# Patient Record
Sex: MALE | Race: WHITE | NOT HISPANIC OR LATINO | Employment: PART TIME | URBAN - METROPOLITAN AREA
[De-identification: names, ages, dates, MRNs, and addresses within clinical notes are randomized per-mention and may not be internally consistent; named-entity substitution may affect disease eponyms.]

---

## 2023-07-25 ENCOUNTER — HOSPITAL ENCOUNTER (OUTPATIENT)
Dept: DATA CONVERSION | Facility: HOSPITAL | Age: 83
End: 2023-07-25
Attending: NEUROLOGICAL SURGERY | Admitting: NEUROLOGICAL SURGERY
Payer: MEDICARE

## 2023-07-25 DIAGNOSIS — G25.0 ESSENTIAL TREMOR: ICD-10-CM

## 2023-07-25 DIAGNOSIS — Z45.42 ENCOUNTER FOR ADJUSTMENT AND MANAGEMENT OF NEUROSTIMULATOR: ICD-10-CM

## 2023-07-25 LAB
POCT GLUCOSE: 148 MG/DL (ref 74–99)
POCT GLUCOSE: 177 MG/DL (ref 74–99)

## 2023-09-14 PROBLEM — R26.9 ABNORMAL GAIT: Status: ACTIVE | Noted: 2023-09-14

## 2023-09-14 PROBLEM — E07.9 THYROID DYSFUNCTION: Status: ACTIVE | Noted: 2023-09-14

## 2023-09-14 PROBLEM — E53.8 VITAMIN B12 DEFICIENCY: Status: ACTIVE | Noted: 2023-09-14

## 2023-09-14 PROBLEM — G25.81 RESTLESS LEGS SYNDROME: Status: ACTIVE | Noted: 2023-09-14

## 2023-09-14 PROBLEM — I25.10 ATHEROSCLEROSIS OF NATIVE CORONARY ARTERY OF NATIVE HEART WITHOUT ANGINA PECTORIS: Status: ACTIVE | Noted: 2023-09-14

## 2023-09-14 PROBLEM — G62.9 PERIPHERAL NEUROPATHY: Status: ACTIVE | Noted: 2023-09-14

## 2023-09-14 PROBLEM — M48.061 LUMBAR STENOSIS: Status: ACTIVE | Noted: 2023-09-14

## 2023-09-14 PROBLEM — D22.9 BENIGN MOLE: Status: ACTIVE | Noted: 2023-09-14

## 2023-09-14 PROBLEM — E11.42 TYPE 2 DIABETES MELLITUS WITH DIABETIC POLYNEUROPATHY (MULTI): Status: ACTIVE | Noted: 2023-09-14

## 2023-09-14 PROBLEM — F17.200 NICOTINE DEPENDENCE: Status: ACTIVE | Noted: 2023-09-14

## 2023-09-14 PROBLEM — G25.0 ESSENTIAL TREMOR: Status: ACTIVE | Noted: 2023-09-14

## 2023-09-14 PROBLEM — L02.214 CUTANEOUS ABSCESS OF GROIN: Status: ACTIVE | Noted: 2023-09-14

## 2023-09-14 PROBLEM — I10 ESSENTIAL (PRIMARY) HYPERTENSION: Status: ACTIVE | Noted: 2023-09-14

## 2023-09-14 PROBLEM — G20.C PARKINSONISM (MULTI): Status: ACTIVE | Noted: 2023-09-14

## 2023-09-14 PROBLEM — K61.0 PERIANAL ABSCESS: Status: ACTIVE | Noted: 2023-09-14

## 2023-09-14 PROBLEM — I48.91 ATRIAL FIBRILLATION (MULTI): Status: ACTIVE | Noted: 2023-09-14

## 2023-09-14 PROBLEM — K61.1 PERIRECTAL ABSCESS: Status: ACTIVE | Noted: 2023-09-14

## 2023-09-14 PROBLEM — R41.3 MEMORY DIFFICULTY: Status: ACTIVE | Noted: 2023-09-14

## 2023-09-14 PROBLEM — E78.2 MIXED HYPERLIPIDEMIA: Status: ACTIVE | Noted: 2023-09-14

## 2023-09-14 RX ORDER — ATORVASTATIN CALCIUM 40 MG/1
1 TABLET, FILM COATED ORAL DAILY
COMMUNITY
End: 2023-10-23 | Stop reason: WASHOUT

## 2023-09-14 RX ORDER — MOMETASONE FUROATE AND FORMOTEROL FUMARATE DIHYDRATE 100; 5 UG/1; UG/1
AEROSOL RESPIRATORY (INHALATION)
COMMUNITY
Start: 2021-07-27

## 2023-09-14 RX ORDER — ATORVASTATIN CALCIUM 80 MG/1
1 TABLET, FILM COATED ORAL DAILY
Status: ON HOLD | COMMUNITY
Start: 2020-11-11 | End: 2024-03-19 | Stop reason: WASHOUT

## 2023-09-14 RX ORDER — FLUOXETINE HYDROCHLORIDE 40 MG/1
1 CAPSULE ORAL EVERY MORNING
COMMUNITY
End: 2024-02-20

## 2023-09-14 RX ORDER — BUDESONIDE AND FORMOTEROL FUMARATE DIHYDRATE 160; 4.5 UG/1; UG/1
AEROSOL RESPIRATORY (INHALATION)
COMMUNITY
End: 2024-03-11

## 2023-09-14 RX ORDER — GABAPENTIN 100 MG/1
CAPSULE ORAL
COMMUNITY
End: 2023-10-23 | Stop reason: DRUGHIGH

## 2023-09-14 RX ORDER — FUROSEMIDE 40 MG/1
40 TABLET ORAL
Status: ON HOLD | COMMUNITY
Start: 2021-11-05 | End: 2024-03-19 | Stop reason: WASHOUT

## 2023-09-14 RX ORDER — IBUPROFEN 600 MG/1
TABLET ORAL
COMMUNITY
Start: 2020-11-18 | End: 2023-10-23 | Stop reason: WASHOUT

## 2023-09-14 RX ORDER — TAMSULOSIN HYDROCHLORIDE 0.4 MG/1
1 CAPSULE ORAL
Status: ON HOLD | COMMUNITY
End: 2024-03-19 | Stop reason: WASHOUT

## 2023-09-14 RX ORDER — TRAZODONE HYDROCHLORIDE 50 MG/1
1 TABLET ORAL NIGHTLY PRN
COMMUNITY
Start: 2013-02-07

## 2023-09-14 RX ORDER — GABAPENTIN 600 MG/1
1 TABLET ORAL 3 TIMES DAILY
COMMUNITY
Start: 2015-10-19 | End: 2024-02-28

## 2023-09-14 RX ORDER — FENOFIBRATE 160 MG/1
160 TABLET ORAL
COMMUNITY
Start: 2012-07-17 | End: 2023-10-23 | Stop reason: WASHOUT

## 2023-09-14 RX ORDER — MONTELUKAST SODIUM 10 MG/1
1 TABLET ORAL EVERY EVENING
Status: ON HOLD | COMMUNITY
End: 2024-03-19 | Stop reason: WASHOUT

## 2023-09-14 RX ORDER — ESOMEPRAZOLE MAGNESIUM 40 MG/1
1 CAPSULE, DELAYED RELEASE ORAL DAILY
COMMUNITY
End: 2023-10-23 | Stop reason: WASHOUT

## 2023-09-14 RX ORDER — APIXABAN 5 MG/1
5 TABLET, FILM COATED ORAL 2 TIMES DAILY
Status: ON HOLD | COMMUNITY
Start: 2023-05-02 | End: 2024-03-19 | Stop reason: WASHOUT

## 2023-09-14 RX ORDER — DAPAGLIFLOZIN 10 MG/1
10 TABLET, FILM COATED ORAL DAILY
COMMUNITY
Start: 2023-01-20 | End: 2023-04-20 | Stop reason: WASHOUT

## 2023-09-14 RX ORDER — CARVEDILOL 3.12 MG/1
1 TABLET ORAL
Status: ON HOLD | COMMUNITY
End: 2024-03-19 | Stop reason: WASHOUT

## 2023-09-14 RX ORDER — NITROGLYCERIN 0.4 MG/1
0.4 TABLET SUBLINGUAL
COMMUNITY
Start: 2013-04-22

## 2023-09-14 RX ORDER — FLUTICASONE PROPIONATE 50 MCG
1 SPRAY, SUSPENSION (ML) NASAL DAILY
COMMUNITY

## 2023-09-14 RX ORDER — METOPROLOL SUCCINATE 50 MG/1
50 TABLET, EXTENDED RELEASE ORAL DAILY
COMMUNITY

## 2023-09-14 RX ORDER — ICOSAPENT ETHYL 1 G/1
CAPSULE ORAL
Status: ON HOLD | COMMUNITY
Start: 2016-10-14 | End: 2024-03-19 | Stop reason: WASHOUT

## 2023-09-14 RX ORDER — DEXTROMETHORPHAN HYDROBROMIDE, GUAIFENESIN 5; 100 MG/5ML; MG/5ML
2 LIQUID ORAL EVERY 8 HOURS
COMMUNITY
End: 2023-10-23 | Stop reason: WASHOUT

## 2023-09-14 RX ORDER — ASCORBIC ACID 500 MG
TABLET ORAL
COMMUNITY
End: 2023-10-23 | Stop reason: WASHOUT

## 2023-09-14 RX ORDER — PRIMIDONE 250 MG/1
1 TABLET ORAL 3 TIMES DAILY
Status: ON HOLD | COMMUNITY
End: 2024-03-19 | Stop reason: WASHOUT

## 2023-09-14 RX ORDER — IPRATROPIUM BROMIDE AND ALBUTEROL 20; 100 UG/1; UG/1
SPRAY, METERED RESPIRATORY (INHALATION)
COMMUNITY
Start: 2020-11-09 | End: 2024-02-22 | Stop reason: SDUPTHER

## 2023-09-14 RX ORDER — OXAPROZIN 600 MG/1
1 TABLET, FILM COATED ORAL 2 TIMES DAILY
Status: ON HOLD | COMMUNITY
End: 2024-03-19 | Stop reason: WASHOUT

## 2023-09-14 RX ORDER — INSULIN DEGLUDEC 200 U/ML
70 INJECTION, SOLUTION SUBCUTANEOUS DAILY
COMMUNITY
End: 2024-01-19

## 2023-09-14 RX ORDER — PRAMIPEXOLE DIHYDROCHLORIDE 0.12 MG/1
TABLET ORAL
COMMUNITY
Start: 2023-07-01

## 2023-09-14 RX ORDER — ISOSORBIDE MONONITRATE 120 MG/1
1 TABLET, EXTENDED RELEASE ORAL DAILY
Status: ON HOLD | COMMUNITY
Start: 2015-07-06 | End: 2024-03-19 | Stop reason: ALTCHOICE

## 2023-09-14 RX ORDER — GLIPIZIDE 5 MG/1
1 TABLET ORAL DAILY
COMMUNITY
End: 2023-10-23 | Stop reason: WASHOUT

## 2023-09-14 RX ORDER — LIRAGLUTIDE 6 MG/ML
INJECTION SUBCUTANEOUS
COMMUNITY
Start: 2020-01-31 | End: 2023-10-23 | Stop reason: WASHOUT

## 2023-09-14 RX ORDER — LISINOPRIL 20 MG/1
1 TABLET ORAL DAILY
COMMUNITY
End: 2023-10-23 | Stop reason: WASHOUT

## 2023-09-14 RX ORDER — ALBUTEROL SULFATE 90 UG/1
AEROSOL, METERED RESPIRATORY (INHALATION)
COMMUNITY
Start: 2017-04-14

## 2023-09-14 RX ORDER — EXENATIDE 250 UG/ML
5 INJECTION SUBCUTANEOUS
COMMUNITY
End: 2023-10-23 | Stop reason: WASHOUT

## 2023-09-14 RX ORDER — LORATADINE 10 MG/1
TABLET ORAL
COMMUNITY
End: 2023-10-23 | Stop reason: WASHOUT

## 2023-09-14 RX ORDER — SPIRONOLACTONE 25 MG/1
0.5 TABLET ORAL DAILY
COMMUNITY

## 2023-09-14 RX ORDER — LOSARTAN POTASSIUM 25 MG/1
TABLET ORAL
COMMUNITY
Start: 2017-04-19 | End: 2023-10-23 | Stop reason: WASHOUT

## 2023-09-14 RX ORDER — VALSARTAN 40 MG/1
1 TABLET ORAL 2 TIMES DAILY
COMMUNITY
End: 2023-10-23 | Stop reason: WASHOUT

## 2023-09-14 RX ORDER — ACETAMINOPHEN 325 MG/1
325 TABLET ORAL
COMMUNITY
End: 2023-10-23 | Stop reason: WASHOUT

## 2023-09-29 VITALS — BODY MASS INDEX: 28.6 KG/M2 | WEIGHT: 205.03 LBS

## 2023-10-02 NOTE — OP NOTE
PROCEDURE DETAILS    Preoperative Diagnosis:  Essential tremor  Postoperative Diagnosis:  Essential tremor  Surgeon: Dr. Jose Magallanes  Resident/Fellow/Other Assistant: Peter Ross    Procedure:  Left deep brain stimulator generator replacement  Anesthesia: LMA  Estimated Blood Loss: 2 cc  Findings: good impedance   Specimens(s) Collected: no,     Complications: none apparent  Implants: Abbot generator  Patient Returned To/Condition: stable, extubated, PACU        Operative Report:   Ramesh Morocho is a 82 years old male with history of essential tremor s/p DBS. He is coming in today for end of life generator. Risks and benefits of the generator  replacement was discussed with him and his wife. Informed consent was obtained.    Patient was identified in the preop and brought back to the operating room. A safety huddle was performed and patient identifiers, operative site, medications and allergies reviewed. Patient was transferred to the operating table. All pressure points  were padded. LMA anesthesia was achieved. Prior left sided battery incision was marked, prepped and draped in sterile fashion. Incision was opened with 15-blade and took down to the level of battery capsule with combination of sharp and blunt dissection  techniques. Capsule was opened and the old battery was removed. Top lead was marked and the battery was disconnected from the leads. Leads were connected to the new generator and secured. Generator was placed in the pocket and secured in place using 2-0  Silk sutures. Impedances were optimal. Incision was copiously irrigated using Irricept and saline. Incision was closed with 2-0 Vicryls in multiple layers and 4-0 Monocryl running subcuticular fashion. Skin was closed with glue.   All counts were correct.  Dr. Magallanes was present for all critical portions of the case.                        Attestation:   Note Completion:  Attending Attestation I was present for key portions of the  procedure and the procedure lasted longer than 5 minutes.    I am a: Resident/Fellow         Electronic Signatures:  Peter Ross (Resident))  (Signed 25-Jul-2023 09:05)   Authored: Post-Operative Note, Chart Review, Note Completion  Jose Magallanes)  (Signed 25-Jul-2023 16:12)   Authored: Note Completion   Co-Signer: Post-Operative Note, Chart Review, Note Completion      Last Updated: 25-Jul-2023 16:12 by Jose Magallanes)

## 2023-10-11 ENCOUNTER — OFFICE VISIT (OUTPATIENT)
Dept: CARDIOLOGY | Facility: CLINIC | Age: 83
End: 2023-10-11
Payer: MEDICARE

## 2023-10-11 ENCOUNTER — HOSPITAL ENCOUNTER (OUTPATIENT)
Dept: CARDIOLOGY | Facility: CLINIC | Age: 83
Discharge: HOME | End: 2023-10-11
Payer: MEDICARE

## 2023-10-11 VITALS
BODY MASS INDEX: 29.69 KG/M2 | WEIGHT: 212.9 LBS | OXYGEN SATURATION: 95 % | DIASTOLIC BLOOD PRESSURE: 73 MMHG | HEART RATE: 70 BPM | SYSTOLIC BLOOD PRESSURE: 114 MMHG

## 2023-10-11 DIAGNOSIS — I48.91 ATRIAL FIBRILLATION, UNSPECIFIED TYPE (MULTI): ICD-10-CM

## 2023-10-11 DIAGNOSIS — I48.91 ATRIAL FIBRILLATION, UNSPECIFIED TYPE (MULTI): Primary | ICD-10-CM

## 2023-10-11 PROCEDURE — 1126F AMNT PAIN NOTED NONE PRSNT: CPT | Performed by: STUDENT IN AN ORGANIZED HEALTH CARE EDUCATION/TRAINING PROGRAM

## 2023-10-11 PROCEDURE — 3074F SYST BP LT 130 MM HG: CPT | Performed by: STUDENT IN AN ORGANIZED HEALTH CARE EDUCATION/TRAINING PROGRAM

## 2023-10-11 PROCEDURE — 99214 OFFICE O/P EST MOD 30 MIN: CPT | Performed by: STUDENT IN AN ORGANIZED HEALTH CARE EDUCATION/TRAINING PROGRAM

## 2023-10-11 PROCEDURE — 3078F DIAST BP <80 MM HG: CPT | Performed by: STUDENT IN AN ORGANIZED HEALTH CARE EDUCATION/TRAINING PROGRAM

## 2023-10-11 PROCEDURE — 93246 EXT ECG>7D<15D RECORDING: CPT

## 2023-10-11 PROCEDURE — 99204 OFFICE O/P NEW MOD 45 MIN: CPT | Performed by: STUDENT IN AN ORGANIZED HEALTH CARE EDUCATION/TRAINING PROGRAM

## 2023-10-11 PROCEDURE — 93005 ELECTROCARDIOGRAM TRACING: CPT

## 2023-10-11 ASSESSMENT — LIFESTYLE VARIABLES
AUDIT-C TOTAL SCORE: 1
SKIP TO QUESTIONS 9-10: 1
HOW MANY STANDARD DRINKS CONTAINING ALCOHOL DO YOU HAVE ON A TYPICAL DAY: 1 OR 2
HOW OFTEN DO YOU HAVE A DRINK CONTAINING ALCOHOL: MONTHLY OR LESS
HOW OFTEN DO YOU HAVE SIX OR MORE DRINKS ON ONE OCCASION: NEVER

## 2023-10-11 ASSESSMENT — ENCOUNTER SYMPTOMS
SHORTNESS OF BREATH: 1
DIZZINESS: 0
CONFUSION: 0
FEVER: 0
PALPITATIONS: 0

## 2023-10-11 ASSESSMENT — PATIENT HEALTH QUESTIONNAIRE - PHQ9
SUM OF ALL RESPONSES TO PHQ9 QUESTIONS 1 & 2: 0
1. LITTLE INTEREST OR PLEASURE IN DOING THINGS: NOT AT ALL
2. FEELING DOWN, DEPRESSED OR HOPELESS: NOT AT ALL

## 2023-10-11 ASSESSMENT — PAIN SCALES - GENERAL: PAINLEVEL: 0-NO PAIN

## 2023-10-11 NOTE — PROGRESS NOTES
Chief Complaint:   No chief complaint on file.     History Of Present Illness:    Ramesh Morocho is a 82 y.o. male referred by Dr. Forbes for afib evaluation and management.  His PMH is significant for CAD s/p CABG (>10years ago), AFIB, s/p DBS. He denies chest pain, chest pressure, chest heaviness. The patient complains of ACKERMAN (NYHA Class II) and unsteady gait. The unsteady gait does bother him considerably.     His ECG today shows sinus arrhythmia with mean HR of 73 bpm.      Last Recorded Vitals:  Vitals:    10/11/23 1547   BP: 114/73   BP Location: Left arm   Patient Position: Sitting   BP Cuff Size: Large adult   Pulse: 70   SpO2: 95%   Weight: 96.6 kg (212 lb 14.4 oz)       Past Medical History:  He has a past medical history of Essential tremor (09/06/2013), Obstructive sleep apnea (adult) (pediatric) (11/20/2014), Old myocardial infarction (11/20/2014), Other intervertebral disc degeneration, lumbar region (11/20/2014), Personal history of other diseases of the circulatory system (11/20/2014), Personal history of other diseases of the digestive system (11/20/2014), Personal history of other diseases of the musculoskeletal system and connective tissue (11/20/2014), Personal history of other diseases of the respiratory system (11/20/2014), Personal history of other endocrine, nutritional and metabolic disease (11/20/2014), Personal history of other endocrine, nutritional and metabolic disease (11/20/2014), and Personal history of other mental and behavioral disorders (07/14/2017).    Past Surgical History:  He has a past surgical history that includes Coronary artery bypass graft (11/20/2014).      Social History:  He reports that he has been smoking cigarettes. He has been smoking an average of 1 pack per day. He does not have any smokeless tobacco history on file. He reports that he does not currently use alcohol. He reports that he does not use drugs.    Family History:  Family History   Problem Relation  Name Age of Onset    Other (blood disease) Brother          Allergies:  Metformin    Outpatient Medications:  Current Outpatient Medications   Medication Instructions    acetaminophen (Tylenol 8 HOUR) 650 mg ER tablet 2 tablets, oral, Every 8 hours    acetaminophen (TYLENOL) 325 mg, oral    albuterol (ProAir HFA) 90 mcg/actuation inhaler inhalation    apixaban (Eliquis) 2.5 mg tablet 1 tablet, oral, 2 times daily, With or without food<BR>    ascorbic acid (Vitamin C) 500 mg tablet oral    aspirin (ASPIR-81 ORAL) 1 tablet, oral, Daily    atorvastatin (Lipitor) 40 mg tablet 1 tablet, oral, Daily    atorvastatin (Lipitor) 80 mg tablet 1 tablet, oral, Daily    budesonide-formoteroL (Symbicort) 160-4.5 mcg/actuation inhaler inhalation    Byetta 5 mcg, subcutaneous, 2 times daily before meals    carvedilol (Coreg) 3.125 mg tablet 1 tablet, oral, 2 times daily with meals    Eliquis 5 mg, oral, 2 times daily    esomeprazole (NexIUM) 40 mg DR capsule 1 capsule, oral, Daily    fenofibrate (TRIGLIDE) 160 mg, oral    FLUoxetine (PROzac) 40 mg capsule 1 capsule, oral, Every morning    fluticasone (Flonase) 50 mcg/actuation nasal spray 1 spray, Each Nostril, Daily    furosemide (LASIX) 40 mg, oral    gabapentin (Neurontin) 100 mg capsule As directed oral    gabapentin (Neurontin) 600 mg tablet 1 tablet, oral, 3 times daily    glipiZIDE (Glucotrol) 5 mg tablet 1 tablet, oral, Daily    ibuprofen 600 mg tablet oral    icosapent ethyL (Vascepa) 1 gram capsule oral    ipratropium-albuteroL (Combivent Respimat)  mcg/actuation inhaler inhalation    isosorbide mononitrate ER (Imdur) 120 mg 24 hr tablet 1 tablet, oral, Daily    liraglutide (Victoza 2-Vicente) 0.6 mg/0.1 mL (18 mg/3 mL) injection subcutaneous    lisinopril 20 mg tablet 1 tablet, oral, Daily    loratadine (Claritin) 10 mg tablet oral    losartan (Cozaar) 25 mg tablet oral    metformin HCl (METFORMIN ORAL) as directed Orally    metoprolol succinate XL (TOPROL-XL) 50 mg,  oral, Daily    mometasone-formoterol (Dulera) 100-5 mcg/actuation inhaler inhalation    montelukast (Singulair) 10 mg tablet 1 tablet, oral, Every evening    MULTIVITAMIN ORAL 1 tablet, oral, Daily    nitroglycerin (NITROSTAT) 0.4 mg, sublingual, As needed    oxaprozin (Daypro) 600 mg tablet 1 tablet, oral, 2 times daily    pramipexole (Mirapex) 0.125 mg tablet 1 TABLET IN AM AND MIDDAY, 2-4 PILLS AT BEDTIME ORALLY AS DIRECTED <BR>    primidone (Mysoline) 250 mg tablet 1 tablet, oral, 3 times daily    sacubitriL-valsartan (Entresto) 24-26 mg tablet 1 tablet, oral, 2 times daily    spironolactone (Aldactone) 25 mg tablet 0.5 tablets, oral, Daily    tamsulosin (Flomax) 0.4 mg 24 hr capsule 1 capsule, oral, 30 minutes after the same meal each day Orally Once a day    traZODone (Desyrel) 50 mg tablet 1 tablet, oral, Nightly PRN    Tresiba FlexTouch U-200 70 Units, subcutaneous, Daily    valsartan (Diovan) 40 mg tablet 1 tablet, oral, 2 times daily       Review of Systems   Constitutional:  Negative for fever.   Respiratory:  Positive for shortness of breath.    Cardiovascular:  Negative for chest pain, palpitations and leg swelling.   Musculoskeletal:  Positive for gait problem.   Neurological:  Negative for dizziness and syncope.   Psychiatric/Behavioral:  Negative for confusion.       Physical Exam  Constitutional:       Appearance: Normal appearance.   Cardiovascular:      Rate and Rhythm: Normal rate and regular rhythm.      Heart sounds: No murmur heard.     No friction rub. No gallop.   Pulmonary:      Effort: Pulmonary effort is normal.      Breath sounds: Normal breath sounds.   Abdominal:      Palpations: Abdomen is soft.   Musculoskeletal:      Cervical back: Neck supple.   Neurological:      Mental Status: He is alert.   Psychiatric:         Mood and Affect: Mood normal.         Behavior: Behavior normal.           Last Labs:  CBC -  Lab Results   Component Value Date    WBC 8.8 07/21/2023    HGB 16.6  07/21/2023    HCT 50.3 07/21/2023    MCV 96 07/21/2023     07/21/2023       CMP -  Lab Results   Component Value Date    CALCIUM 9.4 07/21/2023    PHOS 3.4 05/20/2018    PROT 6.3 05/30/2023    ALBUMIN 3.5 05/30/2023    AST 56 (H) 05/30/2023    ALT 67 (H) 05/30/2023    ALKPHOS 74 05/30/2023    BILITOT 0.3 05/30/2023       LIPID PANEL -   Lab Results   Component Value Date    CHOL 187 03/02/2023    TRIG 199 (H) 03/02/2023    HDL 28 (L) 03/02/2023    CHHDL 6.7 03/02/2023       RENAL FUNCTION PANEL -   Lab Results   Component Value Date    GLUCOSE 138 (H) 07/21/2023     07/21/2023    K 4.8 07/21/2023     07/21/2023    CO2 25 07/21/2023    ANIONGAP 16 07/21/2023    BUN 10 07/21/2023    CREATININE 0.76 07/21/2023    GFRMALE 90 07/21/2023    CALCIUM 9.4 07/21/2023    PHOS 3.4 05/20/2018    ALBUMIN 3.5 05/30/2023        Lab Results   Component Value Date    HGBA1C 7.8 (A) 07/21/2023         Assessment/Plan     I explained to the patient that we may be able to help with his ACKERMAN but very unlikely we will be able to help with his unsteady gait. The patient said that his ACKERMAN does not bother him as much as the unsteady gait. Will order event monitor. He is scheduled to have an echocardiogram with Dr. Forbes this month.     Christopher Beard MD

## 2023-10-17 ENCOUNTER — HOSPITAL ENCOUNTER (OUTPATIENT)
Dept: CARDIOLOGY | Facility: HOSPITAL | Age: 83
Discharge: HOME | End: 2023-10-17
Payer: MEDICARE

## 2023-10-17 DIAGNOSIS — I25.10 ATHEROSCLEROTIC HEART DISEASE OF NATIVE CORONARY ARTERY WITHOUT ANGINA PECTORIS: ICD-10-CM

## 2023-10-17 PROCEDURE — 93306 TTE W/DOPPLER COMPLETE: CPT

## 2023-10-18 LAB — EJECTION FRACTION APICAL 4 CHAMBER: 39.5

## 2023-10-19 ENCOUNTER — TELEPHONE (OUTPATIENT)
Dept: ENDOCRINOLOGY | Facility: CLINIC | Age: 83
End: 2023-10-19
Payer: MEDICARE

## 2023-10-19 NOTE — TELEPHONE ENCOUNTER
I left a msg for Pt to see if he can come in at 2:30pm, instead of 4pm. I spoke to the Pt's wife, she states that 2:30pm tomorrow will work for the Pt.

## 2023-10-20 ENCOUNTER — OFFICE VISIT (OUTPATIENT)
Dept: ENDOCRINOLOGY | Facility: CLINIC | Age: 83
End: 2023-10-20
Payer: MEDICARE

## 2023-10-20 VITALS
SYSTOLIC BLOOD PRESSURE: 112 MMHG | BODY MASS INDEX: 30.27 KG/M2 | HEART RATE: 90 BPM | WEIGHT: 217 LBS | DIASTOLIC BLOOD PRESSURE: 65 MMHG

## 2023-10-20 DIAGNOSIS — E78.2 MIXED HYPERLIPIDEMIA: ICD-10-CM

## 2023-10-20 DIAGNOSIS — I10 ESSENTIAL (PRIMARY) HYPERTENSION: ICD-10-CM

## 2023-10-20 DIAGNOSIS — E11.42 DIABETIC POLYNEUROPATHY ASSOCIATED WITH TYPE 2 DIABETES MELLITUS (MULTI): Primary | ICD-10-CM

## 2023-10-20 LAB — POC HEMOGLOBIN A1C: 9.2 % (ref 4.2–6.5)

## 2023-10-20 PROCEDURE — 3078F DIAST BP <80 MM HG: CPT | Performed by: NURSE PRACTITIONER

## 2023-10-20 PROCEDURE — 1159F MED LIST DOCD IN RCRD: CPT | Performed by: NURSE PRACTITIONER

## 2023-10-20 PROCEDURE — 99214 OFFICE O/P EST MOD 30 MIN: CPT | Performed by: NURSE PRACTITIONER

## 2023-10-20 PROCEDURE — 83036 HEMOGLOBIN GLYCOSYLATED A1C: CPT | Performed by: NURSE PRACTITIONER

## 2023-10-20 PROCEDURE — 1126F AMNT PAIN NOTED NONE PRSNT: CPT | Performed by: NURSE PRACTITIONER

## 2023-10-20 PROCEDURE — 3074F SYST BP LT 130 MM HG: CPT | Performed by: NURSE PRACTITIONER

## 2023-10-20 PROCEDURE — 1160F RVW MEDS BY RX/DR IN RCRD: CPT | Performed by: NURSE PRACTITIONER

## 2023-10-20 ASSESSMENT — PATIENT HEALTH QUESTIONNAIRE - PHQ9
1. LITTLE INTEREST OR PLEASURE IN DOING THINGS: NOT AT ALL
2. FEELING DOWN, DEPRESSED OR HOPELESS: NOT AT ALL
SUM OF ALL RESPONSES TO PHQ9 QUESTIONS 1 & 2: 0

## 2023-10-20 ASSESSMENT — PAIN SCALES - GENERAL: PAINLEVEL: 0-NO PAIN

## 2023-10-20 NOTE — PROGRESS NOTES
HPI:    Here for follow up/metabolic management 83 yo with DM Type 2 + polyneuropathy, diagnosed in his 50s. History CAD with CABG, HLD, HTN, CHF, current smoker. Current A1C 9.2% was 8.3%. Patient testing sugars 4 times day. Not sticking to a carbohrdrate diet, knows reasonable carb allowances. Patient not able to afford tall their medications. Patient is rarely exercising.           -CGM Tricia 2 with sensor. Currently on insulin checking BS at least 4 xs a day adjustments made based on readings/frequent visits to manage.  -INSULIN Tresiba 50 units Humalog 10 units base was given ISF 50>150 but he did not use  -Metformin 1gram GLP1 cannot afford SGLT2 Farxiga TZD history HF  -HTN Entresto, Isosobide, Metoprolol, Spironolactone, daily at goal  -STATIN Atorvastatin high dose LDL           Tricia report downloaded and attached, time in target 42%, lows 0%. Avg blood sugar 211. Post prandial spike after breakfast and dinner high 200s to low 300s. Wakes up 130-150.      Current Outpatient Medications:     albuterol (ProAir HFA) 90 mcg/actuation inhaler, Inhale., Disp: , Rfl:     aspirin (ASPIR-81 ORAL), Take 1 tablet by mouth once daily., Disp: , Rfl:     budesonide-formoteroL (Symbicort) 160-4.5 mcg/actuation inhaler, Inhale., Disp: , Rfl:     carvedilol (Coreg) 3.125 mg tablet, Take 1 tablet (3.125 mg) by mouth 2 times a day with meals., Disp: , Rfl:     Eliquis 5 mg tablet, Take 1 tablet (5 mg) by mouth 2 times a day., Disp: , Rfl:     pramipexole (Mirapex) 0.125 mg tablet, 1 TABLET IN AM AND MIDDAY, 2-4 PILLS AT BEDTIME ORALLY AS DIRECTED, Disp: , Rfl:     primidone (Mysoline) 250 mg tablet, Take 1 tablet (250 mg) by mouth 3 times a day., Disp: , Rfl:     sacubitriL-valsartan (Entresto) 24-26 mg tablet, Take 1 tablet by mouth 2 times a day., Disp: , Rfl:     atorvastatin (Lipitor) 80 mg tablet, Take 1 tablet (80 mg) by mouth once daily., Disp: , Rfl:     FLUoxetine (PROzac) 40 mg capsule, Take 1 capsule (40 mg) by  mouth once daily in the morning., Disp: , Rfl:     fluticasone (Flonase) 50 mcg/actuation nasal spray, Administer 1 spray into each nostril once daily., Disp: , Rfl:     furosemide (Lasix) 40 mg tablet, Take 1 tablet (40 mg) by mouth., Disp: , Rfl:     gabapentin (Neurontin) 600 mg tablet, Take 1 tablet (600 mg) by mouth 3 times a day., Disp: , Rfl:     icosapent ethyL (Vascepa) 1 gram capsule, Take by mouth., Disp: , Rfl:     insulin lispro (HumaLOG KwikPen Insulin) 100 unit/mL injection, Inject 10 Units under the skin 3 times a day with meals. Take as directed per insulin instructions., Disp: , Rfl:     ipratropium-albuteroL (Combivent Respimat)  mcg/actuation inhaler, Inhale., Disp: , Rfl:     isosorbide mononitrate ER (Imdur) 120 mg 24 hr tablet, Take 1 tablet (120 mg) by mouth once daily., Disp: , Rfl:     metformin HCl (METFORMIN ORAL), as directed Orally, Disp: , Rfl:     metoprolol succinate XL (Toprol-XL) 50 mg 24 hr tablet, Take 1 tablet (50 mg) by mouth once daily., Disp: , Rfl:     mometasone-formoterol (Dulera) 100-5 mcg/actuation inhaler, Inhale., Disp: , Rfl:     montelukast (Singulair) 10 mg tablet, Take 1 tablet (10 mg) by mouth once daily in the evening., Disp: , Rfl:     MULTIVITAMIN ORAL, Take 1 tablet by mouth once daily., Disp: , Rfl:     nitroglycerin (Nitrostat) 0.4 mg SL tablet, Place 1 tablet (0.4 mg) under the tongue. As needed, Disp: , Rfl:     oxaprozin (Daypro) 600 mg tablet, Take 1 tablet (600 mg) by mouth 2 times a day., Disp: , Rfl:     spironolactone (Aldactone) 25 mg tablet, Take 0.5 tablets (12.5 mg) by mouth once daily., Disp: , Rfl:     tamsulosin (Flomax) 0.4 mg 24 hr capsule, Take 1 capsule (0.4 mg) by mouth. 30 minutes after the same meal each day Orally Once a day, Disp: , Rfl:     traZODone (Desyrel) 50 mg tablet, Take 1 tablet (50 mg) by mouth as needed at bedtime., Disp: , Rfl:     Tresiba FlexTouch U-200 200 unit/mL (3 mL) injection, Inject 70 Units under the skin  once daily., Disp: , Rfl:     /65 (BP Location: Left arm, Patient Position: Sitting)   Pulse 90   Wt 98.4 kg (217 lb)   BMI 30.27 kg/m²      Past Medical History:   Diagnosis Date    Essential tremor 09/06/2013    Benign familial tremor    Obstructive sleep apnea (adult) (pediatric) 11/20/2014    BRITT on CPAP    Old myocardial infarction 11/20/2014    History of myocardial infarction    Other intervertebral disc degeneration, lumbar region 11/20/2014    Degenerative disc disease, lumbar    Personal history of other diseases of the circulatory system 11/20/2014    History of coronary atherosclerosis    Personal history of other diseases of the digestive system 11/20/2014    History of gastroesophageal reflux (GERD)    Personal history of other diseases of the musculoskeletal system and connective tissue 11/20/2014    History of arthritis    Personal history of other diseases of the respiratory system 11/20/2014    History of asthma    Personal history of other endocrine, nutritional and metabolic disease 11/20/2014    History of diabetes mellitus    Personal history of other endocrine, nutritional and metabolic disease 11/20/2014    History of diabetic neuropathy    Personal history of other mental and behavioral disorders 07/14/2017    History of depression          ROS:      CARDIOLOGY:          Lightheadedness denies.  Chest pain denies.  Leg edema denies.  Palpitations denies.         RESPIRATORY:          Cough denies.  Shortness of breath denies.  Wheezing denies.         GASTROENTEROLOGY:          Abdominal pain denies.  Constipation denies.  Diarrhea denies.  Heartburn denies.         ENDOCRINOLOGY:          Cold intolerance denies.  Excessive sweating denies.  Heat intolerance denies.  Tremulousness denies.         NEUROLOGY:          Burning pain in feet denies.  Burning pain in hands denies.         PSYCHOLOGY:          Low energy denies.  Irritability denies.  Sleep disturbances denies.            Examination:     General Examination:         GENERAL APPEARANCE:  Pleasant and cooperative,No Acute Distress.          NECK: no lymphadenopathy,no thyromegaly, no dominant thyroid nodules.          HEART: no murmurs, click or rubs, regular rate and rhythm, normal S1S2.          LUNGS: clear to auscultation bilaterally, no wheezes/rhonchi/rales.          EXTREMITIES: no edema, 2+ pulses           Lab Results   Component Value Date    TSH 0.65 03/24/2020       Chemistry    Lab Results   Component Value Date/Time     07/21/2023 1142    K 4.8 07/21/2023 1142     07/21/2023 1142    CO2 25 07/21/2023 1142    BUN 10 07/21/2023 1142    CREATININE 0.76 07/21/2023 1142    Lab Results   Component Value Date/Time    CALCIUM 9.4 07/21/2023 1142    ALKPHOS 74 05/30/2023 1110    AST 56 (H) 05/30/2023 1110    ALT 67 (H) 05/30/2023 1110    BILITOT 0.3 05/30/2023 1110          Lab Results   Component Value Date    WBC 8.8 07/21/2023    HGB 16.6 07/21/2023    HCT 50.3 07/21/2023    MCV 96 07/21/2023     07/21/2023     Lab Results   Component Value Date    HGBA1C 9.2 (A) 10/20/2023       1. Diabetic polyneuropathy associated with type 2 diabetes mellitus (CMS/[facility])  -using base but did not use ISF  -waking BS under 200  -last visit about 6 weeks ago  -scale given again pin writing, he will use    2. Type 2 diabetes mellitus with diabetic polyneuropathy, with long-term current use of insulin (CMS/[facility])  -continue basal/bolus    3. Mixed hyperlipidemia  -LDL target < 7o  -tolerates statin    4. Essential (primary) hypertension  -stable      Follow up CNP 3 months      -previous labs and notes reviewed  -labs/tests mentioned in above note reviewed and interpreted  -reviewed and counseled on medication monitoring and side effects.

## 2023-10-20 NOTE — PATIENT INSTRUCTIONS
Blood Sugar.................Units  ........................12  151-200......................14  201-250......................16  251-300......................18  301-350......................20  351-400......................22  401 or greater..........24

## 2023-10-23 PROBLEM — K61.0 PERIANAL ABSCESS: Status: RESOLVED | Noted: 2023-09-14 | Resolved: 2023-10-23

## 2023-10-23 PROBLEM — K61.1 PERIRECTAL ABSCESS: Status: RESOLVED | Noted: 2023-09-14 | Resolved: 2023-10-23

## 2023-10-23 PROBLEM — L02.214 CUTANEOUS ABSCESS OF GROIN: Status: RESOLVED | Noted: 2023-09-14 | Resolved: 2023-10-23

## 2023-10-23 RX ORDER — INSULIN LISPRO 100 [IU]/ML
10 INJECTION, SOLUTION INTRAVENOUS; SUBCUTANEOUS
COMMUNITY

## 2023-10-25 ENCOUNTER — APPOINTMENT (OUTPATIENT)
Dept: CARDIOLOGY | Facility: CLINIC | Age: 83
End: 2023-10-25
Payer: MEDICARE

## 2023-11-06 ENCOUNTER — LAB (OUTPATIENT)
Dept: LAB | Facility: LAB | Age: 83
End: 2023-11-06
Payer: MEDICARE

## 2023-11-06 DIAGNOSIS — I25.10 ATHEROSCLEROTIC HEART DISEASE OF NATIVE CORONARY ARTERY WITHOUT ANGINA PECTORIS: Primary | ICD-10-CM

## 2023-11-06 LAB
ALBUMIN SERPL-MCNC: 3.7 G/DL (ref 3.5–5)
ALP BLD-CCNC: 77 U/L (ref 35–125)
ALT SERPL-CCNC: 26 U/L (ref 5–40)
ANION GAP SERPL CALC-SCNC: 12 MMOL/L
AST SERPL-CCNC: 21 U/L (ref 5–40)
BILIRUB SERPL-MCNC: 0.4 MG/DL (ref 0.1–1.2)
BUN SERPL-MCNC: 11 MG/DL (ref 8–25)
CALCIUM SERPL-MCNC: 9.4 MG/DL (ref 8.5–10.4)
CHLORIDE SERPL-SCNC: 98 MMOL/L (ref 97–107)
CO2 SERPL-SCNC: 26 MMOL/L (ref 24–31)
CREAT SERPL-MCNC: 0.9 MG/DL (ref 0.4–1.6)
GFR SERPL CREATININE-BSD FRML MDRD: 86 ML/MIN/1.73M*2
GLUCOSE SERPL-MCNC: 301 MG/DL (ref 65–99)
POTASSIUM SERPL-SCNC: 4.8 MMOL/L (ref 3.4–5.1)
PROT SERPL-MCNC: 5.8 G/DL (ref 5.9–7.9)
SODIUM SERPL-SCNC: 136 MMOL/L (ref 133–145)

## 2023-11-06 PROCEDURE — 36415 COLL VENOUS BLD VENIPUNCTURE: CPT

## 2023-11-06 PROCEDURE — 80053 COMPREHEN METABOLIC PANEL: CPT

## 2023-11-10 LAB
ATRIAL RATE: 73 BPM
P AXIS: 74 DEGREES
P OFFSET: 145 MS
P ONSET: 125 MS
PR INTERVAL: 192 MS
Q ONSET: 221 MS
QRS COUNT: 11 BEATS
QRS DURATION: 90 MS
QT INTERVAL: 394 MS
QTC CALCULATION(BAZETT): 434 MS
QTC FREDERICIA: 420 MS
R AXIS: 62 DEGREES
T AXIS: 118 DEGREES
T OFFSET: 418 MS
VENTRICULAR RATE: 73 BPM

## 2023-11-14 ENCOUNTER — TELEPHONE (OUTPATIENT)
Dept: PRIMARY CARE | Facility: CLINIC | Age: 83
End: 2023-11-14
Payer: MEDICARE

## 2023-11-14 NOTE — TELEPHONE ENCOUNTER
This is filled by his neurologist. Called patient to inform him to request their office. Wife informed and will let him know.

## 2023-11-28 ENCOUNTER — APPOINTMENT (OUTPATIENT)
Dept: ENDOCRINOLOGY | Facility: CLINIC | Age: 83
End: 2023-11-28
Payer: MEDICARE

## 2023-11-30 ENCOUNTER — OFFICE VISIT (OUTPATIENT)
Dept: ENDOCRINOLOGY | Facility: CLINIC | Age: 83
End: 2023-11-30
Payer: MEDICARE

## 2023-11-30 VITALS
HEART RATE: 90 BPM | DIASTOLIC BLOOD PRESSURE: 71 MMHG | BODY MASS INDEX: 30.35 KG/M2 | WEIGHT: 217.6 LBS | SYSTOLIC BLOOD PRESSURE: 119 MMHG

## 2023-11-30 DIAGNOSIS — E11.42 TYPE 2 DIABETES MELLITUS WITH DIABETIC POLYNEUROPATHY, WITH LONG-TERM CURRENT USE OF INSULIN (MULTI): Primary | ICD-10-CM

## 2023-11-30 DIAGNOSIS — I10 ESSENTIAL (PRIMARY) HYPERTENSION: ICD-10-CM

## 2023-11-30 DIAGNOSIS — E78.2 MIXED HYPERLIPIDEMIA: ICD-10-CM

## 2023-11-30 DIAGNOSIS — Z79.4 TYPE 2 DIABETES MELLITUS WITH DIABETIC POLYNEUROPATHY, WITH LONG-TERM CURRENT USE OF INSULIN (MULTI): Primary | ICD-10-CM

## 2023-11-30 PROCEDURE — 95251 CONT GLUC MNTR ANALYSIS I&R: CPT | Performed by: NURSE PRACTITIONER

## 2023-11-30 PROCEDURE — 1126F AMNT PAIN NOTED NONE PRSNT: CPT | Performed by: NURSE PRACTITIONER

## 2023-11-30 PROCEDURE — 4004F PT TOBACCO SCREEN RCVD TLK: CPT | Performed by: NURSE PRACTITIONER

## 2023-11-30 PROCEDURE — 1160F RVW MEDS BY RX/DR IN RCRD: CPT | Performed by: NURSE PRACTITIONER

## 2023-11-30 PROCEDURE — 99213 OFFICE O/P EST LOW 20 MIN: CPT | Performed by: NURSE PRACTITIONER

## 2023-11-30 PROCEDURE — 3074F SYST BP LT 130 MM HG: CPT | Performed by: NURSE PRACTITIONER

## 2023-11-30 PROCEDURE — 1159F MED LIST DOCD IN RCRD: CPT | Performed by: NURSE PRACTITIONER

## 2023-11-30 PROCEDURE — 3078F DIAST BP <80 MM HG: CPT | Performed by: NURSE PRACTITIONER

## 2023-11-30 RX ORDER — INSULIN LISPRO-AABC 100 [IU]/ML
INJECTION, SOLUTION SUBCUTANEOUS
Qty: 75 ML | Refills: 3 | Status: SHIPPED | OUTPATIENT
Start: 2023-11-30

## 2023-11-30 ASSESSMENT — PAIN SCALES - GENERAL: PAINLEVEL: 0-NO PAIN

## 2023-11-30 ASSESSMENT — ENCOUNTER SYMPTOMS: DEPRESSION: 0

## 2023-11-30 NOTE — PROGRESS NOTES
HPI:  Here for follow up/metabolic management 84 yo with DM Type 2 + polyneuropathy, diagnosed in his 50s. History CAD with CABG, HLD, HTN, CHF, current smoker. Current A1C 8.3% was 7.1% Patient testing sugars 4 times day. Not sticking to a carbohrdrate diet, knows reasonable carb allowances. Patient not able to afford tall their medications. Patient is rarely exercising.  Was started on pre meal insulin with scale last visit, here to see if he is getting better glycemic control.          -CGM Tricia 2 with sensor. Currently on insulin checking BS at least 4 xs a day adjustments made based on readings/frequent visits to manage.  -INSULIN Tresiba 50 ISF   -Metformin no GLP1 cannot afford SGLT2 Farxiga TZD history HF  -HTN Entresto, Isosobide, Metoprolol, Spironolactone, daily at goal   -STATIN Atorvastatin high dose LDL    Tricia report downloaded and attached, time in target 42%, lows 0%. Avg blood sugar 211. Post prandial spike after breakfast and dinner high 200s to low 300s. Wakes up 130-150.    /71 (BP Location: Right arm, Patient Position: Sitting)   Pulse 90   Wt 98.7 kg (217 lb 9.6 oz)   BMI 30.35 kg/m²     Labs:  Lab Results   Component Value Date    WBC 8.8 07/21/2023    NRBC 0.0 07/21/2023    RBC 5.22 07/21/2023    HGB 16.6 07/21/2023    HCT 50.3 07/21/2023     07/21/2023     Lab Results   Component Value Date    CALCIUM 9.4 11/06/2023    AST 21 11/06/2023    ALKPHOS 77 11/06/2023    BILITOT 0.4 11/06/2023    PROT 5.8 (L) 11/06/2023    ALBUMIN 3.7 11/06/2023    GLOB 2.8 05/30/2023    AGR 1.3 (L) 05/30/2023     11/06/2023    K 4.8 11/06/2023    CL 98 11/06/2023    CO2 26 11/06/2023    ANIONGAP 12 11/06/2023    BUN 11 11/06/2023    CREATININE 0.90 11/06/2023    UREACREAUR 10.0 05/30/2023    GLUCOSE 301 (H) 11/06/2023    ALT 26 11/06/2023    EGFR 86 11/06/2023     Lab Results   Component Value Date    CHOL 187 03/02/2023    TRIG 199 (H) 03/02/2023    HDL 28 (L) 03/02/2023    LDLCALC  119 03/02/2023     Lab Results   Component Value Date    MICROALBCREA 9.4 03/02/2023     Lab Results   Component Value Date    TSH 0.65 03/24/2020     Lab Results   Component Value Date    QFVEBFUT89 454 11/22/2022     Lab Results   Component Value Date    HGBA1C 9.2 (A) 10/20/2023         Current Outpatient Medications:     albuterol (ProAir HFA) 90 mcg/actuation inhaler, Inhale., Disp: , Rfl:     aspirin (ASPIR-81 ORAL), Take 1 tablet by mouth once daily., Disp: , Rfl:     atorvastatin (Lipitor) 80 mg tablet, Take 1 tablet (80 mg) by mouth once daily., Disp: , Rfl:     budesonide-formoteroL (Symbicort) 160-4.5 mcg/actuation inhaler, Inhale., Disp: , Rfl:     carvedilol (Coreg) 3.125 mg tablet, Take 1 tablet (3.125 mg) by mouth 2 times a day with meals., Disp: , Rfl:     Eliquis 5 mg tablet, Take 1 tablet (5 mg) by mouth 2 times a day., Disp: , Rfl:     FLUoxetine (PROzac) 40 mg capsule, Take 1 capsule (40 mg) by mouth once daily in the morning., Disp: , Rfl:     fluticasone (Flonase) 50 mcg/actuation nasal spray, Administer 1 spray into each nostril once daily., Disp: , Rfl:     furosemide (Lasix) 40 mg tablet, Take 1 tablet (40 mg) by mouth., Disp: , Rfl:     gabapentin (Neurontin) 600 mg tablet, Take 1 tablet (600 mg) by mouth 3 times a day., Disp: , Rfl:     icosapent ethyL (Vascepa) 1 gram capsule, Take by mouth., Disp: , Rfl:     insulin lispro (HumaLOG KwikPen Insulin) 100 unit/mL injection, Inject 10 Units under the skin 3 times a day with meals. Take as directed per insulin instructions., Disp: , Rfl:     insulin lispro-aabc (Lyumjev KwikPen U-100 Insulin) 100 unit/mL insulin pen, Up to 80 units daily, Disp: 75 mL, Rfl: 3    ipratropium-albuteroL (Combivent Respimat)  mcg/actuation inhaler, Inhale., Disp: , Rfl:     isosorbide mononitrate ER (Imdur) 120 mg 24 hr tablet, Take 1 tablet (120 mg) by mouth once daily., Disp: , Rfl:     metformin HCl (METFORMIN ORAL), as directed Orally, Disp: , Rfl:      metoprolol succinate XL (Toprol-XL) 50 mg 24 hr tablet, Take 1 tablet (50 mg) by mouth once daily., Disp: , Rfl:     mometasone-formoterol (Dulera) 100-5 mcg/actuation inhaler, Inhale., Disp: , Rfl:     montelukast (Singulair) 10 mg tablet, Take 1 tablet (10 mg) by mouth once daily in the evening., Disp: , Rfl:     MULTIVITAMIN ORAL, Take 1 tablet by mouth once daily., Disp: , Rfl:     nitroglycerin (Nitrostat) 0.4 mg SL tablet, Place 1 tablet (0.4 mg) under the tongue. As needed, Disp: , Rfl:     oxaprozin (Daypro) 600 mg tablet, Take 1 tablet (600 mg) by mouth 2 times a day., Disp: , Rfl:     pramipexole (Mirapex) 0.125 mg tablet, 1 TABLET IN AM AND MIDDAY, 2-4 PILLS AT BEDTIME ORALLY AS DIRECTED, Disp: , Rfl:     primidone (Mysoline) 250 mg tablet, Take 1 tablet (250 mg) by mouth 3 times a day., Disp: , Rfl:     sacubitriL-valsartan (Entresto) 24-26 mg tablet, Take 1 tablet by mouth 2 times a day., Disp: , Rfl:     spironolactone (Aldactone) 25 mg tablet, Take 0.5 tablets (12.5 mg) by mouth once daily., Disp: , Rfl:     tamsulosin (Flomax) 0.4 mg 24 hr capsule, Take 1 capsule (0.4 mg) by mouth. 30 minutes after the same meal each day Orally Once a day, Disp: , Rfl:     traZODone (Desyrel) 50 mg tablet, Take 1 tablet (50 mg) by mouth as needed at bedtime., Disp: , Rfl:     Tresiba FlexTouch U-200 200 unit/mL (3 mL) injection, Inject 70 Units under the skin once daily., Disp: , Rfl:     Review of Systems:  Cardiology: Lightheadedness-denies.  Chest pain-denies.  Leg edema-denies.  Palpitations-denies.  Respiratory: Cough-denies. Shortness of breath-denies.  Wheezing-denies.  Gastroenterology: Constipation-denies.  Diarrhea-denies.  Heartburn-denies.  Endocrinology: Cold intolerance-denies.  Heat intolerance-denies.  Sweats-denies.  Neurology: Headache-denies.  Tremor-denies.  Neuropathy in extremities-denies.  Psychology: Low energy-denies.  Irritability-denies.  Sleep disturbances-denies.    Physical  Exam:  General Appearance: pleasant, cooperative, no acute distress  HEENT: no chemosis, no proptosis, no lid lag, no lid retraction  Neck: no lymphadenopathy, no thyromegaly, no dominant thyroid nodules  Heart: no murmurs, regular rate and rhythm, S1 and S2  Lungs: no wheezes, no rhonci, no rales  Extremities: no lower extremity swelling    Assessment and Plan:  1. Type 2 diabetes mellitus with diabetic polyneuropathy, with long-term current use of insulin (CMS/[facility])  -admits to forgetting to pre dose with meal time insulin  -Tricia report continue to show post prandial spikes with BS mostly above target  -will see if lyumjev or lispo is covered to help with missing/late dosing  -reviewed that he takes lispro with food not 15 minutes before  -using up to 80 units meal time insulin    2. Essential (primary) hypertension  -stable     Follow Up: CNP 3 months      -labs/tests/notes reviewed  -reviewed and counseled patient on medication monitoring and side effects

## 2023-12-13 ENCOUNTER — OFFICE VISIT (OUTPATIENT)
Dept: CARDIOLOGY | Facility: CLINIC | Age: 83
End: 2023-12-13
Payer: MEDICARE

## 2023-12-13 VITALS
OXYGEN SATURATION: 93 % | BODY MASS INDEX: 30.94 KG/M2 | DIASTOLIC BLOOD PRESSURE: 68 MMHG | HEIGHT: 71 IN | HEART RATE: 90 BPM | SYSTOLIC BLOOD PRESSURE: 108 MMHG | WEIGHT: 221 LBS

## 2023-12-13 DIAGNOSIS — I47.29 NSVT (NONSUSTAINED VENTRICULAR TACHYCARDIA) (MULTI): ICD-10-CM

## 2023-12-13 DIAGNOSIS — I48.91 ATRIAL FIBRILLATION, UNSPECIFIED TYPE (MULTI): Primary | ICD-10-CM

## 2023-12-13 PROCEDURE — 1160F RVW MEDS BY RX/DR IN RCRD: CPT | Performed by: STUDENT IN AN ORGANIZED HEALTH CARE EDUCATION/TRAINING PROGRAM

## 2023-12-13 PROCEDURE — 99214 OFFICE O/P EST MOD 30 MIN: CPT | Performed by: STUDENT IN AN ORGANIZED HEALTH CARE EDUCATION/TRAINING PROGRAM

## 2023-12-13 PROCEDURE — 3078F DIAST BP <80 MM HG: CPT | Performed by: STUDENT IN AN ORGANIZED HEALTH CARE EDUCATION/TRAINING PROGRAM

## 2023-12-13 PROCEDURE — 3074F SYST BP LT 130 MM HG: CPT | Performed by: STUDENT IN AN ORGANIZED HEALTH CARE EDUCATION/TRAINING PROGRAM

## 2023-12-13 PROCEDURE — 1126F AMNT PAIN NOTED NONE PRSNT: CPT | Performed by: STUDENT IN AN ORGANIZED HEALTH CARE EDUCATION/TRAINING PROGRAM

## 2023-12-13 PROCEDURE — 1159F MED LIST DOCD IN RCRD: CPT | Performed by: STUDENT IN AN ORGANIZED HEALTH CARE EDUCATION/TRAINING PROGRAM

## 2023-12-13 PROCEDURE — 4004F PT TOBACCO SCREEN RCVD TLK: CPT | Performed by: STUDENT IN AN ORGANIZED HEALTH CARE EDUCATION/TRAINING PROGRAM

## 2023-12-13 ASSESSMENT — ENCOUNTER SYMPTOMS
DIZZINESS: 1
DEPRESSION: 0
LOSS OF SENSATION IN FEET: 1
FEVER: 0
CONFUSION: 0
PALPITATIONS: 0
SHORTNESS OF BREATH: 0
OCCASIONAL FEELINGS OF UNSTEADINESS: 1

## 2023-12-13 ASSESSMENT — PAIN SCALES - GENERAL: PAINLEVEL: 0-NO PAIN

## 2023-12-13 NOTE — PROGRESS NOTES
"Chief Complaint:   No chief complaint on file.     History Of Present Illness:    Ramesh Morocho is a 82 y.o. male referred by Dr. Forbes for afib evaluation and management.  His PMH is significant for CAD s/p CABG (>10years ago), ICM, AFIB, s/p DBS. He denies chest pain, chest pressure, chest heaviness, near syncope, syncope. The patient complains of unsteady gait. The unsteady gait does bother him considerably.        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), Personal history of other diseases of the circulatory system (11/20/2014), Personal history of other diseases of the digestive system (11/20/2014), Personal history of other diseases of the musculoskeletal system and connective tissue (11/20/2014), Personal history of other diseases of the respiratory system (11/20/2014), Personal history of other endocrine, nutritional and metabolic disease (11/20/2014), Personal history of other endocrine, nutritional and metabolic disease (11/20/2014), Personal history of other mental and behavioral disorders (07/14/2017), and Type 2 diabetes mellitus (CMS/[facility]).    Past Surgical History:  He has a past surgical history that includes Coronary artery bypass graft (11/20/2014); Colonoscopy (10/01/2015); Hernia repair; and Small intestine surgery.      Social History:  He reports that he has been smoking cigarettes. He has been smoking an average of 1 pack per day. He does not have any smokeless tobacco history on file. He reports that he does not " currently use alcohol. He reports that he does not use drugs.    Family History:  Family History   Problem Relation Name Age of Onset    Other (blood disease) Brother          Allergies:  Metformin    Outpatient Medications:  Current Outpatient Medications   Medication Instructions    albuterol (ProAir HFA) 90 mcg/actuation inhaler inhalation    aspirin (ASPIR-81 ORAL) 1 tablet, oral, Daily    atorvastatin (Lipitor) 80 mg tablet 1 tablet, oral, Daily    budesonide-formoteroL (Symbicort) 160-4.5 mcg/actuation inhaler inhalation    carvedilol (Coreg) 3.125 mg tablet 1 tablet, oral, 2 times daily with meals    Eliquis 5 mg, oral, 2 times daily    FLUoxetine (PROzac) 40 mg capsule 1 capsule, oral, Every morning    fluticasone (Flonase) 50 mcg/actuation nasal spray 1 spray, Each Nostril, Daily    furosemide (LASIX) 40 mg, oral    gabapentin (Neurontin) 600 mg tablet 1 tablet, oral, 3 times daily    icosapent ethyL (Vascepa) 1 gram capsule oral    insulin lispro (HUMALOG KWIKPEN INSULIN) 10 Units, subcutaneous, 3 times daily with meals, Take as directed per insulin instructions.    insulin lispro-aabc (Lyumjev KwikPen U-100 Insulin) 100 unit/mL insulin pen Up to 80 units daily    ipratropium-albuteroL (Combivent Respimat)  mcg/actuation inhaler inhalation    isosorbide mononitrate ER (Imdur) 120 mg 24 hr tablet 1 tablet, oral, Daily    metformin HCl (METFORMIN ORAL) as directed Orally    metoprolol succinate XL (TOPROL-XL) 50 mg, oral, Daily    mometasone-formoterol (Dulera) 100-5 mcg/actuation inhaler inhalation    montelukast (Singulair) 10 mg tablet 1 tablet, oral, Every evening    MULTIVITAMIN ORAL 1 tablet, oral, Daily    nitroglycerin (NITROSTAT) 0.4 mg, sublingual, As needed    oxaprozin (Daypro) 600 mg tablet 1 tablet, oral, 2 times daily    pramipexole (Mirapex) 0.125 mg tablet 1 TABLET IN AM AND MIDDAY, 2-4 PILLS AT BEDTIME ORALLY AS DIRECTED <BR>    primidone (Mysoline) 250 mg tablet 1 tablet, oral, 3  times daily    sacubitriL-valsartan (Entresto) 24-26 mg tablet 1 tablet, oral, 2 times daily    spironolactone (Aldactone) 25 mg tablet 0.5 tablets, oral, Daily    tamsulosin (Flomax) 0.4 mg 24 hr capsule 1 capsule, oral, 30 minutes after the same meal each day Orally Once a day    traZODone (Desyrel) 50 mg tablet 1 tablet, oral, Nightly PRN    Tresiba FlexTouch U-200 70 Units, subcutaneous, Daily     Review of Systems   Constitutional:  Negative for fever.   Respiratory:  Negative for shortness of breath.    Cardiovascular:  Negative for chest pain, palpitations and leg swelling.        As per history.   Neurological:  Positive for dizziness. Negative for syncope.   Psychiatric/Behavioral:  Negative for confusion.        Physical Exam  Constitutional:       Appearance: Normal appearance.   Cardiovascular:      Rate and Rhythm: Normal rate and regular rhythm.      Heart sounds: No murmur heard.     No friction rub. No gallop.   Pulmonary:      Effort: Pulmonary effort is normal.      Breath sounds: Normal breath sounds.   Abdominal:      Palpations: Abdomen is soft.   Musculoskeletal:      Cervical back: Neck supple.   Neurological:      Mental Status: He is alert.   Psychiatric:         Mood and Affect: Mood normal.         Behavior: Behavior normal.           Last Labs:  CBC -  Lab Results   Component Value Date    WBC 8.8 07/21/2023    HGB 16.6 07/21/2023    HCT 50.3 07/21/2023    MCV 96 07/21/2023     07/21/2023       CMP -  Lab Results   Component Value Date    CALCIUM 9.4 11/06/2023    PHOS 3.4 05/20/2018    PROT 5.8 (L) 11/06/2023    ALBUMIN 3.7 11/06/2023    AST 21 11/06/2023    ALT 26 11/06/2023    ALKPHOS 77 11/06/2023    BILITOT 0.4 11/06/2023       LIPID PANEL -   Lab Results   Component Value Date    CHOL 187 03/02/2023    TRIG 199 (H) 03/02/2023    HDL 28 (L) 03/02/2023    CHHDL 6.7 03/02/2023       RENAL FUNCTION PANEL -   Lab Results   Component Value Date    GLUCOSE 301 (H) 11/06/2023    NA  136 11/06/2023    K 4.8 11/06/2023    CL 98 11/06/2023    CO2 26 11/06/2023    ANIONGAP 12 11/06/2023    BUN 11 11/06/2023    CREATININE 0.90 11/06/2023    GFRMALE 90 07/21/2023    CALCIUM 9.4 11/06/2023    PHOS 3.4 05/20/2018    ALBUMIN 3.7 11/06/2023        Lab Results   Component Value Date    HGBA1C 9.2 (A) 10/20/2023       Last Cardiology Tests:    Echo:  Transthoracic Echo (TTE) Complete 10/17/2023     1. Left ventricular systolic function is moderately decreased with a 40-45% estimated ejection fraction.   2. Spectral Doppler shows an impaired relaxation pattern of left ventricular diastolic filling.   3. RV normal size and function.   4. No pericardial effusion.   5. Trace mitral regurgitation.   6. Aortic valve structurally normal.   7. There is global hypokinesis of the left ventricle with minor regional variations.    Event monitor (10/2023)  *The predominant rhythm was Atrial Fibrillation/Flutter .  *The Maximum Heart Rate recorded was 142 bpm, 10/19 02:01:05, the Minimum Heart Rate recorded  was 41 bpm, 10/14 10:02:47, and the Average Heart Rate was 75 bpm.  *There were 3,900 VE beats with a burden of <1 %. There were 5 occurrences of Ventricular Tachycardia  with the Longest episode 19 beats, 10/15 08:54:54, and the Fastest episode 142 bpm, 10/19 02:01:03.  *The study included an Atrial Fibrillation/Flutter [place] of >99 %. The longest episode was 6d 16h 48m  54.0s, 10/13 00:04:33, and the Fast    Assessment/Plan   Diagnoses and all orders for this visit:  Atrial fibrillation, unspecified type (CMS/HCC)  -     ECG 12 lead (Clinic Performed)  NSVT (nonsustained ventricular tachycardia) (CMS/[facility])    Patient with rate controlled atrial fibrillation and episodes of NSVT. Denies near syncope, syncope, fatigue, SOB or palpitations. I had a long discussion with the patient about the findings. For now, will maintain rate control strategy. If in the future patient develops symptoms associated with afib, we may  consider rhythm control strategy. Will follow PRN.      Christopher Beard MD

## 2023-12-14 ENCOUNTER — LAB (OUTPATIENT)
Dept: LAB | Facility: LAB | Age: 83
End: 2023-12-14
Payer: MEDICARE

## 2023-12-14 DIAGNOSIS — I25.10 ATHEROSCLEROTIC HEART DISEASE OF NATIVE CORONARY ARTERY WITHOUT ANGINA PECTORIS: Primary | ICD-10-CM

## 2023-12-14 LAB
ALBUMIN SERPL-MCNC: 3.8 G/DL (ref 3.5–5)
ALP BLD-CCNC: 62 U/L (ref 35–125)
ALT SERPL-CCNC: 25 U/L (ref 5–40)
ANION GAP SERPL CALC-SCNC: 11 MMOL/L
AST SERPL-CCNC: 21 U/L (ref 5–40)
BILIRUB SERPL-MCNC: 0.5 MG/DL (ref 0.1–1.2)
BUN SERPL-MCNC: 12 MG/DL (ref 8–25)
CALCIUM SERPL-MCNC: 9 MG/DL (ref 8.5–10.4)
CHLORIDE SERPL-SCNC: 100 MMOL/L (ref 97–107)
CHOLEST SERPL-MCNC: 120 MG/DL (ref 133–200)
CHOLEST/HDLC SERPL: 4.1 {RATIO}
CO2 SERPL-SCNC: 25 MMOL/L (ref 24–31)
CREAT SERPL-MCNC: 1 MG/DL (ref 0.4–1.6)
ERYTHROCYTE [DISTWIDTH] IN BLOOD BY AUTOMATED COUNT: 13.7 % (ref 11.5–14.5)
GFR SERPL CREATININE-BSD FRML MDRD: 76 ML/MIN/1.73M*2
GLUCOSE SERPL-MCNC: 247 MG/DL (ref 65–99)
HCT VFR BLD AUTO: 45.5 % (ref 41–52)
HDLC SERPL-MCNC: 29 MG/DL
HGB BLD-MCNC: 14.6 G/DL (ref 13.5–17.5)
LDLC SERPL CALC-MCNC: 34 MG/DL (ref 65–130)
MCH RBC QN AUTO: 31.7 PG (ref 26–34)
MCHC RBC AUTO-ENTMCNC: 32.1 G/DL (ref 32–36)
MCV RBC AUTO: 99 FL (ref 80–100)
NRBC BLD-RTO: 0 /100 WBCS (ref 0–0)
PLATELET # BLD AUTO: 146 X10*3/UL (ref 150–450)
POTASSIUM SERPL-SCNC: 4.6 MMOL/L (ref 3.4–5.1)
PROT SERPL-MCNC: 5.7 G/DL (ref 5.9–7.9)
RBC # BLD AUTO: 4.61 X10*6/UL (ref 4.5–5.9)
SODIUM SERPL-SCNC: 136 MMOL/L (ref 133–145)
TRIGL SERPL-MCNC: 287 MG/DL (ref 40–150)
WBC # BLD AUTO: 7.2 X10*3/UL (ref 4.4–11.3)

## 2023-12-14 PROCEDURE — 36415 COLL VENOUS BLD VENIPUNCTURE: CPT

## 2023-12-14 PROCEDURE — 80061 LIPID PANEL: CPT

## 2023-12-14 PROCEDURE — 80053 COMPREHEN METABOLIC PANEL: CPT

## 2023-12-14 PROCEDURE — 85027 COMPLETE CBC AUTOMATED: CPT

## 2024-01-09 ENCOUNTER — APPOINTMENT (OUTPATIENT)
Dept: ENDOCRINOLOGY | Facility: CLINIC | Age: 84
End: 2024-01-09
Payer: MEDICARE

## 2024-01-19 DIAGNOSIS — E11.42 TYPE 2 DIABETES MELLITUS WITH DIABETIC POLYNEUROPATHY (MULTI): ICD-10-CM

## 2024-01-19 RX ORDER — INSULIN DEGLUDEC 200 U/ML
INJECTION, SOLUTION SUBCUTANEOUS
Qty: 36 ML | Refills: 3 | Status: SHIPPED | OUTPATIENT
Start: 2024-01-19

## 2024-02-20 DIAGNOSIS — F34.1 PERSISTENT DEPRESSIVE DISORDER: ICD-10-CM

## 2024-02-20 RX ORDER — FLUOXETINE HYDROCHLORIDE 40 MG/1
40 CAPSULE ORAL DAILY
Qty: 90 CAPSULE | Refills: 1 | Status: SHIPPED | OUTPATIENT
Start: 2024-02-20 | End: 2024-05-13 | Stop reason: WASHOUT

## 2024-02-21 ENCOUNTER — TELEPHONE (OUTPATIENT)
Dept: PRIMARY CARE | Facility: CLINIC | Age: 84
End: 2024-02-21
Payer: MEDICARE

## 2024-02-22 DIAGNOSIS — J44.9 CHRONIC OBSTRUCTIVE PULMONARY DISEASE, UNSPECIFIED COPD TYPE (MULTI): ICD-10-CM

## 2024-02-22 RX ORDER — IPRATROPIUM BROMIDE AND ALBUTEROL 20; 100 UG/1; UG/1
1 SPRAY, METERED RESPIRATORY (INHALATION)
Qty: 12 G | Refills: 1 | Status: SHIPPED | OUTPATIENT
Start: 2024-02-22 | End: 2024-03-26 | Stop reason: HOSPADM

## 2024-02-22 NOTE — TELEPHONE ENCOUNTER
Patient called RX line for a refill on Combivent inhaler  Pharmacy: [facility] on [address]  Contact Number: Mobile

## 2024-02-27 ENCOUNTER — OFFICE VISIT (OUTPATIENT)
Dept: PRIMARY CARE | Facility: CLINIC | Age: 84
End: 2024-02-27
Payer: MEDICARE

## 2024-02-27 VITALS
SYSTOLIC BLOOD PRESSURE: 124 MMHG | TEMPERATURE: 98 F | HEART RATE: 90 BPM | WEIGHT: 226 LBS | OXYGEN SATURATION: 94 % | DIASTOLIC BLOOD PRESSURE: 62 MMHG | BODY MASS INDEX: 31.52 KG/M2

## 2024-02-27 DIAGNOSIS — M79.675 TOE PAIN, LEFT: Primary | ICD-10-CM

## 2024-02-27 PROBLEM — E78.00 PURE HYPERCHOLESTEROLEMIA: Status: ACTIVE | Noted: 2024-02-27

## 2024-02-27 PROBLEM — I42.9 CARDIOMYOPATHY (MULTI): Status: ACTIVE | Noted: 2024-02-27

## 2024-02-27 PROBLEM — E11.9 TYPE 2 DIABETES MELLITUS WITHOUT COMPLICATION (MULTI): Status: ACTIVE | Noted: 2023-09-14

## 2024-02-27 PROBLEM — I50.9 CONGESTIVE HEART FAILURE (MULTI): Status: ACTIVE | Noted: 2024-02-27

## 2024-02-27 PROBLEM — I21.4 ACUTE NON-ST SEGMENT ELEVATION MYOCARDIAL INFARCTION (MULTI): Status: ACTIVE | Noted: 2024-02-27

## 2024-02-27 PROBLEM — G47.30 SLEEP APNEA: Status: ACTIVE | Noted: 2024-02-27

## 2024-02-27 PROBLEM — J44.9 CHRONIC OBSTRUCTIVE PULMONARY DISEASE (MULTI): Status: ACTIVE | Noted: 2024-02-27

## 2024-02-27 PROCEDURE — 1159F MED LIST DOCD IN RCRD: CPT | Performed by: PHYSICIAN ASSISTANT

## 2024-02-27 PROCEDURE — 3078F DIAST BP <80 MM HG: CPT | Performed by: PHYSICIAN ASSISTANT

## 2024-02-27 PROCEDURE — 4004F PT TOBACCO SCREEN RCVD TLK: CPT | Performed by: PHYSICIAN ASSISTANT

## 2024-02-27 PROCEDURE — 1160F RVW MEDS BY RX/DR IN RCRD: CPT | Performed by: PHYSICIAN ASSISTANT

## 2024-02-27 PROCEDURE — 99212 OFFICE O/P EST SF 10 MIN: CPT | Performed by: PHYSICIAN ASSISTANT

## 2024-02-27 PROCEDURE — 3074F SYST BP LT 130 MM HG: CPT | Performed by: PHYSICIAN ASSISTANT

## 2024-02-27 PROCEDURE — 1126F AMNT PAIN NOTED NONE PRSNT: CPT | Performed by: PHYSICIAN ASSISTANT

## 2024-02-27 ASSESSMENT — ENCOUNTER SYMPTOMS
WOUND: 1
ARTHRALGIAS: 1
MYALGIAS: 1

## 2024-02-27 ASSESSMENT — PAIN SCALES - GENERAL: PAINLEVEL: 0-NO PAIN

## 2024-02-27 NOTE — PROGRESS NOTES
Subjective   Patient ID: Ramesh Morocho is a 83 y.o. male who presents for Nail Problem (Infection from cutting toe nails. Left foot 4th digit.).    HPI   Patient tried to cut his toenails and accidentally cut too much on his left fourth digit and noticed blood.  Since today now she is much better than yesterday.  Review of Systems   Musculoskeletal:  Positive for arthralgias and myalgias.   Skin:  Positive for wound. Negative for rash.       Objective   /62   Pulse 90   Temp 36.7 °C (98 °F)   Wt 103 kg (226 lb)   SpO2 94%   BMI 31.52 kg/m²     Physical Exam  Feet:      Comments: Patient had a little dried blood on the tip of his left fourth toe.  Was cleaned off apply Band-Aid.  No actual wound seen or signs of infection noted.  Neurological:      Mental Status: He is alert.         Assessment/Plan   Diagnoses and all orders for this visit:  Toe pain, left  Other orders  -     Follow Up In Primary Care - Medicare Annual; Future  Discussed wound care. Can refer to podiatry to cut his nails if he has more trouble.

## 2024-02-28 ENCOUNTER — OFFICE VISIT (OUTPATIENT)
Dept: ENDOCRINOLOGY | Facility: CLINIC | Age: 84
End: 2024-02-28
Payer: MEDICARE

## 2024-02-28 VITALS
WEIGHT: 228 LBS | HEART RATE: 78 BPM | DIASTOLIC BLOOD PRESSURE: 72 MMHG | BODY MASS INDEX: 31.92 KG/M2 | HEIGHT: 71 IN | SYSTOLIC BLOOD PRESSURE: 130 MMHG

## 2024-02-28 DIAGNOSIS — I10 ESSENTIAL (PRIMARY) HYPERTENSION: ICD-10-CM

## 2024-02-28 DIAGNOSIS — E78.2 MIXED HYPERLIPIDEMIA: ICD-10-CM

## 2024-02-28 DIAGNOSIS — Z79.4 TYPE 2 DIABETES MELLITUS WITH DIABETIC POLYNEUROPATHY, WITH LONG-TERM CURRENT USE OF INSULIN (MULTI): Primary | ICD-10-CM

## 2024-02-28 DIAGNOSIS — E11.42 TYPE 2 DIABETES MELLITUS WITH DIABETIC POLYNEUROPATHY, WITH LONG-TERM CURRENT USE OF INSULIN (MULTI): Primary | ICD-10-CM

## 2024-02-28 LAB — POC HEMOGLOBIN A1C: 8.2 % (ref 4.2–6.5)

## 2024-02-28 PROCEDURE — 1126F AMNT PAIN NOTED NONE PRSNT: CPT | Performed by: NURSE PRACTITIONER

## 2024-02-28 PROCEDURE — 3078F DIAST BP <80 MM HG: CPT | Performed by: NURSE PRACTITIONER

## 2024-02-28 PROCEDURE — 95251 CONT GLUC MNTR ANALYSIS I&R: CPT | Performed by: NURSE PRACTITIONER

## 2024-02-28 PROCEDURE — 4004F PT TOBACCO SCREEN RCVD TLK: CPT | Performed by: NURSE PRACTITIONER

## 2024-02-28 PROCEDURE — 3075F SYST BP GE 130 - 139MM HG: CPT | Performed by: NURSE PRACTITIONER

## 2024-02-28 PROCEDURE — 1160F RVW MEDS BY RX/DR IN RCRD: CPT | Performed by: NURSE PRACTITIONER

## 2024-02-28 PROCEDURE — 99214 OFFICE O/P EST MOD 30 MIN: CPT | Performed by: NURSE PRACTITIONER

## 2024-02-28 PROCEDURE — 1159F MED LIST DOCD IN RCRD: CPT | Performed by: NURSE PRACTITIONER

## 2024-02-28 PROCEDURE — 83036 HEMOGLOBIN GLYCOSYLATED A1C: CPT | Performed by: NURSE PRACTITIONER

## 2024-02-28 RX ORDER — GABAPENTIN 600 MG/1
600 TABLET ORAL
COMMUNITY

## 2024-02-28 RX ORDER — ROSUVASTATIN CALCIUM 40 MG/1
40 TABLET, COATED ORAL DAILY
COMMUNITY
Start: 2024-02-04 | End: 2024-05-04

## 2024-02-28 ASSESSMENT — PAIN SCALES - GENERAL: PAINLEVEL: 0-NO PAIN

## 2024-02-28 ASSESSMENT — PATIENT HEALTH QUESTIONNAIRE - PHQ9
2. FEELING DOWN, DEPRESSED OR HOPELESS: NOT AT ALL
SUM OF ALL RESPONSES TO PHQ9 QUESTIONS 1 AND 2: 0
1. LITTLE INTEREST OR PLEASURE IN DOING THINGS: NOT AT ALL

## 2024-02-28 ASSESSMENT — ENCOUNTER SYMPTOMS: DEPRESSION: 0

## 2024-02-28 NOTE — PATIENT INSTRUCTIONS
INCREASE THE TRESIBA 52 UNITS    Increase long acting insulin by 2 units every 3 days for morning blood sugars around 100-130   Decrease by 2 units every 3 days for morning blood sugars less than 100     Target blood sugars are 100-130 when waking, and under 180 two hours after a meal and before bedtime.

## 2024-02-28 NOTE — PROGRESS NOTES
"HPI:  Presents for follow up/metabolic management 84 yo with DM Type 2 + polyneuropathy, diagnosed in his 50s. History CAD with CABG, HLD, HTN, CHF, current smoker. Current A1C 8.2% was 9.2 % Patient testing sugars 4 times day. Not sticking to a carbohrdrate diet, knows reasonable carb allowances. Patient not able to afford tall their medications. Patient is rarely exercising.  Was started on pre meal insulin with scale last visit, here to see if he is getting better glycemic control.          -CGM Tricia 2 with sensor. Currently on insulin checking BS at least 4 xs a day adjustments made based on readings/frequent visits to manage.  -INSULIN Tresiba 50 Humalog base 12 ISF 50>150 +2  -Metformin no GLP1 cannot afford SGLT2 Farxiga TZD history HF  -HTN Entresto, Isosobide, Metoprolol, Spironolactone, daily at goal  -STATIN Atorvastatin high dose LDL    Tricia report downloaded and attached, time in target 41%, lows 1%. Avg blood sugar 206. Post prandial spike after breakfast and dinner high 200s to low 300s. Wakes up 130-150.    /72 (BP Location: Left arm, Patient Position: Sitting)   Pulse 78   Ht 1.803 m (5' 11\")   Wt 103 kg (228 lb)   BMI 31.80 kg/m²     Labs:  Lab Results   Component Value Date    WBC 7.2 12/14/2023    NRBC 0.0 12/14/2023    RBC 4.61 12/14/2023    HGB 14.6 12/14/2023    HCT 45.5 12/14/2023     (L) 12/14/2023     Lab Results   Component Value Date    CALCIUM 9.0 12/14/2023    AST 21 12/14/2023    ALKPHOS 62 12/14/2023    BILITOT 0.5 12/14/2023    PROT 5.7 (L) 12/14/2023    ALBUMIN 3.8 12/14/2023    GLOB 2.8 05/30/2023    AGR 1.3 (L) 05/30/2023     12/14/2023    K 4.6 12/14/2023     12/14/2023    CO2 25 12/14/2023    ANIONGAP 11 12/14/2023    BUN 12 12/14/2023    CREATININE 1.00 12/14/2023    UREACREAUR 10.0 05/30/2023    GLUCOSE 247 (H) 12/14/2023    ALT 25 12/14/2023    EGFR 76 12/14/2023     Lab Results   Component Value Date    CHOL 120 (L) 12/14/2023    TRIG 287 (H) " 12/14/2023    HDL 29.0 (L) 12/14/2023    LDLCALC 34 (L) 12/14/2023     Lab Results   Component Value Date    MICROALBCREA 9.4 03/02/2023     Lab Results   Component Value Date    TSH 0.65 03/24/2020     Lab Results   Component Value Date    [id number] 454 11/22/2022     Lab Results   Component Value Date    HGBA1C 8.2 (A) 02/28/2024         Current Outpatient Medications:     FLUoxetine (PROzac) 40 mg capsule, TAKE 1 CAPSULE BY MOUTH EVERY DAY, Disp: 90 capsule, Rfl: 1    gabapentin (Neurontin) 600 mg tablet, Take 1 tablet (600 mg) by mouth. QID, Disp: , Rfl:     insulin degludec (Tresiba FlexTouch U-200) 200 unit/mL (3 mL) injection, INJECT 70 UNITS SUBCUTANEOUSLY EVERY DAY 90 DAYS, Disp: 36 mL, Rfl: 3    insulin lispro-aabc (Lyumjev KwikPen U-100 Insulin) 100 unit/mL insulin pen, Up to 80 units daily, Disp: 75 mL, Rfl: 3    ipratropium-albuteroL (Combivent Respimat)  mcg/actuation inhaler, Inhale 1 puff 4 times a day., Disp: 12 g, Rfl: 1    metoprolol succinate XL (Toprol-XL) 50 mg 24 hr tablet, Take 1 tablet (50 mg) by mouth once daily., Disp: , Rfl:     MULTIVITAMIN ORAL, Take 1 tablet by mouth once daily., Disp: , Rfl:     nitroglycerin (Nitrostat) 0.4 mg SL tablet, Place 1 tablet (0.4 mg) under the tongue. As needed, Disp: , Rfl:     pramipexole (Mirapex) 0.125 mg tablet, 1 TABLET IN AM AND MIDDAY, 2-4 PILLS AT BEDTIME ORALLY AS DIRECTED, Disp: , Rfl:     rosuvastatin (Crestor) 40 mg tablet, Take 1 tablet (40 mg) by mouth once daily., Disp: , Rfl:     spironolactone (Aldactone) 25 mg tablet, Take 0.5 tablets (12.5 mg) by mouth once daily., Disp: , Rfl:     traZODone (Desyrel) 50 mg tablet, Take 1 tablet (50 mg) by mouth as needed at bedtime., Disp: , Rfl:     albuterol (ProAir HFA) 90 mcg/actuation inhaler, Inhale., Disp: , Rfl:     aspirin (ASPIR-81 ORAL), Take 1 tablet by mouth once daily., Disp: , Rfl:     atorvastatin (Lipitor) 80 mg tablet, Take 1 tablet (80 mg) by mouth once daily., Disp: , Rfl:      budesonide-formoteroL (Symbicort) 160-4.5 mcg/actuation inhaler, Inhale., Disp: , Rfl:     carvedilol (Coreg) 3.125 mg tablet, Take 1 tablet (3.125 mg) by mouth 2 times a day with meals., Disp: , Rfl:     Eliquis 5 mg tablet, Take 1 tablet (5 mg) by mouth 2 times a day., Disp: , Rfl:     fluticasone (Flonase) 50 mcg/actuation nasal spray, Administer 1 spray into each nostril once daily., Disp: , Rfl:     furosemide (Lasix) 40 mg tablet, Take 1 tablet (40 mg) by mouth., Disp: , Rfl:     icosapent ethyL (Vascepa) 1 gram capsule, Take by mouth., Disp: , Rfl:     insulin lispro (HumaLOG KwikPen Insulin) 100 unit/mL injection, Inject 10 Units under the skin 3 times a day with meals. Take as directed per insulin instructions., Disp: , Rfl:     isosorbide mononitrate ER (Imdur) 120 mg 24 hr tablet, Take 1 tablet (120 mg) by mouth once daily., Disp: , Rfl:     metformin HCl (METFORMIN ORAL), as directed Orally, Disp: , Rfl:     mometasone-formoterol (Dulera) 100-5 mcg/actuation inhaler, Inhale., Disp: , Rfl:     montelukast (Singulair) 10 mg tablet, Take 1 tablet (10 mg) by mouth once daily in the evening., Disp: , Rfl:     oxaprozin (Daypro) 600 mg tablet, Take 1 tablet (600 mg) by mouth 2 times a day., Disp: , Rfl:     primidone (Mysoline) 250 mg tablet, Take 1 tablet (250 mg) by mouth 3 times a day., Disp: , Rfl:     sacubitriL-valsartan (Entresto) 24-26 mg tablet, Take 1 tablet by mouth 2 times a day., Disp: , Rfl:     tamsulosin (Flomax) 0.4 mg 24 hr capsule, Take 1 capsule (0.4 mg) by mouth. 30 minutes after the same meal each day Orally Once a day, Disp: , Rfl:     Review of Systems:  Cardiology: Lightheadedness-denies.  Chest pain-denies.  Leg edema-denies.  Palpitations-denies.  Respiratory: Cough-denies. Shortness of breath-denies.  Wheezing-denies.  Gastroenterology: Constipation-denies.  Diarrhea-denies.  Heartburn-denies.  Endocrinology: Cold intolerance-denies.  Heat intolerance-denies.   Sweats-denies.  Neurology: Headache-denies.  Tremor-denies.  Neuropathy in extremities-denies.  Psychology: Low energy-denies.  Irritability-denies.  Sleep disturbances-denies.    Physical Exam:  General Appearance: pleasant, cooperative, no acute distress  HEENT: no chemosis, no proptosis, no lid lag, no lid retraction  Neck: no lymphadenopathy, no thyromegaly, no dominant thyroid nodules  Heart: no murmurs, regular rate and rhythm, S1 and S2  Lungs: no wheezes, no rhonci, no rales  Extremities: no lower extremity swelling    Assessment and Plan:  1. Type 2 diabetes mellitus with diabetic polyneuropathy, with long-term current use of insulin (CMS/[facility])  -admits to forgetting to pre dose with meal time insulin  -basal increased to 52 units with instruction on how to increase based on waking bs  -Tricia report continue to show post prandial spikes with BS mostly above target  -reviewed that he takes lispro with food not 15 minutes before  -reviewed ISF scale base 12 with +2 50>150  -meet with diabetic educator next    2. Essential (primary) hypertension  -stable    3. HLD  -LDLCALC 34  -tolerates statin     Follow Up: Diabetic Educator 3 months    -labs/tests/notes reviewed  -reviewed and counseled patient on medication monitoring and side effects  Medical Decision Making  Complexity of problem: Chronic illness of diabetes mellitus uncontrolled, progressing  Data analyzed and reviewed: Reviewed prior notes, blood glucose data, labs including HgbA1c, lipids, serum chemistries.  Ordered tests.   Risk of complications and morbidities: Is definite because of use of insulin and risk of hypoglycemia.  Prescription medications reviewed and modifications made.  Compliance assessed.  Addressed social determinants of health including food insecurity.

## 2024-03-07 ENCOUNTER — OFFICE VISIT (OUTPATIENT)
Dept: PRIMARY CARE | Facility: CLINIC | Age: 84
End: 2024-03-07
Payer: MEDICARE

## 2024-03-07 VITALS
SYSTOLIC BLOOD PRESSURE: 114 MMHG | TEMPERATURE: 97.9 F | BODY MASS INDEX: 31.66 KG/M2 | OXYGEN SATURATION: 96 % | DIASTOLIC BLOOD PRESSURE: 74 MMHG | WEIGHT: 227 LBS | HEART RATE: 83 BPM

## 2024-03-07 DIAGNOSIS — R10.13 EPIGASTRIC PAIN: ICD-10-CM

## 2024-03-07 LAB
POC APPEARANCE, URINE: CLEAR
POC BILIRUBIN, URINE: NEGATIVE
POC BLOOD, URINE: NEGATIVE
POC COLOR, URINE: YELLOW
POC GLUCOSE, URINE: NEGATIVE MG/DL
POC KETONES, URINE: NEGATIVE MG/DL
POC LEUKOCYTES, URINE: ABNORMAL
POC NITRITE,URINE: NEGATIVE
POC PH, URINE: 6 PH
POC PROTEIN, URINE: NEGATIVE MG/DL
POC SPECIFIC GRAVITY, URINE: 1.01
POC UROBILINOGEN, URINE: 4 EU/DL

## 2024-03-07 PROCEDURE — 81003 URINALYSIS AUTO W/O SCOPE: CPT | Performed by: PHYSICIAN ASSISTANT

## 2024-03-07 PROCEDURE — 1159F MED LIST DOCD IN RCRD: CPT | Performed by: PHYSICIAN ASSISTANT

## 2024-03-07 PROCEDURE — 3074F SYST BP LT 130 MM HG: CPT | Performed by: PHYSICIAN ASSISTANT

## 2024-03-07 PROCEDURE — 3078F DIAST BP <80 MM HG: CPT | Performed by: PHYSICIAN ASSISTANT

## 2024-03-07 PROCEDURE — 4004F PT TOBACCO SCREEN RCVD TLK: CPT | Performed by: PHYSICIAN ASSISTANT

## 2024-03-07 PROCEDURE — 1160F RVW MEDS BY RX/DR IN RCRD: CPT | Performed by: PHYSICIAN ASSISTANT

## 2024-03-07 PROCEDURE — 1125F AMNT PAIN NOTED PAIN PRSNT: CPT | Performed by: PHYSICIAN ASSISTANT

## 2024-03-07 PROCEDURE — 99213 OFFICE O/P EST LOW 20 MIN: CPT | Performed by: PHYSICIAN ASSISTANT

## 2024-03-07 ASSESSMENT — PAIN SCALES - GENERAL: PAINLEVEL: 2

## 2024-03-07 ASSESSMENT — ENCOUNTER SYMPTOMS
CONSTIPATION: 0
FLATUS: 1
DIARRHEA: 0
BELCHING: 1
VOMITING: 0
NAUSEA: 0
ABDOMINAL PAIN: 1
HEMATOCHEZIA: 0
FEVER: 0

## 2024-03-07 NOTE — PROGRESS NOTES
Subjective   Patient ID: Ramesh Morocho is a 83 y.o. male who presents for Abdominal Pain (Mid abdomen discomfort x 4 days accompanied by low grade fever and bloating. Seems to worse in the evening. ).    Abdominal Pain  This is a new problem. The current episode started in the past 7 days. The onset quality is sudden. The problem has been waxing and waning. The pain is located in the epigastric region. The pain is mild. The quality of the pain is cramping and sharp. The abdominal pain radiates to the LUQ and RUQ. Associated symptoms include belching and flatus. Pertinent negatives include no constipation, diarrhea, fever, hematochezia, melena, nausea or vomiting. Nothing aggravates the pain. The pain is relieved by Nothing. He has tried antacids for the symptoms. The treatment provided no relief.        Review of Systems   Constitutional:  Negative for fever.   Gastrointestinal:  Positive for abdominal pain and flatus. Negative for constipation, diarrhea, hematochezia, melena, nausea and vomiting.       Objective   /74   Pulse 83   Temp 36.6 °C (97.9 °F)   Wt 103 kg (227 lb)   SpO2 96%   BMI 31.66 kg/m²     Physical Exam  Abdominal:      General: Abdomen is flat.      Tenderness: There is abdominal tenderness. There is no guarding or rebound.      Hernia: A hernia is present.   Neurological:      General: No focal deficit present.      Mental Status: He is alert. Mental status is at baseline.   Psychiatric:         Mood and Affect: Mood normal.         Thought Content: Thought content normal.         Judgment: Judgment normal.         Assessment/Plan   Diagnoses and all orders for this visit:  Epigastric pain  -     POCT UA Automated manually resulted  Has a ventral hernia that doesn't appear to bother him. Suggested seeing GI if no better. Start Omeprazole daily and follow up if no better.

## 2024-03-11 DIAGNOSIS — J44.9 CHRONIC OBSTRUCTIVE PULMONARY DISEASE, UNSPECIFIED COPD TYPE (MULTI): Primary | ICD-10-CM

## 2024-03-12 RX ORDER — FLUTICASONE PROPIONATE AND SALMETEROL 250; 50 UG/1; UG/1
1 POWDER RESPIRATORY (INHALATION)
Qty: 60 EACH | Refills: 2 | Status: SHIPPED | OUTPATIENT
Start: 2024-03-12 | End: 2025-03-12

## 2024-03-19 ENCOUNTER — HOSPITAL ENCOUNTER (INPATIENT)
Facility: HOSPITAL | Age: 84
LOS: 7 days | Discharge: HOME | DRG: 177 | End: 2024-03-26
Attending: STUDENT IN AN ORGANIZED HEALTH CARE EDUCATION/TRAINING PROGRAM | Admitting: INTERNAL MEDICINE
Payer: MEDICARE

## 2024-03-19 ENCOUNTER — OFFICE VISIT (OUTPATIENT)
Dept: PRIMARY CARE | Facility: CLINIC | Age: 84
End: 2024-03-19
Payer: MEDICARE

## 2024-03-19 ENCOUNTER — APPOINTMENT (OUTPATIENT)
Dept: RADIOLOGY | Facility: HOSPITAL | Age: 84
DRG: 177 | End: 2024-03-19
Payer: MEDICARE

## 2024-03-19 VITALS
BODY MASS INDEX: 30.49 KG/M2 | TEMPERATURE: 99.7 F | SYSTOLIC BLOOD PRESSURE: 138 MMHG | DIASTOLIC BLOOD PRESSURE: 78 MMHG | WEIGHT: 218.6 LBS | HEART RATE: 83 BPM | OXYGEN SATURATION: 92 %

## 2024-03-19 DIAGNOSIS — R09.02 HYPOXIA: ICD-10-CM

## 2024-03-19 DIAGNOSIS — R06.02 SHORTNESS OF BREATH: Primary | ICD-10-CM

## 2024-03-19 DIAGNOSIS — R33.9 URINARY RETENTION: ICD-10-CM

## 2024-03-19 DIAGNOSIS — J18.9 PNEUMONIA OF LEFT LOWER LOBE DUE TO INFECTIOUS ORGANISM: ICD-10-CM

## 2024-03-19 DIAGNOSIS — J44.9 CHRONIC OBSTRUCTIVE PULMONARY DISEASE, UNSPECIFIED COPD TYPE (MULTI): Primary | ICD-10-CM

## 2024-03-19 DIAGNOSIS — J18.9 PNEUMONIA OF LEFT UPPER LOBE DUE TO INFECTIOUS ORGANISM: ICD-10-CM

## 2024-03-19 DIAGNOSIS — J44.1 CHRONIC OBSTRUCTIVE PULMONARY DISEASE WITH ACUTE EXACERBATION (MULTI): ICD-10-CM

## 2024-03-19 LAB
ALBUMIN SERPL-MCNC: 4.2 G/DL (ref 3.5–5)
ALP BLD-CCNC: 77 U/L (ref 35–125)
ALT SERPL-CCNC: 20 U/L (ref 5–40)
ANION GAP BLDV CALCULATED.4IONS-SCNC: 9 MMOL/L (ref 10–25)
ANION GAP SERPL CALC-SCNC: 13 MMOL/L
APPEARANCE UR: CLEAR
AST SERPL-CCNC: 18 U/L (ref 5–40)
BASE EXCESS BLDV CALC-SCNC: 1.9 MMOL/L (ref -2–3)
BASOPHILS # BLD AUTO: 0.02 X10*3/UL (ref 0–0.1)
BASOPHILS NFR BLD AUTO: 0.1 %
BILIRUB SERPL-MCNC: 1 MG/DL (ref 0.1–1.2)
BILIRUB UR STRIP.AUTO-MCNC: NEGATIVE MG/DL
BODY TEMPERATURE: 37 DEGREES CELSIUS
BUN SERPL-MCNC: 10 MG/DL (ref 8–25)
CA-I BLDV-SCNC: 1.11 MMOL/L (ref 1.1–1.33)
CALCIUM SERPL-MCNC: 9.4 MG/DL (ref 8.5–10.4)
CHLORIDE BLDV-SCNC: 98 MMOL/L (ref 98–107)
CHLORIDE SERPL-SCNC: 94 MMOL/L (ref 97–107)
CO2 SERPL-SCNC: 27 MMOL/L (ref 24–31)
COLOR UR: YELLOW
CREAT SERPL-MCNC: 0.9 MG/DL (ref 0.4–1.6)
EGFRCR SERPLBLD CKD-EPI 2021: 86 ML/MIN/1.73M*2
EOSINOPHIL # BLD AUTO: 0 X10*3/UL (ref 0–0.4)
EOSINOPHIL NFR BLD AUTO: 0 %
ERYTHROCYTE [DISTWIDTH] IN BLOOD BY AUTOMATED COUNT: 13.4 % (ref 11.5–14.5)
EST. AVERAGE GLUCOSE BLD GHB EST-MCNC: 183 MG/DL
FLUAV RNA RESP QL NAA+PROBE: NOT DETECTED
FLUBV RNA RESP QL NAA+PROBE: NOT DETECTED
GLUCOSE BLD MANUAL STRIP-MCNC: 213 MG/DL (ref 74–99)
GLUCOSE BLD MANUAL STRIP-MCNC: 250 MG/DL (ref 74–99)
GLUCOSE BLDV-MCNC: 246 MG/DL (ref 74–99)
GLUCOSE SERPL-MCNC: 283 MG/DL (ref 65–99)
GLUCOSE UR STRIP.AUTO-MCNC: ABNORMAL MG/DL
HBA1C MFR BLD: 8 %
HCO3 BLDV-SCNC: 27.8 MMOL/L (ref 22–26)
HCT VFR BLD AUTO: 47.7 % (ref 41–52)
HCT VFR BLD EST: 47 % (ref 41–52)
HGB BLD-MCNC: 16.2 G/DL (ref 13.5–17.5)
HGB BLDV-MCNC: 15.5 G/DL (ref 13.5–17.5)
IMM GRANULOCYTES # BLD AUTO: 0.03 X10*3/UL (ref 0–0.5)
IMM GRANULOCYTES NFR BLD AUTO: 0.2 % (ref 0–0.9)
INHALED O2 CONCENTRATION: 100 %
INR PPP: 1.1 (ref 0.9–1.2)
KETONES UR STRIP.AUTO-MCNC: ABNORMAL MG/DL
LACTATE BLDV-SCNC: 1.6 MMOL/L (ref 0.4–2)
LACTATE BLDV-SCNC: 2 MMOL/L (ref 0.4–2)
LEUKOCYTE ESTERASE UR QL STRIP.AUTO: NEGATIVE
LYMPHOCYTES # BLD AUTO: 0.66 X10*3/UL (ref 0.8–3)
LYMPHOCYTES NFR BLD AUTO: 4.4 %
MCH RBC QN AUTO: 31.6 PG (ref 26–34)
MCHC RBC AUTO-ENTMCNC: 34 G/DL (ref 32–36)
MCV RBC AUTO: 93 FL (ref 80–100)
MONOCYTES # BLD AUTO: 1.12 X10*3/UL (ref 0.05–0.8)
MONOCYTES NFR BLD AUTO: 7.5 %
NEUTROPHILS # BLD AUTO: 13.07 X10*3/UL (ref 1.6–5.5)
NEUTROPHILS NFR BLD AUTO: 87.8 %
NITRITE UR QL STRIP.AUTO: NEGATIVE
NRBC BLD-RTO: ABNORMAL /100{WBCS}
OXYHGB MFR BLDV: 51.7 % (ref 45–75)
PCO2 BLDV: 47 MM HG (ref 41–51)
PH BLDV: 7.38 PH (ref 7.33–7.43)
PH UR STRIP.AUTO: 6.5 [PH]
PLATELET # BLD AUTO: 113 X10*3/UL (ref 150–450)
PO2 BLDV: 33 MM HG (ref 35–45)
POTASSIUM BLDV-SCNC: 4.3 MMOL/L (ref 3.5–5.3)
POTASSIUM SERPL-SCNC: 4.3 MMOL/L (ref 3.4–5.1)
PROT SERPL-MCNC: 7.1 G/DL (ref 5.9–7.9)
PROT UR STRIP.AUTO-MCNC: NEGATIVE MG/DL
PROTHROMBIN TIME: 11.8 SECONDS (ref 9.3–12.7)
RBC # BLD AUTO: 5.13 X10*6/UL (ref 4.5–5.9)
RBC # UR STRIP.AUTO: NEGATIVE /UL
SAO2 % BLDV: 54 % (ref 45–75)
SARS-COV-2 RNA RESP QL NAA+PROBE: NOT DETECTED
SODIUM BLDV-SCNC: 130 MMOL/L (ref 136–145)
SODIUM SERPL-SCNC: 134 MMOL/L (ref 133–145)
SP GR UR STRIP.AUTO: 1.03
UROBILINOGEN UR STRIP.AUTO-MCNC: ABNORMAL MG/DL
WBC # BLD AUTO: 14.9 X10*3/UL (ref 4.4–11.3)

## 2024-03-19 PROCEDURE — 94640 AIRWAY INHALATION TREATMENT: CPT

## 2024-03-19 PROCEDURE — 81003 URINALYSIS AUTO W/O SCOPE: CPT | Performed by: PHYSICIAN ASSISTANT

## 2024-03-19 PROCEDURE — 1126F AMNT PAIN NOTED NONE PRSNT: CPT | Performed by: PHYSICIAN ASSISTANT

## 2024-03-19 PROCEDURE — 1160F RVW MEDS BY RX/DR IN RCRD: CPT | Performed by: PHYSICIAN ASSISTANT

## 2024-03-19 PROCEDURE — 2500000002 HC RX 250 W HCPCS SELF ADMINISTERED DRUGS (ALT 637 FOR MEDICARE OP, ALT 636 FOR OP/ED): Performed by: PHYSICIAN ASSISTANT

## 2024-03-19 PROCEDURE — 96365 THER/PROPH/DIAG IV INF INIT: CPT

## 2024-03-19 PROCEDURE — 96375 TX/PRO/DX INJ NEW DRUG ADDON: CPT

## 2024-03-19 PROCEDURE — 99214 OFFICE O/P EST MOD 30 MIN: CPT | Performed by: PHYSICIAN ASSISTANT

## 2024-03-19 PROCEDURE — 96368 THER/DIAG CONCURRENT INF: CPT

## 2024-03-19 PROCEDURE — 2500000001 HC RX 250 WO HCPCS SELF ADMINISTERED DRUGS (ALT 637 FOR MEDICARE OP): Performed by: NURSE PRACTITIONER

## 2024-03-19 PROCEDURE — 87040 BLOOD CULTURE FOR BACTERIA: CPT | Mod: TRILAB | Performed by: PHYSICIAN ASSISTANT

## 2024-03-19 PROCEDURE — 71045 X-RAY EXAM CHEST 1 VIEW: CPT

## 2024-03-19 PROCEDURE — 83036 HEMOGLOBIN GLYCOSYLATED A1C: CPT | Performed by: NURSE PRACTITIONER

## 2024-03-19 PROCEDURE — 5A09357 ASSISTANCE WITH RESPIRATORY VENTILATION, LESS THAN 24 CONSECUTIVE HOURS, CONTINUOUS POSITIVE AIRWAY PRESSURE: ICD-10-PCS | Performed by: INTERNAL MEDICINE

## 2024-03-19 PROCEDURE — 85610 PROTHROMBIN TIME: CPT | Performed by: PHYSICIAN ASSISTANT

## 2024-03-19 PROCEDURE — 36415 COLL VENOUS BLD VENIPUNCTURE: CPT | Performed by: PHYSICIAN ASSISTANT

## 2024-03-19 PROCEDURE — 71045 X-RAY EXAM CHEST 1 VIEW: CPT | Performed by: RADIOLOGY

## 2024-03-19 PROCEDURE — 3075F SYST BP GE 130 - 139MM HG: CPT | Performed by: PHYSICIAN ASSISTANT

## 2024-03-19 PROCEDURE — 1159F MED LIST DOCD IN RCRD: CPT | Performed by: PHYSICIAN ASSISTANT

## 2024-03-19 PROCEDURE — 3078F DIAST BP <80 MM HG: CPT | Performed by: PHYSICIAN ASSISTANT

## 2024-03-19 PROCEDURE — 87635 SARS-COV-2 COVID-19 AMP PRB: CPT | Performed by: PHYSICIAN ASSISTANT

## 2024-03-19 PROCEDURE — 99291 CRITICAL CARE FIRST HOUR: CPT

## 2024-03-19 PROCEDURE — 4004F PT TOBACCO SCREEN RCVD TLK: CPT | Performed by: PHYSICIAN ASSISTANT

## 2024-03-19 PROCEDURE — 2500000004 HC RX 250 GENERAL PHARMACY W/ HCPCS (ALT 636 FOR OP/ED): Performed by: PHYSICIAN ASSISTANT

## 2024-03-19 PROCEDURE — 83605 ASSAY OF LACTIC ACID: CPT | Performed by: PHYSICIAN ASSISTANT

## 2024-03-19 PROCEDURE — 96361 HYDRATE IV INFUSION ADD-ON: CPT

## 2024-03-19 PROCEDURE — 84132 ASSAY OF SERUM POTASSIUM: CPT | Performed by: PHYSICIAN ASSISTANT

## 2024-03-19 PROCEDURE — 2500000002 HC RX 250 W HCPCS SELF ADMINISTERED DRUGS (ALT 637 FOR MEDICARE OP, ALT 636 FOR OP/ED): Performed by: NURSE PRACTITIONER

## 2024-03-19 PROCEDURE — 82947 ASSAY GLUCOSE BLOOD QUANT: CPT

## 2024-03-19 PROCEDURE — 2500000005 HC RX 250 GENERAL PHARMACY W/O HCPCS: Performed by: NURSE PRACTITIONER

## 2024-03-19 PROCEDURE — 1200000002 HC GENERAL ROOM WITH TELEMETRY DAILY

## 2024-03-19 PROCEDURE — 2550000001 HC RX 255 CONTRASTS: Performed by: PHYSICIAN ASSISTANT

## 2024-03-19 PROCEDURE — 80053 COMPREHEN METABOLIC PANEL: CPT | Performed by: PHYSICIAN ASSISTANT

## 2024-03-19 PROCEDURE — 94640 AIRWAY INHALATION TREATMENT: CPT | Mod: 59

## 2024-03-19 PROCEDURE — 87449 NOS EACH ORGANISM AG IA: CPT | Mod: TRILAB,WESLAB | Performed by: NURSE PRACTITIONER

## 2024-03-19 PROCEDURE — 9420000001 HC RT PATIENT EDUCATION 5 MIN

## 2024-03-19 PROCEDURE — 85025 COMPLETE CBC W/AUTO DIFF WBC: CPT | Performed by: PHYSICIAN ASSISTANT

## 2024-03-19 PROCEDURE — 94664 DEMO&/EVAL PT USE INHALER: CPT

## 2024-03-19 PROCEDURE — 71275 CT ANGIOGRAPHY CHEST: CPT

## 2024-03-19 PROCEDURE — 2500000004 HC RX 250 GENERAL PHARMACY W/ HCPCS (ALT 636 FOR OP/ED): Performed by: NURSE PRACTITIONER

## 2024-03-19 RX ORDER — ONDANSETRON HYDROCHLORIDE 2 MG/ML
4 INJECTION, SOLUTION INTRAVENOUS ONCE
Status: COMPLETED | OUTPATIENT
Start: 2024-03-19 | End: 2024-03-19

## 2024-03-19 RX ORDER — FLUTICASONE PROPIONATE 50 MCG
1 SPRAY, SUSPENSION (ML) NASAL DAILY
Status: DISCONTINUED | OUTPATIENT
Start: 2024-03-19 | End: 2024-03-26 | Stop reason: HOSPADM

## 2024-03-19 RX ORDER — TRAZODONE HYDROCHLORIDE 50 MG/1
50 TABLET ORAL NIGHTLY PRN
Status: DISCONTINUED | OUTPATIENT
Start: 2024-03-19 | End: 2024-03-26 | Stop reason: HOSPADM

## 2024-03-19 RX ORDER — TAMSULOSIN HYDROCHLORIDE 0.4 MG/1
0.4 CAPSULE ORAL DAILY
Status: DISCONTINUED | OUTPATIENT
Start: 2024-03-19 | End: 2024-03-26 | Stop reason: HOSPADM

## 2024-03-19 RX ORDER — CARVEDILOL 3.12 MG/1
3.12 TABLET ORAL
Status: DISCONTINUED | OUTPATIENT
Start: 2024-03-19 | End: 2024-03-19

## 2024-03-19 RX ORDER — PRIMIDONE 50 MG/1
250 TABLET ORAL 3 TIMES DAILY
Status: DISCONTINUED | OUTPATIENT
Start: 2024-03-19 | End: 2024-03-19

## 2024-03-19 RX ORDER — OXAPROZIN 600 MG/1
600 TABLET, FILM COATED ORAL 2 TIMES DAILY
Status: DISCONTINUED | OUTPATIENT
Start: 2024-03-19 | End: 2024-03-19

## 2024-03-19 RX ORDER — IPRATROPIUM BROMIDE AND ALBUTEROL SULFATE 2.5; .5 MG/3ML; MG/3ML
3 SOLUTION RESPIRATORY (INHALATION) ONCE
Status: COMPLETED | OUTPATIENT
Start: 2024-03-19 | End: 2024-03-19

## 2024-03-19 RX ORDER — IPRATROPIUM BROMIDE AND ALBUTEROL SULFATE 2.5; .5 MG/3ML; MG/3ML
3 SOLUTION RESPIRATORY (INHALATION)
Status: DISCONTINUED | OUTPATIENT
Start: 2024-03-19 | End: 2024-03-19

## 2024-03-19 RX ORDER — GABAPENTIN 600 MG/1
600 TABLET ORAL
Status: DISCONTINUED | OUTPATIENT
Start: 2024-03-19 | End: 2024-03-26 | Stop reason: HOSPADM

## 2024-03-19 RX ORDER — DEXTROSE 50 % IN WATER (D50W) INTRAVENOUS SYRINGE
12.5
Status: DISCONTINUED | OUTPATIENT
Start: 2024-03-19 | End: 2024-03-26 | Stop reason: HOSPADM

## 2024-03-19 RX ORDER — ISOSORBIDE MONONITRATE 120 MG/1
120 TABLET, EXTENDED RELEASE ORAL DAILY
Status: DISCONTINUED | OUTPATIENT
Start: 2024-03-19 | End: 2024-03-19

## 2024-03-19 RX ORDER — SPIRONOLACTONE 25 MG/1
12.5 TABLET ORAL DAILY
Status: DISCONTINUED | OUTPATIENT
Start: 2024-03-19 | End: 2024-03-19

## 2024-03-19 RX ORDER — BISMUTH SUBSALICYLATE 262 MG
1 TABLET,CHEWABLE ORAL DAILY
Status: DISCONTINUED | OUTPATIENT
Start: 2024-03-19 | End: 2024-03-26 | Stop reason: HOSPADM

## 2024-03-19 RX ORDER — FLUOXETINE HYDROCHLORIDE 20 MG/1
40 CAPSULE ORAL DAILY
Status: DISCONTINUED | OUTPATIENT
Start: 2024-03-19 | End: 2024-03-26 | Stop reason: HOSPADM

## 2024-03-19 RX ORDER — DEXTROSE 50 % IN WATER (D50W) INTRAVENOUS SYRINGE
25
Status: DISCONTINUED | OUTPATIENT
Start: 2024-03-19 | End: 2024-03-26 | Stop reason: HOSPADM

## 2024-03-19 RX ORDER — ATORVASTATIN CALCIUM 80 MG/1
80 TABLET, FILM COATED ORAL DAILY
Status: DISCONTINUED | OUTPATIENT
Start: 2024-03-19 | End: 2024-03-20

## 2024-03-19 RX ORDER — CEFTRIAXONE 2 G/50ML
2 INJECTION, SOLUTION INTRAVENOUS ONCE
Status: COMPLETED | OUTPATIENT
Start: 2024-03-19 | End: 2024-03-19

## 2024-03-19 RX ORDER — IPRATROPIUM BROMIDE AND ALBUTEROL SULFATE 2.5; .5 MG/3ML; MG/3ML
3 SOLUTION RESPIRATORY (INHALATION) EVERY 2 HOUR PRN
Status: DISCONTINUED | OUTPATIENT
Start: 2024-03-19 | End: 2024-03-26 | Stop reason: HOSPADM

## 2024-03-19 RX ORDER — FUROSEMIDE 40 MG/1
40 TABLET ORAL DAILY
Status: DISCONTINUED | OUTPATIENT
Start: 2024-03-19 | End: 2024-03-20

## 2024-03-19 RX ORDER — MONTELUKAST SODIUM 10 MG/1
10 TABLET ORAL EVERY EVENING
Status: DISCONTINUED | OUTPATIENT
Start: 2024-03-19 | End: 2024-03-20

## 2024-03-19 RX ORDER — INSULIN LISPRO 100 [IU]/ML
0-10 INJECTION, SOLUTION INTRAVENOUS; SUBCUTANEOUS
Status: DISCONTINUED | OUTPATIENT
Start: 2024-03-19 | End: 2024-03-24

## 2024-03-19 RX ORDER — IPRATROPIUM BROMIDE AND ALBUTEROL SULFATE 2.5; .5 MG/3ML; MG/3ML
3 SOLUTION RESPIRATORY (INHALATION)
Status: DISCONTINUED | OUTPATIENT
Start: 2024-03-19 | End: 2024-03-22

## 2024-03-19 RX ORDER — METOPROLOL SUCCINATE 50 MG/1
50 TABLET, EXTENDED RELEASE ORAL DAILY
Status: DISCONTINUED | OUTPATIENT
Start: 2024-03-19 | End: 2024-03-19

## 2024-03-19 RX ADMIN — TRAZODONE HYDROCHLORIDE 50 MG: 50 TABLET ORAL at 22:04

## 2024-03-19 RX ADMIN — APIXABAN 5 MG: 5 TABLET, FILM COATED ORAL at 22:03

## 2024-03-19 RX ADMIN — FLUOXETINE 40 MG: 20 CAPSULE ORAL at 18:08

## 2024-03-19 RX ADMIN — MULTIVITAMIN TABLET 1 TABLET: TABLET at 18:08

## 2024-03-19 RX ADMIN — IPRATROPIUM BROMIDE AND ALBUTEROL SULFATE 3 ML: 2.5; .5 SOLUTION RESPIRATORY (INHALATION) at 11:58

## 2024-03-19 RX ADMIN — AZITHROMYCIN MONOHYDRATE 500 MG: 500 INJECTION, POWDER, LYOPHILIZED, FOR SOLUTION INTRAVENOUS at 11:35

## 2024-03-19 RX ADMIN — SODIUM CHLORIDE 1500 ML: 900 INJECTION, SOLUTION INTRAVENOUS at 12:00

## 2024-03-19 RX ADMIN — TAMSULOSIN HYDROCHLORIDE 0.4 MG: 0.4 CAPSULE ORAL at 18:08

## 2024-03-19 RX ADMIN — Medication: at 23:00

## 2024-03-19 RX ADMIN — Medication 4 L/MIN: at 18:00

## 2024-03-19 RX ADMIN — ONDANSETRON HYDROCHLORIDE 4 MG: 2 INJECTION INTRAMUSCULAR; INTRAVENOUS at 12:25

## 2024-03-19 RX ADMIN — PIPERACILLIN SODIUM AND TAZOBACTAM SODIUM 4.5 G: 4; .5 INJECTION, SOLUTION INTRAVENOUS at 22:03

## 2024-03-19 RX ADMIN — FUROSEMIDE 40 MG: 40 TABLET ORAL at 18:08

## 2024-03-19 RX ADMIN — GABAPENTIN 600 MG: 600 TABLET, FILM COATED ORAL at 22:03

## 2024-03-19 RX ADMIN — ATORVASTATIN CALCIUM 80 MG: 80 TABLET, FILM COATED ORAL at 18:08

## 2024-03-19 RX ADMIN — IOHEXOL 75 ML: 350 INJECTION, SOLUTION INTRAVENOUS at 13:51

## 2024-03-19 RX ADMIN — IPRATROPIUM BROMIDE AND ALBUTEROL SULFATE 3 ML: 2.5; .5 SOLUTION RESPIRATORY (INHALATION) at 19:26

## 2024-03-19 RX ADMIN — INSULIN LISPRO 4 UNITS: 100 INJECTION, SOLUTION INTRAVENOUS; SUBCUTANEOUS at 18:08

## 2024-03-19 RX ADMIN — MONTELUKAST 10 MG: 10 TABLET, FILM COATED ORAL at 22:03

## 2024-03-19 RX ADMIN — CEFTRIAXONE SODIUM 2 G: 2 INJECTION, SOLUTION INTRAVENOUS at 11:58

## 2024-03-19 SDOH — SOCIAL STABILITY: SOCIAL INSECURITY: ARE THERE ANY APPARENT SIGNS OF INJURIES/BEHAVIORS THAT COULD BE RELATED TO ABUSE/NEGLECT?: NO

## 2024-03-19 SDOH — SOCIAL STABILITY: SOCIAL INSECURITY: ARE YOU OR HAVE YOU BEEN THREATENED OR ABUSED PHYSICALLY, EMOTIONALLY, OR SEXUALLY BY ANYONE?: NO

## 2024-03-19 SDOH — SOCIAL STABILITY: SOCIAL INSECURITY: ABUSE: ADULT

## 2024-03-19 SDOH — SOCIAL STABILITY: SOCIAL INSECURITY: WERE YOU ABLE TO COMPLETE ALL THE BEHAVIORAL HEALTH SCREENINGS?: YES

## 2024-03-19 SDOH — SOCIAL STABILITY: SOCIAL INSECURITY: DO YOU FEEL UNSAFE GOING BACK TO THE PLACE WHERE YOU ARE LIVING?: NO

## 2024-03-19 SDOH — SOCIAL STABILITY: SOCIAL INSECURITY: HAVE YOU HAD THOUGHTS OF HARMING ANYONE ELSE?: NO

## 2024-03-19 SDOH — SOCIAL STABILITY: SOCIAL INSECURITY: DOES ANYONE TRY TO KEEP YOU FROM HAVING/CONTACTING OTHER FRIENDS OR DOING THINGS OUTSIDE YOUR HOME?: NO

## 2024-03-19 SDOH — SOCIAL STABILITY: SOCIAL INSECURITY: HAS ANYONE EVER THREATENED TO HURT YOUR FAMILY OR YOUR PETS?: NO

## 2024-03-19 SDOH — SOCIAL STABILITY: SOCIAL INSECURITY: DO YOU FEEL ANYONE HAS EXPLOITED OR TAKEN ADVANTAGE OF YOU FINANCIALLY OR OF YOUR PERSONAL PROPERTY?: NO

## 2024-03-19 ASSESSMENT — PATIENT HEALTH QUESTIONNAIRE - PHQ9
1. LITTLE INTEREST OR PLEASURE IN DOING THINGS: NOT AT ALL
1. LITTLE INTEREST OR PLEASURE IN DOING THINGS: NOT AT ALL
SUM OF ALL RESPONSES TO PHQ9 QUESTIONS 1 & 2: 0
2. FEELING DOWN, DEPRESSED OR HOPELESS: NOT AT ALL
SUM OF ALL RESPONSES TO PHQ9 QUESTIONS 1 AND 2: 0
2. FEELING DOWN, DEPRESSED OR HOPELESS: NOT AT ALL

## 2024-03-19 ASSESSMENT — ACTIVITIES OF DAILY LIVING (ADL)
PATIENT'S MEMORY ADEQUATE TO SAFELY COMPLETE DAILY ACTIVITIES?: YES
TOILETING: INDEPENDENT
BATHING: INDEPENDENT
ADEQUATE_TO_COMPLETE_ADL: YES
LACK_OF_TRANSPORTATION: NO
DRESSING YOURSELF: INDEPENDENT
WALKS IN HOME: INDEPENDENT
GROOMING: INDEPENDENT
LACK_OF_TRANSPORTATION: NO
HEARING - LEFT EAR: FUNCTIONAL
HEARING - RIGHT EAR: FUNCTIONAL
JUDGMENT_ADEQUATE_SAFELY_COMPLETE_DAILY_ACTIVITIES: YES
FEEDING YOURSELF: INDEPENDENT

## 2024-03-19 ASSESSMENT — COGNITIVE AND FUNCTIONAL STATUS - GENERAL
PATIENT BASELINE BEDBOUND: NO
MOBILITY SCORE: 24
DAILY ACTIVITIY SCORE: 24
MOBILITY SCORE: 24
DAILY ACTIVITIY SCORE: 24

## 2024-03-19 ASSESSMENT — COLUMBIA-SUICIDE SEVERITY RATING SCALE - C-SSRS
2. HAVE YOU ACTUALLY HAD ANY THOUGHTS OF KILLING YOURSELF?: NO
6. HAVE YOU EVER DONE ANYTHING, STARTED TO DO ANYTHING, OR PREPARED TO DO ANYTHING TO END YOUR LIFE?: NO
1. IN THE PAST MONTH, HAVE YOU WISHED YOU WERE DEAD OR WISHED YOU COULD GO TO SLEEP AND NOT WAKE UP?: NO

## 2024-03-19 ASSESSMENT — PAIN SCALES - GENERAL
PAINLEVEL_OUTOF10: 0 - NO PAIN
PAINLEVEL: 0-NO PAIN
PAINLEVEL_OUTOF10: 0 - NO PAIN
PAINLEVEL_OUTOF10: 0 - NO PAIN

## 2024-03-19 ASSESSMENT — LIFESTYLE VARIABLES
AUDIT-C TOTAL SCORE: 1
AUDIT-C TOTAL SCORE: 1
HOW OFTEN DO YOU HAVE 6 OR MORE DRINKS ON ONE OCCASION: NEVER
HOW OFTEN DO YOU HAVE A DRINK CONTAINING ALCOHOL: MONTHLY OR LESS
HOW MANY STANDARD DRINKS CONTAINING ALCOHOL DO YOU HAVE ON A TYPICAL DAY: 1 OR 2
SKIP TO QUESTIONS 9-10: 1

## 2024-03-19 ASSESSMENT — ENCOUNTER SYMPTOMS
DIARRHEA: 0
PALPITATIONS: 0
ABDOMINAL PAIN: 0
ABDOMINAL PAIN: 0
VOMITING: 1
BACK PAIN: 0
FEVER: 1
NAUSEA: 0
FEVER: 1
DIARRHEA: 0
WHEEZING: 1
SEIZURES: 0
FATIGUE: 1
FLANK PAIN: 0
SHORTNESS OF BREATH: 1
CHILLS: 1
TREMORS: 0
COUGH: 1
HEMATURIA: 0
FATIGUE: 1
TREMORS: 1
ACTIVITY CHANGE: 0
DIZZINESS: 0
COUGH: 1
CHEST TIGHTNESS: 0
SHORTNESS OF BREATH: 1
DYSURIA: 0
SINUS PAIN: 1

## 2024-03-19 ASSESSMENT — PAIN - FUNCTIONAL ASSESSMENT: PAIN_FUNCTIONAL_ASSESSMENT: 0-10

## 2024-03-19 NOTE — TREATMENT PLAN
Patient was seen and examined by me personally.  Please detailed history and physical in epic by Lela Lake(LEO).  I talked in detail with CNP regarding assessment plan.  I agree with assessment plan.    General: In moderate distress cooperating during physical exam.  HEENT: Pupils are equal and reactive to light and commendation , oral mucosa moist, no JVD   Cardiovascular: Irregularly irregular heart rhythm   Lungs: Skater rhonchi on the right lung base     Abdomen: No hepatosplenomegaly appreciated, soft , not tender, positive bowel sounds, positive bowel movement.  Neuro: Alert and oriented x2, strength in upper and lower extremities , sensation intact.  Psych: Patient had great insight was going on  Musculoskeletal: No swelling in lower extremities, no limitation in range of motion.  Vascular: Pulses are intact in upper and lower extremities  Skin: No petechiae, ecchymosis or other stigmata for dermatology disease.     Left lower lobe pneumonia  Continue with antibiotics  Consult pulmonary    Diabetes mellitus type II  Cover with insulin sliding scale.    Acute respiratory failure with hypoxia secondary to pneumonia  Continue with oxygen therapy    Atrial fibrillation  Rate controlled   patient see Dr. Forbes in regularly basis  Continue with current medication  On Eliquis    Obstructive sleep apnea    Chronic congestive heart failure with systolic dysfunction  Monitor close  Waiting for pulmonary to see him tomorrow.

## 2024-03-19 NOTE — ED PROVIDER NOTES
HPI   Chief Complaint   Patient presents with    Shortness of Breath     Sick since Tuesday, cough congestion, left sided ronchi, no vomit or diarrhea, hx of pneumonia and copd       HPI  83-year-old male here for evaluation of possible pneumonia, has had a couple of days of shortness of breath cough and general congestion, history of COPD, smokes daily, went to see his family doctor and family doctor was concerned because he was hypoxic and believes that there is concern for possible pneumonia.  Indicated that breath sounds sounded more concerning on the left.  Patient denies any chest pains.  But does state that he feels short of breath.                  [place] Coma Scale Score: 15                     Patient History   Past Medical History:   Diagnosis Date    CHF (congestive heart failure) (CMS/[facility])     Essential tremor 09/06/2013    Benign familial tremor    Hyperlipidemia     Obstructive sleep apnea (adult) (pediatric) 11/20/2014    BRITT on CPAP    Old myocardial infarction 11/20/2014    History of myocardial infarction    Other intervertebral disc degeneration, lumbar region 11/20/2014    Degenerative disc disease, lumbar    Personal history of other diseases of the circulatory system 11/20/2014    History of coronary atherosclerosis    Personal history of other diseases of the digestive system 11/20/2014    History of gastroesophageal reflux (GERD)    Personal history of other diseases of the musculoskeletal system and connective tissue 11/20/2014    History of arthritis    Personal history of other diseases of the respiratory system 11/20/2014    History of asthma    Personal history of other endocrine, nutritional and metabolic disease 11/20/2014    History of diabetes mellitus    Personal history of other endocrine, nutritional and metabolic disease 11/20/2014    History of diabetic neuropathy    Personal history of other mental and behavioral disorders 07/14/2017    History of depression    Type 2  diabetes mellitus (CMS/HCC)      Past Surgical History:   Procedure Laterality Date    COLONOSCOPY  10/01/2015    CORONARY ARTERY BYPASS GRAFT  11/20/2014    CABG    HERNIA REPAIR      SMALL INTESTINE SURGERY       Family History   Problem Relation Name Age of Onset    Other (blood disease) Brother       Social History     Tobacco Use    Smoking status: Every Day     Packs/day: 1     Types: Cigarettes    Smokeless tobacco: Not on file   Substance Use Topics    Alcohol use: Not Currently    Drug use: Never       Physical Exam   ED Triage Vitals [03/19/24 1134]   Temperature Heart Rate Respirations BP   37.7 °C (99.9 °F) (!) 105 (!) 22 123/76      Pulse Ox Temp Source Heart Rate Source Patient Position   (!) 89 % Oral Monitor Sitting      BP Location FiO2 (%)     Right arm --       Physical Exam  PHYSICAL EXAMINATION    GENERAL APPEARANCE: Awake and alert.     VITAL SIGNS: As per the nurses' triage record.     HEENT: Normocephalic, atraumatic. Extraocular muscles are intact. Pupils equal round and reactive to light. Conjunctiva are pink. Negative scleral icterus. Mucous membranes are moist. Tongue in the midline. Pharynx was without erythema or exudates, uvula midline    NECK: Soft Nontender and supple, full gross ROM, no meningeal signs.    CHEST: Nontender to palpation.  Expiratory wheezing with some rhonchi worse on the left compared to the right.    HEART: S1, S2. Regular rate and rhythm. No murmurs, gallops or rubs.  Strong and equal pulses in the extremities.     ABDOMEN: Soft, nontender, nondistended, positive bowel sounds, no palpable masses.    MUSCULOSKELETAL: The calves are nontender to palpation. Full gross active range of motion.      NEUROLOGICAL: Awake, alert and oriented x 3. Power intact in the upper and lower extremities. Sensation is intact to light touch in the upper and lower extremities.     IMMUNOLOGICAL: No lymphatic streaking noted     DERM: No petechiae, rashes, or ecchymoses.  ED Course &  [facility]   ED Course as of 03/19/24 1551   Tue Mar 19, 2024   1138 89% ±1 or 2% during my initial ED assessment, I saw him as low as 87 briefly, as high as 91.  But seem to maximize average around 89% on room air, does not use spinal oxygen, supplemental oxygen provided. [AP]   1457 Attending MDM:  83-year-old male with past medical history of COPD who presents emergency room with shortness of breath.  Patient notes that the symptoms have been going on for about 1 week now.  Patient went to his primary care office today and they noted his oxygen to be low instructed him to come here to the emergency room.  Patient otherwise without any history of supplemental oxygen use and otherwise notes productive cough with on and off shortness of breath.  He otherwise denies any fevers, chills, nausea, vomiting, chest pain, abdominal pain.    Vital signs as interpreted by me: Afebrile, tachycardic, mildly tachypneic, 89% on room air normotensive.    Exam:    Constitutional: No acute distress. Resting comfortably.   Head: Normocephalic, atraumatic.   Eyes: Pupils equal bilaterally, EOM grossly intact, conjunctiva normal.  Mouth/Throat: Oropharynx is clear, moist mucus membranes.   Neck: Supple. No lymphadenopathy.  Cardiovascular: Regular rate and regular rhythm. Extremities are well-perfused.    Pulmonary/Chest: Mild respiratory distress, on 4 L nasal cannula, wheezing noted bilaterally.    Abdominal: Soft, non-tender, non-distended. No rebound or guarding.   Musculoskeletal: No lower extremity edema.       Skin: Warm, dry, and intact.   Neurological: Patient is oriented to person, place, time, and situation. Face symmetric, hearing intact to voice, speech normal. Moves all extremities.     Differential includes but not limited to:  Pneumonia versus viral illness versus COPD exacerbation versus heart failure exacerbation [DH]   1459 Lab work as interpreted by me shows leukocytosis to 14.9 as well as no anemia.  Comprehensive  metabolic panel shows no significant electrolyte abnormality or LFT abnormality.  VBG within normal limits with no acidosis appreciated and appropriate pCO2 of 47.  Urinalysis shows large glucose but otherwise no evidence of urinary tract infection.  Viral panel is negative and chest x-ray per radiology unremarkable without acute finding. []   1500 Chest x-ray followed up with CT angio of chest for pulmonary embolism and notable for possible left-sided pneumonia, no evidence of PE at this time.  Given patient's new onset hypoxia as well as findings consistent with pneumonia will admit.  Patient given antibiotics here in the emergency room for pneumonia coverage. [DH]      ED Course User Index  [AP] Harvey Mercado PA-C  [] Ramesh Crespo MD         Diagnoses as of 03/19/24 1551   Shortness of breath   Hypoxia   Pneumonia of left lower lobe due to infectious organism       Medical Decision Making  Parts of this chart have been completed using voice recognition software. Please excuse any errors of transcription.  My thought process and reason for plan has been formulated from the time that I saw the patient until the time of disposition and is not specific to one specific moment during their visit and furthermore my MDM encompasses this entire chart and not only this text box.      HPI: Detailed above.    Exam: A medically appropriate exam performed, outlined above, given the known history and presentation.    History Limited by: Nothing    History obtained from: Patient who is accompanied by female     External/internal records reviewed: Reviewed outpatient office visit from 3/19/2024    Social Determinants of Health considered during this visit: Was at home    Chronic conditions impacting care: COPD, diabetes, A-fib    Medications given during visit:  Medications   sodium chloride 0.9 % bolus 1,500 mL (0 mL intravenous Stopped 3/19/24 1330)   cefTRIAXone (Rocephin) 2 g IV in dextrose 5% 50 mL (0 g  intravenous Stopped 3/19/24 1221)   azithromycin (Zithromax) 500 mg in dextrose 5% 250 mL IV (0 mg intravenous Stopped 3/19/24 1235)   ipratropium-albuteroL (Duo-Neb) 0.5-2.5 mg/3 mL nebulizer solution 3 mL (3 mL nebulization Given 3/19/24 1158)   ondansetron (Zofran) injection 4 mg (4 mg intravenous Given 3/19/24 1225)   iohexol (OMNIPaque) 350 mg iodine/mL solution 75 mL (75 mL intravenous Given 3/19/24 1351)        Diagnostic/tests  Labs Reviewed   CBC WITH AUTO DIFFERENTIAL - Abnormal       Result Value    WBC 14.9 (*)     nRBC        RBC 5.13      Hemoglobin 16.2      Hematocrit 47.7      MCV 93      MCH 31.6      MCHC 34.0      RDW 13.4      Platelets 113 (*)     Neutrophils % 87.8      Immature Granulocytes %, Automated 0.2      Lymphocytes % 4.4      Monocytes % 7.5      Eosinophils % 0.0      Basophils % 0.1      Neutrophils Absolute 13.07 (*)     Immature Granulocytes Absolute, Automated 0.03      Lymphocytes Absolute 0.66 (*)     Monocytes Absolute 1.12 (*)     Eosinophils Absolute 0.00      Basophils Absolute 0.02     COMPREHENSIVE METABOLIC PANEL - Abnormal    Glucose 283 (*)     Sodium 134      Potassium 4.3      Chloride 94 (*)     Bicarbonate 27      Urea Nitrogen 10      Creatinine 0.90      eGFR 86      Calcium 9.4      Albumin 4.2      Alkaline Phosphatase 77      Total Protein 7.1      AST 18      Bilirubin, Total 1.0      ALT 20      Anion Gap 13     BLOOD GAS VENOUS FULL PANEL - Abnormal    POCT pH, Venous 7.38      POCT pCO2, Venous 47      POCT pO2, Venous 33 (*)     POCT SO2, Venous 54      POCT Oxy Hemoglobin, Venous 51.7      POCT Hematocrit Calculated, Venous 47.0      POCT Sodium, Venous 130 (*)     POCT Potassium, Venous 4.3      POCT Chloride, Venous 98      POCT Ionized Calicum, Venous 1.11      POCT Glucose, Venous 246 (*)     POCT Lactate, Venous 1.6      POCT Base Excess, Venous 1.9      POCT HCO3 Calculated, Venous 27.8 (*)     POCT Hemoglobin, Venous 15.5      POCT Anion Gap,  Venous 9.0 (*)     Patient Temperature 37.0      FiO2 100     URINALYSIS WITH REFLEX CULTURE AND MICROSCOPIC - Abnormal    Color, Urine Yellow      Appearance, Urine Clear      Specific Gravity, Urine 1.033      pH, Urine 6.5      Protein, Urine NEGATIVE      Glucose, Urine 300 (3+) (*)     Blood, Urine NEGATIVE      Ketones, Urine 10 (1+) (*)     Bilirubin, Urine NEGATIVE      Urobilinogen, Urine 3 (1+) (*)     Nitrite, Urine NEGATIVE      Leukocyte Esterase, Urine NEGATIVE     BLOOD GAS LACTIC ACID, VENOUS - Normal    POCT Lactate, Venous 2.0     SARS-COV-2 PCR - Normal    Coronavirus 2019, PCR Not Detected      Narrative:     This assay has received FDA Emergency Use Authorization (EUA) and is only authorized for the duration of time that circumstances exist to justify the authorization of the emergency use of in vitro diagnostic tests for the detection of SARS-CoV-2 virus and/or diagnosis of COVID-19 infection under section 564(b)(1) of the Act, 21 U.S.C. 360bbb-3(b)(1). This assay is an in vitro diagnostic nucleic acid amplification test for the qualitative detection of SARS-CoV-2 from nasopharyngeal specimens and has been validated for use at [facility]. Negative results do not preclude COVID-19 infections and should not be used as the sole basis for diagnosis, treatment, or other management decisions.     INFLUENZA A AND B PCR - Normal    Flu A Result Not Detected      Flu B Result Not Detected      Narrative:     This assay is an in vitro diagnostic multiplex nucleic acid amplification test for the detection and discrimination of Influenza A & B from nasopharyngeal specimens, and has been validated for use at [facility]. Negative results do not preclude Influenza A/B infections, and should not be used as the sole basis for diagnosis, treatment, or other management decisions. If Influenza A/B and RSV PCR results are negative, testing for Parainfluenza virus,  Adenovirus and Metapneumovirus is routinely performed for [facility] [place], and is available on other patients by placing an add-on request.   PROTIME-INR - Normal    Protime 11.8      INR 1.1      Narrative:     INR Therapeutic Range: 2.0-3.5   BLOOD CULTURE   BLOOD CULTURE   URINALYSIS WITH REFLEX CULTURE AND MICROSCOPIC    Narrative:     The following orders were created for panel order Urinalysis with Reflex Culture and Microscopic.  Procedure                               Abnormality         Status                     ---------                               -----------         ------                     Urinalysis with Reflex C...[[id number]]  Abnormal            Final result               Extra Urine Gray Tube[[id number]]                                                         Please view results for these tests on the individual orders.   EXTRA URINE GRAY TUBE      CT angio chest for pulmonary embolism   Final Result   1. No evidence for pulmonary embolus        2. Patchy areas of consolidation and pneumonia within the left lower   lobe. Follow-up to clearing        3. Endobronchial opacification bilateral lower lobes. Correlate with   aspiration        4. Mediastinal and hilar lymphadenopathy likely reactive.             MACRO:   None        Signed by: Florencio Mariee 3/19/2024 2:38 PM   Dictation workstation:   [id number]      XR chest 1 view   Final Result   No acute cardiopulmonary disease.        MACRO:   none        Signed by: Irasema Leung 3/19/2024 1:02 PM   Dictation workstation:   [id number]             EKG: Obtained read by attending physician reviewed by myself      Case discussed with: Dr Arthur of hospital team    Considerations/further MDM:  Considered pneumonia, sepsis, dehydration, electrolyte disturbances.  Also considered ACS however unlikely based on presentation and diagnostics.  The patient has evidence of pneumonia on the left.  Clinically left  sounded worse on auscultation on the right, chest x-ray was nonspecific, CT of the chest reflects left lower lobe pneumonia.  Patient be treated as such, he is requiring 3 L of oxygen.  Patient will be hospitalized for pneumonia and respiratory distress with hypoxemia    Upon my evaluation, this patient had a high probability of imminent or life-threatening deterioration which required my direct attention, intervention, and personal management  I personally saw the patient and independently provided 30 minutes of non-concurrent critical care time were provided which excludes all other billable procedures.  This was for management of abnormal vital signs in the setting of respiratory distress with hypoxemia requiring supplemental oxygen, diagnostic abnormalities with initially unclear definitive etiology of source of hypoxia requiring additional imaging and CT of the chest reflecting pneumonia, elevation in the white blood cell count suggestive of pneumonia, initiation and management of multiple parenteral IV antibiotics for management of pneumonia leading to respiratory depression, consultation to the hospitalist for arrangements to manage pneumonia with supplemental oxygen and IV medication        Procedure  Procedures     Harvey Mercado PA-C  03/19/24 6190

## 2024-03-19 NOTE — H&P
History Of Present Illness  Ramesh Morocho is a 83 y.o. male presenting with dyspnea, cough, fever, and fatigue.  Patient reports that he has had the symptoms for a few days.  He went to see his PCP who advised him to go to the emergency room.  Patient says that he was hypoxic at his PCP appointment.  Upon arrival to the ED his oxygen level was at 88% on room air.  Patient becomes dyspneic and tachypneic with minimal exertion upon my examination.  CT in the ED was concerning for pneumonia and possible aspiration.      During ED course he was given Zofran, DuoNeb, azithromycin, normal saline bolus, and ceftriaxone     Past Medical History  Past Medical History:   Diagnosis Date    CHF (congestive heart failure) (CMS/[facility])     Essential tremor 09/06/2013    Benign familial tremor    Hyperlipidemia     Obstructive sleep apnea (adult) (pediatric) 11/20/2014    BRITT on CPAP    Old myocardial infarction 11/20/2014    History of myocardial infarction    Other intervertebral disc degeneration, lumbar region 11/20/2014    Degenerative disc disease, lumbar    Personal history of other diseases of the circulatory system 11/20/2014    History of coronary atherosclerosis    Personal history of other diseases of the digestive system 11/20/2014    History of gastroesophageal reflux (GERD)    Personal history of other diseases of the musculoskeletal system and connective tissue 11/20/2014    History of arthritis    Personal history of other diseases of the respiratory system 11/20/2014    History of asthma    Personal history of other endocrine, nutritional and metabolic disease 11/20/2014    History of diabetes mellitus    Personal history of other endocrine, nutritional and metabolic disease 11/20/2014    History of diabetic neuropathy    Personal history of other mental and behavioral disorders 07/14/2017    History of depression    Type 2 diabetes mellitus (CMS/[facility])        Surgical History  Past Surgical History:   Procedure  Laterality Date    COLONOSCOPY  10/01/2015    CORONARY ARTERY BYPASS GRAFT  11/20/2014    CABG    HERNIA REPAIR      SMALL INTESTINE SURGERY          Social History  He reports that he has been smoking cigarettes. He has been smoking an average of 1 pack per day. He does not have any smokeless tobacco history on file. He reports that he does not currently use alcohol. He reports that he does not use drugs.    Family History  Family History   Problem Relation Name Age of Onset    Other (blood disease) Brother          Allergies  Metformin    Review of Systems   Constitutional:  Positive for chills, fatigue and fever. Negative for activity change.   Respiratory:  Positive for cough and shortness of breath.    Cardiovascular:  Positive for leg swelling. Negative for chest pain and palpitations.   Gastrointestinal:  Negative for abdominal pain, diarrhea and nausea.   Genitourinary:  Negative for dysuria, flank pain and hematuria.   Musculoskeletal:  Negative for back pain and gait problem.   Neurological:  Negative for dizziness, tremors and seizures.   Psychiatric/Behavioral:  Negative for behavioral problems and suicidal ideas.         Physical Exam  Constitutional:       General: He is not in acute distress.     Appearance: He is ill-appearing. He is not toxic-appearing.   Cardiovascular:      Rate and Rhythm: Normal rate and regular rhythm.      Pulses: Normal pulses.      Heart sounds: Normal heart sounds.   Pulmonary:      Effort: Respiratory distress (become tachypenic with activity) present.      Breath sounds: Wheezing and rhonchi present.   Abdominal:      General: Bowel sounds are normal.      Palpations: Abdomen is soft.   Musculoskeletal:         General: Swelling (1+ bilateral feet) present.   Skin:     General: Skin is warm and dry.   Neurological:      General: No focal deficit present.      Mental Status: He is alert and oriented to person, place, and time.   Psychiatric:         Mood and Affect: Mood  "normal.         Behavior: Behavior normal.          Last Recorded Vitals  Blood pressure 126/51, pulse 74, temperature 37.5 °C (99.5 °F), temperature source Temporal, resp. rate 20, height 1.803 m (5' 11\"), weight 98.9 kg (218 lb), SpO2 (!) 92 %.    Relevant Results  Scheduled medications  apixaban, 5 mg, oral, BID  atorvastatin, 80 mg, oral, Daily  azithromycin, 500 mg, intravenous, q24h  FLUoxetine, 40 mg, oral, Daily  fluticasone, 1 spray, Each Nostril, Daily  furosemide, 40 mg, oral, Daily  gabapentin, 600 mg, oral, Before meals & nightly  ipratropium-albuteroL, 3 mL, nebulization, q6h  montelukast, 10 mg, oral, q PM  multivitamin, 1 tablet, oral, Daily  oxygen, , inhalation, Continuous - Inhalation  piperacillin-tazobactam, 4.5 g, intravenous, q6h  sacubitriL-valsartan, 1 tablet, oral, BID  tamsulosin, 0.4 mg, oral, Daily      Continuous medications     PRN medications  PRN medications: traZODone     following data reviewed    WBC-14.9  Hgb-16.2  Hct-47.7  Platelets-113    AST-18  ALT-20  Alk Phos- 77  Total Joaquín-1.0    Na-134  K+-4.3  Cl- 94  Bicarb-27  BUN-10  Creatinine-0.9    CTA chest  mpression:     1. No evidence for pulmonary embolus      2. Patchy areas of consolidation and pneumonia within the left lower  lobe. Follow-up to clearing      3. Endobronchial opacification bilateral lower lobes. Correlate with  aspiration      4. Mediastinal and hilar lymphadenopathy likely reactive.           Flu A- negative  Flu B- negative  COVID-19 negative       Assessment/Plan   Principal Problem:    Shortness of breath    PNA  -CT suggestive of aspiration will treat for aspiration pneumonia  -ST eval to rule out aspiration  -Sputum culture if able  -Pulmonology consult  -Nanettebs  -IV ATB    Acute Resp Failure with hypoxia  -Oxygen as needed, wean as tolerated  -Pulmonology consult    Tobacco use  -Encourage cessation  -Refusing nicotine patch    Acute exacerbation of chronic COPD  -see #1 and #2    BRITT   -CPAP at " at bedtime per home settings    DM II  -Monitor blood glucose  -ISS  -Diabetic diet  -Check hemoglobin A1c  -Hold metformin    Hyperlipidemia  -continue statin    CHF systolic  -last ECHO from 10/23 with an EF 40-45%    Atrial Fibrillation  -continue BB   -telemetry  -continue Eliquis     Plan  Above plan discussing with patient he is in agreement  CODE STATUS discussed and patient wishes to be full measures  Above Case and plan discussed with collaborating physician        Lela Lake, APRN-CNP     none

## 2024-03-19 NOTE — PROGRESS NOTES
Subjective   Patient ID: Ramesh Morocho is a 83 y.o. male who presents for Sore Throat and Cough (5 days, cough, fever, sore throat).    URI   This is a new problem. The current episode started in the past 7 days. The problem has been gradually worsening. The maximum temperature recorded prior to his arrival was 102 - 102.9 F. Associated symptoms include coughing, sinus pain, vomiting and wheezing. Pertinent negatives include no abdominal pain, chest pain or diarrhea.    Did not do a covid test at home yet.Patient drank coffee this morning and vomited in the office.  His wife brought him today.  Has been apparently coughing the past few days but felt worse today.  Most the night last night he was up coughing.  He did not take any of his medications nor did he use his albuterol this morning.    Review of Systems   Constitutional:  Positive for fatigue and fever.   HENT:  Positive for sinus pain.    Respiratory:  Positive for cough, shortness of breath and wheezing. Negative for chest tightness.    Cardiovascular:  Negative for chest pain.   Gastrointestinal:  Positive for vomiting. Negative for abdominal pain and diarrhea.   Neurological:  Positive for tremors.       Objective   /78 (BP Location: Left arm, Patient Position: Sitting)   Pulse 83   Temp 37.6 °C (99.7 °F) (Temporal)   Wt 99.2 kg (218 lb 9.6 oz)   SpO2 92%   BMI 30.49 kg/m²     Physical Exam  Constitutional:       Appearance: He is ill-appearing.      Comments: He can speak in full sentences.  Is little pale but he answers appropriately.   HENT:      Right Ear: Tympanic membrane normal.      Left Ear: Tympanic membrane normal.      Nose: Congestion and rhinorrhea present.      Mouth/Throat:      Pharynx: Posterior oropharyngeal erythema present.   Eyes:      Extraocular Movements: Extraocular movements intact.      Pupils: Pupils are equal, round, and reactive to light.   Cardiovascular:      Rate and Rhythm: Normal rate and regular rhythm.       Pulses: Normal pulses.      Heart sounds: Murmur heard.   Pulmonary:      Effort: Pulmonary effort is normal.      Breath sounds: Rhonchi present.      Comments: Left lung  Musculoskeletal:      Cervical back: Neck supple. No tenderness.   Neurological:      General: No focal deficit present.      Mental Status: He is alert. Mental status is at baseline.   Psychiatric:         Mood and Affect: Mood normal.         Thought Content: Thought content normal.         Judgment: Judgment normal.         Assessment/Plan   Diagnoses and all orders for this visit:  Chronic obstructive pulmonary disease, unspecified COPD type (CMS/HCC)  Pneumonia of left upper lobe due to infectious organism    Clinically has pneumonia but he is certainly having some difficulty and needs further evaluation.  Recheck pulse ox is at 90% on room air.  Initially wanted to send him by squad but since he is able to speak in full sentences and his wife brought him she will take him to [facility] for further evaluation.

## 2024-03-19 NOTE — CARE PLAN
The patient's goals for the shift include      The clinical goals for the shift include Pulse ox above 93      Problem: Respiratory  Goal: Clear secretions with interventions this shift  Outcome: Progressing  Goal: Minimize anxiety/maximize coping throughout shift  Outcome: Progressing  Goal: Minimal/no exertional discomfort or dyspnea this shift  Outcome: Progressing  Goal: No signs of respiratory distress (eg. Use of accessory muscles. Peds grunting)  Outcome: Progressing  Goal: Patent airway maintained this shift  Outcome: Progressing  Goal: Tolerate mechanical ventilation evidenced by VS/agitation level this shift  Outcome: Progressing  Goal: Tolerate pulmonary toileting this shift  Outcome: Progressing  Goal: Verbalize decreased shortness of breath this shift  Outcome: Progressing  Goal: Wean oxygen to maintain O2 saturation per order/standard this shift  Outcome: Progressing  Goal: Increase self care and/or family involvement in next 24 hours  Outcome: Progressing

## 2024-03-20 ENCOUNTER — APPOINTMENT (OUTPATIENT)
Dept: RADIOLOGY | Facility: HOSPITAL | Age: 84
DRG: 177 | End: 2024-03-20
Payer: MEDICARE

## 2024-03-20 PROBLEM — J96.01 ACUTE RESPIRATORY FAILURE WITH HYPOXIA (MULTI): Status: ACTIVE | Noted: 2024-03-20

## 2024-03-20 PROBLEM — J15.9 BACTERIAL PNEUMONIA: Status: ACTIVE | Noted: 2024-03-20

## 2024-03-20 LAB
ALBUMIN SERPL-MCNC: 3.4 G/DL (ref 3.5–5)
ALP BLD-CCNC: 56 U/L (ref 35–125)
ALT SERPL-CCNC: 14 U/L (ref 5–40)
ANION GAP SERPL CALC-SCNC: 8 MMOL/L
AST SERPL-CCNC: 17 U/L (ref 5–40)
BILIRUB SERPL-MCNC: 1 MG/DL (ref 0.1–1.2)
BUN SERPL-MCNC: 11 MG/DL (ref 8–25)
CALCIUM SERPL-MCNC: 8.8 MG/DL (ref 8.5–10.4)
CHLORIDE SERPL-SCNC: 96 MMOL/L (ref 97–107)
CO2 SERPL-SCNC: 27 MMOL/L (ref 24–31)
CREAT SERPL-MCNC: 1 MG/DL (ref 0.4–1.6)
EGFRCR SERPLBLD CKD-EPI 2021: 76 ML/MIN/1.73M*2
ERYTHROCYTE [DISTWIDTH] IN BLOOD BY AUTOMATED COUNT: 13.5 % (ref 11.5–14.5)
GLUCOSE BLD MANUAL STRIP-MCNC: 198 MG/DL (ref 74–99)
GLUCOSE BLD MANUAL STRIP-MCNC: 230 MG/DL (ref 74–99)
GLUCOSE BLD MANUAL STRIP-MCNC: 240 MG/DL (ref 74–99)
GLUCOSE BLD MANUAL STRIP-MCNC: 311 MG/DL (ref 74–99)
GLUCOSE SERPL-MCNC: 209 MG/DL (ref 65–99)
HCT VFR BLD AUTO: 42.8 % (ref 41–52)
HGB BLD-MCNC: 14.4 G/DL (ref 13.5–17.5)
MCH RBC QN AUTO: 31.7 PG (ref 26–34)
MCHC RBC AUTO-ENTMCNC: 33.6 G/DL (ref 32–36)
MCV RBC AUTO: 94 FL (ref 80–100)
NRBC BLD-RTO: 0 /100 WBCS (ref 0–0)
PLATELET # BLD AUTO: 107 X10*3/UL (ref 150–450)
POTASSIUM SERPL-SCNC: 3.6 MMOL/L (ref 3.4–5.1)
PROT SERPL-MCNC: 5.8 G/DL (ref 5.9–7.9)
RBC # BLD AUTO: 4.54 X10*6/UL (ref 4.5–5.9)
SODIUM SERPL-SCNC: 131 MMOL/L (ref 133–145)
WBC # BLD AUTO: 10.6 X10*3/UL (ref 4.4–11.3)

## 2024-03-20 PROCEDURE — 85027 COMPLETE CBC AUTOMATED: CPT | Performed by: NURSE PRACTITIONER

## 2024-03-20 PROCEDURE — 2500000001 HC RX 250 WO HCPCS SELF ADMINISTERED DRUGS (ALT 637 FOR MEDICARE OP): Performed by: INTERNAL MEDICINE

## 2024-03-20 PROCEDURE — 74230 X-RAY XM SWLNG FUNCJ C+: CPT

## 2024-03-20 PROCEDURE — 2500000001 HC RX 250 WO HCPCS SELF ADMINISTERED DRUGS (ALT 637 FOR MEDICARE OP): Performed by: NURSE PRACTITIONER

## 2024-03-20 PROCEDURE — 3430000001 HC RX 343 DIAGNOSTIC RADIOPHARMACEUTICALS: Performed by: INTERNAL MEDICINE

## 2024-03-20 PROCEDURE — 1200000002 HC GENERAL ROOM WITH TELEMETRY DAILY

## 2024-03-20 PROCEDURE — 36415 COLL VENOUS BLD VENIPUNCTURE: CPT | Performed by: NURSE PRACTITIONER

## 2024-03-20 PROCEDURE — 9420000001 HC RT PATIENT EDUCATION 5 MIN

## 2024-03-20 PROCEDURE — 2500000005 HC RX 250 GENERAL PHARMACY W/O HCPCS: Performed by: NURSE PRACTITIONER

## 2024-03-20 PROCEDURE — 74230 X-RAY XM SWLNG FUNCJ C+: CPT | Performed by: RADIOLOGY

## 2024-03-20 PROCEDURE — 92611 MOTION FLUOROSCOPY/SWALLOW: CPT | Mod: GN | Performed by: SPEECH-LANGUAGE PATHOLOGIST

## 2024-03-20 PROCEDURE — 2500000002 HC RX 250 W HCPCS SELF ADMINISTERED DRUGS (ALT 637 FOR MEDICARE OP, ALT 636 FOR OP/ED): Performed by: NURSE PRACTITIONER

## 2024-03-20 PROCEDURE — 82947 ASSAY GLUCOSE BLOOD QUANT: CPT

## 2024-03-20 PROCEDURE — 87205 SMEAR GRAM STAIN: CPT | Mod: TRILAB,WESLAB | Performed by: NURSE PRACTITIONER

## 2024-03-20 PROCEDURE — 94640 AIRWAY INHALATION TREATMENT: CPT

## 2024-03-20 PROCEDURE — 80053 COMPREHEN METABOLIC PANEL: CPT | Performed by: NURSE PRACTITIONER

## 2024-03-20 PROCEDURE — 2500000004 HC RX 250 GENERAL PHARMACY W/ HCPCS (ALT 636 FOR OP/ED): Performed by: NURSE PRACTITIONER

## 2024-03-20 PROCEDURE — 92610 EVALUATE SWALLOWING FUNCTION: CPT | Mod: GN | Performed by: SPEECH-LANGUAGE PATHOLOGIST

## 2024-03-20 RX ORDER — PRAMIPEXOLE DIHYDROCHLORIDE 0.12 MG/1
0.12 TABLET ORAL 3 TIMES DAILY
Status: DISCONTINUED | OUTPATIENT
Start: 2024-03-20 | End: 2024-03-26 | Stop reason: HOSPADM

## 2024-03-20 RX ORDER — METOPROLOL SUCCINATE 50 MG/1
50 TABLET, EXTENDED RELEASE ORAL DAILY
Status: DISCONTINUED | OUTPATIENT
Start: 2024-03-20 | End: 2024-03-26 | Stop reason: HOSPADM

## 2024-03-20 RX ORDER — INSULIN GLARGINE 100 [IU]/ML
56 INJECTION, SOLUTION SUBCUTANEOUS NIGHTLY
Status: DISCONTINUED | OUTPATIENT
Start: 2024-03-20 | End: 2024-03-26 | Stop reason: HOSPADM

## 2024-03-20 RX ORDER — SPIRONOLACTONE 25 MG/1
12.5 TABLET ORAL DAILY
Status: DISCONTINUED | OUTPATIENT
Start: 2024-03-20 | End: 2024-03-26 | Stop reason: HOSPADM

## 2024-03-20 RX ORDER — ROSUVASTATIN CALCIUM 20 MG/1
40 TABLET, COATED ORAL NIGHTLY
Status: DISCONTINUED | OUTPATIENT
Start: 2024-03-20 | End: 2024-03-26 | Stop reason: HOSPADM

## 2024-03-20 RX ORDER — INSULIN LISPRO 100 [IU]/ML
10 INJECTION, SOLUTION INTRAVENOUS; SUBCUTANEOUS
Status: DISCONTINUED | OUTPATIENT
Start: 2024-03-20 | End: 2024-03-26 | Stop reason: HOSPADM

## 2024-03-20 RX ADMIN — IPRATROPIUM BROMIDE AND ALBUTEROL SULFATE 3 ML: 2.5; .5 SOLUTION RESPIRATORY (INHALATION) at 17:40

## 2024-03-20 RX ADMIN — ROSUVASTATIN CALCIUM 40 MG: 20 TABLET, FILM COATED ORAL at 21:02

## 2024-03-20 RX ADMIN — INSULIN LISPRO 2 UNITS: 100 INJECTION, SOLUTION INTRAVENOUS; SUBCUTANEOUS at 09:21

## 2024-03-20 RX ADMIN — IPRATROPIUM BROMIDE AND ALBUTEROL SULFATE 3 ML: 2.5; .5 SOLUTION RESPIRATORY (INHALATION) at 07:16

## 2024-03-20 RX ADMIN — Medication: at 07:00

## 2024-03-20 RX ADMIN — INSULIN LISPRO 4 UNITS: 100 INJECTION, SOLUTION INTRAVENOUS; SUBCUTANEOUS at 17:19

## 2024-03-20 RX ADMIN — AZITHROMYCIN MONOHYDRATE 500 MG: 500 INJECTION, POWDER, LYOPHILIZED, FOR SOLUTION INTRAVENOUS at 11:05

## 2024-03-20 RX ADMIN — TAMSULOSIN HYDROCHLORIDE 0.4 MG: 0.4 CAPSULE ORAL at 09:13

## 2024-03-20 RX ADMIN — PIPERACILLIN SODIUM AND TAZOBACTAM SODIUM 4.5 G: 4; .5 INJECTION, SOLUTION INTRAVENOUS at 13:38

## 2024-03-20 RX ADMIN — PIPERACILLIN SODIUM AND TAZOBACTAM SODIUM 4.5 G: 4; .5 INJECTION, SOLUTION INTRAVENOUS at 03:50

## 2024-03-20 RX ADMIN — BARIUM SULFATE 10 ML: 400 PASTE ORAL at 11:06

## 2024-03-20 RX ADMIN — BARIUM SULFATE 90 ML: 0.81 POWDER, FOR SUSPENSION ORAL at 11:04

## 2024-03-20 RX ADMIN — FLUOXETINE 40 MG: 20 CAPSULE ORAL at 09:13

## 2024-03-20 RX ADMIN — BARIUM SULFATE 85 ML: 400 SUSPENSION ORAL at 11:05

## 2024-03-20 RX ADMIN — GABAPENTIN 600 MG: 600 TABLET, FILM COATED ORAL at 15:43

## 2024-03-20 RX ADMIN — INSULIN LISPRO 10 UNITS: 100 INJECTION, SOLUTION INTRAVENOUS; SUBCUTANEOUS at 11:29

## 2024-03-20 RX ADMIN — PRAMIPEXOLE DIHYDROCHLORIDE 0.12 MG: 0.12 TABLET ORAL at 09:39

## 2024-03-20 RX ADMIN — METOPROLOL SUCCINATE 50 MG: 50 TABLET, EXTENDED RELEASE ORAL at 09:13

## 2024-03-20 RX ADMIN — BARIUM SULFATE 5 ML: 400 SUSPENSION ORAL at 11:05

## 2024-03-20 RX ADMIN — PRAMIPEXOLE DIHYDROCHLORIDE 0.12 MG: 0.12 TABLET ORAL at 15:43

## 2024-03-20 RX ADMIN — SPIRONOLACTONE 12.5 MG: 25 TABLET ORAL at 09:13

## 2024-03-20 RX ADMIN — IPRATROPIUM BROMIDE AND ALBUTEROL SULFATE 3 ML: 2.5; .5 SOLUTION RESPIRATORY (INHALATION) at 15:13

## 2024-03-20 RX ADMIN — PIPERACILLIN SODIUM AND TAZOBACTAM SODIUM 4.5 G: 4; .5 INJECTION, SOLUTION INTRAVENOUS at 21:03

## 2024-03-20 RX ADMIN — INSULIN LISPRO 10 UNITS: 100 INJECTION, SOLUTION INTRAVENOUS; SUBCUTANEOUS at 17:20

## 2024-03-20 RX ADMIN — INSULIN GLARGINE 56 UNITS: 100 INJECTION, SOLUTION SUBCUTANEOUS at 20:59

## 2024-03-20 RX ADMIN — GABAPENTIN 600 MG: 600 TABLET, FILM COATED ORAL at 11:05

## 2024-03-20 RX ADMIN — GABAPENTIN 600 MG: 600 TABLET, FILM COATED ORAL at 06:43

## 2024-03-20 RX ADMIN — GABAPENTIN 600 MG: 600 TABLET, FILM COATED ORAL at 21:02

## 2024-03-20 RX ADMIN — INSULIN LISPRO 8 UNITS: 100 INJECTION, SOLUTION INTRAVENOUS; SUBCUTANEOUS at 11:27

## 2024-03-20 RX ADMIN — INSULIN LISPRO 10 UNITS: 100 INJECTION, SOLUTION INTRAVENOUS; SUBCUTANEOUS at 09:22

## 2024-03-20 RX ADMIN — MULTIVITAMIN TABLET 1 TABLET: TABLET at 09:13

## 2024-03-20 RX ADMIN — IPRATROPIUM BROMIDE AND ALBUTEROL SULFATE 3 ML: 2.5; .5 SOLUTION RESPIRATORY (INHALATION) at 11:19

## 2024-03-20 RX ADMIN — Medication: at 15:00

## 2024-03-20 RX ADMIN — PRAMIPEXOLE DIHYDROCHLORIDE 0.12 MG: 0.12 TABLET ORAL at 21:02

## 2024-03-20 RX ADMIN — PIPERACILLIN SODIUM AND TAZOBACTAM SODIUM 4.5 G: 4; .5 INJECTION, SOLUTION INTRAVENOUS at 09:14

## 2024-03-20 ASSESSMENT — COGNITIVE AND FUNCTIONAL STATUS - GENERAL
DAILY ACTIVITIY SCORE: 24
DAILY ACTIVITIY SCORE: 24
MOBILITY SCORE: 24
MOBILITY SCORE: 24

## 2024-03-20 ASSESSMENT — PAIN SCALES - WONG BAKER: WONGBAKER_NUMERICALRESPONSE: NO HURT

## 2024-03-20 ASSESSMENT — PAIN SCALES - GENERAL
PAINLEVEL_OUTOF10: 0 - NO PAIN
PAINLEVEL_OUTOF10: 0 - NO PAIN

## 2024-03-20 NOTE — CONSULTS
"Nutrition Assessement Note    Nutrition Assessment    Reason for Assessment: Admission nursing screening (MST 2)    Reason for Hospital Admission:  Ramesh Morocho is a 83 y.o. male who is admitted for SOB, pneumonia    Nutrition History:  Food and Nutrient History: reports decreased po intake over the past 1 week d/t not feeling well but states his appetite is good at this time. ate well this AM.  GI Symptoms: Diarrhea    Anthropometrics:  Ht: 180.3 cm (5' 11\"), Wt: 98.9 kg (218 lb), BMI: 30.42  IBW/kg (Dietitian Calculated): 78.18 kg  Percent of IBW: 127 %  Adjusted Body Weight (kg): 83.64 kg    Weight Change:  Daily Weight  03/19/24 : 98.9 kg (218 lb)  03/19/24 : 99.2 kg (218 lb 9.6 oz)  03/07/24 : 103 kg (227 lb)  02/28/24 : 103 kg (228 lb)  02/27/24 : 103 kg (226 lb)  12/13/23 : 100 kg (221 lb)  11/30/23 : 98.7 kg (217 lb 9.6 oz)  10/20/23 : 98.4 kg (217 lb)  10/11/23 : 96.6 kg (212 lb 14.4 oz)  09/26/23 : 95.3 kg (210 lb)     Weight History / % Weight Change: usual wt 220# per pt. reports a ~6# wt loss over 1 week d/t not feeling well.  Significant Weight Loss: Yes  Interpretation of Weight Loss:  (6# (2.6%) wt loss over 1 week per pt report)    Nutrition Focused Physical Exam Findings: deferred     Nutrition Significant Labs:  Lab Results   Component Value Date    WBC 10.6 03/20/2024    HGB 14.4 03/20/2024    HCT 42.8 03/20/2024     (L) 03/20/2024    CHOL 120 (L) 12/14/2023    TRIG 287 (H) 12/14/2023    HDL 29.0 (L) 12/14/2023    ALT 14 03/20/2024    AST 17 03/20/2024     (L) 03/20/2024    K 3.6 03/20/2024    CL 96 (L) 03/20/2024    CREATININE 1.00 03/20/2024    BUN 11 03/20/2024    CO2 27 03/20/2024    TSH 0.65 03/24/2020    PSA 0.6 11/05/2018    INR 1.1 03/19/2024    HGBA1C 8.0 (H) 03/19/2024    ALBUR 12 03/02/2023       Current Facility-Administered Medications:     azithromycin (Zithromax) 500 mg in dextrose 5% 250 mL IV, 500 mg, intravenous, q24h, Lela Lake, APRN-CNP    dextrose 50 % " injection 12.5 g, 12.5 g, intravenous, q15 min PRN, KATIE Mcgill    dextrose 50 % injection 25 g, 25 g, intravenous, q15 min PRN, KATIE Mcgill    FLUoxetine (PROzac) capsule 40 mg, 40 mg, oral, Daily, KATIE Mcgill, 40 mg at 03/19/24 1808    fluticasone (Flonase) nasal spray 1 spray, 1 spray, Each Nostril, Daily, KATIE Mcgill    gabapentin (Neurontin) tablet 600 mg, 600 mg, oral, Before meals & nightly, KATIE Mcgill, 600 mg at 03/20/24 0643    glucagon (Glucagen) injection 1 mg, 1 mg, intramuscular, q15 min PRN, KATIE Mcgill    insulin glargine (Lantus) injection 56 Units, 56 Units, subcutaneous, Nightly, KATIE Mcgill    insulin lispro (HumaLOG) injection 0-10 Units, 0-10 Units, subcutaneous, TID with meals, KATIE Mcgill, 4 Units at 03/19/24 1808    insulin lispro (HumaLOG) injection 10 Units, 10 Units, subcutaneous, TID with meals, KATIE Mcgill    ipratropium-albuteroL (Duo-Neb) 0.5-2.5 mg/3 mL nebulizer solution 3 mL, 3 mL, nebulization, 4x daily, KATIE Mcgill, 3 mL at 03/20/24 0716    ipratropium-albuteroL (Duo-Neb) 0.5-2.5 mg/3 mL nebulizer solution 3 mL, 3 mL, nebulization, q2h PRN, KATIE Mcgill    metoprolol succinate XL (Toprol-XL) 24 hr tablet 50 mg, 50 mg, oral, Daily, KATIE Mcgill    multivitamin 1 tablet, 1 tablet, oral, Daily, KATIE Mcgill, 1 tablet at 03/19/24 1808    oxygen (O2) therapy, , inhalation, q8h, KATIE Mcgill, Start at 03/20/24 0700    piperacillin-tazobactam-dextrose (Zosyn) IV 4.5 g, 4.5 g, intravenous, q6h, KATIE Mcgill, Stopped at 03/20/24 0420    pramipexole (Mirapex) tablet 0.125 mg, 0.125 mg, oral, TID, KATIE Mcgill    rosuvastatin (Crestor) tablet 40 mg, 40 mg, oral, Nightly, KATIE Mcgill    spironolactone (Aldactone) tablet 12.5 mg, 12.5 mg, oral, Daily, KATIE Mcgill     tamsulosin (Flomax) 24 hr capsule 0.4 mg, 0.4 mg, oral, Daily, KATIE Mcgill, 0.4 mg at 03/19/24 1808    traZODone (Desyrel) tablet 50 mg, 50 mg, oral, Nightly PRN, KATIE Mcgill, 50 mg at 03/19/24 2204    Dietary Orders (From admission, onward)       Start     Ordered    03/19/24 1737  Adult diet Regular, Carb Controlled; 90 gram carb/meal, 60 gram Carb evening snack  Diet effective now        Question Answer Comment   Diet type Regular    Diet type Carb Controlled    Carb diet selection: 90 gram carb/meal, 60 gram Carb evening snack        03/19/24 1737                  Estimated Needs:   Estimated Energy Needs  Total Energy Estimated Needs (kCal): 2085 kCal  Total Estimated Energy Need per Day (kCal/kg): 25 kCal/kg  Method for Estimating Needs: ABW    Estimated Protein Needs  Total Protein Estimated Needs (g): 83 g  Total Protein Estimated Needs (g/kg): 1 g/kg  Method for Estimating Needs: ABW    Estimated Fluid Needs  Method for Estimating Needs: 1 ml/kcal        Nutrition Diagnosis   Nutrition Diagnosis:  Malnutrition Diagnosis  Patient has Malnutrition Diagnosis: No    Nutrition Diagnosis  Patient has Nutrition Diagnosis: Yes  Diagnosis Status (1): New  Nutrition Diagnosis 1: Unintended weight loss  Related to (1): decreased ability to consume sufficient energy  As Evidenced by (1): wt loss       Nutrition Interventions/Recommendations   Nutrition Interventions and Recommendations:    Nutrition Prescription:  Individualized Nutrition Prescription Provided for : 2085 kcals and 83g protein to be provided via diet    Nutrition Interventions:   Food and/or Nutrient Delivery Interventions  Interventions: Meals and snacks  Meals and Snacks: Carbohydrate-modified diet  Goal: CCD 75g diet    Education Documentation  No documentation found.           Nutrition Monitoring and Evaluation   Monitoring/Evaluation:   Food/Nutrient Related History Monitoring  Monitoring and Evaluation Plan: Energy  intake  Energy Intake: Estimated energy intake  Criteria: pt to consume >/= 75% estimated needs    Body Composition/Growth/Weight History  Monitoring and Evaluation Plan: Weight  Weight: Measured weight  Criteria: pt will maintain wt at this time       Time Spent/Follow-up:   Follow Up  Time Spent (min): 25 minutes  Last Date of Nutrition Visit: 03/20/24  Nutrition Follow-Up Needed?: 7-10 days  Follow up Comment: 3/27/24

## 2024-03-20 NOTE — PROGRESS NOTES
Speech-Language Pathology    Inpatient Speech-Language Pathology Clinical Swallow Evaluation       Patient Name: Ramesh Morocho  MRN: [id number]  : 1940  Today's Date: 24  Time Calculation  Start Time: 920  Stop Time: 935  Time Calculation (min): 15 min     Reason for Consultation: Pt seen for bedside dysphagia eval in the setting of pneumonia.     Recommendations:  Risk for Aspiration: Yes   Solid Diet Recommendations : Regular (IDDSI Level 7)   Liquid Diet Recommendations: Thin (IDDSI Level 0)         Medication Administration Recommendations: Whole, With Liquid      Skilled dysphagia treatment is warranted  at next level of care.     Assessment:  Consistencies Trialed:      Pt participated in diagnostic trials of 5 oz thin water (3 oz successively swallowed), 3 tsp apple sauce, and 1/2 misty cracker.     OME and CNE are grossly WFL. Vocal quality and cough are perceptually WFL. Pt reports a history of coughing while eating/drinking and a history of pneumonia. Pt denies any recent weight loss.    Oral phase - Oral mucosa moist and normal in coloration. Mastication, bolus manipulation, and oral clearance timely and functional.     Pharyngeal phase - Although it is a limited assessment technique; Hyolaryngeal elevation/excursion assessed via digital palpation and appeared consistent across all trials. No coughing, choking, nor change in vocal quality present throughout trials. No overt s/s of aspiration present at the bedside.      Due to pt's history of recurrent pneumonia and self-reported coughing and choking while eating, an MBSS is recommended and scheduled for 3/20/24 at 11 AM.    Relevant Imaging Results:  CT Chest 3/19/24  IMPRESSION:  1. No evidence for pulmonary embolus      2. Patchy areas of consolidation and pneumonia within the left lower  lobe. Follow-up to clearing      3. Endobronchial opacification bilateral lower lobes. Correlate with  aspiration      4. Mediastinal and hilar  lymphadenopathy likely reactive.        Plan:  SLP Plan: Skilled SLP   SLP Frequency: 3x per week   Duration: Current admission   Next Treatment Priority: MBSS 3/20/24 11 AM   Discussed POC: Patient, Nursing, Other (Comment) (NP)   Discussed Risks/Benefits: Yes, Patient, Nursing, Other (Comment) (NP)   Patient/Caregiver Agreeable: Yes     Skilled dysphagia treatment is warranted due to for further education and training on dysphagia management including instruction on oropharyngeal therapeutic exercises and/or compensatory swallowing strategies    Goals:  1) Pt to tolerate recommended diet without overt s/s of aspiration in order to ensure pt is demonstrating adequate swallow function for meeting nutrition and hydration needs.   2) Pt to participate in trials of upgraded diet texture for possible diet upgrade in order to ensure least restrictive diet.   3) Pt to participate in oropharyngeal therapeutic exercises to improve oropharyngeal swallow function for return to baseline diet level.   4) Pt to participate in oral motor theraputic exercises to improve speech and/or swallow function for return to baseline level.   5) Pt to participate in assessment of oropharyngeal swallow function via MBSS for assessment of possible aspiration and to determine least restrictive diet for meeting pt's nutritional and hydration needs.     -scheduled 3/20/24 at 11 AM    Subjective   Pt is seen sitting upright in bed. Pt is pleasant and participative throughout.       General Visit Information:  History Of Present Illness  Ramesh Morocho is a 83 y.o. male presenting with dyspnea, cough, fever, and fatigue.  Patient reports that he has had the symptoms for a few days.  He went to see his PCP who advised him to go to the emergency room.  Patient says that he was hypoxic at his PCP appointment.  Upon arrival to the ED his oxygen level was at 88% on room air.  Patient becomes dyspneic and tachypneic with minimal exertion upon my  examination.  CT in the ED was concerning for pneumonia and possible aspiration.      Pain:    Pt denies pain    Education:   Patient education completed regarding results and recommendations of SLP evaluation. Pt verbalizes understanding and consent for MBSS. Pt verbalizes understanding with all patient questions answered to satisfaction.     Care Team involvement:   Communicated with bedside nurse prior to evaluation with clearance for patient participation obtained, Results of the bedside swallowing evaluation were discussed with nurse and verbalized understanding was noted. , and Results of bedside evaluation communicated with nurse practitioner via EPIC secure chat.     Disclaimer: Session supervised by Corinne E Casey, MA-CCC, SLP

## 2024-03-20 NOTE — PROGRESS NOTES
03/20/24 1106   Discharge Planning   Living Arrangements Spouse/significant other   Support Systems Spouse/significant other   Type of Residence Private residence   Number of Stairs to Enter Residence 1   Number of Stairs Within Residence 15   Do you have animals or pets at home? Yes   Type of Animals or Pets 3 cats   Who is requesting discharge planning? Provider   Home or Post Acute Services None   Patient expects to be discharged to: Home   Does the patient need discharge transport arranged? No         Spoke with patient bedside.  Discussed discharge planning.  Patient is home w spouse and independent.  Denies any needs going home.  Currently on 4L NC.  RN-TCC following

## 2024-03-20 NOTE — PROGRESS NOTES
"Ramesh Morocho is a 83 y.o. male on day 1 of admission presenting with Shortness of breath.    Subjective     Pt seen and examined.  No documented concerns/events overnight from nursing.  Afebrile overnight.  Denies chills.   Pt said his breathing slightly improved.     Objective     Physical Exam  Constitutional:       Appearance: He is ill-appearing. He is not toxic-appearing.   Cardiovascular:      Rate and Rhythm: Rhythm irregular.      Pulses: Normal pulses.      Heart sounds: Normal heart sounds.   Pulmonary:      Effort: Pulmonary effort is normal. No respiratory distress.      Breath sounds: Wheezing and rhonchi present.      [place] [facility] [place]    Abdominal:      General: Bowel sounds are normal.      Palpations: Abdomen is soft.   Musculoskeletal:      Right lower leg: Edema (trace) present.      Left lower leg: Edema (trace) present.   Skin:     General: Skin is warm and dry.   Neurological:      General: No focal deficit present.      Mental Status: He is alert and oriented to person, place, and time.   Psychiatric:         Mood and Affect: Mood normal.         Behavior: Behavior normal.         Last Recorded Vitals  Blood pressure 127/54, pulse 96, temperature 36.7 °C (98.1 °F), temperature source Temporal, resp. rate 22, height 1.803 m (5' 11\"), weight 98.9 kg (218 lb), SpO2 94 %.  Intake/Output last 3 Shifts:  I/O last 3 completed shifts:  In: 1700 (17.2 mL/kg) [IV Piggyback:1700]  Out: 650 (6.6 mL/kg) [Urine:650 (0.2 mL/kg/hr)]  Weight: 98.9 kg     Relevant Results  Scheduled medications  azithromycin, 500 mg, intravenous, q24h  FLUoxetine, 40 mg, oral, Daily  fluticasone, 1 spray, Each Nostril, Daily  gabapentin, 600 mg, oral, Before meals & nightly  insulin glargine, 56 Units, subcutaneous, Nightly  insulin lispro, 0-10 Units, subcutaneous, TID with meals  insulin lispro, 10 Units, subcutaneous, TID with meals  ipratropium-albuteroL, 3 mL, nebulization, 4x daily  metoprolol succinate XL, 50 mg, oral, " Daily  multivitamin, 1 tablet, oral, Daily  oxygen, , inhalation, q8h  piperacillin-tazobactam, 4.5 g, intravenous, q6h  pramipexole, 0.125 mg, oral, TID  rosuvastatin, 40 mg, oral, Nightly  spironolactone, 12.5 mg, oral, Daily  tamsulosin, 0.4 mg, oral, Daily      Continuous medications     PRN medications  PRN medications: dextrose, dextrose, glucagon, ipratropium-albuteroL, traZODone     following data reviewed    WBC- 10.6  Hgb-14.4  Hct-42.8  Platelets-107    AST-17  ALT- 14  Alk Phos-56  Total Joaquín-1.0    Na-131  K+-3.6  Cl-96  Bicarb-27  BUN-11  creatinine-1.0    HgbA1C 8.0                       Assessment/Plan   Principal Problem:    Shortness of breath  PNA  -CT suggestive of aspiration will treat for aspiration pneumonia  -ST eval to rule out aspiration  -Sputum culture if able  -Pulmonology consult  -Ginny  -IV ATB     Acute Resp Failure with hypoxia  -Oxygen as needed, wean as tolerated  -Pulmonology consult     Tobacco use  -Encourage cessation  -Refusing nicotine patch     Acute exacerbation of chronic COPD  -see #1 and #2     BRITT   -CPAP at at bedtime per home settings     DM II  -Monitor blood glucose  -ISS  -Diabetic diet  -Check hemoglobin A1c  -Hold metformin     Hyperlipidemia  -continue statin     CHF systolic  -last ECHO from 10/23 with an EF 40-45%     Atrial Fibrillation  -continue BB   -telemetry  -continue Eliquis      Plan  Above plan discussing with patient he is in agreement  I personally discussed code status at length with pt again and pt wishes to be FULL MEASURES  Wife, Amy, updated  Lela Lake, APRN-CNP

## 2024-03-20 NOTE — CARE PLAN
The patient's goals for the shift include  decrease O2 requirement    The clinical goals for the shift include Pulse ox above 93    Problem: Respiratory  Goal: Clear secretions with interventions this shift  3/20/2024 0157 by Melanie Cortez RN  Outcome: Progressing  3/20/2024 0157 by Melanie Cortez RN  Outcome: Progressing  Goal: Minimize anxiety/maximize coping throughout shift  3/20/2024 0157 by Melanie Cortez RN  Outcome: Progressing  3/20/2024 0157 by Melanie Cortez RN  Outcome: Progressing  Goal: Minimal/no exertional discomfort or dyspnea this shift  3/20/2024 0157 by Melanie Cortez RN  Outcome: Progressing  3/20/2024 0157 by Melanie Cortez RN  Outcome: Progressing  Goal: No signs of respiratory distress (eg. Use of accessory muscles. Peds grunting)  3/20/2024 0157 by Melanie oCrtez RN  Outcome: Progressing  3/20/2024 0157 by Melanie Cortez RN  Outcome: Progressing  Goal: Patent airway maintained this shift  3/20/2024 0157 by Melanie Cortez RN  Outcome: Progressing  3/20/2024 0157 by Melanie Cortez RN  Outcome: Progressing  Goal: Tolerate mechanical ventilation evidenced by VS/agitation level this shift  3/20/2024 0157 by Melanie Cortez RN  Outcome: Progressing  3/20/2024 0157 by Melanie Cortez RN  Outcome: Progressing  Goal: Tolerate pulmonary toileting this shift  3/20/2024 0157 by Melanie Cortez RN  Outcome: Progressing  3/20/2024 0157 by Melanie Cortez RN  Outcome: Progressing  Goal: Verbalize decreased shortness of breath this shift  3/20/2024 0157 by Melanie Cortez RN  Outcome: Progressing  3/20/2024 0157 by Melaine Cortez RN  Outcome: Progressing  Goal: Wean oxygen to maintain O2 saturation per order/standard this shift  3/20/2024 0157 by Melanie Cortez RN  Outcome: Progressing  3/20/2024 0157 by Melanie Cortez RN  Outcome: Progressing  Goal: Increase self care and/or family involvement in next 24 hours  3/20/2024 0157 by Melanie Cortez RN  Outcome:  Progressing  3/20/2024 0157 by Melanie Cortez RN  Outcome: Progressing     Problem: Fall/Injury  Goal: Not fall by end of shift  Outcome: Progressing  Goal: Be free from injury by end of the shift  Outcome: Progressing  Goal: Verbalize understanding of personal risk factors for fall in the hospital  Outcome: Progressing  Goal: Verbalize understanding of risk factor reduction measures to prevent injury from fall in the home  Outcome: Progressing  Goal: Use assistive devices by end of the shift  Outcome: Progressing

## 2024-03-20 NOTE — CARE PLAN
Problem: Respiratory  Goal: Clear secretions with interventions this shift  Outcome: Progressing  Goal: Minimize anxiety/maximize coping throughout shift  Outcome: Progressing  Goal: Minimal/no exertional discomfort or dyspnea this shift  Outcome: Progressing  Goal: No signs of respiratory distress (eg. Use of accessory muscles. Peds grunting)  Outcome: Progressing  Goal: Patent airway maintained this shift  Outcome: Progressing  Goal: Tolerate mechanical ventilation evidenced by VS/agitation level this shift  Outcome: Progressing  Goal: Tolerate pulmonary toileting this shift  Outcome: Progressing  Goal: Verbalize decreased shortness of breath this shift  Outcome: Progressing  Goal: Wean oxygen to maintain O2 saturation per order/standard this shift  Outcome: Progressing  Goal: Increase self care and/or family involvement in next 24 hours  Outcome: Progressing     Problem: Fall/Injury  Goal: Not fall by end of shift  Outcome: Progressing  Goal: Be free from injury by end of the shift  Outcome: Progressing  Goal: Verbalize understanding of personal risk factors for fall in the hospital  Outcome: Progressing  Goal: Verbalize understanding of risk factor reduction measures to prevent injury from fall in the home  Outcome: Progressing  Goal: Use assistive devices by end of the shift  Outcome: Progressing   The patient's goals for the shift include      The clinical goals for the shift include safety, decrease O2 requirement

## 2024-03-20 NOTE — PROCEDURES
"Speech-Language Pathology        Modified Barium Swallow Study     Patient Name: Ramesh Morocho  MRN: [id number]  : 1940  Today's Date: 24  Time Calculation  Start Time: 1048  Stop Time: 1115  Time Calculation (min): 27 min       Recommendations:  Regular solids with thin liquids  Small sips strategy     Assessment/Impression:    Full detailed SLP/Radiologist Modified Barium Swallow study report can be found under Chart Review tab, Imaging tab and  titled \"FL Modified Barium Swallow Study\"      Pt. Presenting with Pt demonstrates oral and pharyngeal phase of swallow that is within functional limits. Pt demonstrates transient penetration episodes during successive swallow trials of thin liquids and nectar liquids. Penetration occurs after the initial swallow and remains above the vocal chords. Penetrated material is ejected. Penetration does not present during single sip swallows. Discussed use of small sip strategy with pt who verbalizes understanding and recalls education material.      Plan:  Treatment/Interventions None  SLP Plan: No treatment needs identified at this time     Discussed POC: Patient, Nursing, NP  Discussed Risks/Benefits: Yes  Patient/Caregiver Agreeable: Yes    Pain:   Rating 0-10: Denies pain  Location: n/a       Education:   Pt. Educated on results of MBS study, recommended diet and recommended safe swallow strategies      "

## 2024-03-20 NOTE — CONSULTS
Pulmonary Consult Note    Chief Complaint   Patient presents with    Shortness of Breath     Sick since Tuesday, cough congestion, left sided ronchi, no vomit or diarrhea, hx of pneumonia and copd        Reason for consultation:  Acute respiratory failure with hypoxia, pneumonia    History Of Present Illness  Ramesh Morocho is a 83 y.o. male presenting with fever, shortness of breath and fatigue.  Started last week.  Went to see his PCP who advised him to go to the emergency room.  On arrival he was noted to have low oxygen saturations and placed on supplemental oxygen.  Admitted with a diagnosis of pneumonia and started on antibiotics.  Were asked to see him in this regard.     Past Medical History  He has a past medical history of CHF (congestive heart failure) (CMS/[facility]), Essential tremor (09/06/2013), Hyperlipidemia, Obstructive sleep apnea (adult) (pediatric) (11/20/2014), Old myocardial infarction (11/20/2014), Other intervertebral disc degeneration, lumbar region (11/20/2014), Personal history of other diseases of the circulatory system (11/20/2014), Personal history of other diseases of the digestive system (11/20/2014), Personal history of other diseases of the musculoskeletal system and connective tissue (11/20/2014), Personal history of other diseases of the respiratory system (11/20/2014), Personal history of other endocrine, nutritional and metabolic disease (11/20/2014), Personal history of other endocrine, nutritional and metabolic disease (11/20/2014), Personal history of other mental and behavioral disorders (07/14/2017), and Type 2 diabetes mellitus (CMS/[facility]).    Surgical History  He has a past surgical history that includes Coronary artery bypass graft (11/20/2014); Colonoscopy (10/01/2015); Hernia repair; and Small intestine surgery.     Social History  He reports that he has been smoking cigarettes. He has been smoking an average of 1 pack per day. He does not have any smokeless tobacco history on  "file. He reports that he does not currently use alcohol. He reports that he does not use drugs.    Family History  Family History   Problem Relation Name Age of Onset    Other (blood disease) Brother          Allergies  Metformin    Review of Systems   All other systems reviewed and are negative.      Last Recorded Vitals  Blood pressure 116/54, pulse 83, temperature 37.2 °C (99 °F), temperature source Temporal, resp. rate 24, height 1.803 m (5' 11\"), weight 98.9 kg (218 lb), SpO2 95 %.     Physical Exam  Vitals reviewed.   Constitutional:       Interventions: Nasal cannula in place.   HENT:      Head: Normocephalic and atraumatic.      Mouth/Throat:      Mouth: Mucous membranes are moist.      Pharynx: Oropharynx is clear.   Eyes:      General: No scleral icterus.     Conjunctiva/sclera: Conjunctivae normal.   Cardiovascular:      Rate and Rhythm: Normal rate and regular rhythm.   Pulmonary:      Effort: Pulmonary effort is normal.      Breath sounds: Wheezing and rhonchi present.   Abdominal:      General: Abdomen is flat. Bowel sounds are normal.   Musculoskeletal:      Right lower leg: No edema.      Left lower leg: No edema.   Skin:     General: Skin is warm and dry.   Neurological:      General: No focal deficit present.   Psychiatric:         Mood and Affect: Mood normal.         Behavior: Behavior normal.         Meds  Scheduled medications  azithromycin, 500 mg, intravenous, q24h  barium sulfate, 1 tablet, oral, Once in imaging  FLUoxetine, 40 mg, oral, Daily  fluticasone, 1 spray, Each Nostril, Daily  gabapentin, 600 mg, oral, Before meals & nightly  insulin glargine, 56 Units, subcutaneous, Nightly  insulin lispro, 0-10 Units, subcutaneous, TID with meals  insulin lispro, 10 Units, subcutaneous, TID with meals  ipratropium-albuteroL, 3 mL, nebulization, 4x daily  metoprolol succinate XL, 50 mg, oral, Daily  multivitamin, 1 tablet, oral, Daily  oxygen, , inhalation, q8h  piperacillin-tazobactam, 4.5 g, " intravenous, q6h  pramipexole, 0.125 mg, oral, TID  rosuvastatin, 40 mg, oral, Nightly  spironolactone, 12.5 mg, oral, Daily  tamsulosin, 0.4 mg, oral, Daily      Continuous medications     PRN medications  PRN medications: dextrose, dextrose, glucagon, ipratropium-albuteroL, traZODone       Relevant Results  CHEM comprehensive panel reviewed  CBC reviewed  Blood cultures and sputum cultures in progress  CT of the chest personally reviewed showing left lower lobe consolidative changes consistent with pneumonia    Principal Problem:    Acute respiratory failure with hypoxia (CMS/[facility])  Active Problems:    Chronic obstructive pulmonary disease (CMS/[facility])    Tobacco use disorder    Bacterial pneumonia       Recommendations  Acute respiratory failure with hypoxia: In the setting of bacterial pneumonia  Continue with supplemental oxygen, target transcutaneous saturations of 88 to 92% and above  Bacterial pneumonia: Possible aspiration phenomenon.  Will recommend gram-negative and anaerobic coverage  Follow-up on sputum cultures   continue with piperacillin/tazobactam  Follow-up on blood cultures  Chronic obstructive pulmonary disease  Bronchodilators  FEV1 prior to discharge or once optimized  Nicotine dependence  Smoking cessation discussed    Thank you for this consultation.  We will follow with you.       I spent 26 minutes in the professional and overall care of this patient.      Homer Sparks MD  Pulmonary/Critical Care Physician  [facility]

## 2024-03-21 LAB
GLUCOSE BLD MANUAL STRIP-MCNC: 211 MG/DL (ref 74–99)
GLUCOSE BLD MANUAL STRIP-MCNC: 216 MG/DL (ref 74–99)
GLUCOSE BLD MANUAL STRIP-MCNC: 287 MG/DL (ref 74–99)
GLUCOSE BLD MANUAL STRIP-MCNC: 287 MG/DL (ref 74–99)
LEGIONELLA AG UR QL: NEGATIVE

## 2024-03-21 PROCEDURE — 2500000001 HC RX 250 WO HCPCS SELF ADMINISTERED DRUGS (ALT 637 FOR MEDICARE OP): Performed by: NURSE PRACTITIONER

## 2024-03-21 PROCEDURE — 51701 INSERT BLADDER CATHETER: CPT

## 2024-03-21 PROCEDURE — 94640 AIRWAY INHALATION TREATMENT: CPT

## 2024-03-21 PROCEDURE — 2500000005 HC RX 250 GENERAL PHARMACY W/O HCPCS: Performed by: NURSE PRACTITIONER

## 2024-03-21 PROCEDURE — 9420000001 HC RT PATIENT EDUCATION 5 MIN

## 2024-03-21 PROCEDURE — 2500000002 HC RX 250 W HCPCS SELF ADMINISTERED DRUGS (ALT 637 FOR MEDICARE OP, ALT 636 FOR OP/ED): Performed by: NURSE PRACTITIONER

## 2024-03-21 PROCEDURE — 2500000004 HC RX 250 GENERAL PHARMACY W/ HCPCS (ALT 636 FOR OP/ED): Performed by: NURSE PRACTITIONER

## 2024-03-21 PROCEDURE — 1200000002 HC GENERAL ROOM WITH TELEMETRY DAILY

## 2024-03-21 PROCEDURE — 82947 ASSAY GLUCOSE BLOOD QUANT: CPT

## 2024-03-21 RX ADMIN — MULTIVITAMIN TABLET 1 TABLET: TABLET at 08:27

## 2024-03-21 RX ADMIN — Medication 3 L/MIN: at 15:00

## 2024-03-21 RX ADMIN — PRAMIPEXOLE DIHYDROCHLORIDE 0.12 MG: 0.12 TABLET ORAL at 14:37

## 2024-03-21 RX ADMIN — METOPROLOL SUCCINATE 50 MG: 50 TABLET, EXTENDED RELEASE ORAL at 08:27

## 2024-03-21 RX ADMIN — INSULIN LISPRO 6 UNITS: 100 INJECTION, SOLUTION INTRAVENOUS; SUBCUTANEOUS at 12:02

## 2024-03-21 RX ADMIN — TAMSULOSIN HYDROCHLORIDE 0.4 MG: 0.4 CAPSULE ORAL at 08:27

## 2024-03-21 RX ADMIN — INSULIN LISPRO 10 UNITS: 100 INJECTION, SOLUTION INTRAVENOUS; SUBCUTANEOUS at 08:37

## 2024-03-21 RX ADMIN — INSULIN LISPRO 10 UNITS: 100 INJECTION, SOLUTION INTRAVENOUS; SUBCUTANEOUS at 12:07

## 2024-03-21 RX ADMIN — ROSUVASTATIN CALCIUM 40 MG: 20 TABLET, FILM COATED ORAL at 21:51

## 2024-03-21 RX ADMIN — AZITHROMYCIN MONOHYDRATE 500 MG: 500 INJECTION, POWDER, LYOPHILIZED, FOR SOLUTION INTRAVENOUS at 12:02

## 2024-03-21 RX ADMIN — PRAMIPEXOLE DIHYDROCHLORIDE 0.12 MG: 0.12 TABLET ORAL at 21:51

## 2024-03-21 RX ADMIN — INSULIN LISPRO 10 UNITS: 100 INJECTION, SOLUTION INTRAVENOUS; SUBCUTANEOUS at 16:57

## 2024-03-21 RX ADMIN — FLUOXETINE 40 MG: 20 CAPSULE ORAL at 08:27

## 2024-03-21 RX ADMIN — PRAMIPEXOLE DIHYDROCHLORIDE 0.12 MG: 0.12 TABLET ORAL at 08:26

## 2024-03-21 RX ADMIN — Medication 4 L/MIN: at 07:00

## 2024-03-21 RX ADMIN — GABAPENTIN 600 MG: 600 TABLET, FILM COATED ORAL at 16:55

## 2024-03-21 RX ADMIN — PIPERACILLIN SODIUM AND TAZOBACTAM SODIUM 4.5 G: 4; .5 INJECTION, SOLUTION INTRAVENOUS at 02:41

## 2024-03-21 RX ADMIN — GABAPENTIN 600 MG: 600 TABLET, FILM COATED ORAL at 12:02

## 2024-03-21 RX ADMIN — INSULIN LISPRO 4 UNITS: 100 INJECTION, SOLUTION INTRAVENOUS; SUBCUTANEOUS at 08:47

## 2024-03-21 RX ADMIN — PIPERACILLIN SODIUM AND TAZOBACTAM SODIUM 4.5 G: 4; .5 INJECTION, SOLUTION INTRAVENOUS at 08:27

## 2024-03-21 RX ADMIN — GABAPENTIN 600 MG: 600 TABLET, FILM COATED ORAL at 21:51

## 2024-03-21 RX ADMIN — SPIRONOLACTONE 12.5 MG: 25 TABLET ORAL at 08:26

## 2024-03-21 RX ADMIN — GABAPENTIN 600 MG: 600 TABLET, FILM COATED ORAL at 06:45

## 2024-03-21 RX ADMIN — INSULIN LISPRO 6 UNITS: 100 INJECTION, SOLUTION INTRAVENOUS; SUBCUTANEOUS at 16:55

## 2024-03-21 RX ADMIN — FLUTICASONE PROPIONATE 1 SPRAY: 50 SPRAY, METERED NASAL at 08:37

## 2024-03-21 RX ADMIN — IPRATROPIUM BROMIDE AND ALBUTEROL SULFATE 3 ML: 2.5; .5 SOLUTION RESPIRATORY (INHALATION) at 07:17

## 2024-03-21 RX ADMIN — IPRATROPIUM BROMIDE AND ALBUTEROL SULFATE 3 ML: 2.5; .5 SOLUTION RESPIRATORY (INHALATION) at 11:16

## 2024-03-21 RX ADMIN — PIPERACILLIN SODIUM AND TAZOBACTAM SODIUM 4.5 G: 4; .5 INJECTION, SOLUTION INTRAVENOUS at 14:37

## 2024-03-21 RX ADMIN — INSULIN GLARGINE 56 UNITS: 100 INJECTION, SOLUTION SUBCUTANEOUS at 21:51

## 2024-03-21 RX ADMIN — IPRATROPIUM BROMIDE AND ALBUTEROL SULFATE 3 ML: 2.5; .5 SOLUTION RESPIRATORY (INHALATION) at 20:28

## 2024-03-21 RX ADMIN — IPRATROPIUM BROMIDE AND ALBUTEROL SULFATE 3 ML: 2.5; .5 SOLUTION RESPIRATORY (INHALATION) at 15:36

## 2024-03-21 RX ADMIN — PIPERACILLIN SODIUM AND TAZOBACTAM SODIUM 4.5 G: 4; .5 INJECTION, SOLUTION INTRAVENOUS at 21:53

## 2024-03-21 ASSESSMENT — PAIN - FUNCTIONAL ASSESSMENT: PAIN_FUNCTIONAL_ASSESSMENT: 0-10

## 2024-03-21 ASSESSMENT — COGNITIVE AND FUNCTIONAL STATUS - GENERAL
MOBILITY SCORE: 24
DAILY ACTIVITIY SCORE: 24

## 2024-03-21 ASSESSMENT — PAIN SCALES - GENERAL: PAINLEVEL_OUTOF10: 0 - NO PAIN

## 2024-03-21 NOTE — CARE PLAN
Problem: Respiratory  Goal: Clear secretions with interventions this shift  Outcome: Progressing  Goal: Minimize anxiety/maximize coping throughout shift  Outcome: Progressing

## 2024-03-21 NOTE — PROGRESS NOTES
Ramesh Morocho is a 83 y.o. male on day 2 of admission presenting with Acute respiratory failure with hypoxia (CMS/HCC).      Subjective   Seen and examined.  No documented concerns/events overnight from nursing.  Patient said his breathing is improving.       Objective     Last Recorded Vitals  /62 (BP Location: Left arm, Patient Position: Lying)   Pulse 92   Temp 36.9 °C (98.4 °F) (Temporal)   Resp 20   Wt 98.9 kg (218 lb)   SpO2 96%   Intake/Output last 3 Shifts:    Intake/Output Summary (Last 24 hours) at 3/21/2024 1131  Last data filed at 3/21/2024 1111  Gross per 24 hour   Intake 990 ml   Output 450 ml   Net 540 ml       Admission Weight  Weight: 98.9 kg (218 lb) (03/19/24 1134)    Daily Weight  03/19/24 : 98.9 kg (218 lb)        Physical Exam  Constitutional:       Appearance: He is ill-appearing. He is not toxic-appearing.   Cardiovascular:      Rate and Rhythm: Rhythm irregular.      Pulses: Normal pulses.      Heart sounds: Normal heart sounds.   Pulmonary:      Effort: Pulmonary effort is normal. No respiratory distress.      Breath sounds: Wheezing and rhonchi present.      Comments: [facility] [place]   Abdominal:      General: Bowel sounds are normal.      Palpations: Abdomen is soft.   Musculoskeletal:      Right lower leg: Edema (trace) present.      Left lower leg: Edema (trace) present.   Skin:     General: Skin is warm and dry.   Neurological:      General: No focal deficit present.      Mental Status: He is alert and oriented to person, place, and time.   Psychiatric:         Mood and Affect: Mood normal.         Behavior: Behavior normal.        Relevant Results  Scheduled medications  azithromycin, 500 mg, intravenous, q24h  barium sulfate, 1 tablet, oral, Once in imaging  FLUoxetine, 40 mg, oral, Daily  fluticasone, 1 spray, Each Nostril, Daily  gabapentin, 600 mg, oral, Before meals & nightly  insulin glargine, 56 Units, subcutaneous, Nightly  insulin lispro, 0-10 Units, subcutaneous, TID with  meals  insulin lispro, 10 Units, subcutaneous, TID with meals  ipratropium-albuteroL, 3 mL, nebulization, 4x daily  metoprolol succinate XL, 50 mg, oral, Daily  multivitamin, 1 tablet, oral, Daily  oxygen, , inhalation, q8h  piperacillin-tazobactam, 4.5 g, intravenous, q6h  pramipexole, 0.125 mg, oral, TID  rosuvastatin, 40 mg, oral, Nightly  spironolactone, 12.5 mg, oral, Daily  tamsulosin, 0.4 mg, oral, Daily      Continuous medications     PRN medications  PRN medications: dextrose, dextrose, glucagon, ipratropium-albuteroL, traZODone       Assessment/Plan                  Principal Problem:    Acute respiratory failure with hypoxia (CMS/[facility])  Active Problems:    Chronic obstructive pulmonary disease (CMS/[facility])    Tobacco use disorder    Bacterial pneumonia    PNA  -CT suggestive of aspiration will treat for aspiration pneumonia  -ST eval to rule out aspiration  -Sputum culture if able  -Pulmonology consult  -Ginny  -IV ATB     Acute Resp Failure with hypoxia  -Oxygen as needed, wean as tolerated  -Pulmonology consult     Tobacco use  -Encourage cessation  -Refusing nicotine patch     Acute exacerbation of chronic COPD  -see #1 and #2     BRITT   -CPAP at at bedtime per home settings     DM II  -Monitor blood glucose  -ISS  -Diabetic diet  -Check hemoglobin A1c  -Hold metformin     Hyperlipidemia  -continue statin     CHF systolic  -last ECHO from 10/23 with an EF 40-45%     Atrial Fibrillation  -continue BB   -telemetry  -continue Eliquis      Plan  Above plan discussing with patient he is in agreement              KEVON Mcgill-CNP

## 2024-03-21 NOTE — PROGRESS NOTES
03/21/24 0806   Discharge Planning   Patient expects to be discharged to: Home no needs   Does the patient need discharge transport arranged? No       Home no needs when medically clear.  Oxygen at 4L [place].  Antibiotics.

## 2024-03-21 NOTE — PROGRESS NOTES
Pulmonary Progress Note 03/21/24     Patient seen in follow-up for bacterial pneumonia and acute respiratory failure with hypoxia    Subjective   Interval History:   Overall feels improved.  Endorses productive cough.    Objective   Vital signs in last 24 hours:  Temp:  [36.3 °C (97.3 °F)-37.2 °C (99 °F)] 36.9 °C (98.4 °F)  Heart Rate:  [84-96] 92  Resp:  [20-24] 20  BP: (108-137)/(54-72) 116/62  FiO2 (%):  [32 %] 32 %    Intake/Output last 3 shifts:  I/O last 3 completed shifts:  In: 800 (8.1 mL/kg) [P.O.:600; IV Piggyback:200]  Out: 750 (7.6 mL/kg) [Urine:750 (0.2 mL/kg/hr)]  Weight: 98.9 kg   Intake/Output this shift:  I/O this shift:  In: 540 [P.O.:440; IV Piggyback:100]  Out: -     Physical Exam  Vitals reviewed.   Constitutional:       Appearance: Normal appearance.      Interventions: Nasal cannula in place.   Eyes:      General: No scleral icterus.     Conjunctiva/sclera: Conjunctivae normal.   Cardiovascular:      Rate and Rhythm: Normal rate and regular rhythm.   Pulmonary:      Effort: Pulmonary effort is normal.      Breath sounds: Wheezing and rhonchi present.   Musculoskeletal:      Right lower leg: No edema.      Left lower leg: No edema.   Skin:     General: Skin is warm and dry.   Neurological:      General: No focal deficit present.      Mental Status: He is alert.   Psychiatric:         Mood and Affect: Mood normal.         Behavior: Behavior normal.         Scheduled medications  azithromycin, 500 mg, intravenous, q24h  barium sulfate, 1 tablet, oral, Once in imaging  FLUoxetine, 40 mg, oral, Daily  fluticasone, 1 spray, Each Nostril, Daily  gabapentin, 600 mg, oral, Before meals & nightly  insulin glargine, 56 Units, subcutaneous, Nightly  insulin lispro, 0-10 Units, subcutaneous, TID with meals  insulin lispro, 10 Units, subcutaneous, TID with meals  ipratropium-albuteroL, 3 mL, nebulization, 4x daily  metoprolol succinate XL, 50 mg, oral, Daily  multivitamin, 1 tablet, oral,  Daily  oxygen, , inhalation, q8h  piperacillin-tazobactam, 4.5 g, intravenous, q6h  pramipexole, 0.125 mg, oral, TID  rosuvastatin, 40 mg, oral, Nightly  spironolactone, 12.5 mg, oral, Daily  tamsulosin, 0.4 mg, oral, Daily      Continuous medications     PRN medications  PRN medications: dextrose, dextrose, glucagon, ipratropium-albuteroL, traZODone     Labs:  Lab Results   Component Value Date     (L) 03/20/2024    K 3.6 03/20/2024    CL 96 (L) 03/20/2024    CO2 27 03/20/2024    BUN 11 03/20/2024    CREATININE 1.00 03/20/2024    GLUCOSE 209 (H) 03/20/2024    CALCIUM 8.8 03/20/2024     Lab Results   Component Value Date    WBC 10.6 03/20/2024    HGB 14.4 03/20/2024    HCT 42.8 03/20/2024    MCV 94 03/20/2024     (L) 03/20/2024       Imaging:  FL modified barium swallow study    Result Date: 3/20/2024  Interpreted By:  Steiger, David, and Casey Corinne STUDY: FL MODIFIED BARIUM SWALLOW STUDY;; 3/20/2024 11:08 am   INDICATION: Signs/Symptoms:PNA rule out aspiration.   COMPARISON: None.   ACCESSION NUMBER(S): [id number]   ORDERING CLINICIAN: ORESTES FORMAN   TECHNIQUE: MBSS completed. Informed verbal consent obtained prior to completion of exam. Trials of thin, nectar thick, puree, regular solids, and barium tablet given. Fluoroscopy time :  2 minutes 18 seconds Radiation dose: 10.29 mGy air kerma.   SLP: Corinne E. Casey, M.A. CCC-SLP Phone/Pager: [phone] opt 2, via [email or web address], or via EP[employer] secure chat   SPEECH FINDINGS: Reason for referral: Concern for aspiration pna on Chest CT Patient hx: PNA, hypoxia, COPD, DM Respiratory status: Nasal cannula Previous diet: Regular solids with thin liquids   FINAL SPEECH RECOMMENDATIONS   Diet recommendations/feeding strategies: Regular solids with thin liquids. Single sips with drinking.   Follow-up speech therapy recommended: No   Education provided: Yes. Results, recommendations, and compensatory swallowing strategies reviewed with pt verbalizing understanding.  Pt able to recall education following a delay.   Treatment Provided today: No   Additional consult suggested: None   Repeat study/ dc plan: None   Mechanics of the swallow summary: *Oral phase: WFL *Pharyngeal phase: Transient penetration after the initial swallow during successive swallow trials of thin liquid and nectar liquid. *Esophageal phase: Unremarkable   SLP impressions with severity rating: Pt demonstrates oral and pharyngeal phase of swallow that is within functional limits. Pt demonstrates transient penetration episodes during successive swallow trials of thin liquids and nectar liquids. Penetration occurs after the initial swallow and remains above the vocal chords. Penetrated material is ejected. Penetration does not present during single sip swallows. Discussed use of small sip strategy with pt who verbalizes understanding and recalls education material.   Thin Liquids (MBSS) Rosenbek's Penetration Aspiration Scale, Thin Liquids (MBSS): 3. PENETRATION with LOW ASPIRATION risk - contrast remains above vocal cords, visible residue     Response to Pharyngeal Residue, Thin Liquid (MBSS): spontaneous clearing, ,     Nectar Thick Liquids (MBSS) Rosenbek's Penetration Aspiration Scale, Nectar thick liquids (MBSS): 3. PENETRATION with LOW ASPIRATION risk - contrast remains above vocal cords, visible residue     Response to Pharyngeal Residue, Nectar thick liquids (MBSS): spontaneous clearing, ,       Purees (MBSS) Rosenbek's Penetration Aspiration Scale, Purees (MBSS): 1. NO ASPIRATION & NO PENETRATION - no aspiration, contrast does not enter airway     Response to Pharyngeal Residue, Purees (MBSS): no residue, ,     Solids (MBSS) Rosenbek's Penetration Aspiration Scale, Solids (MBSS): 1. NO ASPIRATION & NO PENETRATION - no aspiration, contrast does not enter airway     Response to Pharyngeal Residue, Solids (MBSS): no residue, ,     Speech Therapy section of this report signed by Corinne E. Casey, M.A.  CCC-SLP on 3/20/2024 at 11:13 am.   RADIOLOGY FINDINGS: Penetration occurred with the thin and nectar consistencies liquid when utilizing multiple swallows. No aspiration occurred.   Radiology section of this report signed by Ramesh Moran MD.       Penetration occurred with nectar and thin consistencies utilizing multiple swallows. Please see the more detailed speech pathology report above.   MACRO: None   Signed by: Ramesh Moran 3/20/2024 4:13 PM Dictation workstation:   [id number]    CT angio chest for pulmonary embolism    Result Date: 3/19/2024  Interpreted By:  Florencio Mariee, STUDY: CT ANGIO CHEST FOR PULMONARY EMBOLISM; ;  3/19/2024 1:53 pm   INDICATION: Signs/Symptoms:sob tachy.   COMPARISON: 06/03/2022   ACCESSION NUMBER(S): [id number]   ORDERING CLINICIAN: ROBERT IRAHETA   TECHNIQUE: CT angiogram performed through the chest following intravenous administration of 75 mL of Omnipaque 350. MIP reconstruction imaging in the coronal and sagittal projection   All CT examinations are performed with 1 or more of the following dose reduction techniques: Automated exposure control, adjustment of mA and/or kv according to patient's size, or use of iterative reconstruction techniques.   FINDINGS: Mediastinum demonstrates scattered subcentimeter lymph nodes which are more conspicuous comparison to the remote CT. For example, in the right paratracheal location with lymph node identified measuring 7 mm. Also, scattered subcentimeter AP window lymph nodes appearing slightly more conspicuous with largest lymph node identified measuring 7 mm in the short axis. There is a lymph node in the precarinal location identified measuring 10 x 15 mm. Subcarinal lymphadenopathy also demonstrated intervally developed from prior imaging measuring 12 mm in the short axis. Roro demonstrate lymphadenopathy. For example, in the right roro with lymph node measuring 10 mm in the short axis. Also, in the left roro with a few  enlarged lymph nodes identified measuring up to 11 mm in the short axis. Esophagus is unremarkable.   Heart and great vessels demonstrate ascending thoracic aorta to measure 3.5 cm. 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 Main pulmonary artery measures 3.4 cm which is enlarged which can be seen with component of pulmonary artery hypertension. Central and segmental pulmonary arteries demonstrate no filling defects to suggest the possibility of pulmonary embolus   Lung parenchyma demonstrates patchy consolidation within the left lower lobe with areas of endobronchial opacification also demonstrated. Correlate with aspiration. Follow-up to clearing recommended. Also, mild endobronchial opacification within the right lower lobe with basilar dependent atelectasis. No focal consolidation at the right lung base.   Included portions visualized abdomen are unremarkable   Visualized osseous structures demonstrate no compression deformity in the spine. Mild multilevel degenerative discogenic changes noted.           1. No evidence for pulmonary embolus   2. Patchy areas of consolidation and pneumonia within the left lower lobe. Follow-up to clearing   3. Endobronchial opacification bilateral lower lobes. Correlate with aspiration   4. Mediastinal and hilar lymphadenopathy likely reactive.     MACRO: None   Signed by: Florencio Mariee 3/19/2024 2:38 PM Dictation workstation:   [id number]    XR chest 1 view    Result Date: 3/19/2024  Interpreted By:  Irasema Leung, STUDY: XR CHEST 1 VIEW;  3/19/2024 12:43 pm   INDICATION: Signs/Symptoms:cough sob.   COMPARISON: 03/30/2022   ACCESSION NUMBER(S): [id number]   ORDERING CLINICIAN: ROBERT IRAHETA   FINDINGS: The cardiac silhouette may be normal in size.   The lungs appear hyperinflated. There is no obvious consolidation or pleural fluid.   Sternal present.   A neural stimulator is present on the left.   COMPARISON OF FINDING: The chest is similar.       No acute cardiopulmonary  disease.   MACRO: none   Signed by: Irasema Leung 3/19/2024 1:02 PM Dictation workstation:   [id number]              Assessment/Plan   Principal Problem:    Acute respiratory failure with hypoxia (CMS/HCC)  Active Problems:    Chronic obstructive pulmonary disease (CMS/HCC)    Tobacco use disorder    Bacterial pneumonia    Acute respiratory failure with hypoxia: In the setting of bacterial pneumonia  Continue with supplemental oxygen, target transcutaneous saturations of 88 to 92% and above  Bacterial pneumonia: Possible aspiration phenomenon.  Will recommend gram-negative and anaerobic coverage  Sputum cultures without any growth  continue with piperacillin/tazobactam  Follow-up on blood cultures  Chronic obstructive pulmonary disease: Still with airway disease and rhonchi  Bronchodilators  FEV1 prior to discharge or once optimized  Nicotine dependence  Smoking cessation discussed  Pulmonary disposition:  Anticipate another 24 to 48 hours of treatment given airway exam today.  Will reassess in the morning    Homer Sparks MD  Pulmonary/CC Medicine  [facility]     LOS: 2 days

## 2024-03-21 NOTE — CARE PLAN
The patient's goals for the shift include  safety and monitor pox and labs     The clinical goals for the shift include safety    Over the shift, the patient did not make progress toward the following goals. Barriers to progression include none. Recommendations to address these barriers include none.      03/20/24 at 10:48 PM - FRANCISCO COLLADO RN

## 2024-03-22 LAB
GLUCOSE BLD MANUAL STRIP-MCNC: 179 MG/DL (ref 74–99)
GLUCOSE BLD MANUAL STRIP-MCNC: 206 MG/DL (ref 74–99)
GLUCOSE BLD MANUAL STRIP-MCNC: 215 MG/DL (ref 74–99)
GLUCOSE BLD MANUAL STRIP-MCNC: 271 MG/DL (ref 74–99)

## 2024-03-22 PROCEDURE — 2500000005 HC RX 250 GENERAL PHARMACY W/O HCPCS: Performed by: NURSE PRACTITIONER

## 2024-03-22 PROCEDURE — 2500000001 HC RX 250 WO HCPCS SELF ADMINISTERED DRUGS (ALT 637 FOR MEDICARE OP): Performed by: NURSE PRACTITIONER

## 2024-03-22 PROCEDURE — 2500000002 HC RX 250 W HCPCS SELF ADMINISTERED DRUGS (ALT 637 FOR MEDICARE OP, ALT 636 FOR OP/ED): Performed by: NURSE PRACTITIONER

## 2024-03-22 PROCEDURE — 9420000001 HC RT PATIENT EDUCATION 5 MIN

## 2024-03-22 PROCEDURE — 82947 ASSAY GLUCOSE BLOOD QUANT: CPT

## 2024-03-22 PROCEDURE — 1100000001 HC PRIVATE ROOM DAILY

## 2024-03-22 PROCEDURE — 94640 AIRWAY INHALATION TREATMENT: CPT

## 2024-03-22 PROCEDURE — 2500000002 HC RX 250 W HCPCS SELF ADMINISTERED DRUGS (ALT 637 FOR MEDICARE OP, ALT 636 FOR OP/ED): Performed by: INTERNAL MEDICINE

## 2024-03-22 PROCEDURE — 2500000004 HC RX 250 GENERAL PHARMACY W/ HCPCS (ALT 636 FOR OP/ED): Performed by: NURSE PRACTITIONER

## 2024-03-22 RX ORDER — IPRATROPIUM BROMIDE AND ALBUTEROL SULFATE 2.5; .5 MG/3ML; MG/3ML
3 SOLUTION RESPIRATORY (INHALATION)
Status: DISCONTINUED | OUTPATIENT
Start: 2024-03-22 | End: 2024-03-26 | Stop reason: HOSPADM

## 2024-03-22 RX ADMIN — INSULIN LISPRO 4 UNITS: 100 INJECTION, SOLUTION INTRAVENOUS; SUBCUTANEOUS at 12:23

## 2024-03-22 RX ADMIN — GABAPENTIN 600 MG: 600 TABLET, FILM COATED ORAL at 12:23

## 2024-03-22 RX ADMIN — INSULIN LISPRO 6 UNITS: 100 INJECTION, SOLUTION INTRAVENOUS; SUBCUTANEOUS at 16:51

## 2024-03-22 RX ADMIN — PIPERACILLIN SODIUM AND TAZOBACTAM SODIUM 4.5 G: 4; .5 INJECTION, SOLUTION INTRAVENOUS at 21:12

## 2024-03-22 RX ADMIN — FLUTICASONE PROPIONATE 1 SPRAY: 50 SPRAY, METERED NASAL at 08:59

## 2024-03-22 RX ADMIN — TAMSULOSIN HYDROCHLORIDE 0.4 MG: 0.4 CAPSULE ORAL at 08:57

## 2024-03-22 RX ADMIN — AZITHROMYCIN MONOHYDRATE 500 MG: 500 INJECTION, POWDER, LYOPHILIZED, FOR SOLUTION INTRAVENOUS at 12:23

## 2024-03-22 RX ADMIN — Medication 4 L/MIN: at 15:00

## 2024-03-22 RX ADMIN — Medication 4 L/MIN: at 07:00

## 2024-03-22 RX ADMIN — PRAMIPEXOLE DIHYDROCHLORIDE 0.12 MG: 0.12 TABLET ORAL at 21:12

## 2024-03-22 RX ADMIN — IPRATROPIUM BROMIDE AND ALBUTEROL SULFATE 3 ML: 2.5; .5 SOLUTION RESPIRATORY (INHALATION) at 13:57

## 2024-03-22 RX ADMIN — SPIRONOLACTONE 12.5 MG: 25 TABLET ORAL at 08:57

## 2024-03-22 RX ADMIN — FLUOXETINE 40 MG: 20 CAPSULE ORAL at 08:57

## 2024-03-22 RX ADMIN — GABAPENTIN 600 MG: 600 TABLET, FILM COATED ORAL at 21:12

## 2024-03-22 RX ADMIN — PRAMIPEXOLE DIHYDROCHLORIDE 0.12 MG: 0.12 TABLET ORAL at 14:03

## 2024-03-22 RX ADMIN — IPRATROPIUM BROMIDE AND ALBUTEROL SULFATE 3 ML: 2.5; .5 SOLUTION RESPIRATORY (INHALATION) at 19:32

## 2024-03-22 RX ADMIN — PIPERACILLIN SODIUM AND TAZOBACTAM SODIUM 4.5 G: 4; .5 INJECTION, SOLUTION INTRAVENOUS at 08:57

## 2024-03-22 RX ADMIN — INSULIN GLARGINE 56 UNITS: 100 INJECTION, SOLUTION SUBCUTANEOUS at 21:11

## 2024-03-22 RX ADMIN — METOPROLOL SUCCINATE 50 MG: 50 TABLET, EXTENDED RELEASE ORAL at 08:57

## 2024-03-22 RX ADMIN — ROSUVASTATIN CALCIUM 40 MG: 20 TABLET, FILM COATED ORAL at 21:12

## 2024-03-22 RX ADMIN — INSULIN LISPRO 10 UNITS: 100 INJECTION, SOLUTION INTRAVENOUS; SUBCUTANEOUS at 12:25

## 2024-03-22 RX ADMIN — IPRATROPIUM BROMIDE AND ALBUTEROL SULFATE 3 ML: 2.5; .5 SOLUTION RESPIRATORY (INHALATION) at 08:22

## 2024-03-22 RX ADMIN — INSULIN LISPRO 10 UNITS: 100 INJECTION, SOLUTION INTRAVENOUS; SUBCUTANEOUS at 16:53

## 2024-03-22 RX ADMIN — GABAPENTIN 600 MG: 600 TABLET, FILM COATED ORAL at 07:08

## 2024-03-22 RX ADMIN — PRAMIPEXOLE DIHYDROCHLORIDE 0.12 MG: 0.12 TABLET ORAL at 08:56

## 2024-03-22 RX ADMIN — PIPERACILLIN SODIUM AND TAZOBACTAM SODIUM 4.5 G: 4; .5 INJECTION, SOLUTION INTRAVENOUS at 14:03

## 2024-03-22 RX ADMIN — MULTIVITAMIN TABLET 1 TABLET: TABLET at 08:57

## 2024-03-22 RX ADMIN — INSULIN LISPRO 2 UNITS: 100 INJECTION, SOLUTION INTRAVENOUS; SUBCUTANEOUS at 08:56

## 2024-03-22 RX ADMIN — PIPERACILLIN SODIUM AND TAZOBACTAM SODIUM 4.5 G: 4; .5 INJECTION, SOLUTION INTRAVENOUS at 02:41

## 2024-03-22 RX ADMIN — INSULIN LISPRO 10 UNITS: 100 INJECTION, SOLUTION INTRAVENOUS; SUBCUTANEOUS at 08:55

## 2024-03-22 RX ADMIN — GABAPENTIN 600 MG: 600 TABLET, FILM COATED ORAL at 16:54

## 2024-03-22 ASSESSMENT — COGNITIVE AND FUNCTIONAL STATUS - GENERAL
MOBILITY SCORE: 24
DAILY ACTIVITIY SCORE: 24
MOBILITY SCORE: 24
DAILY ACTIVITIY SCORE: 24

## 2024-03-22 ASSESSMENT — PAIN SCALES - WONG BAKER: WONGBAKER_NUMERICALRESPONSE: NO HURT

## 2024-03-22 ASSESSMENT — PAIN - FUNCTIONAL ASSESSMENT
PAIN_FUNCTIONAL_ASSESSMENT: 0-10
PAIN_FUNCTIONAL_ASSESSMENT: 0-10

## 2024-03-22 ASSESSMENT — PAIN SCALES - GENERAL
PAINLEVEL_OUTOF10: 0 - NO PAIN

## 2024-03-22 NOTE — NURSING NOTE
Assumed care of patient from previous shift RN. Pt resting in bed, denies any needs. Call bell within reach.

## 2024-03-22 NOTE — PROGRESS NOTES
"Ramesh Morocho is a 83 y.o. male on day 3 of admission presenting with Acute respiratory failure with hypoxia (CMS/HCC).    Subjective   No respiratory compaints.       Objective     Physical Exam    Last Recorded Vitals  Blood pressure 111/57, pulse 93, temperature 37.2 °C (99 °F), temperature source Temporal, resp. rate 19, height 1.803 m (5' 11\"), weight 98.9 kg (218 lb), SpO2 93 %.  Intake/Output last 3 Shifts:  I/O last 3 completed shifts:  In: 1300 (13.1 mL/kg) [P.O.:1200; IV Piggyback:100]  Out: 1100 (11.1 mL/kg) [Urine:1100 (0.3 mL/kg/hr)]  Weight: 98.9 kg       Intake/Output Summary (Last 24 hours) at 3/22/2024 1528  Last data filed at 3/22/2024 1433  Gross per 24 hour   Intake 1100 ml   Output 400 ml   Net 700 ml      azithromycin, 500 mg, intravenous, q24h  barium sulfate, 1 tablet, oral, Once in imaging  FLUoxetine, 40 mg, oral, Daily  fluticasone, 1 spray, Each Nostril, Daily  gabapentin, 600 mg, oral, Before meals & nightly  insulin glargine, 56 Units, subcutaneous, Nightly  insulin lispro, 0-10 Units, subcutaneous, TID with meals  insulin lispro, 10 Units, subcutaneous, TID with meals  ipratropium-albuteroL, 3 mL, nebulization, TID  metoprolol succinate XL, 50 mg, oral, Daily  multivitamin, 1 tablet, oral, Daily  oxygen, , inhalation, q8h  piperacillin-tazobactam, 4.5 g, intravenous, q6h  pramipexole, 0.125 mg, oral, TID  rosuvastatin, 40 mg, oral, Nightly  spironolactone, 12.5 mg, oral, Daily  tamsulosin, 0.4 mg, oral, Daily       PRN medications: dextrose, dextrose, glucagon, ipratropium-albuteroL, traZODone       Relevant Results  Results for orders placed or performed during the hospital encounter of 03/19/24 (from the past 24 hour(s))   POCT GLUCOSE   Result Value Ref Range    POCT Glucose 287 (H) 74 - 99 mg/dL   POCT GLUCOSE   Result Value Ref Range    POCT Glucose 216 (H) 74 - 99 mg/dL   POCT GLUCOSE   Result Value Ref Range    POCT Glucose 179 (H) 74 - 99 mg/dL   POCT GLUCOSE   Result Value Ref " Range    POCT Glucose 215 (H) 74 - 99 mg/dL   FL modified barium swallow study  Result Date: 3/20/2024  Interpreted By:  Steiger, David, and Casey Corinne STUDY: FL MODIFIED BARIUM SWALLOW STUDY;; 3/20/2024 11:08 am   INDICATION: Signs/Symptoms:PNA rule out aspiration.   COMPARISON: None.   ACCESSION NUMBER(S): [id number]   ORDERING CLINICIAN: ORESTES FORMAN   TECHNIQUE: MBSS completed. Informed verbal consent obtained prior to completion of exam. Trials of thin, nectar thick, puree, regular solids, and barium tablet given. Fluoroscopy time :  2 minutes 18 seconds Radiation dose: 10.29 mGy air kerma.   SLP: Corinne E. Casey, M.A. CCC-SLP Phone/Pager: [phone] opt 2, via [employer], or via [employer] secure chat   SPEECH FINDINGS: Reason for referral: Concern for aspiration pna on Chest CT Patient hx: PNA, hypoxia, COPD, DM Respiratory status: Nasal cannula Previous diet: Regular solids with thin liquids   FINAL SPEECH RECOMMENDATIONS   Diet recommendations/feeding strategies: Regular solids with thin liquids. Single sips with drinking.   Follow-up speech therapy recommended: No   Education provided: Yes. Results, recommendations, and compensatory swallowing strategies reviewed with pt verbalizing understanding. Pt able to recall education following a delay.   Treatment Provided today: No   Additional consult suggested: None   Repeat study/ dc plan: None   Mechanics of the swallow summary: *Oral phase: WFL *Pharyngeal phase: Transient penetration after the initial swallow during successive swallow trials of thin liquid and nectar liquid. *Esophageal phase: Unremarkable   SLP impressions with severity rating: Pt demonstrates oral and pharyngeal phase of swallow that is within functional limits. Pt demonstrates transient penetration episodes during successive swallow trials of thin liquids and nectar liquids. Penetration occurs after the initial swallow and remains above the vocal chords. Penetrated material is ejected.  Penetration does not present during single sip swallows. Discussed use of small sip strategy with pt who verbalizes understanding and recalls education material.   Thin Liquids (MBSS) Rosenbek's Penetration Aspiration Scale, Thin Liquids (MBSS): 3. PENETRATION with LOW ASPIRATION risk - contrast remains above vocal cords, visible residue     Response to Pharyngeal Residue, Thin Liquid (MBSS): spontaneous clearing, ,     Nectar Thick Liquids (MBSS) Rosenbek's Penetration Aspiration Scale, Nectar thick liquids (MBSS): 3. PENETRATION with LOW ASPIRATION risk - contrast remains above vocal cords, visible residue     Response to Pharyngeal Residue, Nectar thick liquids (MBSS): spontaneous clearing, ,       Purees (MBSS) Rosenbek's Penetration Aspiration Scale, Purees (MBSS): 1. NO ASPIRATION & NO PENETRATION - no aspiration, contrast does not enter airway     Response to Pharyngeal Residue, Purees (MBSS): no residue, ,     Solids (MBSS) Rosenbek's Penetration Aspiration Scale, Solids (MBSS): 1. NO ASPIRATION & NO PENETRATION - no aspiration, contrast does not enter airway     Response to Pharyngeal Residue, Solids (MBSS): no residue, ,     Speech Therapy section of this report signed by Corinne E. Casey, M.A. CCC-SLP on 3/20/2024 at 11:13 am.   RADIOLOGY FINDINGS: Penetration occurred with the thin and nectar consistencies liquid when utilizing multiple swallows. No aspiration occurred.   Radiology section of this report signed by Ramesh Moran MD.   Penetration occurred with nectar and thin consistencies utilizing multiple swallows. Please see the more detailed speech pathology report above.   MACRO: None   Signed by: Ramesh Moran 3/20/2024 4:13 PM Dictation workstation:   [id number]    CT angio chest for pulmonary embolism  Result Date: 3/19/2024  Interpreted By:  Florencio Mariee, STUDY: CT ANGIO CHEST FOR PULMONARY EMBOLISM; ;  3/19/2024 1:53 pm   INDICATION: Signs/Symptoms:sob tachy.   COMPARISON: 06/03/2022    ACCESSION NUMBER(S): [id number]   ORDERING CLINICIAN: ROBERT IRAHETA   TECHNIQUE: CT angiogram performed through the chest following intravenous administration of 75 mL of Omnipaque 350. MIP reconstruction imaging in the coronal and sagittal projection   All CT examinations are performed with 1 or more of the following dose reduction techniques: Automated exposure control, adjustment of mA and/or kv according to patient's size, or use of iterative reconstruction techniques.   FINDINGS: Mediastinum demonstrates scattered subcentimeter lymph nodes which are more conspicuous comparison to the remote CT. For example, in the right paratracheal location with lymph node identified measuring 7 mm. Also, scattered subcentimeter AP window lymph nodes appearing slightly more conspicuous with largest lymph node identified measuring 7 mm in the short axis. There is a lymph node in the precarinal location identified measuring 10 x 15 mm. Subcarinal lymphadenopathy also demonstrated intervally developed from prior imaging measuring 12 mm in the short axis. Roro demonstrate lymphadenopathy. For example, in the right roro with lymph node measuring 10 mm in the short axis. Also, in the left roro with a few enlarged lymph nodes identified measuring up to 11 mm in the short axis. Esophagus is unremarkable.   Heart and great vessels demonstrate ascending thoracic aorta to measure 3.5 cm. 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 Main pulmonary artery measures 3.4 cm which is enlarged which can be seen with component of pulmonary artery hypertension. Central and segmental pulmonary arteries demonstrate no filling defects to suggest the possibility of pulmonary embolus   Lung parenchyma demonstrates patchy consolidation within the left lower lobe with areas of endobronchial opacification also demonstrated. Correlate with aspiration. Follow-up to clearing recommended. Also, mild endobronchial opacification within the right lower lobe with  basilar dependent atelectasis. No focal consolidation at the right lung base.   Included portions visualized abdomen are unremarkable   Visualized osseous structures demonstrate no compression deformity in the spine. Mild multilevel degenerative discogenic changes noted.         1. No evidence for pulmonary embolus   2. Patchy areas of consolidation and pneumonia within the left lower lobe. Follow-up to clearing   3. Endobronchial opacification bilateral lower lobes. Correlate with aspiration   4. Mediastinal and hilar lymphadenopathy likely reactive.     MACRO: None   Signed by: Florencio Mariee 3/19/2024 2:38 PM Dictation workstation:   [id number]    XR chest 1 view  Result Date: 3/19/2024  Interpreted By:  Irasema Leung, STUDY: XR CHEST 1 VIEW;  3/19/2024 12:43 pm   INDICATION: Signs/Symptoms:cough sob.   COMPARISON: 03/30/2022   ACCESSION NUMBER(S): [id number]   ORDERING CLINICIAN: ROBERT IRAHETA   FINDINGS: The cardiac silhouette may be normal in size.   The lungs appear hyperinflated. There is no obvious consolidation or pleural fluid.   Sternal present.   A neural stimulator is present on the left.   COMPARISON OF FINDING: The chest is similar.   No acute cardiopulmonary disease.                       Assessment/Plan   Principal Problem:    Acute respiratory failure with hypoxia (CMS/HCC)  Active Problems:    Chronic obstructive pulmonary disease (CMS/HCC)    Tobacco use disorder    Bacterial pneumonia          Shameka Suarez APRN-CNP  [facility]

## 2024-03-22 NOTE — CARE PLAN
The patient's goals for the shift include  wean off o2, antibiotics, monitor labs and vitals, good output     The clinical goals for the shift include Wean off O2    Over the shift, the patient did not make progress toward the following goals. Barriers to progression include none. Recommendations to address these barriers include none.      03/21/24 at 11:31 PM - FRANCISCO COLLADO RN

## 2024-03-22 NOTE — CARE PLAN
Problem: Respiratory  Goal: Clear secretions with interventions this shift  Outcome: Progressing  Goal: Minimize anxiety/maximize coping throughout shift  Outcome: Progressing  Goal: Minimal/no exertional discomfort or dyspnea this shift  Outcome: Progressing

## 2024-03-22 NOTE — PROGRESS NOTES
03/22/24 1210   Discharge Planning   Patient expects to be discharged to: Home no needs   Does the patient need discharge transport arranged? No       Patient remains on 4 L NC and receiving antibiotics.  Home no needs when medically clear.

## 2024-03-22 NOTE — PROGRESS NOTES
"Ramesh Morocho is a 83 y.o. male on day 3 of admission presenting with Acute respiratory failure with hypoxia (CMS/HCC).    Subjective     Pt seen and examined.  No documented concerns/events overnight from nursing.  Pt   Sitting up on edge of bed eating breakfast.  Patient said his breathing continues to improve slowly.  Continues to have a moist cough.      Objective     Physical Exam  Constitutional:       Appearance: He is ill-appearing. He is not toxic-appearing.   Cardiovascular:      Rate and Rhythm: Rhythm irregular.      Pulses: Normal pulses.      Heart sounds: Normal heart sounds.   Pulmonary:      Effort: Pulmonary effort is normal. No respiratory distress.      Breath sounds: Wheezing and rhonchi present.      Comments: [facility] [place]   Abdominal:      General: Bowel sounds are normal.      Palpations: Abdomen is soft.   Musculoskeletal:      Right lower leg: Edema (trace) present.      Left lower leg: Edema (trace) present.   Skin:     General: Skin is warm and dry.   Neurological:      General: No focal deficit present.      Mental Status: He is alert and oriented to person, place, and time.   Psychiatric:         Mood and Affect: Mood normal.         Behavior: Behavior normal.     Last Recorded Vitals  Blood pressure 133/77, pulse 91, temperature 36.9 °C (98.4 °F), temperature source Temporal, resp. rate 20, height 1.803 m (5' 11\"), weight 98.9 kg (218 lb), SpO2 94 %.  Intake/Output last 3 Shifts:  I/O last 3 completed shifts:  In: 1300 (13.1 mL/kg) [P.O.:1200; IV Piggyback:100]  Out: 1100 (11.1 mL/kg) [Urine:1100 (0.3 mL/kg/hr)]  Weight: 98.9 kg     Relevant Results  Scheduled medications  azithromycin, 500 mg, intravenous, q24h  barium sulfate, 1 tablet, oral, Once in imaging  FLUoxetine, 40 mg, oral, Daily  fluticasone, 1 spray, Each Nostril, Daily  gabapentin, 600 mg, oral, Before meals & nightly  insulin glargine, 56 Units, subcutaneous, Nightly  insulin lispro, 0-10 Units, subcutaneous, TID with " meals  insulin lispro, 10 Units, subcutaneous, TID with meals  ipratropium-albuteroL, 3 mL, nebulization, 4x daily  metoprolol succinate XL, 50 mg, oral, Daily  multivitamin, 1 tablet, oral, Daily  oxygen, , inhalation, q8h  piperacillin-tazobactam, 4.5 g, intravenous, q6h  pramipexole, 0.125 mg, oral, TID  rosuvastatin, 40 mg, oral, Nightly  spironolactone, 12.5 mg, oral, Daily  tamsulosin, 0.4 mg, oral, Daily      Continuous medications     PRN medications  PRN medications: dextrose, dextrose, glucagon, ipratropium-albuteroL, traZODone                    Assessment/Plan   Principal Problem:    Acute respiratory failure with hypoxia (CMS/[facility])  Active Problems:    Chronic obstructive pulmonary disease (CMS/[facility])    Tobacco use disorder    Bacterial pneumonia       PNA  -CT suggestive of aspiration will treat for aspiration pneumonia  -MBS no evidence of aspiration  -Sputum culture if able  -Pulmonology following  -DuoNebs  -IV ATB     Acute Resp Failure with hypoxia  -Oxygen as needed, wean as tolerated  -Pulmonology following     Tobacco use  -Encourage cessation  -Refusing nicotine patch     Acute exacerbation of chronic COPD  -see #1 and #2     BRITT   -CPAP at at bedtime per home settings     DM II  -Monitor blood glucose  -ISS  -Diabetic diet  -hemoglobin A1C 8.0  -Hold metformin     Hyperlipidemia  -continue statin     CHF systolic  -last ECHO from 10/23 with an EF 40-45%     Atrial Fibrillation  -continue BB   -telemetry  -pt was on Eliquis at some point but is no longer taking      Plan  Above plan discussed at length with patient he is in agreement  KEVON Mcgill-CNP

## 2024-03-23 LAB
BACTERIA BLD CULT: NORMAL
BACTERIA BLD CULT: NORMAL
BACTERIA SPEC RESP CULT: NORMAL
GLUCOSE BLD MANUAL STRIP-MCNC: 145 MG/DL (ref 74–99)
GLUCOSE BLD MANUAL STRIP-MCNC: 230 MG/DL (ref 74–99)
GLUCOSE BLD MANUAL STRIP-MCNC: 258 MG/DL (ref 74–99)
GLUCOSE BLD MANUAL STRIP-MCNC: 262 MG/DL (ref 74–99)
GRAM STN SPEC: NORMAL
GRAM STN SPEC: NORMAL

## 2024-03-23 PROCEDURE — 1100000001 HC PRIVATE ROOM DAILY

## 2024-03-23 PROCEDURE — 2500000001 HC RX 250 WO HCPCS SELF ADMINISTERED DRUGS (ALT 637 FOR MEDICARE OP): Performed by: NURSE PRACTITIONER

## 2024-03-23 PROCEDURE — 2500000004 HC RX 250 GENERAL PHARMACY W/ HCPCS (ALT 636 FOR OP/ED): Performed by: NURSE PRACTITIONER

## 2024-03-23 PROCEDURE — 2500000002 HC RX 250 W HCPCS SELF ADMINISTERED DRUGS (ALT 637 FOR MEDICARE OP, ALT 636 FOR OP/ED): Performed by: NURSE PRACTITIONER

## 2024-03-23 PROCEDURE — 2500000002 HC RX 250 W HCPCS SELF ADMINISTERED DRUGS (ALT 637 FOR MEDICARE OP, ALT 636 FOR OP/ED): Performed by: HOSPITALIST

## 2024-03-23 PROCEDURE — 82947 ASSAY GLUCOSE BLOOD QUANT: CPT

## 2024-03-23 PROCEDURE — 9420000001 HC RT PATIENT EDUCATION 5 MIN

## 2024-03-23 PROCEDURE — 2500000005 HC RX 250 GENERAL PHARMACY W/O HCPCS: Performed by: NURSE PRACTITIONER

## 2024-03-23 PROCEDURE — 2500000002 HC RX 250 W HCPCS SELF ADMINISTERED DRUGS (ALT 637 FOR MEDICARE OP, ALT 636 FOR OP/ED): Performed by: INTERNAL MEDICINE

## 2024-03-23 PROCEDURE — 94760 N-INVAS EAR/PLS OXIMETRY 1: CPT

## 2024-03-23 PROCEDURE — 2500000004 HC RX 250 GENERAL PHARMACY W/ HCPCS (ALT 636 FOR OP/ED): Performed by: HOSPITALIST

## 2024-03-23 PROCEDURE — 94640 AIRWAY INHALATION TREATMENT: CPT

## 2024-03-23 RX ORDER — PREDNISONE 20 MG/1
20 TABLET ORAL 2 TIMES DAILY
Status: DISCONTINUED | OUTPATIENT
Start: 2024-03-23 | End: 2024-03-26 | Stop reason: HOSPADM

## 2024-03-23 RX ORDER — BUDESONIDE 0.5 MG/2ML
0.5 INHALANT ORAL
Status: COMPLETED | OUTPATIENT
Start: 2024-03-23 | End: 2024-03-25

## 2024-03-23 RX ADMIN — PREDNISONE 20 MG: 20 TABLET ORAL at 14:40

## 2024-03-23 RX ADMIN — PREDNISONE 20 MG: 20 TABLET ORAL at 21:13

## 2024-03-23 RX ADMIN — GABAPENTIN 600 MG: 600 TABLET, FILM COATED ORAL at 06:05

## 2024-03-23 RX ADMIN — PIPERACILLIN SODIUM AND TAZOBACTAM SODIUM 4.5 G: 4; .5 INJECTION, SOLUTION INTRAVENOUS at 02:25

## 2024-03-23 RX ADMIN — PIPERACILLIN SODIUM AND TAZOBACTAM SODIUM 4.5 G: 4; .5 INJECTION, SOLUTION INTRAVENOUS at 08:49

## 2024-03-23 RX ADMIN — ROSUVASTATIN CALCIUM 40 MG: 20 TABLET, FILM COATED ORAL at 21:13

## 2024-03-23 RX ADMIN — PRAMIPEXOLE DIHYDROCHLORIDE 0.12 MG: 0.12 TABLET ORAL at 21:12

## 2024-03-23 RX ADMIN — PIPERACILLIN SODIUM AND TAZOBACTAM SODIUM 4.5 G: 4; .5 INJECTION, SOLUTION INTRAVENOUS at 21:14

## 2024-03-23 RX ADMIN — BUDESONIDE INHALATION 0.5 MG: 0.5 SUSPENSION RESPIRATORY (INHALATION) at 19:06

## 2024-03-23 RX ADMIN — FLUOXETINE 40 MG: 20 CAPSULE ORAL at 08:42

## 2024-03-23 RX ADMIN — PRAMIPEXOLE DIHYDROCHLORIDE 0.12 MG: 0.12 TABLET ORAL at 08:42

## 2024-03-23 RX ADMIN — INSULIN LISPRO 10 UNITS: 100 INJECTION, SOLUTION INTRAVENOUS; SUBCUTANEOUS at 17:47

## 2024-03-23 RX ADMIN — IPRATROPIUM BROMIDE AND ALBUTEROL SULFATE 3 ML: 2.5; .5 SOLUTION RESPIRATORY (INHALATION) at 12:21

## 2024-03-23 RX ADMIN — IPRATROPIUM BROMIDE AND ALBUTEROL SULFATE 3 ML: 2.5; .5 SOLUTION RESPIRATORY (INHALATION) at 19:05

## 2024-03-23 RX ADMIN — IPRATROPIUM BROMIDE AND ALBUTEROL SULFATE 3 ML: 2.5; .5 SOLUTION RESPIRATORY (INHALATION) at 07:50

## 2024-03-23 RX ADMIN — TAMSULOSIN HYDROCHLORIDE 0.4 MG: 0.4 CAPSULE ORAL at 08:42

## 2024-03-23 RX ADMIN — Medication: at 15:00

## 2024-03-23 RX ADMIN — INSULIN LISPRO 6 UNITS: 100 INJECTION, SOLUTION INTRAVENOUS; SUBCUTANEOUS at 17:45

## 2024-03-23 RX ADMIN — PRAMIPEXOLE DIHYDROCHLORIDE 0.12 MG: 0.12 TABLET ORAL at 14:05

## 2024-03-23 RX ADMIN — MULTIVITAMIN TABLET 1 TABLET: TABLET at 08:42

## 2024-03-23 RX ADMIN — SPIRONOLACTONE 12.5 MG: 25 TABLET ORAL at 08:42

## 2024-03-23 RX ADMIN — GABAPENTIN 600 MG: 600 TABLET, FILM COATED ORAL at 21:13

## 2024-03-23 RX ADMIN — PIPERACILLIN SODIUM AND TAZOBACTAM SODIUM 4.5 G: 4; .5 INJECTION, SOLUTION INTRAVENOUS at 13:57

## 2024-03-23 RX ADMIN — BUDESONIDE INHALATION 0.5 MG: 0.5 SUSPENSION RESPIRATORY (INHALATION) at 12:21

## 2024-03-23 RX ADMIN — INSULIN GLARGINE 56 UNITS: 100 INJECTION, SOLUTION SUBCUTANEOUS at 21:09

## 2024-03-23 RX ADMIN — METOPROLOL SUCCINATE 50 MG: 50 TABLET, EXTENDED RELEASE ORAL at 08:42

## 2024-03-23 RX ADMIN — GABAPENTIN 600 MG: 600 TABLET, FILM COATED ORAL at 17:44

## 2024-03-23 RX ADMIN — INSULIN LISPRO 6 UNITS: 100 INJECTION, SOLUTION INTRAVENOUS; SUBCUTANEOUS at 13:51

## 2024-03-23 RX ADMIN — FLUTICASONE PROPIONATE 1 SPRAY: 50 SPRAY, METERED NASAL at 08:49

## 2024-03-23 RX ADMIN — Medication: at 07:00

## 2024-03-23 ASSESSMENT — PAIN SCALES - GENERAL
PAINLEVEL_OUTOF10: 0 - NO PAIN

## 2024-03-23 ASSESSMENT — COGNITIVE AND FUNCTIONAL STATUS - GENERAL
DAILY ACTIVITIY SCORE: 24
MOBILITY SCORE: 24

## 2024-03-23 ASSESSMENT — PAIN - FUNCTIONAL ASSESSMENT
PAIN_FUNCTIONAL_ASSESSMENT: 0-10
PAIN_FUNCTIONAL_ASSESSMENT: FLACC (FACE, LEGS, ACTIVITY, CRY, CONSOLABILITY)

## 2024-03-23 ASSESSMENT — PAIN SCALES - WONG BAKER: WONGBAKER_NUMERICALRESPONSE: NO HURT

## 2024-03-23 NOTE — PROGRESS NOTES
Pulmonary Progress Note 03/23/24     Patient seen in follow-up for bacterial pneumonia and acute respiratory failure with hypoxia    Subjective   Interval History:   Feels about the same today    Objective   Vital signs in last 24 hours:  Temp:  [36.7 °C (98.1 °F)-37.3 °C (99.1 °F)] 36.7 °C (98.1 °F)  Heart Rate:  [] 91  Resp:  [18-22] 18  BP: (111-141)/(52-84) 129/77  FiO2 (%):  [21 %-32 %] 32 %    Intake/Output last 3 shifts:  I/O last 3 completed shifts:  In: 1540 (15.6 mL/kg) [P.O.:890; IV Piggyback:650]  Out: 200 (2 mL/kg) [Urine:200 (0.1 mL/kg/hr)]  Weight: 98.9 kg   Intake/Output this shift:  I/O this shift:  In: 450 [P.O.:450]  Out: -     Physical Exam  Vitals reviewed.   Constitutional:       Appearance: Normal appearance.      Interventions: Nasal cannula in place.   Eyes:      General: No scleral icterus.     Conjunctiva/sclera: Conjunctivae normal.   Cardiovascular:      Rate and Rhythm: Normal rate and regular rhythm.   Pulmonary:      Effort: Pulmonary effort is normal.      Breath sounds: Wheezing and rhonchi present.   Musculoskeletal:      Right lower leg: No edema.      Left lower leg: No edema.   Skin:     General: Skin is warm and dry.   Neurological:      General: No focal deficit present.      Mental Status: He is alert.   Psychiatric:         Mood and Affect: Mood normal.         Behavior: Behavior normal.         Scheduled medications  azithromycin, 500 mg, intravenous, q24h  barium sulfate, 1 tablet, oral, Once in imaging  FLUoxetine, 40 mg, oral, Daily  fluticasone, 1 spray, Each Nostril, Daily  gabapentin, 600 mg, oral, Before meals & nightly  insulin glargine, 56 Units, subcutaneous, Nightly  insulin lispro, 0-10 Units, subcutaneous, TID with meals  insulin lispro, 10 Units, subcutaneous, TID with meals  ipratropium-albuteroL, 3 mL, nebulization, TID  metoprolol succinate XL, 50 mg, oral, Daily  multivitamin, 1 tablet, oral, Daily  oxygen, , inhalation,  "q8h  piperacillin-tazobactam, 4.5 g, intravenous, q6h  pramipexole, 0.125 mg, oral, TID  rosuvastatin, 40 mg, oral, Nightly  spironolactone, 12.5 mg, oral, Daily  tamsulosin, 0.4 mg, oral, Daily      Continuous medications     PRN medications  PRN medications: dextrose, dextrose, glucagon, ipratropium-albuteroL, traZODone     Labs:  No results found for: \"NA\", \"K\", \"CL\", \"CO2\", \"BUN\", \"CREATININE\", \"GLUCOSE\", \"CALCIUM\"    No results found for: \"WBC\", \"HGB\", \"HCT\", \"MCV\", \"PLT\"      Imaging:  FL modified barium swallow study    Result Date: 3/20/2024  Interpreted By:  Steiger, David, and Casey Corinne STUDY: FL MODIFIED BARIUM SWALLOW STUDY;; 3/20/2024 11:08 am   INDICATION: Signs/Symptoms:PNA rule out aspiration.   COMPARISON: None.   ACCESSION NUMBER(S): [id number]   ORDERING CLINICIAN: ORESTES FORMAN   TECHNIQUE: MBSS completed. Informed verbal consent obtained prior to completion of exam. Trials of thin, nectar thick, puree, regular solids, and barium tablet given. Fluoroscopy time :  2 minutes 18 seconds Radiation dose: 10.29 mGy air kerma.   SLP: Corinne E. Casey, M.A. CCC-SLP Phone/Pager: [phone] opt 2, via [employer], or via [employer] secure chat   SPEECH FINDINGS: Reason for referral: Concern for aspiration pna on Chest CT Patient hx: PNA, hypoxia, COPD, DM Respiratory status: Nasal cannula Previous diet: Regular solids with thin liquids   FINAL SPEECH RECOMMENDATIONS   Diet recommendations/feeding strategies: Regular solids with thin liquids. Single sips with drinking.   Follow-up speech therapy recommended: No   Education provided: Yes. Results, recommendations, and compensatory swallowing strategies reviewed with pt verbalizing understanding. Pt able to recall education following a delay.   Treatment Provided today: No   Additional consult suggested: None   Repeat study/ dc plan: None   Mechanics of the swallow summary: *Oral phase: WFL *Pharyngeal phase: Transient penetration after the initial swallow " during successive swallow trials of thin liquid and nectar liquid. *Esophageal phase: Unremarkable   SLP impressions with severity rating: Pt demonstrates oral and pharyngeal phase of swallow that is within functional limits. Pt demonstrates transient penetration episodes during successive swallow trials of thin liquids and nectar liquids. Penetration occurs after the initial swallow and remains above the vocal chords. Penetrated material is ejected. Penetration does not present during single sip swallows. Discussed use of small sip strategy with pt who verbalizes understanding and recalls education material.   Thin Liquids (MBSS) Rosenbek's Penetration Aspiration Scale, Thin Liquids (MBSS): 3. PENETRATION with LOW ASPIRATION risk - contrast remains above vocal cords, visible residue     Response to Pharyngeal Residue, Thin Liquid (MBSS): spontaneous clearing, ,     Nectar Thick Liquids (MBSS) Rosenbek's Penetration Aspiration Scale, Nectar thick liquids (MBSS): 3. PENETRATION with LOW ASPIRATION risk - contrast remains above vocal cords, visible residue     Response to Pharyngeal Residue, Nectar thick liquids (MBSS): spontaneous clearing, ,       Purees (MBSS) Rosenbek's Penetration Aspiration Scale, Purees (MBSS): 1. NO ASPIRATION & NO PENETRATION - no aspiration, contrast does not enter airway     Response to Pharyngeal Residue, Purees (MBSS): no residue, ,     Solids (MBSS) Rosenbek's Penetration Aspiration Scale, Solids (MBSS): 1. NO ASPIRATION & NO PENETRATION - no aspiration, contrast does not enter airway     Response to Pharyngeal Residue, Solids (MBSS): no residue, ,     Speech Therapy section of this report signed by Corinne E. Casey, M.A. CCC-SLP on 3/20/2024 at 11:13 am.   RADIOLOGY FINDINGS: Penetration occurred with the thin and nectar consistencies liquid when utilizing multiple swallows. No aspiration occurred.   Radiology section of this report signed by Ramesh Moran MD.       Penetration  occurred with nectar and thin consistencies utilizing multiple swallows. Please see the more detailed speech pathology report above.   MACRO: None   Signed by: Ramesh Moran 3/20/2024 4:13 PM Dictation workstation:   [id number]    CT angio chest for pulmonary embolism    Result Date: 3/19/2024  Interpreted By:  Florencio Mariee, STUDY: CT ANGIO CHEST FOR PULMONARY EMBOLISM; ;  3/19/2024 1:53 pm   INDICATION: Signs/Symptoms:sob tachy.   COMPARISON: 06/03/2022   ACCESSION NUMBER(S): [id number]   ORDERING CLINICIAN: ROBERT IRAHETA   TECHNIQUE: CT angiogram performed through the chest following intravenous administration of 75 mL of Omnipaque 350. MIP reconstruction imaging in the coronal and sagittal projection   All CT examinations are performed with 1 or more of the following dose reduction techniques: Automated exposure control, adjustment of mA and/or kv according to patient's size, or use of iterative reconstruction techniques.   FINDINGS: Mediastinum demonstrates scattered subcentimeter lymph nodes which are more conspicuous comparison to the remote CT. For example, in the right paratracheal location with lymph node identified measuring 7 mm. Also, scattered subcentimeter AP window lymph nodes appearing slightly more conspicuous with largest lymph node identified measuring 7 mm in the short axis. There is a lymph node in the precarinal location identified measuring 10 x 15 mm. Subcarinal lymphadenopathy also demonstrated intervally developed from prior imaging measuring 12 mm in the short axis. Roro demonstrate lymphadenopathy. For example, in the right roro with lymph node measuring 10 mm in the short axis. Also, in the left roro with a few enlarged lymph nodes identified measuring up to 11 mm in the short axis. Esophagus is unremarkable.   Heart and great vessels demonstrate ascending thoracic aorta to measure 3.5 cm. 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 Main pulmonary artery measures 3.4 cm which is  enlarged which can be seen with component of pulmonary artery hypertension. Central and segmental pulmonary arteries demonstrate no filling defects to suggest the possibility of pulmonary embolus   Lung parenchyma demonstrates patchy consolidation within the left lower lobe with areas of endobronchial opacification also demonstrated. Correlate with aspiration. Follow-up to clearing recommended. Also, mild endobronchial opacification within the right lower lobe with basilar dependent atelectasis. No focal consolidation at the right lung base.   Included portions visualized abdomen are unremarkable   Visualized osseous structures demonstrate no compression deformity in the spine. Mild multilevel degenerative discogenic changes noted.           1. No evidence for pulmonary embolus   2. Patchy areas of consolidation and pneumonia within the left lower lobe. Follow-up to clearing   3. Endobronchial opacification bilateral lower lobes. Correlate with aspiration   4. Mediastinal and hilar lymphadenopathy likely reactive.     MACRO: None   Signed by: Florencio Mariee 3/19/2024 2:38 PM Dictation workstation:   [id number]    XR chest 1 view    Result Date: 3/19/2024  Interpreted By:  Irasema Leung, STUDY: XR CHEST 1 VIEW;  3/19/2024 12:43 pm   INDICATION: Signs/Symptoms:cough sob.   COMPARISON: 03/30/2022   ACCESSION NUMBER(S): [id number]   ORDERING CLINICIAN: ROBERT IRAHETA   FINDINGS: The cardiac silhouette may be normal in size.   The lungs appear hyperinflated. There is no obvious consolidation or pleural fluid.   Sternal present.   A neural stimulator is present on the left.   COMPARISON OF FINDING: The chest is similar.       No acute cardiopulmonary disease.   MACRO: none   Signed by: Irasema Leung 3/19/2024 1:02 PM Dictation workstation:   [id number]              Assessment/Plan   Principal Problem:    Acute respiratory failure with hypoxia (CMS/HCC)  Active Problems:    Chronic obstructive pulmonary disease  (CMS/[facility])    Tobacco use disorder    Bacterial pneumonia    Acute respiratory failure with hypoxia: In the setting of bacterial pneumonia  Continue with supplemental oxygen, target transcutaneous saturations of 88 to 92% and above  May end up needing some home-going oxygen  Bacterial pneumonia: Possible aspiration phenomenon.    continue with piperacillin/tazobactam  Stop azithromycin  Blood cultures show no growth.  Sputum culture without any significant growth  Treat empirically for aspiration  Chronic obstructive pulmonary disease: Still with airway disease and rhonchi  Bronchodilators  Given wheezing on exam today, will start low-dose steroids.  Monitor for glycemic control, defer to primary  FEV1 prior to discharge or once optimized  Nicotine dependence  Smoking cessation discussed  Pulmonary disposition:  Given airway exam today and slow to stunted improvement, anticipate 1-2 more days    Homer Sparks MD  Pulmonary/ Medicine  [facility]     LOS: 4 days

## 2024-03-23 NOTE — CARE PLAN
The patient's goals for the shift include      The clinical goals for the shift include wean oxygen, antibiotics

## 2024-03-23 NOTE — PROGRESS NOTES
"Ramesh Morocho is a 83 y.o. male on day 4 of admission presenting with Acute respiratory failure with hypoxia (CMS/HCC).    Subjective   Patient seen and examined.  No documented concerns/events overnight per nursing.  Afebrile overnight.  Denies chills.  Continues to have a moist nonproductive cough.  Comes dyspneic with exertion    Objective     Physical Exam  Constitutional:       Appearance: He is ill-appearing. He is not toxic-appearing.   Cardiovascular:      Rate and Rhythm: Rhythm irregular.      Pulses: Normal pulses.      Heart sounds: Normal heart sounds.   Pulmonary:      Effort: Pulmonary effort is normal. No respiratory distress.      Breath sounds: Wheezing and rhonchi present.      Comments: [facility] [place]   Abdominal:      General: Bowel sounds are normal.      Palpations: Abdomen is soft.   Musculoskeletal:      Right lower leg: Edema (trace) present.      Left lower leg: Edema (trace) present.   Skin:     General: Skin is warm and dry.   Neurological:      General: No focal deficit present.      Mental Status: He is alert and oriented to person, place, and time.   Psychiatric:         Mood and Affect: Mood normal.         Behavior: Behavior normal.     Last Recorded Vitals  Blood pressure 129/77, pulse 91, temperature 36.7 °C (98.1 °F), temperature source Temporal, resp. rate 18, height 1.803 m (5' 11\"), weight 98.9 kg (218 lb), SpO2 95 %.  Intake/Output last 3 Shifts:  I/O last 3 completed shifts:  In: 1540 (15.6 mL/kg) [P.O.:890; IV Piggyback:650]  Out: 200 (2 mL/kg) [Urine:200 (0.1 mL/kg/hr)]  Weight: 98.9 kg     Relevant Results  Scheduled medications  azithromycin, 500 mg, intravenous, q24h  barium sulfate, 1 tablet, oral, Once in imaging  FLUoxetine, 40 mg, oral, Daily  fluticasone, 1 spray, Each Nostril, Daily  gabapentin, 600 mg, oral, Before meals & nightly  insulin glargine, 56 Units, subcutaneous, Nightly  insulin lispro, 0-10 Units, subcutaneous, TID with meals  insulin lispro, 10 Units, " subcutaneous, TID with meals  ipratropium-albuteroL, 3 mL, nebulization, TID  metoprolol succinate XL, 50 mg, oral, Daily  multivitamin, 1 tablet, oral, Daily  oxygen, , inhalation, q8h  piperacillin-tazobactam, 4.5 g, intravenous, q6h  pramipexole, 0.125 mg, oral, TID  rosuvastatin, 40 mg, oral, Nightly  spironolactone, 12.5 mg, oral, Daily  tamsulosin, 0.4 mg, oral, Daily      Continuous medications     PRN medications  PRN medications: dextrose, dextrose, glucagon, ipratropium-albuteroL, traZODone                            Assessment/Plan   Principal Problem:    Acute respiratory failure with hypoxia (CMS/[facility])  Active Problems:    Chronic obstructive pulmonary disease (CMS/[facility])    Tobacco use disorder    Bacterial pneumonia    PNA  -CT suggestive of aspiration will treat for aspiration pneumonia  -MBS no evidence of aspiration  -Sputum culture if able  -Pulmonology following  -DuoNebs  -IV ATB     Acute Resp Failure with hypoxia  -Oxygen as needed, wean as tolerated  -Pulmonology following     Tobacco use  -Encourage cessation  -Refusing nicotine patch     Acute exacerbation of chronic COPD  -see #1 and #2     BRITT   -CPAP at at bedtime per home settings     DM II  -Monitor blood glucose  -ISS  -Diabetic diet  -hemoglobin A1C 8.0  -Hold metformin     Hyperlipidemia  -continue statin     CHF systolic  -last ECHO from 10/23 with an EF 40-45%     Atrial Fibrillation  -continue BB   -telemetry  -pt was on Eliquis at some point but is no longer taking      Plan  Above plan discussed at length with patient he is in agreement    Lela Lake, KEVON-CNP

## 2024-03-24 LAB
GLUCOSE BLD MANUAL STRIP-MCNC: 198 MG/DL (ref 74–99)
GLUCOSE BLD MANUAL STRIP-MCNC: 199 MG/DL (ref 74–99)
GLUCOSE BLD MANUAL STRIP-MCNC: 222 MG/DL (ref 74–99)
GLUCOSE BLD MANUAL STRIP-MCNC: 312 MG/DL (ref 74–99)

## 2024-03-24 PROCEDURE — 2500000002 HC RX 250 W HCPCS SELF ADMINISTERED DRUGS (ALT 637 FOR MEDICARE OP, ALT 636 FOR OP/ED): Performed by: NURSE PRACTITIONER

## 2024-03-24 PROCEDURE — 9420000001 HC RT PATIENT EDUCATION 5 MIN

## 2024-03-24 PROCEDURE — 2500000002 HC RX 250 W HCPCS SELF ADMINISTERED DRUGS (ALT 637 FOR MEDICARE OP, ALT 636 FOR OP/ED): Performed by: INTERNAL MEDICINE

## 2024-03-24 PROCEDURE — 82947 ASSAY GLUCOSE BLOOD QUANT: CPT

## 2024-03-24 PROCEDURE — 2500000002 HC RX 250 W HCPCS SELF ADMINISTERED DRUGS (ALT 637 FOR MEDICARE OP, ALT 636 FOR OP/ED): Performed by: HOSPITALIST

## 2024-03-24 PROCEDURE — 94640 AIRWAY INHALATION TREATMENT: CPT

## 2024-03-24 PROCEDURE — 2500000001 HC RX 250 WO HCPCS SELF ADMINISTERED DRUGS (ALT 637 FOR MEDICARE OP): Performed by: NURSE PRACTITIONER

## 2024-03-24 PROCEDURE — 2500000004 HC RX 250 GENERAL PHARMACY W/ HCPCS (ALT 636 FOR OP/ED): Performed by: NURSE PRACTITIONER

## 2024-03-24 PROCEDURE — 2500000004 HC RX 250 GENERAL PHARMACY W/ HCPCS (ALT 636 FOR OP/ED): Performed by: HOSPITALIST

## 2024-03-24 PROCEDURE — 2500000005 HC RX 250 GENERAL PHARMACY W/O HCPCS: Performed by: NURSE PRACTITIONER

## 2024-03-24 PROCEDURE — 1100000001 HC PRIVATE ROOM DAILY

## 2024-03-24 RX ORDER — INSULIN LISPRO 100 [IU]/ML
0-15 INJECTION, SOLUTION INTRAVENOUS; SUBCUTANEOUS
Status: DISCONTINUED | OUTPATIENT
Start: 2024-03-24 | End: 2024-03-26 | Stop reason: HOSPADM

## 2024-03-24 RX ORDER — ACETYLCYSTEINE 200 MG/ML
3 SOLUTION ORAL; RESPIRATORY (INHALATION)
Status: DISCONTINUED | OUTPATIENT
Start: 2024-03-24 | End: 2024-03-26 | Stop reason: HOSPADM

## 2024-03-24 RX ADMIN — MULTIVITAMIN TABLET 1 TABLET: TABLET at 09:02

## 2024-03-24 RX ADMIN — ACETYLCYSTEINE 600 MG: 200 SOLUTION ORAL; RESPIRATORY (INHALATION) at 19:29

## 2024-03-24 RX ADMIN — TAMSULOSIN HYDROCHLORIDE 0.4 MG: 0.4 CAPSULE ORAL at 09:02

## 2024-03-24 RX ADMIN — GABAPENTIN 600 MG: 600 TABLET, FILM COATED ORAL at 12:00

## 2024-03-24 RX ADMIN — BUDESONIDE INHALATION 0.5 MG: 0.5 SUSPENSION RESPIRATORY (INHALATION) at 08:03

## 2024-03-24 RX ADMIN — PRAMIPEXOLE DIHYDROCHLORIDE 0.12 MG: 0.12 TABLET ORAL at 21:30

## 2024-03-24 RX ADMIN — INSULIN LISPRO 10 UNITS: 100 INJECTION, SOLUTION INTRAVENOUS; SUBCUTANEOUS at 08:55

## 2024-03-24 RX ADMIN — IPRATROPIUM BROMIDE AND ALBUTEROL SULFATE 3 ML: 2.5; .5 SOLUTION RESPIRATORY (INHALATION) at 08:03

## 2024-03-24 RX ADMIN — GABAPENTIN 600 MG: 600 TABLET, FILM COATED ORAL at 06:33

## 2024-03-24 RX ADMIN — Medication: at 15:00

## 2024-03-24 RX ADMIN — PIPERACILLIN SODIUM AND TAZOBACTAM SODIUM 4.5 G: 4; .5 INJECTION, SOLUTION INTRAVENOUS at 13:58

## 2024-03-24 RX ADMIN — PRAMIPEXOLE DIHYDROCHLORIDE 0.12 MG: 0.12 TABLET ORAL at 09:02

## 2024-03-24 RX ADMIN — INSULIN GLARGINE 56 UNITS: 100 INJECTION, SOLUTION SUBCUTANEOUS at 21:29

## 2024-03-24 RX ADMIN — PREDNISONE 20 MG: 20 TABLET ORAL at 09:02

## 2024-03-24 RX ADMIN — PIPERACILLIN SODIUM AND TAZOBACTAM SODIUM 4.5 G: 4; .5 INJECTION, SOLUTION INTRAVENOUS at 02:32

## 2024-03-24 RX ADMIN — GABAPENTIN 600 MG: 600 TABLET, FILM COATED ORAL at 16:42

## 2024-03-24 RX ADMIN — BUDESONIDE INHALATION 0.5 MG: 0.5 SUSPENSION RESPIRATORY (INHALATION) at 19:29

## 2024-03-24 RX ADMIN — PRAMIPEXOLE DIHYDROCHLORIDE 0.12 MG: 0.12 TABLET ORAL at 16:39

## 2024-03-24 RX ADMIN — METOPROLOL SUCCINATE 50 MG: 50 TABLET, EXTENDED RELEASE ORAL at 09:02

## 2024-03-24 RX ADMIN — IPRATROPIUM BROMIDE AND ALBUTEROL SULFATE 3 ML: 2.5; .5 SOLUTION RESPIRATORY (INHALATION) at 19:30

## 2024-03-24 RX ADMIN — INSULIN LISPRO 3 UNITS: 100 INJECTION, SOLUTION INTRAVENOUS; SUBCUTANEOUS at 16:36

## 2024-03-24 RX ADMIN — FLUTICASONE PROPIONATE 1 SPRAY: 50 SPRAY, METERED NASAL at 09:07

## 2024-03-24 RX ADMIN — INSULIN LISPRO 10 UNITS: 100 INJECTION, SOLUTION INTRAVENOUS; SUBCUTANEOUS at 16:38

## 2024-03-24 RX ADMIN — PIPERACILLIN SODIUM AND TAZOBACTAM SODIUM 4.5 G: 4; .5 INJECTION, SOLUTION INTRAVENOUS at 09:10

## 2024-03-24 RX ADMIN — GABAPENTIN 600 MG: 600 TABLET, FILM COATED ORAL at 21:30

## 2024-03-24 RX ADMIN — PIPERACILLIN SODIUM AND TAZOBACTAM SODIUM 4.5 G: 4; .5 INJECTION, SOLUTION INTRAVENOUS at 21:30

## 2024-03-24 RX ADMIN — INSULIN LISPRO 10 UNITS: 100 INJECTION, SOLUTION INTRAVENOUS; SUBCUTANEOUS at 13:27

## 2024-03-24 RX ADMIN — SPIRONOLACTONE 12.5 MG: 25 TABLET ORAL at 09:02

## 2024-03-24 RX ADMIN — IPRATROPIUM BROMIDE AND ALBUTEROL SULFATE 3 ML: 2.5; .5 SOLUTION RESPIRATORY (INHALATION) at 12:58

## 2024-03-24 RX ADMIN — INSULIN LISPRO 3 UNITS: 100 INJECTION, SOLUTION INTRAVENOUS; SUBCUTANEOUS at 13:26

## 2024-03-24 RX ADMIN — PREDNISONE 20 MG: 20 TABLET ORAL at 21:31

## 2024-03-24 RX ADMIN — FLUOXETINE 40 MG: 20 CAPSULE ORAL at 09:02

## 2024-03-24 RX ADMIN — Medication: at 07:00

## 2024-03-24 RX ADMIN — ROSUVASTATIN CALCIUM 40 MG: 20 TABLET, FILM COATED ORAL at 21:31

## 2024-03-24 ASSESSMENT — COGNITIVE AND FUNCTIONAL STATUS - GENERAL
DAILY ACTIVITIY SCORE: 24
MOBILITY SCORE: 24
DAILY ACTIVITIY SCORE: 24
MOBILITY SCORE: 24

## 2024-03-24 ASSESSMENT — PAIN - FUNCTIONAL ASSESSMENT
PAIN_FUNCTIONAL_ASSESSMENT: FLACC (FACE, LEGS, ACTIVITY, CRY, CONSOLABILITY)
PAIN_FUNCTIONAL_ASSESSMENT: 0-10

## 2024-03-24 ASSESSMENT — PAIN SCALES - GENERAL
PAINLEVEL_OUTOF10: 0 - NO PAIN

## 2024-03-24 ASSESSMENT — PAIN SCALES - WONG BAKER: WONGBAKER_NUMERICALRESPONSE: NO HURT

## 2024-03-24 NOTE — PROGRESS NOTES
Pulmonary Progress Note 03/24/24     Patient seen in follow-up for bacterial pneumonia and acute respiratory failure with hypoxia    Subjective   Interval History:   Overall feels better.  Endorses a cough but has some difficulty clearing    Objective   Vital signs in last 24 hours:  Temp:  [36.1 °C (97 °F)-37.1 °C (98.8 °F)] 36.1 °C (97 °F)  Heart Rate:  [87-99] 87  Resp:  [18-21] 21  BP: (112-152)/(63-86) 116/63  FiO2 (%):  [32 %-36 %] 36 %    Intake/Output last 3 shifts:  I/O last 3 completed shifts:  In: 1950 (19.7 mL/kg) [P.O.:1550; IV Piggyback:400]  Out: - (0 mL/kg)   Weight: 98.9 kg   Intake/Output this shift:  No intake/output data recorded.    Physical Exam  Vitals reviewed.   Constitutional:       Appearance: Normal appearance.      Interventions: Nasal cannula in place.   Eyes:      General: No scleral icterus.     Conjunctiva/sclera: Conjunctivae normal.   Cardiovascular:      Rate and Rhythm: Normal rate and regular rhythm.   Pulmonary:      Effort: Pulmonary effort is normal.      Breath sounds: Wheezing and rhonchi present.   Musculoskeletal:      Right lower leg: No edema.      Left lower leg: No edema.   Skin:     General: Skin is warm and dry.   Neurological:      General: No focal deficit present.      Mental Status: He is alert.   Psychiatric:         Mood and Affect: Mood normal.         Behavior: Behavior normal.         Scheduled medications  acetylcysteine, 3 mL, nebulization, BID  barium sulfate, 1 tablet, oral, Once in imaging  budesonide, 0.5 mg, nebulization, BID  FLUoxetine, 40 mg, oral, Daily  fluticasone, 1 spray, Each Nostril, Daily  gabapentin, 600 mg, oral, Before meals & nightly  insulin glargine, 56 Units, subcutaneous, Nightly  insulin lispro, 0-15 Units, subcutaneous, TID with meals  insulin lispro, 10 Units, subcutaneous, TID with meals  ipratropium-albuteroL, 3 mL, nebulization, TID  metoprolol succinate XL, 50 mg, oral, Daily  multivitamin, 1 tablet, oral,  "Daily  oxygen, , inhalation, q8h  piperacillin-tazobactam, 4.5 g, intravenous, q6h  pramipexole, 0.125 mg, oral, TID  predniSONE, 20 mg, oral, BID  rosuvastatin, 40 mg, oral, Nightly  spironolactone, 12.5 mg, oral, Daily  tamsulosin, 0.4 mg, oral, Daily      Continuous medications     PRN medications  PRN medications: dextrose, dextrose, glucagon, ipratropium-albuteroL, traZODone     Labs:  No results found for: \"NA\", \"K\", \"CL\", \"CO2\", \"BUN\", \"CREATININE\", \"GLUCOSE\", \"CALCIUM\"    No results found for: \"WBC\", \"HGB\", \"HCT\", \"MCV\", \"PLT\"      Imaging:  FL modified barium swallow study    Result Date: 3/20/2024  Interpreted By:  Steiger, David, and Casey Corinne STUDY: FL MODIFIED BARIUM SWALLOW STUDY;; 3/20/2024 11:08 am   INDICATION: Signs/Symptoms:PNA rule out aspiration.   COMPARISON: None.   ACCESSION NUMBER(S): [id number]   ORDERING CLINICIAN: ORESTES FORMAN   TECHNIQUE: MBSS completed. Informed verbal consent obtained prior to completion of exam. Trials of thin, nectar thick, puree, regular solids, and barium tablet given. Fluoroscopy time :  2 minutes 18 seconds Radiation dose: 10.29 mGy air kerma.   SLP: Corinne E. Casey, M.A. CCC-SLP Phone/Pager: [phone] opt 2, via [employer], or via [employer] secure chat   SPEECH FINDINGS: Reason for referral: Concern for aspiration pna on Chest CT Patient hx: PNA, hypoxia, COPD, DM Respiratory status: Nasal cannula Previous diet: Regular solids with thin liquids   FINAL SPEECH RECOMMENDATIONS   Diet recommendations/feeding strategies: Regular solids with thin liquids. Single sips with drinking.   Follow-up speech therapy recommended: No   Education provided: Yes. Results, recommendations, and compensatory swallowing strategies reviewed with pt verbalizing understanding. Pt able to recall education following a delay.   Treatment Provided today: No   Additional consult suggested: None   Repeat study/ dc plan: None   Mechanics of the swallow summary: *Oral phase: WFL *Pharyngeal " phase: Transient penetration after the initial swallow during successive swallow trials of thin liquid and nectar liquid. *Esophageal phase: Unremarkable   SLP impressions with severity rating: Pt demonstrates oral and pharyngeal phase of swallow that is within functional limits. Pt demonstrates transient penetration episodes during successive swallow trials of thin liquids and nectar liquids. Penetration occurs after the initial swallow and remains above the vocal chords. Penetrated material is ejected. Penetration does not present during single sip swallows. Discussed use of small sip strategy with pt who verbalizes understanding and recalls education material.   Thin Liquids (MBSS) Rosenbek's Penetration Aspiration Scale, Thin Liquids (MBSS): 3. PENETRATION with LOW ASPIRATION risk - contrast remains above vocal cords, visible residue     Response to Pharyngeal Residue, Thin Liquid (MBSS): spontaneous clearing, ,     Nectar Thick Liquids (MBSS) Rosenbek's Penetration Aspiration Scale, Nectar thick liquids (MBSS): 3. PENETRATION with LOW ASPIRATION risk - contrast remains above vocal cords, visible residue     Response to Pharyngeal Residue, Nectar thick liquids (MBSS): spontaneous clearing, ,       Purees (MBSS) Rosenbek's Penetration Aspiration Scale, Purees (MBSS): 1. NO ASPIRATION & NO PENETRATION - no aspiration, contrast does not enter airway     Response to Pharyngeal Residue, Purees (MBSS): no residue, ,     Solids (MBSS) Rosenbek's Penetration Aspiration Scale, Solids (MBSS): 1. NO ASPIRATION & NO PENETRATION - no aspiration, contrast does not enter airway     Response to Pharyngeal Residue, Solids (MBSS): no residue, ,     Speech Therapy section of this report signed by Corinne E. Casey, M.A. CCC-SLP on 3/20/2024 at 11:13 am.   RADIOLOGY FINDINGS: Penetration occurred with the thin and nectar consistencies liquid when utilizing multiple swallows. No aspiration occurred.   Radiology section of this report  signed by Ramesh Moran MD.       Penetration occurred with nectar and thin consistencies utilizing multiple swallows. Please see the more detailed speech pathology report above.   MACRO: None   Signed by: Ramesh Moran 3/20/2024 4:13 PM Dictation workstation:   [id number]    CT angio chest for pulmonary embolism    Result Date: 3/19/2024  Interpreted By:  Florencio Mariee, STUDY: CT ANGIO CHEST FOR PULMONARY EMBOLISM; ;  3/19/2024 1:53 pm   INDICATION: Signs/Symptoms:sob tachy.   COMPARISON: 06/03/2022   ACCESSION NUMBER(S): [id number]   ORDERING CLINICIAN: ROBERT IRAHETA   TECHNIQUE: CT angiogram performed through the chest following intravenous administration of 75 mL of Omnipaque 350. MIP reconstruction imaging in the coronal and sagittal projection   All CT examinations are performed with 1 or more of the following dose reduction techniques: Automated exposure control, adjustment of mA and/or kv according to patient's size, or use of iterative reconstruction techniques.   FINDINGS: Mediastinum demonstrates scattered subcentimeter lymph nodes which are more conspicuous comparison to the remote CT. For example, in the right paratracheal location with lymph node identified measuring 7 mm. Also, scattered subcentimeter AP window lymph nodes appearing slightly more conspicuous with largest lymph node identified measuring 7 mm in the short axis. There is a lymph node in the precarinal location identified measuring 10 x 15 mm. Subcarinal lymphadenopathy also demonstrated intervally developed from prior imaging measuring 12 mm in the short axis. Roro demonstrate lymphadenopathy. For example, in the right roro with lymph node measuring 10 mm in the short axis. Also, in the left roro with a few enlarged lymph nodes identified measuring up to 11 mm in the short axis. Esophagus is unremarkable.   Heart and great vessels demonstrate ascending thoracic aorta to measure 3.5 cm. 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  Main pulmonary artery measures 3.4 cm which is enlarged which can be seen with component of pulmonary artery hypertension. Central and segmental pulmonary arteries demonstrate no filling defects to suggest the possibility of pulmonary embolus   Lung parenchyma demonstrates patchy consolidation within the left lower lobe with areas of endobronchial opacification also demonstrated. Correlate with aspiration. Follow-up to clearing recommended. Also, mild endobronchial opacification within the right lower lobe with basilar dependent atelectasis. No focal consolidation at the right lung base.   Included portions visualized abdomen are unremarkable   Visualized osseous structures demonstrate no compression deformity in the spine. Mild multilevel degenerative discogenic changes noted.           1. No evidence for pulmonary embolus   2. Patchy areas of consolidation and pneumonia within the left lower lobe. Follow-up to clearing   3. Endobronchial opacification bilateral lower lobes. Correlate with aspiration   4. Mediastinal and hilar lymphadenopathy likely reactive.     MACRO: None   Signed by: Florencio Mariee 3/19/2024 2:38 PM Dictation workstation:   [id number]    XR chest 1 view    Result Date: 3/19/2024  Interpreted By:  Irasema Leung, STUDY: XR CHEST 1 VIEW;  3/19/2024 12:43 pm   INDICATION: Signs/Symptoms:cough sob.   COMPARISON: 03/30/2022   ACCESSION NUMBER(S): [id number]   ORDERING CLINICIAN: ROBERT IRAHETA   FINDINGS: The cardiac silhouette may be normal in size.   The lungs appear hyperinflated. There is no obvious consolidation or pleural fluid.   Sternal present.   A neural stimulator is present on the left.   COMPARISON OF FINDING: The chest is similar.       No acute cardiopulmonary disease.   MACRO: none   Signed by: Irasema Leung 3/19/2024 1:02 PM Dictation workstation:   [id number]              Assessment/Plan   Principal Problem:    Acute respiratory failure with hypoxia (CMS/HCC)  Active  Problems:    Chronic obstructive pulmonary disease (CMS/[facility])    Tobacco use disorder    Bacterial pneumonia    Acute respiratory failure with hypoxia: In the setting of bacterial pneumonia  Continue with supplemental oxygen, target transcutaneous saturations of 88 to 92% and above  May end up needing some home-going oxygen  Bacterial pneumonia: Possible aspiration phenomenon.    continue with piperacillin/tazobactam  Blood cultures show no growth.  Sputum culture without any significant growth  Treat empirically for aspiration  Chronic obstructive pulmonary disease: Still with airway disease and rhonchi but interval improvement compared with yesterday  Bronchodilators  Aerosolized corticosteroids  Add mucolytic's today  Continue with low-dose prednisone  FEV1 prior to discharge or once optimized  Nicotine dependence  Smoking cessation discussed  Pulmonary disposition:  Evaluate for safety of discharge in the next 24 hours    Homer Sparks MD  Pulmonary/ Medicine  [facility]     LOS: 5 days

## 2024-03-24 NOTE — PROGRESS NOTES
"Ramesh Morocho is a 83 y.o. male on day 5 of admission presenting with Acute respiratory failure with hypoxia (CMS/HCC).    Subjective   Patient seen and examined.  No documented concerns/events overnight from nursing.  Afebrile overnight.  Denies chills.  Patient said his breathing is improving.         Objective     Physical Exam  Constitutional:       Appearance: He is ill-appearing. He is not toxic-appearing.   Cardiovascular:      Rate and Rhythm: Rhythm irregular.      Pulses: Normal pulses.      Heart sounds: Normal heart sounds.   Pulmonary:      Effort: Pulmonary effort is normal. No respiratory distress.      Breath sounds: Wheezing and rhonchi in bases     [place] [facility] [place]   Abdominal:      General: Bowel sounds are normal.      Palpations: Abdomen is soft.   Musculoskeletal:      Right lower leg: Edema (trace) present.      Left lower leg: Edema (trace) present.   Skin:     General: Skin is warm and dry.   Neurological:      General: No focal deficit present.      Mental Status: He is alert and oriented to person, place, and time.   Psychiatric:         Mood and Affect: Mood normal.         Behavior: Behavior normal.     Last Recorded Vitals  Blood pressure 116/63, pulse 87, temperature 36.1 °C (97 °F), temperature source Temporal, resp. rate 21, height 1.803 m (5' 11\"), weight 98.9 kg (218 lb), SpO2 97 %.  Intake/Output last 3 Shifts:  I/O last 3 completed shifts:  In: 1950 (19.7 mL/kg) [P.O.:1550; IV Piggyback:400]  Out: - (0 mL/kg)   Weight: 98.9 kg     Relevant Results  Scheduled medications  barium sulfate, 1 tablet, oral, Once in imaging  budesonide, 0.5 mg, nebulization, BID  FLUoxetine, 40 mg, oral, Daily  fluticasone, 1 spray, Each Nostril, Daily  gabapentin, 600 mg, oral, Before meals & nightly  insulin glargine, 56 Units, subcutaneous, Nightly  insulin lispro, 0-15 Units, subcutaneous, TID with meals  insulin lispro, 10 Units, subcutaneous, TID with meals  ipratropium-albuteroL, 3 mL, " nebulization, TID  metoprolol succinate XL, 50 mg, oral, Daily  multivitamin, 1 tablet, oral, Daily  oxygen, , inhalation, q8h  piperacillin-tazobactam, 4.5 g, intravenous, q6h  pramipexole, 0.125 mg, oral, TID  predniSONE, 20 mg, oral, BID  rosuvastatin, 40 mg, oral, Nightly  spironolactone, 12.5 mg, oral, Daily  tamsulosin, 0.4 mg, oral, Daily      Continuous medications     PRN medications  PRN medications: dextrose, dextrose, glucagon, ipratropium-albuteroL, traZODone                       Assessment/Plan   Principal Problem:    Acute respiratory failure with hypoxia (CMS/HCC)  Active Problems:    Chronic obstructive pulmonary disease (CMS/[facility])    Tobacco use disorder    Bacterial pneumonia    PNA  -CT suggestive of aspiration will treat for aspiration pneumonia  -MBS no evidence of aspiration  -Sputum culture normal resp jose  -Pulmonology following  -DuoNebs  -IV ATB  -Prednisone  -BC no growth     Acute Resp Failure with hypoxia  -Oxygen as needed, wean as tolerated  -Pulmonology following     Tobacco use  -Encourage cessation  -Refusing nicotine patch     Acute exacerbation of chronic COPD  -see #1 and #2     BRITT   -CPAP at at bedtime per home settings     DM II  -Monitor blood glucose  -ISS, increase as pt is now on steroids  -Diabetic diet  -hemoglobin A1C 8.0  -Hold metformin     Hyperlipidemia  -continue statin     CHF systolic  -last ECHO from 10/23 with an EF 40-45%     Atrial Fibrillation  -continue BB   -telemetry  -pt was on Eliquis at some point but is no longer taking      Plan  Above plan discussed at length with patient he is in agreement  Discharge home with no needs 1-2 days  KEVON Mcgill-CNP

## 2024-03-24 NOTE — CARE PLAN
The patient's goals for the shift include wean off oxygen    The clinical goals for the shift include wean off O2

## 2024-03-25 LAB
GLUCOSE BLD MANUAL STRIP-MCNC: 206 MG/DL (ref 74–99)
GLUCOSE BLD MANUAL STRIP-MCNC: 229 MG/DL (ref 74–99)
GLUCOSE BLD MANUAL STRIP-MCNC: 300 MG/DL (ref 74–99)
GLUCOSE BLD MANUAL STRIP-MCNC: 317 MG/DL (ref 74–99)

## 2024-03-25 PROCEDURE — 2500000002 HC RX 250 W HCPCS SELF ADMINISTERED DRUGS (ALT 637 FOR MEDICARE OP, ALT 636 FOR OP/ED): Performed by: NURSE PRACTITIONER

## 2024-03-25 PROCEDURE — 94640 AIRWAY INHALATION TREATMENT: CPT

## 2024-03-25 PROCEDURE — 94760 N-INVAS EAR/PLS OXIMETRY 1: CPT

## 2024-03-25 PROCEDURE — 2500000004 HC RX 250 GENERAL PHARMACY W/ HCPCS (ALT 636 FOR OP/ED): Performed by: NURSE PRACTITIONER

## 2024-03-25 PROCEDURE — 2500000004 HC RX 250 GENERAL PHARMACY W/ HCPCS (ALT 636 FOR OP/ED): Performed by: HOSPITALIST

## 2024-03-25 PROCEDURE — 2500000001 HC RX 250 WO HCPCS SELF ADMINISTERED DRUGS (ALT 637 FOR MEDICARE OP): Performed by: NURSE PRACTITIONER

## 2024-03-25 PROCEDURE — 1100000001 HC PRIVATE ROOM DAILY

## 2024-03-25 PROCEDURE — 9420000001 HC RT PATIENT EDUCATION 5 MIN

## 2024-03-25 PROCEDURE — 2500000005 HC RX 250 GENERAL PHARMACY W/O HCPCS: Performed by: NURSE PRACTITIONER

## 2024-03-25 PROCEDURE — 82947 ASSAY GLUCOSE BLOOD QUANT: CPT

## 2024-03-25 PROCEDURE — 2500000002 HC RX 250 W HCPCS SELF ADMINISTERED DRUGS (ALT 637 FOR MEDICARE OP, ALT 636 FOR OP/ED): Performed by: INTERNAL MEDICINE

## 2024-03-25 PROCEDURE — 2500000002 HC RX 250 W HCPCS SELF ADMINISTERED DRUGS (ALT 637 FOR MEDICARE OP, ALT 636 FOR OP/ED): Performed by: HOSPITALIST

## 2024-03-25 RX ADMIN — GABAPENTIN 600 MG: 600 TABLET, FILM COATED ORAL at 05:42

## 2024-03-25 RX ADMIN — Medication 3 L/MIN: at 15:00

## 2024-03-25 RX ADMIN — FLUTICASONE PROPIONATE 1 SPRAY: 50 SPRAY, METERED NASAL at 10:51

## 2024-03-25 RX ADMIN — Medication 4 L/MIN: at 07:00

## 2024-03-25 RX ADMIN — GABAPENTIN 600 MG: 600 TABLET, FILM COATED ORAL at 17:42

## 2024-03-25 RX ADMIN — GABAPENTIN 600 MG: 600 TABLET, FILM COATED ORAL at 21:28

## 2024-03-25 RX ADMIN — ACETYLCYSTEINE 800 MG: 200 SOLUTION ORAL; RESPIRATORY (INHALATION) at 19:24

## 2024-03-25 RX ADMIN — INSULIN LISPRO 6 UNITS: 100 INJECTION, SOLUTION INTRAVENOUS; SUBCUTANEOUS at 10:48

## 2024-03-25 RX ADMIN — PIPERACILLIN SODIUM AND TAZOBACTAM SODIUM 4.5 G: 4; .5 INJECTION, SOLUTION INTRAVENOUS at 14:53

## 2024-03-25 RX ADMIN — BUDESONIDE INHALATION 0.5 MG: 0.5 SUSPENSION RESPIRATORY (INHALATION) at 19:24

## 2024-03-25 RX ADMIN — PRAMIPEXOLE DIHYDROCHLORIDE 0.12 MG: 0.12 TABLET ORAL at 14:53

## 2024-03-25 RX ADMIN — ROSUVASTATIN CALCIUM 40 MG: 20 TABLET, FILM COATED ORAL at 20:02

## 2024-03-25 RX ADMIN — INSULIN LISPRO 10 UNITS: 100 INJECTION, SOLUTION INTRAVENOUS; SUBCUTANEOUS at 10:47

## 2024-03-25 RX ADMIN — FLUOXETINE 40 MG: 20 CAPSULE ORAL at 10:44

## 2024-03-25 RX ADMIN — INSULIN GLARGINE 56 UNITS: 100 INJECTION, SOLUTION SUBCUTANEOUS at 21:29

## 2024-03-25 RX ADMIN — PIPERACILLIN SODIUM AND TAZOBACTAM SODIUM 4.5 G: 4; .5 INJECTION, SOLUTION INTRAVENOUS at 10:45

## 2024-03-25 RX ADMIN — PIPERACILLIN SODIUM AND TAZOBACTAM SODIUM 4.5 G: 4; .5 INJECTION, SOLUTION INTRAVENOUS at 20:02

## 2024-03-25 RX ADMIN — IPRATROPIUM BROMIDE AND ALBUTEROL SULFATE 3 ML: 2.5; .5 SOLUTION RESPIRATORY (INHALATION) at 07:54

## 2024-03-25 RX ADMIN — ACETYLCYSTEINE 600 MG: 200 SOLUTION ORAL; RESPIRATORY (INHALATION) at 07:55

## 2024-03-25 RX ADMIN — MULTIVITAMIN TABLET 1 TABLET: TABLET at 10:43

## 2024-03-25 RX ADMIN — INSULIN LISPRO 6 UNITS: 100 INJECTION, SOLUTION INTRAVENOUS; SUBCUTANEOUS at 12:47

## 2024-03-25 RX ADMIN — SPIRONOLACTONE 12.5 MG: 25 TABLET ORAL at 10:44

## 2024-03-25 RX ADMIN — METOPROLOL SUCCINATE 50 MG: 50 TABLET, EXTENDED RELEASE ORAL at 10:43

## 2024-03-25 RX ADMIN — INSULIN LISPRO 10 UNITS: 100 INJECTION, SOLUTION INTRAVENOUS; SUBCUTANEOUS at 12:48

## 2024-03-25 RX ADMIN — PRAMIPEXOLE DIHYDROCHLORIDE 0.12 MG: 0.12 TABLET ORAL at 10:44

## 2024-03-25 RX ADMIN — TAMSULOSIN HYDROCHLORIDE 0.4 MG: 0.4 CAPSULE ORAL at 10:43

## 2024-03-25 RX ADMIN — IPRATROPIUM BROMIDE AND ALBUTEROL SULFATE 3 ML: 2.5; .5 SOLUTION RESPIRATORY (INHALATION) at 19:24

## 2024-03-25 RX ADMIN — GABAPENTIN 600 MG: 600 TABLET, FILM COATED ORAL at 10:43

## 2024-03-25 RX ADMIN — INSULIN LISPRO 9 UNITS: 100 INJECTION, SOLUTION INTRAVENOUS; SUBCUTANEOUS at 17:42

## 2024-03-25 RX ADMIN — PIPERACILLIN SODIUM AND TAZOBACTAM SODIUM 4.5 G: 4; .5 INJECTION, SOLUTION INTRAVENOUS at 02:27

## 2024-03-25 RX ADMIN — INSULIN LISPRO 10 UNITS: 100 INJECTION, SOLUTION INTRAVENOUS; SUBCUTANEOUS at 17:44

## 2024-03-25 RX ADMIN — PREDNISONE 20 MG: 20 TABLET ORAL at 10:44

## 2024-03-25 RX ADMIN — BUDESONIDE INHALATION 0.5 MG: 0.5 SUSPENSION RESPIRATORY (INHALATION) at 07:54

## 2024-03-25 RX ADMIN — PREDNISONE 20 MG: 20 TABLET ORAL at 20:02

## 2024-03-25 RX ADMIN — PRAMIPEXOLE DIHYDROCHLORIDE 0.12 MG: 0.12 TABLET ORAL at 21:28

## 2024-03-25 ASSESSMENT — COGNITIVE AND FUNCTIONAL STATUS - GENERAL
DAILY ACTIVITIY SCORE: 24
MOBILITY SCORE: 24
DAILY ACTIVITIY SCORE: 24
MOBILITY SCORE: 24

## 2024-03-25 ASSESSMENT — PAIN SCALES - GENERAL
PAINLEVEL_OUTOF10: 0 - NO PAIN

## 2024-03-25 ASSESSMENT — PAIN - FUNCTIONAL ASSESSMENT: PAIN_FUNCTIONAL_ASSESSMENT: FLACC (FACE, LEGS, ACTIVITY, CRY, CONSOLABILITY)

## 2024-03-25 NOTE — CARE PLAN
The patient's goals for the shift include      The clinical goals for the shift include wean off oxygen

## 2024-03-25 NOTE — PROGRESS NOTES
03/25/24 1419   Discharge Planning   Patient expects to be discharged to: Home no care coordination needs   Does the patient need discharge transport arranged? No     Home likely tomorrow.  Will likely need Home oxygen at discharge.

## 2024-03-25 NOTE — PROGRESS NOTES
"Ramesh Morocho is a 83 y.o. male on day 6 of admission presenting with Acute respiratory failure with hypoxia (CMS/HCC).    Subjective   Remains on O2 4 liters. Breathing is improved. Ambulating in halls. Wife at bedside.     Objective     Physical Exam  Vitals and nursing note reviewed.   Constitutional:       Appearance: Normal appearance.   HENT:      Head: Normocephalic and atraumatic.      Nose: Nose normal.      Mouth/Throat:      Mouth: Mucous membranes are moist.      Pharynx: Oropharynx is clear.   Eyes:      Extraocular Movements: Extraocular movements intact.      Pupils: Pupils are equal, round, and reactive to light.   Cardiovascular:      Rate and Rhythm: Normal rate and regular rhythm.      Pulses: Normal pulses.   Pulmonary:      Effort: Pulmonary effort is normal.      Breath sounds: Rhonchi present.      Comments: No wheezes  Abdominal:      General: Abdomen is flat. Bowel sounds are normal.      Palpations: Abdomen is soft.   Musculoskeletal:         General: Normal range of motion.   Skin:     General: Skin is warm and dry.      Capillary Refill: Capillary refill takes less than 2 seconds.   Neurological:      General: No focal deficit present.      Mental Status: He is oriented to person, place, and time.   Psychiatric:         Mood and Affect: Mood normal.         Last Recorded Vitals  Blood pressure 150/84, pulse 93, temperature 36.6 °C (97.9 °F), temperature source Temporal, resp. rate 22, height 1.803 m (5' 11\"), weight 98.9 kg (218 lb), SpO2 97 %.  Intake/Output last 3 Shifts:  I/O last 3 completed shifts:  In: 1820 (18.4 mL/kg) [P.O.:1420; IV Piggyback:400]  Out: - (0 mL/kg)   Weight: 98.9 kg     Relevant Results  Results for orders placed or performed during the hospital encounter of 03/19/24 (from the past 24 hour(s))   POCT GLUCOSE   Result Value Ref Range    POCT Glucose 199 (H) 74 - 99 mg/dL   POCT GLUCOSE   Result Value Ref Range    POCT Glucose 198 (H) 74 - 99 mg/dL   POCT GLUCOSE "   Result Value Ref Range    POCT Glucose 312 (H) 74 - 99 mg/dL   POCT GLUCOSE   Result Value Ref Range    POCT Glucose 206 (H) 74 - 99 mg/dL       Assessment/Plan   PNA  -Continue IV Abx  -Supportive care  -Pulmonology following     Acute Resp Failure with hypoxia  -Currently on 4 liters, wean as able  -Will need home Oxygen study     Tobacco use  -Encourage cessation  -Refusing nicotine patch     Acute exacerbation of chronic COPD  -Continue steroids, has been weaned to oral  -IBD/ICS's     BRITT   -CPAP at at bedtime per home settings     DM II  -Insulin SS  -Monitor blood glucose     Hyperlipidemia  -Continue statin     CHF systolic  -Last ECHO from 10/23 with an EF 40-45%     Atrial Fibrillation  -Continue BB   -Telemetry  -Pt was on Eliquis at some point but is no longer taking      Plan  Continue to wean oxygen. Anticipate discharge tomorrow, home with no needs. Will need home O2 study    KEVON Waters-CNP

## 2024-03-26 ENCOUNTER — PHARMACY VISIT (OUTPATIENT)
Dept: PHARMACY | Facility: CLINIC | Age: 84
End: 2024-03-26
Payer: COMMERCIAL

## 2024-03-26 VITALS
BODY MASS INDEX: 30.52 KG/M2 | DIASTOLIC BLOOD PRESSURE: 69 MMHG | RESPIRATION RATE: 16 BRPM | TEMPERATURE: 97.7 F | WEIGHT: 218 LBS | OXYGEN SATURATION: 93 % | HEART RATE: 58 BPM | SYSTOLIC BLOOD PRESSURE: 133 MMHG | HEIGHT: 71 IN

## 2024-03-26 LAB
ANION GAP SERPL CALC-SCNC: 10 MMOL/L
BUN SERPL-MCNC: 16 MG/DL (ref 8–25)
CALCIUM SERPL-MCNC: 9.2 MG/DL (ref 8.5–10.4)
CHLORIDE SERPL-SCNC: 98 MMOL/L (ref 97–107)
CO2 SERPL-SCNC: 26 MMOL/L (ref 24–31)
CREAT SERPL-MCNC: 0.8 MG/DL (ref 0.4–1.6)
EGFRCR SERPLBLD CKD-EPI 2021: 89 ML/MIN/1.73M*2
ERYTHROCYTE [DISTWIDTH] IN BLOOD BY AUTOMATED COUNT: 12.5 % (ref 11.5–14.5)
GLUCOSE BLD MANUAL STRIP-MCNC: 199 MG/DL (ref 74–99)
GLUCOSE BLD MANUAL STRIP-MCNC: 230 MG/DL (ref 74–99)
GLUCOSE SERPL-MCNC: 257 MG/DL (ref 65–99)
HCT VFR BLD AUTO: 43.9 % (ref 41–52)
HGB BLD-MCNC: 14.8 G/DL (ref 13.5–17.5)
MCH RBC QN AUTO: 31.4 PG (ref 26–34)
MCHC RBC AUTO-ENTMCNC: 33.7 G/DL (ref 32–36)
MCV RBC AUTO: 93 FL (ref 80–100)
NRBC BLD-RTO: 0 /100 WBCS (ref 0–0)
PLATELET # BLD AUTO: 194 X10*3/UL (ref 150–450)
POTASSIUM SERPL-SCNC: 4.5 MMOL/L (ref 3.4–5.1)
RBC # BLD AUTO: 4.71 X10*6/UL (ref 4.5–5.9)
SODIUM SERPL-SCNC: 134 MMOL/L (ref 133–145)
WBC # BLD AUTO: 11.5 X10*3/UL (ref 4.4–11.3)

## 2024-03-26 PROCEDURE — 9420000001 HC RT PATIENT EDUCATION 5 MIN

## 2024-03-26 PROCEDURE — 82374 ASSAY BLOOD CARBON DIOXIDE: CPT | Performed by: NURSE PRACTITIONER

## 2024-03-26 PROCEDURE — 82947 ASSAY GLUCOSE BLOOD QUANT: CPT

## 2024-03-26 PROCEDURE — RXMED WILLOW AMBULATORY MEDICATION CHARGE

## 2024-03-26 PROCEDURE — 2500000004 HC RX 250 GENERAL PHARMACY W/ HCPCS (ALT 636 FOR OP/ED): Performed by: HOSPITALIST

## 2024-03-26 PROCEDURE — 85027 COMPLETE CBC AUTOMATED: CPT | Performed by: NURSE PRACTITIONER

## 2024-03-26 PROCEDURE — 94640 AIRWAY INHALATION TREATMENT: CPT

## 2024-03-26 PROCEDURE — 2500000002 HC RX 250 W HCPCS SELF ADMINISTERED DRUGS (ALT 637 FOR MEDICARE OP, ALT 636 FOR OP/ED): Performed by: NURSE PRACTITIONER

## 2024-03-26 PROCEDURE — 94760 N-INVAS EAR/PLS OXIMETRY 1: CPT

## 2024-03-26 PROCEDURE — 2500000004 HC RX 250 GENERAL PHARMACY W/ HCPCS (ALT 636 FOR OP/ED): Performed by: NURSE PRACTITIONER

## 2024-03-26 PROCEDURE — 36415 COLL VENOUS BLD VENIPUNCTURE: CPT | Performed by: NURSE PRACTITIONER

## 2024-03-26 PROCEDURE — 2500000002 HC RX 250 W HCPCS SELF ADMINISTERED DRUGS (ALT 637 FOR MEDICARE OP, ALT 636 FOR OP/ED): Performed by: INTERNAL MEDICINE

## 2024-03-26 PROCEDURE — 2500000005 HC RX 250 GENERAL PHARMACY W/O HCPCS: Performed by: NURSE PRACTITIONER

## 2024-03-26 PROCEDURE — 2500000001 HC RX 250 WO HCPCS SELF ADMINISTERED DRUGS (ALT 637 FOR MEDICARE OP): Performed by: NURSE PRACTITIONER

## 2024-03-26 RX ORDER — TAMSULOSIN HYDROCHLORIDE 0.4 MG/1
0.4 CAPSULE ORAL DAILY
Start: 2024-03-26

## 2024-03-26 RX ORDER — PREDNISONE 20 MG/1
TABLET ORAL
Qty: 11 TABLET | Refills: 0 | Status: SHIPPED | OUTPATIENT
Start: 2024-03-26 | End: 2024-04-04

## 2024-03-26 RX ADMIN — MULTIVITAMIN TABLET 1 TABLET: TABLET at 09:12

## 2024-03-26 RX ADMIN — PREDNISONE 20 MG: 20 TABLET ORAL at 09:12

## 2024-03-26 RX ADMIN — FLUOXETINE 40 MG: 20 CAPSULE ORAL at 09:12

## 2024-03-26 RX ADMIN — METOPROLOL SUCCINATE 50 MG: 50 TABLET, EXTENDED RELEASE ORAL at 09:12

## 2024-03-26 RX ADMIN — GABAPENTIN 600 MG: 600 TABLET, FILM COATED ORAL at 11:33

## 2024-03-26 RX ADMIN — PIPERACILLIN SODIUM AND TAZOBACTAM SODIUM 4.5 G: 4; .5 INJECTION, SOLUTION INTRAVENOUS at 09:11

## 2024-03-26 RX ADMIN — FLUTICASONE PROPIONATE 1 SPRAY: 50 SPRAY, METERED NASAL at 09:20

## 2024-03-26 RX ADMIN — Medication: at 07:00

## 2024-03-26 RX ADMIN — ACETYLCYSTEINE 600 MG: 200 SOLUTION ORAL; RESPIRATORY (INHALATION) at 07:36

## 2024-03-26 RX ADMIN — GABAPENTIN 600 MG: 600 TABLET, FILM COATED ORAL at 07:12

## 2024-03-26 RX ADMIN — TAMSULOSIN HYDROCHLORIDE 0.4 MG: 0.4 CAPSULE ORAL at 09:13

## 2024-03-26 RX ADMIN — PIPERACILLIN SODIUM AND TAZOBACTAM SODIUM 4.5 G: 4; .5 INJECTION, SOLUTION INTRAVENOUS at 02:48

## 2024-03-26 RX ADMIN — INSULIN LISPRO 6 UNITS: 100 INJECTION, SOLUTION INTRAVENOUS; SUBCUTANEOUS at 09:06

## 2024-03-26 RX ADMIN — IPRATROPIUM BROMIDE AND ALBUTEROL SULFATE 3 ML: 2.5; .5 SOLUTION RESPIRATORY (INHALATION) at 07:37

## 2024-03-26 RX ADMIN — PRAMIPEXOLE DIHYDROCHLORIDE 0.12 MG: 0.12 TABLET ORAL at 09:12

## 2024-03-26 RX ADMIN — SPIRONOLACTONE 12.5 MG: 25 TABLET ORAL at 09:12

## 2024-03-26 RX ADMIN — INSULIN LISPRO 10 UNITS: 100 INJECTION, SOLUTION INTRAVENOUS; SUBCUTANEOUS at 09:11

## 2024-03-26 ASSESSMENT — COGNITIVE AND FUNCTIONAL STATUS - GENERAL
DAILY ACTIVITIY SCORE: 24
MOBILITY SCORE: 24

## 2024-03-26 ASSESSMENT — PAIN SCALES - GENERAL: PAINLEVEL_OUTOF10: 0 - NO PAIN

## 2024-03-26 NOTE — CARE PLAN
The patient's goals for the shift include  ambulating in the room.     The clinical goals for the shift include IV antibiotics, monitor oxygen level      Problem: Respiratory  Goal: Clear secretions with interventions this shift  Outcome: Progressing  Goal: Minimize anxiety/maximize coping throughout shift  Outcome: Progressing  Goal: Minimal/no exertional discomfort or dyspnea this shift  Outcome: Progressing  Goal: No signs of respiratory distress (eg. Use of accessory muscles. Peds grunting)  Outcome: Progressing  Goal: Tolerate pulmonary toileting this shift  Outcome: Progressing  Goal: Verbalize decreased shortness of breath this shift  Outcome: Progressing  Goal: Wean oxygen to maintain O2 saturation per order/standard this shift  Outcome: Progressing  Goal: Increase self care and/or family involvement in next 24 hours  Outcome: Progressing     Problem: Fall/Injury  Goal: Not fall by end of shift  Outcome: Progressing  Goal: Be free from injury by end of the shift  Outcome: Progressing  Goal: Verbalize understanding of personal risk factors for fall in the hospital  Outcome: Progressing  Goal: Verbalize understanding of risk factor reduction measures to prevent injury from fall in the home  Outcome: Progressing  Goal: Use assistive devices by end of the shift  Outcome: Progressing

## 2024-03-26 NOTE — NURSING NOTE
Spoke with patient about home O2 and patient said [facility] will be out to his house today 3/26.

## 2024-03-26 NOTE — DISCHARGE SUMMARY
Discharge Diagnosis  Acute respiratory failure with hypoxia (CMS/[facility])    Issues Requiring Follow-Up      Discharge Meds     Your medication list        START taking these medications        Instructions Last Dose Given Next Dose Due   oxygen gas therapy  Commonly known as: O2      Inhale 3 L/min every 8 hours.       predniSONE 20 mg tablet  Commonly known as: Deltasone  Start taking on: March 26, 2024      Take 1 tablet (20 mg) by mouth 2 times a day for 3 days, THEN 1 tablet (20 mg) once daily for 3 days, THEN 0.5 tablets (10 mg) once daily for 3 days.              CHANGE how you take these medications        Instructions Last Dose Given Next Dose Due   tamsulosin 0.4 mg 24 hr capsule  Commonly known as: Flomax  What changed: when to take this      Take 1 capsule (0.4 mg) by mouth once daily. 30 minutes after the same meal each day Orally Once a day              CONTINUE taking these medications        Instructions Last Dose Given Next Dose Due   Dulera 100-5 mcg/actuation inhaler  Generic drug: mometasone-formoterol           FLUoxetine 40 mg capsule  Commonly known as: PROzac      TAKE 1 CAPSULE BY MOUTH EVERY DAY       fluticasone 50 mcg/actuation nasal spray  Commonly known as: Flonase           fluticasone propion-salmeteroL 250-50 mcg/dose diskus inhaler  Commonly known as: Advair Diskus      Inhale 1 puff 2 times a day. Rinse mouth with water after use to reduce aftertaste and incidence of candidiasis. Do not swallow.       gabapentin 600 mg tablet  Commonly known as: Neurontin           HumaLOG KwikPen Insulin 100 unit/mL injection  Generic drug: insulin lispro           Lyumjev KwikPen U-100 Insulin 100 unit/mL insulin pen  Generic drug: insulin lispro-aabc      Up to 80 units daily       metoprolol succinate XL 50 mg 24 hr tablet  Commonly known as: Toprol-XL           MULTIVITAMIN ORAL           Nitrostat 0.4 mg SL tablet  Generic drug: nitroglycerin           pramipexole 0.125 mg tablet  Commonly known  as: Mirapex           ProAir HFA 90 mcg/actuation inhaler  Generic drug: albuterol           rosuvastatin 40 mg tablet  Commonly known as: Crestor           spironolactone 25 mg tablet  Commonly known as: Aldactone           traZODone 50 mg tablet  Commonly known as: Desyrel           Tresiba FlexTouch U-200 200 unit/mL (3 mL) injection  Generic drug: insulin degludec      INJECT 70 UNITS SUBCUTANEOUSLY EVERY DAY 90 DAYS              STOP taking these medications      Combivent Respimat  mcg/actuation inhaler  Generic drug: ipratropium-albuteroL                  Where to Get Your Medications        These medications were sent to [facility]  [address], [address], [place] [address]      Hours: 9 AM to 6 PM Mon-Fri, 9 AM to 1 PM Sat Phone: [phone]   predniSONE 20 mg tablet       Information about where to get these medications is not yet available    Ask your nurse or doctor about these medications  oxygen gas therapy  tamsulosin 0.4 mg 24 hr capsule         Test Results Pending At Discharge  Pending Labs       Order Current Status    Extra Urine Hughes Tube Collected (03/19/24 1437)    Urinalysis with Reflex Culture and Microscopic In process            Hospital Course   Admitted for COPD exacerbation. Was treated with steroids and Duonebs. CXR showed possible aspiration pneumonia. Pt has completed 7 day course of IV Zosyn. Was evaluated by Pulmonology and to follow-up outpatient. Will need PFT's.   Evaluated by Speech Therapy and regular diet was recommended which pt is tolerating. To complete Prednisone taper on discharge. Has clinically improved however unable to wean from oxygen. Home O2 study done and pt requires 2 liters at rest and 3 liters with exertion. Medically stable for discharge. Referral placed for Healthy at Home program.      Pertinent Physical Exam At Time of Discharge  Physical Exam  HENT:      Head: Normocephalic and atraumatic.      Nose: Nose normal.       Mouth/Throat:      Mouth: Mucous membranes are moist.      Pharynx: Oropharynx is clear.   Eyes:      Extraocular Movements: Extraocular movements intact.      Pupils: Pupils are equal, round, and reactive to light.   Cardiovascular:      Rate and Rhythm: Normal rate and regular rhythm.      Pulses: Normal pulses.   Pulmonary:      Effort: Pulmonary effort is normal.      Breath sounds: Normal breath sounds.   Abdominal:      General: Abdomen is flat. Bowel sounds are normal.      Palpations: Abdomen is soft.   Musculoskeletal:         General: Normal range of motion.   Skin:     General: Skin is warm and dry.      Capillary Refill: Capillary refill takes less than 2 seconds.   Neurological:      General: No focal deficit present.      Mental Status: He is oriented to person, place, and time.   Psychiatric:         Mood and Affect: Mood normal.         Outpatient Follow-Up  Future Appointments   Date Time Provider Department Center   4/3/2024  8:00 AM Karla Newsome PA-C [id number] [facility]   5/13/2024  9:00 AM Stephanie Dempsey RN [id number] [facility]         Brunilda Mcdonough, APRN-CNP

## 2024-03-26 NOTE — PROGRESS NOTES
03/26/24 1122   Discharge Planning   Patient expects to be discharged to: Home no needs   Does the patient need discharge transport arranged? No       DC written.  Home with new home oxygen per respiratory.  Patient LACE score at 78 - patient states he already has appointment 5/3/2024 at 8 am.  IMM signed and scanned to registration.  Home no needs

## 2024-03-26 NOTE — PROGRESS NOTES
Ramesh Morocho is a 83 y.o. male on day 7 of admission presenting with Acute respiratory failure with hypoxia (CMS/HCC).      Subjective   No overnight issues. Patient removed oxygen and is resting comfortably, denies dyspnea.  Breathing has improved. Ambulating in the halls as tolerated. Pending home oxygen study.        Objective     Last Recorded Vitals  /69 (BP Location: Left arm, Patient Position: Lying)   Pulse 58   Temp 36.5 °C (97.7 °F) (Temporal)   Resp 16   Wt 98.9 kg (218 lb)   SpO2 96%   Intake/Output last 3 Shifts:    Intake/Output Summary (Last 24 hours) at 3/26/2024 0907  Last data filed at 3/26/2024 0600  Gross per 24 hour   Intake 500 ml   Output --   Net 500 ml   Admission Weight  Weight: 98.9 kg (218 lb) (03/19/24 1134)      Physical Exam  Vitals and nursing note reviewed.   Constitutional:       Appearance: Normal appearance.   HENT:      Head: Normocephalic and atraumatic.      Nose: Nose normal.      Mouth/Throat:      Mouth: Mucous membranes are moist.      Pharynx: Oropharynx is clear.   Eyes:      Extraocular Movements: Extraocular movements intact.      Pupils: Pupils are equal, round, and reactive to light.   Cardiovascular:      Rate and Rhythm: Normal rate and regular rhythm.      Pulses: Normal pulses.      Heart sounds: Normal heart sounds.   Pulmonary:      Effort: Pulmonary effort is normal.      Breath sounds: Normal breath sounds.   Abdominal:      General: Abdomen is flat.      Palpations: Abdomen is soft.   Musculoskeletal:         General: Normal range of motion.      Cervical back: Normal range of motion and neck supple.   Skin:     General: Skin is warm.      Capillary Refill: Capillary refill takes less than 2 seconds.   Neurological:      General: No focal deficit present.      Mental Status: He is alert and oriented to person, place, and time.   Psychiatric:         Mood and Affect: Mood normal.         Behavior: Behavior normal.         Thought Content: Thought  content normal.         Judgment: Judgment normal.      Relevant Results  Results for orders placed or performed during the hospital encounter of 03/19/24 (from the past 24 hour(s))   POCT GLUCOSE   Result Value Ref Range    POCT Glucose 229 (H) 74 - 99 mg/dL   POCT GLUCOSE   Result Value Ref Range    POCT Glucose 300 (H) 74 - 99 mg/dL   POCT GLUCOSE   Result Value Ref Range    POCT Glucose 317 (H) 74 - 99 mg/dL   Basic metabolic panel   Result Value Ref Range    Glucose 257 (H) 65 - 99 mg/dL    Sodium 134 133 - 145 mmol/L    Potassium 4.5 3.4 - 5.1 mmol/L    Chloride 98 97 - 107 mmol/L    Bicarbonate 26 24 - 31 mmol/L    Urea Nitrogen 16 8 - 25 mg/dL    Creatinine 0.80 0.40 - 1.60 mg/dL    eGFR 89 >60 mL/min/1.73m*2    Calcium 9.2 8.5 - 10.4 mg/dL    Anion Gap 10 <=19 mmol/L   CBC   Result Value Ref Range    WBC 11.5 (H) 4.4 - 11.3 x10*3/uL    nRBC 0.0 0.0 - 0.0 /100 WBCs    RBC 4.71 4.50 - 5.90 x10*6/uL    Hemoglobin 14.8 13.5 - 17.5 g/dL    Hematocrit 43.9 41.0 - 52.0 %    MCV 93 80 - 100 fL    MCH 31.4 26.0 - 34.0 pg    MCHC 33.7 32.0 - 36.0 g/dL    RDW 12.5 11.5 - 14.5 %    Platelets 194 150 - 450 x10*3/uL   POCT GLUCOSE   Result Value Ref Range    POCT Glucose 230 (H) 74 - 99 mg/dL        Assessment/Plan    PNA  -Completed IV Abx and will transition to oral ABX for anticipated discharge.      Acute Resp Failure with hypoxia  -currently on O2 3L and wean as tolerated  -Pending home Oxygen study     Tobacco use  -Encourage cessation  -Refusing nicotine patch     Acute exacerbation of chronic COPD  -Continue steroids, has been weaned to oral   -IBD/ICS's     BRITT   -CPAP at at bedtime per home settings     DM II  -Insulin SS  -Monitor blood glucose     Hyperlipidemia  -Continue statin     CHF systolic  -Last ECHO from 10/23 with an EF 40-45%     Atrial Fibrillation  -Continue BB   -discontinue Telemetry  -Pt was on Eliquis at some point but is no longer taking      Plan  Continue to wean oxygen as tolerated,  pending home O2 study, Anticipate discharge today. home with no needs.                   Carolann Chavarria, CNP

## 2024-03-27 ENCOUNTER — DOCUMENTATION (OUTPATIENT)
Dept: PRIMARY CARE | Facility: CLINIC | Age: 84
End: 2024-03-27
Payer: MEDICARE

## 2024-03-27 ENCOUNTER — PATIENT OUTREACH (OUTPATIENT)
Dept: HOME HEALTH SERVICES | Age: 84
End: 2024-03-27
Payer: MEDICARE

## 2024-03-27 ENCOUNTER — PATIENT OUTREACH (OUTPATIENT)
Dept: PRIMARY CARE | Facility: CLINIC | Age: 84
End: 2024-03-27
Payer: MEDICARE

## 2024-03-27 NOTE — PROGRESS NOTES
Discharge Facility: [facility]     Discharge Diagnosis:    Acute respiratory failure with hypoxia     Admission Date: 3/19/2024   Discharge Date:  3/26/2024     PCP Appointment Date: 4/3/2024     Specialist Appointment Date:     Healthy at home involved      Patient will call Dr. Sparks Pulmonary F/U due in 2 weeks     Hospital Encounter and Summary: Linked     See discharge assessment below for further details     Engagement  Call Start Time: 1159 (3/27/2024 11:59 AM)    Medications  Medications reviewed with patient/caregiver?: Yes (3/27/2024 11:59 AM)  Is the patient having any side effects they believe may be caused by any medication additions or changes?: No (3/27/2024 11:59 AM)  Does the patient have all medications ordered at discharge?: Yes (3/27/2024 11:59 AM)  Care Management Interventions: No intervention needed (3/27/2024 11:59 AM)  Prescription Comments: New meds reviewed ,predniSONE 20 mg tablet and 02 (3/27/2024 11:59 AM)  Is the patient taking all medications as directed (includes completed medication regime)?: -- (Patient states following medication list) (3/27/2024 11:59 AM)  Care Management Interventions: Provided patient education (3/27/2024 11:59 AM)  Medication Comments: Patient has no questions or concerns at this time (3/27/2024 11:59 AM)    Appointments  Does the patient have a primary care provider?: Yes (3/27/2024 11:59 AM)  Care Management Interventions: Verified appointment date/time/provider (4/3) (3/27/2024 11:59 AM)  Has the patient kept scheduled appointments due by today?: Yes (3/27/2024 11:59 AM)  Care Management Interventions: Advised to schedule with specialist (Dr. Sparks Pulmonary F/U needed 2 weeks) (3/27/2024 11:59 AM)    Self Management  What is the home health agency?: NA (3/27/2024 11:59 AM)  What Durable Medical Equipment (DME) was ordered?: 02 (3/27/2024 11:59 AM)  Has all Durable Medical Equipment (DME) been delivered?: Yes (3/27/2024 11:59 AM)    Patient Teaching  Does the  patient have access to their discharge instructions?: Yes (3/27/2024 11:59 AM)  Care Management Interventions: Reviewed instructions with patient (3/27/2024 11:59 AM)  What is the patient's perception of their health status since discharge?: Improving (3/27/2024 11:59 AM)  Patient/Caregiver Education Comments: Patient encouraged to call providers for any questions concerns or change in condition , Healthy at Home involved (3/27/2024 11:59 AM)

## 2024-03-27 NOTE — PROGRESS NOTES
The patient was called regarding the Healthy at Home program, but his wife answered and learned about the program. She declined on his behalf, stating that they had no need for assistance and everything was in place for them to manage his care.    Patient's Address:   [address] [place] [address]  **  If this is not the address patient will receive services - alert team and address in EMR**       Patient Contacts:  Extended Emergency Contact Information  Primary Emergency Contact: Amy Morocho  Home Phone: [phone]  Relation: Spouse  Secondary Emergency Contact: Semaj Morocho  Address: [address] [place] [address]  Home Phone: [phone]  Relation: None                                Patient's Preferred Phone: [phone]  Patient's E-mail: [email or web address]

## 2024-03-27 NOTE — DOCUMENTATION CLARIFICATION NOTE
PATIENT:               ANNITA CHU  ACCT #:                  [id number]  MRN:                       [id number]  :                       1940  ADMIT DATE:       3/19/2024 11:37 AM  DISCH DATE:        3/26/2024 12:25 PM  RESPONDING PROVIDER #:        [id number]          PROVIDER RESPONSE TEXT:    No thrombocytopenia as platelets are normal. Thus not listed as a problem    CDI QUERY TEXT:    [facility]_Abnormal Studies      Instruction:    Based on your assessment of the patient and the clinical information, please provide the requested documentation by clicking on the appropriate radio button and enter any additional information if prompted.    Question: Is there a diagnosis indicative of the lab values or image study    When answering this query, please exercise your independent professional judgment. The fact that a question is being asked, does not imply that any particular answer is desired or expected.    The patient's clinical indicators include:  Clinical Information:  This 83 y.o. male admitted for SOB.  He was found to have PNA, COPD exacerbation, and acute hypoxic respiratory.    Clinical Indicators:    Prior:    3/19:    3/20:      Pt was receiving Zosyn 4 grams q6 (3/19-3/26), Eliquis 5 mg bid    Treatment:  Monitoring PLT level via labs.    Risk Factors:  Medication (Zosyn, Eliquis), PNA  Options provided:  -- Thrombocytopenia is clinically significant and required treatment/monitoring  -- Other - I will add my own diagnosis  -- Refer to Clinical Documentation Reviewer    Query created by: Yaya Jackson on 3/27/2024 1:33 PM      Electronically signed by:  IFRAH WILSON 3/27/2024 5:22 PM

## 2024-04-01 ENCOUNTER — TELEPHONE (OUTPATIENT)
Dept: INPATIENT UNIT | Facility: HOSPITAL | Age: 84
End: 2024-04-01

## 2024-04-02 ENCOUNTER — PATIENT OUTREACH (OUTPATIENT)
Dept: PRIMARY CARE | Facility: CLINIC | Age: 84
End: 2024-04-02
Payer: MEDICARE

## 2024-04-02 NOTE — PROGRESS NOTES
Unable to reach patient for call back , was updated patient refused Healthy at Home program patient will see PCP 4/3/2024     LVM with call back number for patient to call if needed   If no voicemail available call attempts x 2 were made to contact the patient to assist with any questions or concerns patient may have.       Patient encouraged to call providers for any questions concerns or change in condition

## 2024-04-03 ENCOUNTER — TELEPHONE (OUTPATIENT)
Dept: PRIMARY CARE | Facility: CLINIC | Age: 84
End: 2024-04-03

## 2024-04-03 ENCOUNTER — OFFICE VISIT (OUTPATIENT)
Dept: PRIMARY CARE | Facility: CLINIC | Age: 84
End: 2024-04-03
Payer: MEDICARE

## 2024-04-03 VITALS
SYSTOLIC BLOOD PRESSURE: 116 MMHG | HEART RATE: 91 BPM | DIASTOLIC BLOOD PRESSURE: 62 MMHG | WEIGHT: 219.8 LBS | OXYGEN SATURATION: 93 % | BODY MASS INDEX: 30.66 KG/M2

## 2024-04-03 DIAGNOSIS — J44.9 CHRONIC OBSTRUCTIVE PULMONARY DISEASE, UNSPECIFIED COPD TYPE (MULTI): ICD-10-CM

## 2024-04-03 DIAGNOSIS — B37.0 THRUSH: ICD-10-CM

## 2024-04-03 DIAGNOSIS — I12.9 HYPERTENSIVE RENAL DISEASE: ICD-10-CM

## 2024-04-03 DIAGNOSIS — J96.21 ACUTE ON CHRONIC RESPIRATORY FAILURE WITH HYPOXIA (MULTI): Primary | ICD-10-CM

## 2024-04-03 PROBLEM — J96.90 RESPIRATORY FAILURE (MULTI): Status: ACTIVE | Noted: 2024-03-20

## 2024-04-03 PROBLEM — R20.0 NUMBNESS: Status: ACTIVE | Noted: 2024-04-03

## 2024-04-03 PROBLEM — M51.369 DEGENERATION OF INTERVERTEBRAL DISC OF LUMBAR REGION: Status: ACTIVE | Noted: 2024-04-03

## 2024-04-03 PROBLEM — Z95.5 HISTORY OF CORONARY ARTERY STENT PLACEMENT: Status: ACTIVE | Noted: 2024-04-03

## 2024-04-03 PROBLEM — R06.00 DYSPNEA: Status: ACTIVE | Noted: 2024-03-19

## 2024-04-03 PROBLEM — R52 GENERALIZED PAIN: Status: ACTIVE | Noted: 2024-04-03

## 2024-04-03 PROBLEM — M51.36 DEGENERATION OF INTERVERTEBRAL DISC OF LUMBAR REGION: Status: ACTIVE | Noted: 2024-04-03

## 2024-04-03 PROBLEM — F45.9 IMPAIRED PSYCHOMOTOR PERFORMANCE: Status: ACTIVE | Noted: 2023-09-14

## 2024-04-03 PROBLEM — Z86.39 HISTORY OF DIABETES MELLITUS: Status: ACTIVE | Noted: 2024-04-03

## 2024-04-03 PROBLEM — E11.9 TYPE 2 DIABETES MELLITUS (MULTI): Status: ACTIVE | Noted: 2023-05-30

## 2024-04-03 PROBLEM — G47.33 OBSTRUCTIVE SLEEP APNEA SYNDROME: Status: ACTIVE | Noted: 2024-02-27

## 2024-04-03 PROBLEM — R33.9 RETENTION OF URINE: Status: ACTIVE | Noted: 2024-04-03

## 2024-04-03 PROBLEM — D69.1 PLATELET DISORDER (MULTI): Status: ACTIVE | Noted: 2024-04-03

## 2024-04-03 PROCEDURE — 1111F DSCHRG MED/CURRENT MED MERGE: CPT | Performed by: PHYSICIAN ASSISTANT

## 2024-04-03 PROCEDURE — 1160F RVW MEDS BY RX/DR IN RCRD: CPT | Performed by: PHYSICIAN ASSISTANT

## 2024-04-03 PROCEDURE — 3074F SYST BP LT 130 MM HG: CPT | Performed by: PHYSICIAN ASSISTANT

## 2024-04-03 PROCEDURE — 99496 TRANSJ CARE MGMT HIGH F2F 7D: CPT | Performed by: PHYSICIAN ASSISTANT

## 2024-04-03 PROCEDURE — 1159F MED LIST DOCD IN RCRD: CPT | Performed by: PHYSICIAN ASSISTANT

## 2024-04-03 PROCEDURE — 1126F AMNT PAIN NOTED NONE PRSNT: CPT | Performed by: PHYSICIAN ASSISTANT

## 2024-04-03 PROCEDURE — 3078F DIAST BP <80 MM HG: CPT | Performed by: PHYSICIAN ASSISTANT

## 2024-04-03 PROCEDURE — 4004F PT TOBACCO SCREEN RCVD TLK: CPT | Performed by: PHYSICIAN ASSISTANT

## 2024-04-03 RX ORDER — ALBUTEROL SULFATE 0.83 MG/ML
2.5 SOLUTION RESPIRATORY (INHALATION) 4 TIMES DAILY PRN
Qty: 120 ML | Refills: 1 | Status: SHIPPED | OUTPATIENT
Start: 2024-04-03 | End: 2024-05-13 | Stop reason: WASHOUT

## 2024-04-03 RX ORDER — NYSTATIN 100000 [USP'U]/ML
500000 SUSPENSION ORAL 4 TIMES DAILY
Qty: 280 ML | Refills: 0 | Status: SHIPPED | OUTPATIENT
Start: 2024-04-03 | End: 2024-04-17

## 2024-04-03 ASSESSMENT — ENCOUNTER SYMPTOMS
NUMBNESS: 1
SHORTNESS OF BREATH: 1
DIZZINESS: 0
FEVER: 0
WHEEZING: 0
SPUTUM PRODUCTION: 0
LIGHT-HEADEDNESS: 0
ARTHRALGIAS: 1
BACK PAIN: 1
CHILLS: 0
COUGH: 0
DIFFICULTY BREATHING: 0

## 2024-04-03 ASSESSMENT — PATIENT HEALTH QUESTIONNAIRE - PHQ9
SUM OF ALL RESPONSES TO PHQ9 QUESTIONS 1 AND 2: 0
1. LITTLE INTEREST OR PLEASURE IN DOING THINGS: NOT AT ALL
2. FEELING DOWN, DEPRESSED OR HOPELESS: NOT AT ALL

## 2024-04-03 ASSESSMENT — PAIN SCALES - GENERAL: PAINLEVEL: 0-NO PAIN

## 2024-04-03 NOTE — PROGRESS NOTES
Subjective   Patient ID: Ramesh Morocho is a 83 y.o. male who presents for ER follow up.    Breathing Problem  He complains of shortness of breath. There is no cough, difficulty breathing, sputum production or wheezing. This is a recurrent problem. The problem has been gradually improving. Pertinent negatives include no fever.    He was in the hospital for a bad COPD exacerbation. He is doing much better. He is no longer on Oxygen.  He sent it back yesterday.  He has been checking his pulse ox throughout the day and usually stays around 90 to 93%.  Lowest it goes per patient after exertions 90%.  He has not been using his inhaler.  Other main concern is he may have possible thrush.    Review of Systems   Constitutional:  Negative for chills and fever.   Respiratory:  Positive for shortness of breath. Negative for cough, sputum production and wheezing.    Musculoskeletal:  Positive for arthralgias and back pain.   Neurological:  Positive for numbness. Negative for dizziness and light-headedness.       Objective   /62 (BP Location: Left arm, Patient Position: Sitting)   Pulse 91   Wt 99.7 kg (219 lb 12.8 oz)   SpO2 93%   BMI 30.66 kg/m²     Physical Exam  Constitutional:       Appearance: Normal appearance.      Comments: Looks much better today.   HENT:      Mouth/Throat:      Comments: Tongue is mildly erythematous he has small white removable lesions on the inside of his cheek and under his tongue.  Eyes:      Extraocular Movements: Extraocular movements intact.      Pupils: Pupils are equal, round, and reactive to light.   Cardiovascular:      Rate and Rhythm: Normal rate and regular rhythm.      Pulses: Normal pulses.      Heart sounds: Normal heart sounds.   Pulmonary:      Effort: Pulmonary effort is normal.   Musculoskeletal:      Cervical back: Neck supple.      Right lower leg: Edema present.      Left lower leg: Edema present.   Neurological:      General: No focal deficit present.      Mental  Status: He is alert. Mental status is at baseline.   Psychiatric:         Mood and Affect: Mood normal.         Thought Content: Thought content normal.         Judgment: Judgment normal.         Assessment/Plan   Diagnoses and all orders for this visit:  Acute on chronic respiratory failure with hypoxia (CMS/HCC)  Chronic obstructive pulmonary disease, unspecified COPD type (CMS/HCC)  -     albuterol 2.5 mg /3 mL (0.083 %) nebulizer solution; Take 3 mL (2.5 mg) by nebulization 4 times a day as needed for wheezing or shortness of breath.  Hypertensive renal disease  Thrush  -     nystatin (Mycostatin) 100,000 unit/mL suspension; Take 5 mL (500,000 Units) by mouth 4 times a day for 14 days. Swish in mouth and swallow.  Other orders  -     Follow Up In Primary Care - Medicare Annual    Treat him for thrush refilled his albuterol for his nebulizer as well.  Discussed the importance of rinsing his mouth out after use of Advair.  He will see Dr. Hernandez next week.  As for his back pain briefly discussed seeing pain management but will go more detail at his physical in the next few months.

## 2024-04-03 NOTE — TELEPHONE ENCOUNTER
Rx Refill Request Telephone Encounter    Name:  Ramesh Morocho  :  [id number]  Medication Name:  Albuterol SUL 2.5 mg. Alternative requested or needs prior authorization the package size is 75, please send  in a multiple of 75, 75ML is a 6 day supply             Specific Pharmacy location:   [facility] [phone]  Date of last appointment:    Date of next appointment:    Best number to reach patient:

## 2024-04-04 ENCOUNTER — TELEPHONE (OUTPATIENT)
Dept: PRIMARY CARE | Facility: CLINIC | Age: 84
End: 2024-04-04
Payer: MEDICARE

## 2024-04-04 NOTE — TELEPHONE ENCOUNTER
Prescription for Nystatin was denied through his insurance. Can something else be called in to [facility] in [facility].

## 2024-04-09 ENCOUNTER — PATIENT OUTREACH (OUTPATIENT)
Dept: PRIMARY CARE | Facility: CLINIC | Age: 84
End: 2024-04-09
Payer: MEDICARE

## 2024-04-09 NOTE — PROGRESS NOTES
Unable to reach patient for call back after patient's follow up appointment with PCP on 4/3/2024     [facility] with call back number for patient to call if needed   If no voicemail available call attempts x 2 were made to contact the patient to assist with any questions or concerns patient may have.    L/M Encouraged patient to call providers for any questions concerns or change in condition

## 2024-04-24 ENCOUNTER — PATIENT OUTREACH (OUTPATIENT)
Dept: PRIMARY CARE | Facility: CLINIC | Age: 84
End: 2024-04-24
Payer: MEDICARE

## 2024-04-24 NOTE — PROGRESS NOTES
Unable to reach patient for discharge follow up call.   LVM with call back number for patient to call if needed   If no voicemail available call attempts x 2 were made to contact the patient to assist with any questions or concerns patient may have.     L/M encouraged patient to call providers for any questions concerns or change in condition

## 2024-04-26 ENCOUNTER — TELEPHONE (OUTPATIENT)
Dept: PRIMARY CARE | Facility: CLINIC | Age: 84
End: 2024-04-26
Payer: MEDICARE

## 2024-04-26 DIAGNOSIS — M51.36 DEGENERATION OF INTERVERTEBRAL DISC OF LUMBAR REGION: ICD-10-CM

## 2024-04-26 NOTE — TELEPHONE ENCOUNTER
PT STATED  THE LAST VISIT SAH THOUGHT HE SHOULD SEE PAIN [facility]. HE WOULD LIKE A REFERRAL  FOR BACK PAIN

## 2024-04-29 ENCOUNTER — OFFICE VISIT (OUTPATIENT)
Dept: PAIN MEDICINE | Facility: CLINIC | Age: 84
End: 2024-04-29
Payer: MEDICARE

## 2024-04-29 VITALS
SYSTOLIC BLOOD PRESSURE: 120 MMHG | HEART RATE: 78 BPM | OXYGEN SATURATION: 98 % | WEIGHT: 228 LBS | HEIGHT: 71 IN | BODY MASS INDEX: 31.92 KG/M2 | RESPIRATION RATE: 22 BRPM | DIASTOLIC BLOOD PRESSURE: 50 MMHG

## 2024-04-29 DIAGNOSIS — M54.16 LUMBAR RADICULOPATHY: Primary | ICD-10-CM

## 2024-04-29 DIAGNOSIS — M48.062 LUMBAR STENOSIS WITH NEUROGENIC CLAUDICATION: ICD-10-CM

## 2024-04-29 DIAGNOSIS — M51.36 DEGENERATION OF INTERVERTEBRAL DISC OF LUMBAR REGION: ICD-10-CM

## 2024-04-29 PROCEDURE — 99214 OFFICE O/P EST MOD 30 MIN: CPT | Performed by: ANESTHESIOLOGY

## 2024-04-29 PROCEDURE — 99204 OFFICE O/P NEW MOD 45 MIN: CPT | Performed by: ANESTHESIOLOGY

## 2024-04-29 PROCEDURE — 1160F RVW MEDS BY RX/DR IN RCRD: CPT | Performed by: ANESTHESIOLOGY

## 2024-04-29 PROCEDURE — 1125F AMNT PAIN NOTED PAIN PRSNT: CPT | Performed by: ANESTHESIOLOGY

## 2024-04-29 PROCEDURE — 4004F PT TOBACCO SCREEN RCVD TLK: CPT | Performed by: ANESTHESIOLOGY

## 2024-04-29 PROCEDURE — 1159F MED LIST DOCD IN RCRD: CPT | Performed by: ANESTHESIOLOGY

## 2024-04-29 PROCEDURE — 3078F DIAST BP <80 MM HG: CPT | Performed by: ANESTHESIOLOGY

## 2024-04-29 PROCEDURE — 3074F SYST BP LT 130 MM HG: CPT | Performed by: ANESTHESIOLOGY

## 2024-04-29 ASSESSMENT — ENCOUNTER SYMPTOMS
WEAKNESS: 1
BACK PAIN: 1
NUMBNESS: 1
ARTHRALGIAS: 1
ACTIVITY CHANGE: 1

## 2024-04-29 ASSESSMENT — PATIENT HEALTH QUESTIONNAIRE - PHQ9
2. FEELING DOWN, DEPRESSED OR HOPELESS: NOT AT ALL
1. LITTLE INTEREST OR PLEASURE IN DOING THINGS: NOT AT ALL
SUM OF ALL RESPONSES TO PHQ9 QUESTIONS 1 & 2: 0

## 2024-04-29 ASSESSMENT — PAIN DESCRIPTION - DESCRIPTORS: DESCRIPTORS: SHARP

## 2024-04-29 ASSESSMENT — PAIN SCALES - GENERAL
PAINLEVEL: 0-NO PAIN
PAINLEVEL_OUTOF10: 9

## 2024-04-29 ASSESSMENT — PAIN - FUNCTIONAL ASSESSMENT: PAIN_FUNCTIONAL_ASSESSMENT: 0-10

## 2024-04-29 ASSESSMENT — LIFESTYLE VARIABLES: TOTAL SCORE: 0

## 2024-04-29 NOTE — PROGRESS NOTES
The patient is an 83-year-old male with low back and bilateral leg pain.  The pain is worse on the left side.  He has had this pain for many years.  The pain is worsening.  The pain is worse with activity.  He gets significant relief with rest.  The patient feels better when he leans forward on something such as a grocery cart or countertop.  His quality of life is unacceptable.  He has modified his lifestyle because of the pain.  He has participated in physical therapy in the past which seem to exacerbate his pain.    Review of Systems   Constitutional:  Positive for activity change.   Musculoskeletal:  Positive for arthralgias, back pain and gait problem.   Neurological:  Positive for weakness and numbness.   All other systems reviewed and are negative.    GENERAL: alert and appropriate, in no distress, well-hydrated, well nourished, interactive         SKIN: no rash noted         HEAD: normocephalic, no abnormality or lesion noted         EYES: no injection and visual acuity is grossly normal         EARS: external ears normal, no mastoid tenderness         NOSE: external nose normal without rhinorrhea         OROPHARYNX: moist mucus membranes, no tonsillar hypertrophy/exudate, uvula midline and pharynx non-erythematous, lips, teeth and gums are without obvious lesion         NECK: Adequate ROM, no cervical LNs noted         RESPIRATORY: breathing non-labored and no grunting/flaring/retractions         CHEST: equal chest rise with normal respiratory effort         ABDOMEN: soft and non-tender         BACK: back normal in appearance, lumbar spine with reduced ROM         EXTREMITIES: Dorsiflexor weakness on the right         NEUROLOGIC: gait antalgic, SLR positive, decreased sensation distally in the L5 distribution on the right    Assessment and Plan    -Chronicity--chronic spinal pain    -Diagnostics--given his pattern of pain and the neurologic deficits, I would like the patient to have a lumbar spine  MRI    -Pharmacologic--no change    -Psychologic--no need for psychologic intervention from my standpoint    -Physical--we discussed the importance of physical therapy and exercise.  We discussed avoidance and modification techniques.    -Intervention--the patient is a candidate for an interlaminar epidural steroid injection at the L4-L5 level.  I explained the risks benefits and alternatives of the procedure to the patient.  The patient wishes to proceed.    I spent time educating the patient on the condition including the treatment and the prognosis.  I invited the patient to call at anytime with any questions.

## 2024-05-02 ENCOUNTER — HOSPITAL ENCOUNTER (OUTPATIENT)
Dept: RADIOLOGY | Facility: CLINIC | Age: 84
Discharge: HOME | End: 2024-05-02
Payer: MEDICARE

## 2024-05-02 DIAGNOSIS — J15.9 UNSPECIFIED BACTERIAL PNEUMONIA: ICD-10-CM

## 2024-05-02 PROCEDURE — 71046 X-RAY EXAM CHEST 2 VIEWS: CPT

## 2024-05-07 ENCOUNTER — TELEPHONE (OUTPATIENT)
Dept: PAIN MEDICINE | Facility: CLINIC | Age: 84
End: 2024-05-07
Payer: MEDICARE

## 2024-05-07 NOTE — TELEPHONE ENCOUNTER
Phone call from patient stating when he saw you last you talked about an injection and a MRI. He scheduled his MRI but can not get in until June 12th. He feels like this is a long time. He would like to know if you want to do the injection before the MRI. Patient reports pain when doing any activity at a 8/10.

## 2024-05-09 ENCOUNTER — TELEPHONE (OUTPATIENT)
Dept: PAIN MEDICINE | Facility: CLINIC | Age: 84
End: 2024-05-09
Payer: MEDICARE

## 2024-05-10 NOTE — PROGRESS NOTES
HPI   Presents for follow up/metabolic management 82 yo with DM Type 2 + polyneuropathy, diagnosed in his 50s. History CAD with CABG, HLD, HTN, CHF, current smoker/no desire to quit. Current A1c 8.2% was 8.2 % Patient testing sugars 4 times day with tricia 3. Not following a carbohrdrate diet, knows reasonable carb allowances. Patient not able to afford tall their medications. Patient is not exercising.    -CGM Tricia 3 with sensor. Currently on insulin checking BS at least 4 xs a day adjustments made based on readings/frequent visits to manage.    -INSULIN Tresiba 50 units, Lyumjev base 12 ISF 50>150 +2- takes AFTER meals and snacks  -Metformin - does not tolerate,  GLP1 cannot afford,  SGLT2 Farxiga, no TZD history HF    -HTN Entresto, Isosobide, Metoprolol, Spironolactone, daily at goal  -STATIN Atorvastatin high dose LDL    Libre3 report downloaded and attached, time in target 46%, lows 6%. Avg blood sugar 180, pattern: mid 100's overnight and waking, upper 200's after breakfasts, upper 100's mid day, after lunch and dinner mid 200's, bedtime mid 100's  -variability 40.1%  -increase in lows from injecting after meal using sliding scale      Current Outpatient Medications:     albuterol (ProAir HFA) 90 mcg/actuation inhaler, Inhale., Disp: , Rfl:     fluticasone (Flonase) 50 mcg/actuation nasal spray, Administer 1 spray into each nostril once daily., Disp: , Rfl:     fluticasone propion-salmeteroL (Advair Diskus) 250-50 mcg/dose diskus inhaler, Inhale 1 puff 2 times a day. Rinse mouth with water after use to reduce aftertaste and incidence of candidiasis. Do not swallow., Disp: 60 each, Rfl: 2    gabapentin (Neurontin) 600 mg tablet, Take 1 tablet (600 mg) by mouth. QID, Disp: , Rfl:     insulin degludec (Tresiba FlexTouch U-200) 200 unit/mL (3 mL) injection, INJECT 70 UNITS SUBCUTANEOUSLY EVERY DAY 90 DAYS, Disp: 36 mL, Rfl: 3    insulin lispro-aabc (Lyumjev KwikPen U-100 Insulin) 100 unit/mL insulin pen, Up to 80  units daily, Disp: 75 mL, Rfl: 3    metoprolol succinate XL (Toprol-XL) 50 mg 24 hr tablet, Take 1 tablet (50 mg) by mouth once daily., Disp: , Rfl:     mometasone-formoterol (Dulera) 100-5 mcg/actuation inhaler, Inhale., Disp: , Rfl:     MULTIVITAMIN ORAL, Take 1 tablet by mouth once daily., Disp: , Rfl:     nitroglycerin (Nitrostat) 0.4 mg SL tablet, Place 1 tablet (0.4 mg) under the tongue. As needed, Disp: , Rfl:     oxygen (O2) gas therapy, Inhale 3 L/min every 8 hours., Disp: , Rfl:     oxygen (O2) gas therapy, Inhale 3 L/min every 12 hours., Disp: , Rfl:     pramipexole (Mirapex) 0.125 mg tablet, 1 TABLET IN AM AND MIDDAY, 2-4 PILLS AT BEDTIME ORALLY AS DIRECTED, Disp: , Rfl:     spironolactone (Aldactone) 25 mg tablet, Take 0.5 tablets (12.5 mg) by mouth once daily., Disp: , Rfl:     tamsulosin (Flomax) 0.4 mg 24 hr capsule, Take 1 capsule (0.4 mg) by mouth once daily. 30 minutes after the same meal each day Orally Once a day, Disp: , Rfl:     traZODone (Desyrel) 50 mg tablet, Take 1 tablet (50 mg) by mouth as needed at bedtime., Disp: , Rfl:     insulin lispro (HumaLOG KwikPen Insulin) 100 unit/mL injection, Inject 10 Units under the skin 3 times a day with meals. Take as directed per insulin instructions., Disp: , Rfl:     rosuvastatin (Crestor) 40 mg tablet, Take 1 tablet (40 mg) by mouth once daily., Disp: , Rfl:   No current facility-administered medications for this visit.    Facility-Administered Medications Ordered in Other Visits:     oxygen (O2) therapy, , inhalation, Continuous - Inhalation, KEVON Waters-CNP    Allergies as of 05/13/2024 - Reviewed 05/13/2024   Allergen Reaction Noted    House dust Other 04/03/2024    Metformin Rash 09/14/2023    Mold Other 04/03/2024       /61 (BP Location: Right arm, Patient Position: Sitting)   Pulse 74   Wt 103 kg (227 lb 9.6 oz)   BMI 31.74 kg/m²     Labs:   Lab Results   Component Value Date    WBC 11.5 (H) 03/26/2024    NRBC 0.0 03/26/2024     RBC 4.71 03/26/2024    HGB 14.8 03/26/2024    HCT 43.9 03/26/2024     03/26/2024     Lab Results   Component Value Date    CALCIUM 9.2 03/26/2024    AST 17 03/20/2024    ALKPHOS 56 03/20/2024    BILITOT 1.0 03/20/2024    PROT 5.8 (L) 03/20/2024    ALBUMIN 3.4 (L) 03/20/2024    GLOB 2.8 05/30/2023    AGR 1.3 (L) 05/30/2023     03/26/2024    K 4.5 03/26/2024    CL 98 03/26/2024    CO2 26 03/26/2024    ANIONGAP 10 03/26/2024    BUN 16 03/26/2024    CREATININE 0.80 03/26/2024    UREACREAUR 10.0 05/30/2023    GLUCOSE 257 (H) 03/26/2024    ALT 14 03/20/2024    EGFR 89 03/26/2024     Lab Results   Component Value Date    CHOL 120 (L) 12/14/2023    TRIG 287 (H) 12/14/2023    HDL 29.0 (L) 12/14/2023    LDLCALC 34 (L) 12/14/2023     Lab Results   Component Value Date    MICROALBCREA 9.4 03/02/2023     Lab Results   Component Value Date    TSH 0.65 03/24/2020     Lab Results   Component Value Date    RLVBAUWH17 454 11/22/2022     Lab Results   Component Value Date    HGBA1C 8.2 (A) 05/13/2024       Assessment/Plan   1. Type 2 diabetes mellitus with diabetic polyneuropathy, with long-term current use of insulin (Multi)  -A1c ordered and reviewed  -labs reviewed  -abraham 3 data reviewed with patient (scanned in epic), reviewed glycemic targets, reinforced looking for patterns and trends  -no adjustment in dose, need to inject lyumjev per scale before meals, inject 6 units before high carb snacks  -reinforced if taking lyumjev after meal, do not use scale - inject 12 units only  -reviewed CHO foods, meal planning, reinforced carb limits and protein snacks  -reviewed symptoms, treatment and prevention of hypoglycemia, reinforced carrying glucose source at all times.    2. Essential (primary) hypertension  -at target    3. Mixed hyperlipidemia  -on statin and tolerating    Follow up: LEO Allen 3 months    -labs/tests/notes reviewed  -reviewed and counseled patient on medication monitoring and side  effects    Treatment and plan discussed with Dr. Oliveir. Juany MALCOLM Certified Diabetes Care and     Medical Decision Making  Complexity of problem: Chronic illness of diabetes mellitus uncontrolled, progressing  Data analyzed and reviewed: Reviewed prior notes, blood glucose data, labs including HgbA1c, lipids, serum chemistries.  Ordered tests.   Risk of complications and morbidities: Is definite because of use of insulin and risk of hypoglycemia.  Prescription medications reviewed and modifications made.  Compliance assessed.  Addressed social determinants of health including food insecurity.

## 2024-05-13 ENCOUNTER — CLINICAL SUPPORT (OUTPATIENT)
Dept: ENDOCRINOLOGY | Facility: CLINIC | Age: 84
End: 2024-05-13
Payer: MEDICARE

## 2024-05-13 VITALS
WEIGHT: 227.6 LBS | HEART RATE: 74 BPM | DIASTOLIC BLOOD PRESSURE: 61 MMHG | SYSTOLIC BLOOD PRESSURE: 115 MMHG | BODY MASS INDEX: 31.74 KG/M2

## 2024-05-13 DIAGNOSIS — E11.42 TYPE 2 DIABETES MELLITUS WITH DIABETIC POLYNEUROPATHY, WITH LONG-TERM CURRENT USE OF INSULIN (MULTI): ICD-10-CM

## 2024-05-13 DIAGNOSIS — Z79.4 TYPE 2 DIABETES MELLITUS WITH DIABETIC POLYNEUROPATHY, WITH LONG-TERM CURRENT USE OF INSULIN (MULTI): ICD-10-CM

## 2024-05-13 DIAGNOSIS — I10 ESSENTIAL (PRIMARY) HYPERTENSION: ICD-10-CM

## 2024-05-13 DIAGNOSIS — E78.2 MIXED HYPERLIPIDEMIA: ICD-10-CM

## 2024-05-13 LAB — POC HEMOGLOBIN A1C: 8.2 % (ref 4.2–6.5)

## 2024-05-13 PROCEDURE — 95251 CONT GLUC MNTR ANALYSIS I&R: CPT | Performed by: INTERNAL MEDICINE

## 2024-05-13 PROCEDURE — 83036 HEMOGLOBIN GLYCOSYLATED A1C: CPT | Performed by: INTERNAL MEDICINE

## 2024-05-13 PROCEDURE — 99214 OFFICE O/P EST MOD 30 MIN: CPT | Performed by: INTERNAL MEDICINE

## 2024-05-13 RX ORDER — BLOOD-GLUCOSE SENSOR
1 EACH MISCELLANEOUS
Qty: 6 EACH | Refills: 1 | Status: SHIPPED | OUTPATIENT
Start: 2024-05-13

## 2024-05-13 ASSESSMENT — PAIN SCALES - GENERAL: PAINLEVEL: 0-NO PAIN

## 2024-05-13 NOTE — PATIENT INSTRUCTIONS
Use current scale and inject BEFORE your meal    Keep a large sign on your computer desk where you eat    If you forget to inject before your meal only take 12 units    Inject 6 units for your snack - do not use the scale    Carry glucose source with you at all times

## 2024-05-13 NOTE — PROGRESS NOTES
I, Dr Louis Olivier, have reviewed this progress note, medication list, vital signs, any pertinent lab values, and any CGM data if present with the Certified Diabetes Care and  face to face during this visit today. This note reflects the treatment plan that was made under my direction after reviewing the above mentioned elements while face to face with the patient and CDE.  I personally answered and addressed any questions and concerns the patient had during the visit today.  The CDE entered the data in this note under my direction and I personally reviewed it, signed any lab or medication orders that I instructed to be completed. I am the billing provider for this visit and the level of service was determined by my involvement in the Medical Decision Making Component of this visit while face to face with the patient.    Electronically signed by Louis Olivier MD on 5/13/24 at 10:15 AM.

## 2024-05-23 ENCOUNTER — ANCILLARY PROCEDURE (OUTPATIENT)
Dept: RADIOLOGY | Facility: EXTERNAL LOCATION | Age: 84
End: 2024-05-23
Payer: MEDICARE

## 2024-05-23 DIAGNOSIS — M54.16 RADICULOPATHY, LUMBAR REGION: ICD-10-CM

## 2024-05-23 PROCEDURE — 62323 NJX INTERLAMINAR LMBR/SAC: CPT | Performed by: ANESTHESIOLOGY

## 2024-05-23 NOTE — PROGRESS NOTES
Pre and postprocedure diagnosis--lumbar radiculopathy    Procedure--interlaminar epidural steroid injection at the L4-5 level    Anesthesia--local    Complications--none    Clinical note--the patient has a history of pain.  I explained the risks, benefits, and alternatives of the procedure to the patient.  The patient wishes to proceed.    Procedure Note--The patient was brought to the procedure room and placed in prone position.  Sterile prep and drape with ChloraPrep and a fenestrated drape.  8 mL of half percent lidocaine were injected through a 25-gauge spinal needle for local anesthesia.  An 18-gauge Touhy needle was guided to the L4-L5 interlaminar epidural space by loss-of-resistance technique under fluoroscopic guidance.  Contrast was injected under live fluoroscopy to ensure proper needle placement.  Then 60 mg of triamcinolone and 2 mL of half percent lidocaine were injected through the needle.  The needle was removed and the patient was transferred to recovery.

## 2024-06-12 ENCOUNTER — HOSPITAL ENCOUNTER (OUTPATIENT)
Dept: RADIOLOGY | Facility: HOSPITAL | Age: 84
Discharge: HOME | End: 2024-06-12
Payer: MEDICARE

## 2024-06-12 ENCOUNTER — OFFICE VISIT (OUTPATIENT)
Dept: NEUROLOGY | Facility: HOSPITAL | Age: 84
End: 2024-06-12
Payer: MEDICARE

## 2024-06-12 VITALS
DIASTOLIC BLOOD PRESSURE: 65 MMHG | SYSTOLIC BLOOD PRESSURE: 104 MMHG | HEART RATE: 63 BPM | HEIGHT: 71 IN | BODY MASS INDEX: 30.66 KG/M2 | WEIGHT: 219 LBS

## 2024-06-12 DIAGNOSIS — G25.0 ESSENTIAL TREMOR: Primary | ICD-10-CM

## 2024-06-12 DIAGNOSIS — M54.16 LUMBAR RADICULOPATHY: ICD-10-CM

## 2024-06-12 DIAGNOSIS — M48.062 LUMBAR STENOSIS WITH NEUROGENIC CLAUDICATION: ICD-10-CM

## 2024-06-12 PROCEDURE — 1159F MED LIST DOCD IN RCRD: CPT | Performed by: STUDENT IN AN ORGANIZED HEALTH CARE EDUCATION/TRAINING PROGRAM

## 2024-06-12 PROCEDURE — 95970 ALYS NPGT W/O PRGRMG: CPT | Performed by: STUDENT IN AN ORGANIZED HEALTH CARE EDUCATION/TRAINING PROGRAM

## 2024-06-12 PROCEDURE — 72148 MRI LUMBAR SPINE W/O DYE: CPT

## 2024-06-12 PROCEDURE — 3078F DIAST BP <80 MM HG: CPT | Performed by: STUDENT IN AN ORGANIZED HEALTH CARE EDUCATION/TRAINING PROGRAM

## 2024-06-12 PROCEDURE — 3074F SYST BP LT 130 MM HG: CPT | Performed by: STUDENT IN AN ORGANIZED HEALTH CARE EDUCATION/TRAINING PROGRAM

## 2024-06-12 PROCEDURE — 72148 MRI LUMBAR SPINE W/O DYE: CPT | Performed by: RADIOLOGY

## 2024-06-12 NOTE — PROGRESS NOTES
Subjective     Ramesh Morocho is a  83 y.o. year old male with a deep brain stimulator who is here for DBS MRI check. Abbott bilateral VIM getting MRI lumbar spine.      Patient Active Problem List   Diagnosis    Abnormal gait    Atherosclerosis of coronary artery    Atrial fibrillation (Multi)    Melanocytic nevus of skin    Mixed hyperlipidemia    Benign essential hypertension    Essential tremor    Spinal stenosis of lumbar region    Memory impairment    Nicotine dependence    Impaired psychomotor performance    Peripheral nerve disease    Restless legs syndrome    Dysfunction of thyroid    Type 2 diabetes mellitus (Multi)    Cobalamin deficiency    Acute non-ST segment elevation myocardial infarction (Multi)    Cardiomyopathy (Multi)    Chronic obstructive lung disease (Multi)    Heart failure (Multi)    Pure hypercholesterolemia    Obstructive sleep apnea syndrome    Tobacco use disorder    Dyspnea    Respiratory failure (Multi)    Bacterial pneumonia    Degeneration of intervertebral disc of lumbar region    Generalized pain    History of coronary artery stent placement    History of diabetes mellitus    Hypertensive renal disease    Numbness    Platelet disorder (Multi)    Retention of urine      Past Medical History:   Diagnosis Date    CHF (congestive heart failure) (Multi)     Essential tremor 09/06/2013    Benign familial tremor    Hyperlipidemia     Hypertension     Obstructive sleep apnea (adult) (pediatric) 11/20/2014    BRITT on CPAP    Old myocardial infarction 11/20/2014    History of myocardial infarction    Other intervertebral disc degeneration, lumbar region 11/20/2014    Degenerative disc disease, lumbar    Personal history of other diseases of the circulatory system 11/20/2014    History of coronary atherosclerosis    Personal history of other diseases of the digestive system 11/20/2014    History of gastroesophageal reflux (GERD)    Personal history of other diseases of the musculoskeletal  system and connective tissue 11/20/2014    History of arthritis    Personal history of other diseases of the respiratory system 11/20/2014    History of asthma    Personal history of other endocrine, nutritional and metabolic disease 11/20/2014    History of diabetes mellitus    Personal history of other endocrine, nutritional and metabolic disease 11/20/2014    History of diabetic neuropathy    Personal history of other mental and behavioral disorders 07/14/2017    History of depression    Type 2 diabetes mellitus (Multi)       Past Surgical History:   Procedure Laterality Date    COLONOSCOPY  10/01/2015    CORONARY ARTERY BYPASS GRAFT  11/20/2014    CABG    HERNIA REPAIR      SMALL INTESTINE SURGERY        Social History     Socioeconomic History    Marital status:      Spouse name: Not on file    Number of children: Not on file    Years of education: Not on file    Highest education level: Not on file   Occupational History    Not on file   Tobacco Use    Smoking status: Every Day     Current packs/day: 1.00     Types: Cigarettes    Smokeless tobacco: Not on file   Substance and Sexual Activity    Alcohol use: Not Currently    Drug use: Never    Sexual activity: Not on file   Other Topics Concern    Not on file   Social History Narrative    Not on file     Social Determinants of Health     Financial Resource Strain: Low Risk  (3/19/2024)    Overall Financial Resource Strain (CARDIA)     Difficulty of Paying Living Expenses: Not hard at all   Food Insecurity: Not on file   Transportation Needs: No Transportation Needs (3/19/2024)    PRAPARE - Transportation     Lack of Transportation (Medical): No     Lack of Transportation (Non-Medical): No   Physical Activity: Not on file   Stress: Not on file   Social Connections: Not on file   Intimate Partner Violence: Not on file   Housing Stability: Low Risk  (3/19/2024)    Housing Stability Vital Sign     Unable to Pay for Housing in the Last Year: No     Number of  "Places Lived in the Last Year: 1     Unstable Housing in the Last Year: No      Family History   Problem Relation Name Age of Onset    Other (blood disease) Brother                   Vitals:    06/12/24 1112   BP: 104/65   Pulse: 63   Weight: 99.3 kg (219 lb)   Height: 1.803 m (5' 11\")     Objective     Procedure  Battery >2.9V  Autoimpedances were normal. The device was confirmed to be MRI compatible. The device was changed to MRI mode and the patient will return to have the device turned back on.      "

## 2024-06-17 ENCOUNTER — OFFICE VISIT (OUTPATIENT)
Dept: PAIN MEDICINE | Facility: CLINIC | Age: 84
End: 2024-06-17
Payer: MEDICARE

## 2024-06-17 DIAGNOSIS — M54.16 LUMBAR RADICULOPATHY: Primary | ICD-10-CM

## 2024-06-17 PROCEDURE — 99213 OFFICE O/P EST LOW 20 MIN: CPT | Performed by: ANESTHESIOLOGY

## 2024-06-17 ASSESSMENT — ENCOUNTER SYMPTOMS
ACTIVITY CHANGE: 1
BACK PAIN: 1
ARTHRALGIAS: 1

## 2024-06-17 ASSESSMENT — PATIENT HEALTH QUESTIONNAIRE - PHQ9
10. IF YOU CHECKED OFF ANY PROBLEMS, HOW DIFFICULT HAVE THESE PROBLEMS MADE IT FOR YOU TO DO YOUR WORK, TAKE CARE OF THINGS AT HOME, OR GET ALONG WITH OTHER PEOPLE: SOMEWHAT DIFFICULT
2. FEELING DOWN, DEPRESSED OR HOPELESS: SEVERAL DAYS
1. LITTLE INTEREST OR PLEASURE IN DOING THINGS: SEVERAL DAYS
SUM OF ALL RESPONSES TO PHQ9 QUESTIONS 1 & 2: 2

## 2024-06-17 NOTE — PROGRESS NOTES
The patient is an 83-year-old male with low back and bilateral leg pain.  The patient reports his pain is improved by 90% after his interlaminar epidural steroid injection at the L4-L5 level.  The patient is pleased with his response to treatment.  The patient would like to review the results of his lumbar spine MRI.    Review of Systems   Constitutional:  Positive for activity change.   Musculoskeletal:  Positive for arthralgias, back pain and gait problem.   All other systems reviewed and are negative.    GENERAL: alert and appropriate, in no distress, well-hydrated, well-nourished and interactive  SKIN: no rash noted  RESPIRATORY: breathing non-labored and no grunting/flaring/retractions  CHEST: equal chest rise with normal respiratory effort  ABDOMEN: soft and non-tender  BACK: back normal in appearance, spine with reduced ROM  EXTREMITIES: strength intact  NEUROLOGIC: gait antalgic, SLR negative, sensation grossly intact.    Assessment and Plan    -Chronicity--chronic spinal pain    -Diagnostics--no new imaging; we reviewed the results of his lumbar spine MRI    -Pharmacologic--no change    -Psychologic--no need for psychologic intervention from my standpoint    -Physical--we discussed the importance of physical therapy and exercise.  We discussed avoidance and modification techniques.    -Intervention--no intervention planned    I spent time educating the patient on the condition including the treatment and the prognosis.  I invited the patient to call at anytime with any questions.

## 2024-06-18 DIAGNOSIS — J44.9 CHRONIC OBSTRUCTIVE PULMONARY DISEASE, UNSPECIFIED COPD TYPE (MULTI): ICD-10-CM

## 2024-06-18 RX ORDER — FLUTICASONE PROPIONATE AND SALMETEROL 250; 50 UG/1; UG/1
1 POWDER RESPIRATORY (INHALATION)
Qty: 60 EACH | Refills: 2 | Status: SHIPPED | OUTPATIENT
Start: 2024-06-18 | End: 2025-06-18

## 2024-06-21 ENCOUNTER — PATIENT OUTREACH (OUTPATIENT)
Dept: PRIMARY CARE | Facility: CLINIC | Age: 84
End: 2024-06-21
Payer: MEDICARE

## 2024-06-21 NOTE — PROGRESS NOTES
Unable to reach patient  for a F/U     LVM with call back number for patient to call if needed   If no voicemail available call attempts x 2 were made to contact the patient to assist with any questions or concerns patient may have.     L/M encouraged patient to call providers for any questions concerns or change in condition

## 2024-07-03 ENCOUNTER — APPOINTMENT (OUTPATIENT)
Dept: PRIMARY CARE | Facility: CLINIC | Age: 84
End: 2024-07-03
Payer: MEDICARE

## 2024-07-03 VITALS
DIASTOLIC BLOOD PRESSURE: 62 MMHG | SYSTOLIC BLOOD PRESSURE: 106 MMHG | OXYGEN SATURATION: 94 % | HEART RATE: 76 BPM | BODY MASS INDEX: 31.36 KG/M2 | WEIGHT: 224 LBS | HEIGHT: 71 IN

## 2024-07-03 DIAGNOSIS — I12.9 HYPERTENSIVE RENAL DISEASE: ICD-10-CM

## 2024-07-03 DIAGNOSIS — G47.33 OBSTRUCTIVE SLEEP APNEA SYNDROME: ICD-10-CM

## 2024-07-03 DIAGNOSIS — R29.6 FREQUENT FALLS: ICD-10-CM

## 2024-07-03 DIAGNOSIS — Z79.4 TYPE 2 DIABETES MELLITUS WITHOUT COMPLICATION, WITH LONG-TERM CURRENT USE OF INSULIN (MULTI): ICD-10-CM

## 2024-07-03 DIAGNOSIS — Z00.00 ROUTINE GENERAL MEDICAL EXAMINATION AT HEALTH CARE FACILITY: Primary | ICD-10-CM

## 2024-07-03 DIAGNOSIS — I48.91 ATRIAL FIBRILLATION, UNSPECIFIED TYPE (MULTI): ICD-10-CM

## 2024-07-03 DIAGNOSIS — D69.1 PLATELET DISORDER (MULTI): ICD-10-CM

## 2024-07-03 DIAGNOSIS — F17.200 TOBACCO USE DISORDER: ICD-10-CM

## 2024-07-03 DIAGNOSIS — E11.9 TYPE 2 DIABETES MELLITUS WITHOUT COMPLICATION, WITH LONG-TERM CURRENT USE OF INSULIN (MULTI): ICD-10-CM

## 2024-07-03 DIAGNOSIS — E55.9 VITAMIN D DEFICIENCY: ICD-10-CM

## 2024-07-03 DIAGNOSIS — I10 BENIGN ESSENTIAL HYPERTENSION: ICD-10-CM

## 2024-07-03 DIAGNOSIS — E78.2 MIXED HYPERLIPIDEMIA: ICD-10-CM

## 2024-07-03 LAB
CREAT UR-MCNC: 100.9 MG/DL
MICROALBUMIN UR-MCNC: <12 MG/L (ref 0–23)
MICROALBUMIN/CREAT UR: NORMAL MG/G{CREAT}
POC APPEARANCE, URINE: CLEAR
POC BILIRUBIN, URINE: NEGATIVE
POC BLOOD, URINE: NEGATIVE
POC COLOR, URINE: YELLOW
POC GLUCOSE, URINE: ABNORMAL MG/DL
POC KETONES, URINE: NEGATIVE MG/DL
POC LEUKOCYTES, URINE: NEGATIVE
POC NITRITE,URINE: NEGATIVE
POC PH, URINE: 5.5 PH
POC PROTEIN, URINE: NEGATIVE MG/DL
POC SPECIFIC GRAVITY, URINE: 1.01
POC UROBILINOGEN, URINE: 1 EU/DL

## 2024-07-03 PROCEDURE — 82570 ASSAY OF URINE CREATININE: CPT

## 2024-07-03 PROCEDURE — 1158F ADVNC CARE PLAN TLK DOCD: CPT | Performed by: PHYSICIAN ASSISTANT

## 2024-07-03 PROCEDURE — 3074F SYST BP LT 130 MM HG: CPT | Performed by: PHYSICIAN ASSISTANT

## 2024-07-03 PROCEDURE — 1126F AMNT PAIN NOTED NONE PRSNT: CPT | Performed by: PHYSICIAN ASSISTANT

## 2024-07-03 PROCEDURE — 1160F RVW MEDS BY RX/DR IN RCRD: CPT | Performed by: PHYSICIAN ASSISTANT

## 2024-07-03 PROCEDURE — G0439 PPPS, SUBSEQ VISIT: HCPCS | Performed by: PHYSICIAN ASSISTANT

## 2024-07-03 PROCEDURE — 3078F DIAST BP <80 MM HG: CPT | Performed by: PHYSICIAN ASSISTANT

## 2024-07-03 PROCEDURE — 82043 UR ALBUMIN QUANTITATIVE: CPT

## 2024-07-03 PROCEDURE — 1170F FXNL STATUS ASSESSED: CPT | Performed by: PHYSICIAN ASSISTANT

## 2024-07-03 PROCEDURE — 1159F MED LIST DOCD IN RCRD: CPT | Performed by: PHYSICIAN ASSISTANT

## 2024-07-03 PROCEDURE — 4004F PT TOBACCO SCREEN RCVD TLK: CPT | Performed by: PHYSICIAN ASSISTANT

## 2024-07-03 PROCEDURE — 81003 URINALYSIS AUTO W/O SCOPE: CPT | Performed by: PHYSICIAN ASSISTANT

## 2024-07-03 PROCEDURE — 1123F ACP DISCUSS/DSCN MKR DOCD: CPT | Performed by: PHYSICIAN ASSISTANT

## 2024-07-03 RX ORDER — VITAMIN B COMPLEX
1 CAPSULE ORAL DAILY
COMMUNITY

## 2024-07-03 RX ORDER — IBUPROFEN 100 MG/5ML
1000 SUSPENSION, ORAL (FINAL DOSE FORM) ORAL DAILY
COMMUNITY

## 2024-07-03 RX ORDER — FLUOXETINE HYDROCHLORIDE 40 MG/1
40 CAPSULE ORAL DAILY
COMMUNITY

## 2024-07-03 RX ORDER — CHOLECALCIFEROL (VITAMIN D3) 50 MCG
50 TABLET ORAL DAILY
COMMUNITY

## 2024-07-03 ASSESSMENT — ACTIVITIES OF DAILY LIVING (ADL)
TAKING_MEDICATION: INDEPENDENT
DRESSING: INDEPENDENT
BATHING: INDEPENDENT
MANAGING_FINANCES: INDEPENDENT
DOING_HOUSEWORK: INDEPENDENT
GROCERY_SHOPPING: INDEPENDENT

## 2024-07-03 ASSESSMENT — ENCOUNTER SYMPTOMS
BACK PAIN: 1
COLOR CHANGE: 0
SLEEP DISTURBANCE: 0
CONSTIPATION: 0
DIZZINESS: 0
NUMBNESS: 1
WEAKNESS: 1
BLOOD IN STOOL: 0
NECK PAIN: 1
OCCASIONAL FEELINGS OF UNSTEADINESS: 1
APPETITE CHANGE: 0
TREMORS: 1
WHEEZING: 0
ACTIVITY CHANGE: 0
DIARRHEA: 0
NERVOUS/ANXIOUS: 0
DEPRESSION: 1
LOSS OF SENSATION IN FEET: 1
LIGHT-HEADEDNESS: 0
CHEST TIGHTNESS: 0
ABDOMINAL PAIN: 0
PALPITATIONS: 0
FREQUENCY: 0
MYALGIAS: 1
ARTHRALGIAS: 1
SHORTNESS OF BREATH: 0
NAUSEA: 0

## 2024-07-03 ASSESSMENT — PATIENT HEALTH QUESTIONNAIRE - PHQ9
3. TROUBLE FALLING OR STAYING ASLEEP OR SLEEPING TOO MUCH: SEVERAL DAYS
6. FEELING BAD ABOUT YOURSELF - OR THAT YOU ARE A FAILURE OR HAVE LET YOURSELF OR YOUR FAMILY DOWN: NOT AT ALL
10. IF YOU CHECKED OFF ANY PROBLEMS, HOW DIFFICULT HAVE THESE PROBLEMS MADE IT FOR YOU TO DO YOUR WORK, TAKE CARE OF THINGS AT HOME, OR GET ALONG WITH OTHER PEOPLE: SOMEWHAT DIFFICULT
7. TROUBLE CONCENTRATING ON THINGS, SUCH AS READING THE NEWSPAPER OR WATCHING TELEVISION: NOT AT ALL
9. THOUGHTS THAT YOU WOULD BE BETTER OFF DEAD, OR OF HURTING YOURSELF: NOT AT ALL
SUM OF ALL RESPONSES TO PHQ QUESTIONS 1-9: 8
4. FEELING TIRED OR HAVING LITTLE ENERGY: SEVERAL DAYS
SUM OF ALL RESPONSES TO PHQ9 QUESTIONS 1 AND 2: 6
8. MOVING OR SPEAKING SO SLOWLY THAT OTHER PEOPLE COULD HAVE NOTICED. OR THE OPPOSITE, BEING SO FIGETY OR RESTLESS THAT YOU HAVE BEEN MOVING AROUND A LOT MORE THAN USUAL: NOT AT ALL
5. POOR APPETITE OR OVEREATING: NOT AT ALL
2. FEELING DOWN, DEPRESSED OR HOPELESS: NEARLY EVERY DAY
1. LITTLE INTEREST OR PLEASURE IN DOING THINGS: NEARLY EVERY DAY

## 2024-07-03 ASSESSMENT — PAIN SCALES - GENERAL: PAINLEVEL: 0-NO PAIN

## 2024-07-03 NOTE — PROGRESS NOTES
Subjective   Reason for Visit: Ramesh Morocho is an 83 y.o. male here for a Medicare Wellness visit.     Past Medical, Surgical, and Family History reviewed and updated in chart.    Reviewed all medications by prescribing practitioner or clinical pharmacist (such as prescriptions, OTCs, herbal therapies and supplements) and documented in the medical record.    Depression  Visit Type: follow-up  Patient is not experiencing: nervousness/anxiety, palpitations and shortness of breath.      Diabetes  He presents for his follow-up diabetic visit. He has type 2 diabetes mellitus. Hypoglycemia symptoms include tremors. Pertinent negatives for hypoglycemia include no dizziness or nervousness/anxiousness. Associated symptoms include weakness. Pertinent negatives for diabetes include no chest pain.   Sees neuro. Pain medicine and endo.  Falling more lately. Off for the summer. For school. Going back in August.   Having more leg weakness issues.  Patient Care Team:  Karla Newsome PA-C as PCP - General  Marta Rome DO as PCP - [employer] PCP  Karla Newsome PA-C     Review of Systems   Constitutional:  Negative for activity change and appetite change.   HENT:  Negative for dental problem.    Eyes:  Negative for visual disturbance.   Respiratory:  Negative for chest tightness, shortness of breath and wheezing.    Cardiovascular:  Negative for chest pain, palpitations and leg swelling.   Gastrointestinal:  Negative for abdominal pain, blood in stool, constipation, diarrhea and nausea.   Endocrine: Negative for cold intolerance and heat intolerance.   Genitourinary:  Negative for frequency and urgency.   Musculoskeletal:  Positive for arthralgias, back pain, gait problem, myalgias and neck pain.   Skin:  Negative for color change.   Allergic/Immunologic: Negative for immunocompromised state.   Neurological:  Positive for tremors, weakness and numbness. Negative for dizziness and light-headedness.  "  Psychiatric/Behavioral:  Positive for depression. Negative for behavioral problems and sleep disturbance. The patient is not nervous/anxious.        Objective   Vitals:  /62   Pulse 76   Ht 1.803 m (5' 11\")   Wt 102 kg (224 lb)   SpO2 94%   BMI 31.24 kg/m²       Physical Exam  HENT:      Right Ear: Tympanic membrane normal.      Left Ear: Tympanic membrane normal.      Nose: Nose normal.      Mouth/Throat:      Mouth: Mucous membranes are moist.   Cardiovascular:      Rate and Rhythm: Normal rate. Rhythm irregular.      Pulses: Normal pulses.      Heart sounds: Murmur heard.   Pulmonary:      Effort: Pulmonary effort is normal. No respiratory distress.   Abdominal:      General: Bowel sounds are normal.      Tenderness: There is no abdominal tenderness.   Musculoskeletal:      Cervical back: Normal range of motion. No tenderness.      Right lower leg: No edema.      Left lower leg: No edema.   Skin:     General: Skin is warm.   Neurological:      General: No focal deficit present.      Mental Status: He is alert. Mental status is at baseline.   Psychiatric:         Mood and Affect: Mood normal.         Thought Content: Thought content normal.         Judgment: Judgment normal.         Assessment/Plan   Problem List Items Addressed This Visit       Atrial fibrillation (Multi)    Relevant Orders    TSH with reflex to Free T4 if abnormal    Mixed hyperlipidemia    Relevant Orders    CBC and Auto Differential    Comprehensive Metabolic Panel    Lipid Panel    TSH with reflex to Free T4 if abnormal    Benign essential hypertension    Relevant Orders    CBC and Auto Differential    Comprehensive Metabolic Panel    Lipid Panel    TSH with reflex to Free T4 if abnormal    Albumin-Creatinine Ratio, Urine Random    Type 2 diabetes mellitus (Multi)    Relevant Orders    CBC and Auto Differential    Comprehensive Metabolic Panel    Lipid Panel    TSH with reflex to Free T4 if abnormal    Albumin-Creatinine Ratio, " Urine Random    POCT UA Automated manually resulted (Completed)    Obstructive sleep apnea syndrome    Tobacco use disorder    Hypertensive renal disease    Relevant Orders    Albumin-Creatinine Ratio, Urine Random    POCT UA Automated manually resulted (Completed)    Platelet disorder (Multi)    Relevant Orders    CBC and Auto Differential     Other Visit Diagnoses       Routine general medical examination at health care facility    -  Primary    Vitamin D deficiency        Relevant Orders    Vitamin D 25-Hydroxy,Total (for eval of Vitamin D levels)    Frequent falls        Relevant Orders    Referral to Physical Therapy        Will refer to physical therapy for gait evaluation.  Did suggest in the meantime at least having a cane with him more so for balance.  Patient still smokes but has no desire to quit.  Sees cardiology on regular basis as well.

## 2024-07-08 DIAGNOSIS — F45.9 IMPAIRED PSYCHOMOTOR PERFORMANCE: ICD-10-CM

## 2024-07-08 DIAGNOSIS — F32.A DEPRESSION, UNSPECIFIED DEPRESSION TYPE: ICD-10-CM

## 2024-07-08 RX ORDER — FLUOXETINE HYDROCHLORIDE 40 MG/1
40 CAPSULE ORAL DAILY
Qty: 90 CAPSULE | Refills: 1 | Status: ON HOLD | OUTPATIENT
Start: 2024-07-08

## 2024-07-08 NOTE — TELEPHONE ENCOUNTER
Patient needs a refill on Fluoxetine 40 mg, 90 day supply sent to [facility], formerly [facility] in [place] on [address]    Patient's number:  [phone]

## 2024-07-09 ENCOUNTER — APPOINTMENT (OUTPATIENT)
Dept: RADIOLOGY | Facility: HOSPITAL | Age: 84
DRG: 390 | End: 2024-07-09
Payer: MEDICARE

## 2024-07-09 ENCOUNTER — HOSPITAL ENCOUNTER (INPATIENT)
Facility: HOSPITAL | Age: 84
DRG: 388 | End: 2024-07-09
Attending: INTERNAL MEDICINE | Admitting: STUDENT IN AN ORGANIZED HEALTH CARE EDUCATION/TRAINING PROGRAM
Payer: MEDICARE

## 2024-07-09 ENCOUNTER — HOSPITAL ENCOUNTER (INPATIENT)
Facility: HOSPITAL | Age: 84
LOS: 1 days | Discharge: ANOTHER HOSPITAL | DRG: 390 | End: 2024-07-09
Attending: EMERGENCY MEDICINE | Admitting: HOSPITALIST
Payer: MEDICARE

## 2024-07-09 VITALS
TEMPERATURE: 98.1 F | DIASTOLIC BLOOD PRESSURE: 73 MMHG | HEART RATE: 78 BPM | OXYGEN SATURATION: 95 % | BODY MASS INDEX: 31.09 KG/M2 | WEIGHT: 222.1 LBS | HEIGHT: 71 IN | SYSTOLIC BLOOD PRESSURE: 134 MMHG | RESPIRATION RATE: 16 BRPM

## 2024-07-09 DIAGNOSIS — K56.609 SMALL BOWEL OBSTRUCTION (MULTI): Primary | ICD-10-CM

## 2024-07-09 DIAGNOSIS — E11.42 TYPE 2 DIABETES MELLITUS WITH DIABETIC POLYNEUROPATHY (MULTI): ICD-10-CM

## 2024-07-09 DIAGNOSIS — K56.609 SMALL BOWEL OBSTRUCTION (MULTI): ICD-10-CM

## 2024-07-09 DIAGNOSIS — E66.01 MORBID OBESITY (MULTI): ICD-10-CM

## 2024-07-09 DIAGNOSIS — K56.609 SBO (SMALL BOWEL OBSTRUCTION) (MULTI): Primary | ICD-10-CM

## 2024-07-09 DIAGNOSIS — R94.31 ABNORMAL ELECTROCARDIOGRAM (ECG) (EKG): ICD-10-CM

## 2024-07-09 DIAGNOSIS — I50.9 HEART FAILURE, UNSPECIFIED HF CHRONICITY, UNSPECIFIED HEART FAILURE TYPE (MULTI): ICD-10-CM

## 2024-07-09 DIAGNOSIS — R21 GROIN RASH: Chronic | ICD-10-CM

## 2024-07-09 LAB
ALBUMIN SERPL-MCNC: 4 G/DL (ref 3.5–5)
ALP BLD-CCNC: 58 U/L (ref 35–125)
ALT SERPL-CCNC: 18 U/L (ref 5–40)
ANION GAP SERPL CALC-SCNC: 11 MMOL/L
APPEARANCE UR: CLEAR
AST SERPL-CCNC: 17 U/L (ref 5–40)
BASOPHILS # BLD AUTO: 0.02 X10*3/UL (ref 0–0.1)
BASOPHILS NFR BLD AUTO: 0.3 %
BILIRUB SERPL-MCNC: 0.9 MG/DL (ref 0.1–1.2)
BILIRUB UR STRIP.AUTO-MCNC: NEGATIVE MG/DL
BUN SERPL-MCNC: 15 MG/DL (ref 8–25)
CALCIUM SERPL-MCNC: 9.8 MG/DL (ref 8.5–10.4)
CHLORIDE SERPL-SCNC: 99 MMOL/L (ref 97–107)
CO2 SERPL-SCNC: 25 MMOL/L (ref 24–31)
COLOR UR: YELLOW
CREAT SERPL-MCNC: 1 MG/DL (ref 0.4–1.6)
EGFRCR SERPLBLD CKD-EPI 2021: 76 ML/MIN/1.73M*2
EOSINOPHIL # BLD AUTO: 0.04 X10*3/UL (ref 0–0.4)
EOSINOPHIL NFR BLD AUTO: 0.5 %
ERYTHROCYTE [DISTWIDTH] IN BLOOD BY AUTOMATED COUNT: 15.5 % (ref 11.5–14.5)
GLUCOSE BLD MANUAL STRIP-MCNC: 132 MG/DL (ref 74–99)
GLUCOSE BLD MANUAL STRIP-MCNC: 152 MG/DL (ref 74–99)
GLUCOSE SERPL-MCNC: 204 MG/DL (ref 65–99)
GLUCOSE UR STRIP.AUTO-MCNC: ABNORMAL MG/DL
HCT VFR BLD AUTO: 53.6 % (ref 41–52)
HGB BLD-MCNC: 17.9 G/DL (ref 13.5–17.5)
IMM GRANULOCYTES # BLD AUTO: 0.02 X10*3/UL (ref 0–0.5)
IMM GRANULOCYTES NFR BLD AUTO: 0.3 % (ref 0–0.9)
KETONES UR STRIP.AUTO-MCNC: ABNORMAL MG/DL
LACTATE BLDV-SCNC: 1.6 MMOL/L (ref 0.4–2)
LEUKOCYTE ESTERASE UR QL STRIP.AUTO: NEGATIVE
LIPASE SERPL-CCNC: <16 U/L (ref 16–63)
LYMPHOCYTES # BLD AUTO: 0.9 X10*3/UL (ref 0.8–3)
LYMPHOCYTES NFR BLD AUTO: 11.3 %
MCH RBC QN AUTO: 32 PG (ref 26–34)
MCHC RBC AUTO-ENTMCNC: 33.4 G/DL (ref 32–36)
MCV RBC AUTO: 96 FL (ref 80–100)
MONOCYTES # BLD AUTO: 0.95 X10*3/UL (ref 0.05–0.8)
MONOCYTES NFR BLD AUTO: 11.9 %
NEUTROPHILS # BLD AUTO: 6.03 X10*3/UL (ref 1.6–5.5)
NEUTROPHILS NFR BLD AUTO: 75.7 %
NITRITE UR QL STRIP.AUTO: NEGATIVE
NRBC BLD-RTO: 0 /100 WBCS (ref 0–0)
NT-PROBNP SERPL-MCNC: 649 PG/ML (ref 0–852)
PH UR STRIP.AUTO: 6 [PH]
PLATELET # BLD AUTO: 128 X10*3/UL (ref 150–450)
POLYCHROMASIA BLD QL SMEAR: NORMAL
POTASSIUM SERPL-SCNC: 4.6 MMOL/L (ref 3.4–5.1)
PROT SERPL-MCNC: 7.1 G/DL (ref 5.9–7.9)
PROT UR STRIP.AUTO-MCNC: ABNORMAL MG/DL
RBC # BLD AUTO: 5.6 X10*6/UL (ref 4.5–5.9)
RBC # UR STRIP.AUTO: NEGATIVE /UL
RBC #/AREA URNS AUTO: NORMAL /HPF
RBC MORPH BLD: NORMAL
SODIUM SERPL-SCNC: 135 MMOL/L (ref 133–145)
SP GR UR STRIP.AUTO: >1.05
UROBILINOGEN UR STRIP.AUTO-MCNC: NORMAL MG/DL
WBC # BLD AUTO: 8 X10*3/UL (ref 4.4–11.3)
WBC #/AREA URNS AUTO: NORMAL /HPF

## 2024-07-09 PROCEDURE — 2500000004 HC RX 250 GENERAL PHARMACY W/ HCPCS (ALT 636 FOR OP/ED): Performed by: EMERGENCY MEDICINE

## 2024-07-09 PROCEDURE — 2500000004 HC RX 250 GENERAL PHARMACY W/ HCPCS (ALT 636 FOR OP/ED): Performed by: STUDENT IN AN ORGANIZED HEALTH CARE EDUCATION/TRAINING PROGRAM

## 2024-07-09 PROCEDURE — 99223 1ST HOSP IP/OBS HIGH 75: CPT | Performed by: STUDENT IN AN ORGANIZED HEALTH CARE EDUCATION/TRAINING PROGRAM

## 2024-07-09 PROCEDURE — 82947 ASSAY GLUCOSE BLOOD QUANT: CPT

## 2024-07-09 PROCEDURE — 99291 CRITICAL CARE FIRST HOUR: CPT | Mod: 25 | Performed by: EMERGENCY MEDICINE

## 2024-07-09 PROCEDURE — 2500000001 HC RX 250 WO HCPCS SELF ADMINISTERED DRUGS (ALT 637 FOR MEDICARE OP): Performed by: PHYSICIAN ASSISTANT

## 2024-07-09 PROCEDURE — 74177 CT ABD & PELVIS W/CONTRAST: CPT | Performed by: RADIOLOGY

## 2024-07-09 PROCEDURE — 96376 TX/PRO/DX INJ SAME DRUG ADON: CPT | Mod: 59

## 2024-07-09 PROCEDURE — 83605 ASSAY OF LACTIC ACID: CPT | Performed by: EMERGENCY MEDICINE

## 2024-07-09 PROCEDURE — 99223 1ST HOSP IP/OBS HIGH 75: CPT | Performed by: SURGERY

## 2024-07-09 PROCEDURE — 36415 COLL VENOUS BLD VENIPUNCTURE: CPT | Performed by: EMERGENCY MEDICINE

## 2024-07-09 PROCEDURE — 43753 TX GASTRO INTUB W/ASP: CPT | Performed by: EMERGENCY MEDICINE

## 2024-07-09 PROCEDURE — 83880 ASSAY OF NATRIURETIC PEPTIDE: CPT | Performed by: EMERGENCY MEDICINE

## 2024-07-09 PROCEDURE — 96375 TX/PRO/DX INJ NEW DRUG ADDON: CPT | Mod: 59

## 2024-07-09 PROCEDURE — 80053 COMPREHEN METABOLIC PANEL: CPT | Performed by: EMERGENCY MEDICINE

## 2024-07-09 PROCEDURE — 74177 CT ABD & PELVIS W/CONTRAST: CPT

## 2024-07-09 PROCEDURE — 2500000001 HC RX 250 WO HCPCS SELF ADMINISTERED DRUGS (ALT 637 FOR MEDICARE OP): Performed by: STUDENT IN AN ORGANIZED HEALTH CARE EDUCATION/TRAINING PROGRAM

## 2024-07-09 PROCEDURE — 0D9670Z DRAINAGE OF STOMACH WITH DRAINAGE DEVICE, VIA NATURAL OR ARTIFICIAL OPENING: ICD-10-PCS | Performed by: STUDENT IN AN ORGANIZED HEALTH CARE EDUCATION/TRAINING PROGRAM

## 2024-07-09 PROCEDURE — 2500000001 HC RX 250 WO HCPCS SELF ADMINISTERED DRUGS (ALT 637 FOR MEDICARE OP): Performed by: NURSE PRACTITIONER

## 2024-07-09 PROCEDURE — 85025 COMPLETE CBC W/AUTO DIFF WBC: CPT | Performed by: EMERGENCY MEDICINE

## 2024-07-09 PROCEDURE — 71046 X-RAY EXAM CHEST 2 VIEWS: CPT | Performed by: RADIOLOGY

## 2024-07-09 PROCEDURE — 74018 RADEX ABDOMEN 1 VIEW: CPT | Performed by: RADIOLOGY

## 2024-07-09 PROCEDURE — 96374 THER/PROPH/DIAG INJ IV PUSH: CPT | Mod: 59

## 2024-07-09 PROCEDURE — 2500000002 HC RX 250 W HCPCS SELF ADMINISTERED DRUGS (ALT 637 FOR MEDICARE OP, ALT 636 FOR OP/ED): Performed by: STUDENT IN AN ORGANIZED HEALTH CARE EDUCATION/TRAINING PROGRAM

## 2024-07-09 PROCEDURE — 71046 X-RAY EXAM CHEST 2 VIEWS: CPT

## 2024-07-09 PROCEDURE — 81001 URINALYSIS AUTO W/SCOPE: CPT | Performed by: EMERGENCY MEDICINE

## 2024-07-09 PROCEDURE — 71045 X-RAY EXAM CHEST 1 VIEW: CPT | Performed by: RADIOLOGY

## 2024-07-09 PROCEDURE — 1100000001 HC PRIVATE ROOM DAILY

## 2024-07-09 PROCEDURE — 83690 ASSAY OF LIPASE: CPT | Performed by: EMERGENCY MEDICINE

## 2024-07-09 PROCEDURE — 96361 HYDRATE IV INFUSION ADD-ON: CPT | Mod: 59

## 2024-07-09 PROCEDURE — 1210000001 HC SEMI-PRIVATE ROOM DAILY

## 2024-07-09 PROCEDURE — 2500000004 HC RX 250 GENERAL PHARMACY W/ HCPCS (ALT 636 FOR OP/ED): Performed by: PHYSICIAN ASSISTANT

## 2024-07-09 PROCEDURE — 71045 X-RAY EXAM CHEST 1 VIEW: CPT

## 2024-07-09 PROCEDURE — 2550000001 HC RX 255 CONTRASTS: Performed by: EMERGENCY MEDICINE

## 2024-07-09 RX ORDER — MORPHINE SULFATE 4 MG/ML
4 INJECTION, SOLUTION INTRAMUSCULAR; INTRAVENOUS ONCE
Status: COMPLETED | OUTPATIENT
Start: 2024-07-09 | End: 2024-07-09

## 2024-07-09 RX ORDER — FLUOXETINE HYDROCHLORIDE 20 MG/1
40 CAPSULE ORAL DAILY
Status: DISCONTINUED | OUTPATIENT
Start: 2024-07-09 | End: 2024-07-15 | Stop reason: HOSPADM

## 2024-07-09 RX ORDER — SPIRONOLACTONE 25 MG/1
12.5 TABLET ORAL DAILY
Status: DISCONTINUED | OUTPATIENT
Start: 2024-07-09 | End: 2024-07-15 | Stop reason: HOSPADM

## 2024-07-09 RX ORDER — INSULIN LISPRO 100 [IU]/ML
0-15 INJECTION, SOLUTION INTRAVENOUS; SUBCUTANEOUS
Status: DISCONTINUED | OUTPATIENT
Start: 2024-07-09 | End: 2024-07-10

## 2024-07-09 RX ORDER — DEXTROSE 50 % IN WATER (D50W) INTRAVENOUS SYRINGE
12.5
Status: DISCONTINUED | OUTPATIENT
Start: 2024-07-09 | End: 2024-07-15 | Stop reason: HOSPADM

## 2024-07-09 RX ORDER — FAMOTIDINE 10 MG/ML
20 INJECTION INTRAVENOUS ONCE
Status: COMPLETED | OUTPATIENT
Start: 2024-07-09 | End: 2024-07-09

## 2024-07-09 RX ORDER — MORPHINE SULFATE 2 MG/ML
2 INJECTION, SOLUTION INTRAMUSCULAR; INTRAVENOUS EVERY 4 HOURS PRN
Status: DISCONTINUED | OUTPATIENT
Start: 2024-07-09 | End: 2024-07-15 | Stop reason: HOSPADM

## 2024-07-09 RX ORDER — METOPROLOL SUCCINATE 50 MG/1
50 TABLET, EXTENDED RELEASE ORAL DAILY
Status: DISCONTINUED | OUTPATIENT
Start: 2024-07-09 | End: 2024-07-10

## 2024-07-09 RX ORDER — ACETAMINOPHEN 160 MG/5ML
650 SOLUTION ORAL EVERY 6 HOURS
Status: DISCONTINUED | OUTPATIENT
Start: 2024-07-09 | End: 2024-07-13

## 2024-07-09 RX ORDER — SODIUM CHLORIDE 9 MG/ML
75 INJECTION, SOLUTION INTRAVENOUS CONTINUOUS
Status: DISCONTINUED | OUTPATIENT
Start: 2024-07-09 | End: 2024-07-10

## 2024-07-09 RX ORDER — FLUTICASONE FUROATE AND VILANTEROL 200; 25 UG/1; UG/1
1 POWDER RESPIRATORY (INHALATION)
Status: DISCONTINUED | OUTPATIENT
Start: 2024-07-09 | End: 2024-07-15 | Stop reason: HOSPADM

## 2024-07-09 RX ORDER — PRAMIPEXOLE DIHYDROCHLORIDE 0.25 MG/1
0.12 TABLET ORAL 3 TIMES DAILY
Status: DISCONTINUED | OUTPATIENT
Start: 2024-07-09 | End: 2024-07-15 | Stop reason: HOSPADM

## 2024-07-09 RX ORDER — HEPARIN SODIUM 5000 [USP'U]/ML
5000 INJECTION, SOLUTION INTRAVENOUS; SUBCUTANEOUS EVERY 8 HOURS SCHEDULED
Status: DISCONTINUED | OUTPATIENT
Start: 2024-07-09 | End: 2024-07-15 | Stop reason: HOSPADM

## 2024-07-09 RX ORDER — DEXTROSE 50 % IN WATER (D50W) INTRAVENOUS SYRINGE
25
Status: DISCONTINUED | OUTPATIENT
Start: 2024-07-09 | End: 2024-07-15 | Stop reason: HOSPADM

## 2024-07-09 RX ORDER — MORPHINE SULFATE 4 MG/ML
4 INJECTION INTRAVENOUS EVERY 4 HOURS PRN
Status: DISCONTINUED | OUTPATIENT
Start: 2024-07-09 | End: 2024-07-15 | Stop reason: HOSPADM

## 2024-07-09 RX ORDER — ONDANSETRON HYDROCHLORIDE 2 MG/ML
4 INJECTION, SOLUTION INTRAVENOUS ONCE
Status: COMPLETED | OUTPATIENT
Start: 2024-07-09 | End: 2024-07-09

## 2024-07-09 RX ORDER — GABAPENTIN 300 MG/1
600 CAPSULE ORAL 4 TIMES DAILY
Status: DISCONTINUED | OUTPATIENT
Start: 2024-07-09 | End: 2024-07-11

## 2024-07-09 RX ORDER — ROSUVASTATIN CALCIUM 20 MG/1
40 TABLET, COATED ORAL DAILY
Status: DISCONTINUED | OUTPATIENT
Start: 2024-07-09 | End: 2024-07-15 | Stop reason: HOSPADM

## 2024-07-09 RX ORDER — KETOROLAC TROMETHAMINE 15 MG/ML
15 INJECTION, SOLUTION INTRAMUSCULAR; INTRAVENOUS EVERY 6 HOURS
Status: COMPLETED | OUTPATIENT
Start: 2024-07-09 | End: 2024-07-12

## 2024-07-09 RX ORDER — ONDANSETRON HYDROCHLORIDE 2 MG/ML
4 INJECTION, SOLUTION INTRAVENOUS EVERY 6 HOURS PRN
Status: DISCONTINUED | OUTPATIENT
Start: 2024-07-09 | End: 2024-07-15 | Stop reason: HOSPADM

## 2024-07-09 SDOH — SOCIAL STABILITY: SOCIAL INSECURITY: WERE YOU ABLE TO COMPLETE ALL THE BEHAVIORAL HEALTH SCREENINGS?: YES

## 2024-07-09 SDOH — SOCIAL STABILITY: SOCIAL INSECURITY: ARE THERE ANY APPARENT SIGNS OF INJURIES/BEHAVIORS THAT COULD BE RELATED TO ABUSE/NEGLECT?: NO

## 2024-07-09 SDOH — SOCIAL STABILITY: SOCIAL INSECURITY: DO YOU FEEL UNSAFE GOING BACK TO THE PLACE WHERE YOU ARE LIVING?: NO

## 2024-07-09 SDOH — SOCIAL STABILITY: SOCIAL INSECURITY: DOES ANYONE TRY TO KEEP YOU FROM HAVING/CONTACTING OTHER FRIENDS OR DOING THINGS OUTSIDE YOUR HOME?: NO

## 2024-07-09 SDOH — SOCIAL STABILITY: SOCIAL INSECURITY: DO YOU FEEL ANYONE HAS EXPLOITED OR TAKEN ADVANTAGE OF YOU FINANCIALLY OR OF YOUR PERSONAL PROPERTY?: NO

## 2024-07-09 SDOH — SOCIAL STABILITY: SOCIAL INSECURITY: HAVE YOU HAD ANY THOUGHTS OF HARMING ANYONE ELSE?: NO

## 2024-07-09 SDOH — SOCIAL STABILITY: SOCIAL INSECURITY: ABUSE: ADULT

## 2024-07-09 SDOH — SOCIAL STABILITY: SOCIAL INSECURITY: ARE YOU OR HAVE YOU BEEN THREATENED OR ABUSED PHYSICALLY, EMOTIONALLY, OR SEXUALLY BY ANYONE?: NO

## 2024-07-09 SDOH — SOCIAL STABILITY: SOCIAL INSECURITY: HAVE YOU HAD THOUGHTS OF HARMING ANYONE ELSE?: NO

## 2024-07-09 SDOH — SOCIAL STABILITY: SOCIAL INSECURITY: HAS ANYONE EVER THREATENED TO HURT YOUR FAMILY OR YOUR PETS?: NO

## 2024-07-09 ASSESSMENT — COLUMBIA-SUICIDE SEVERITY RATING SCALE - C-SSRS
6. HAVE YOU EVER DONE ANYTHING, STARTED TO DO ANYTHING, OR PREPARED TO DO ANYTHING TO END YOUR LIFE?: NO
2. HAVE YOU ACTUALLY HAD ANY THOUGHTS OF KILLING YOURSELF?: NO
2. HAVE YOU ACTUALLY HAD ANY THOUGHTS OF KILLING YOURSELF?: NO
1. IN THE PAST MONTH, HAVE YOU WISHED YOU WERE DEAD OR WISHED YOU COULD GO TO SLEEP AND NOT WAKE UP?: NO
1. IN THE PAST MONTH, HAVE YOU WISHED YOU WERE DEAD OR WISHED YOU COULD GO TO SLEEP AND NOT WAKE UP?: NO
6. HAVE YOU EVER DONE ANYTHING, STARTED TO DO ANYTHING, OR PREPARED TO DO ANYTHING TO END YOUR LIFE?: NO

## 2024-07-09 ASSESSMENT — ACTIVITIES OF DAILY LIVING (ADL)
TOILETING: INDEPENDENT
BATHING: INDEPENDENT
HEARING - LEFT EAR: FUNCTIONAL
WALKS IN HOME: INDEPENDENT
JUDGMENT_ADEQUATE_SAFELY_COMPLETE_DAILY_ACTIVITIES: YES
PATIENT'S MEMORY ADEQUATE TO SAFELY COMPLETE DAILY ACTIVITIES?: YES
HEARING - RIGHT EAR: FUNCTIONAL
LACK_OF_TRANSPORTATION: NO
DRESSING YOURSELF: INDEPENDENT
ADEQUATE_TO_COMPLETE_ADL: YES
GROOMING: INDEPENDENT
FEEDING YOURSELF: INDEPENDENT

## 2024-07-09 ASSESSMENT — LIFESTYLE VARIABLES
SKIP TO QUESTIONS 9-10: 1
HOW OFTEN DO YOU HAVE 6 OR MORE DRINKS ON ONE OCCASION: NEVER
HOW MANY STANDARD DRINKS CONTAINING ALCOHOL DO YOU HAVE ON A TYPICAL DAY: PATIENT DOES NOT DRINK
HOW OFTEN DO YOU HAVE A DRINK CONTAINING ALCOHOL: NEVER
AUDIT-C TOTAL SCORE: 0
AUDIT-C TOTAL SCORE: 0

## 2024-07-09 ASSESSMENT — COGNITIVE AND FUNCTIONAL STATUS - GENERAL
DRESSING REGULAR LOWER BODY CLOTHING: A LITTLE
MOBILITY SCORE: 21
DRESSING REGULAR LOWER BODY CLOTHING: A LITTLE
CLIMB 3 TO 5 STEPS WITH RAILING: A LITTLE
DRESSING REGULAR UPPER BODY CLOTHING: A LITTLE
TOILETING: A LITTLE
PERSONAL GROOMING: A LITTLE
WALKING IN HOSPITAL ROOM: A LITTLE
STANDING UP FROM CHAIR USING ARMS: A LITTLE
DAILY ACTIVITIY SCORE: 19
DAILY ACTIVITIY SCORE: 23
STANDING UP FROM CHAIR USING ARMS: A LITTLE
CLIMB 3 TO 5 STEPS WITH RAILING: A LITTLE
PATIENT BASELINE BEDBOUND: NO
WALKING IN HOSPITAL ROOM: A LITTLE
EATING MEALS: A LITTLE
MOBILITY SCORE: 21

## 2024-07-09 ASSESSMENT — PAIN DESCRIPTION - PAIN TYPE: TYPE: ACUTE PAIN

## 2024-07-09 ASSESSMENT — PAIN SCALES - GENERAL
PAINLEVEL_OUTOF10: 0 - NO PAIN
PAINLEVEL_OUTOF10: 2
PAINLEVEL_OUTOF10: 4
PAINLEVEL_OUTOF10: 4
PAINLEVEL_OUTOF10: 8
PAINLEVEL_OUTOF10: 9
PAINLEVEL_OUTOF10: 2

## 2024-07-09 ASSESSMENT — PAIN - FUNCTIONAL ASSESSMENT
PAIN_FUNCTIONAL_ASSESSMENT: 0-10
PAIN_FUNCTIONAL_ASSESSMENT: 0-10

## 2024-07-09 ASSESSMENT — ENCOUNTER SYMPTOMS
ABDOMINAL PAIN: 1
DIARRHEA: 1
CHILLS: 1
VOMITING: 1
FEVER: 1
NAUSEA: 1
HEADACHES: 0
APPETITE CHANGE: 1
SHORTNESS OF BREATH: 0

## 2024-07-09 ASSESSMENT — PAIN DESCRIPTION - ONSET: ONSET: GRADUAL

## 2024-07-09 ASSESSMENT — PAIN DESCRIPTION - DESCRIPTORS
DESCRIPTORS: DULL;ACHING
DESCRIPTORS: ACHING;DULL

## 2024-07-09 ASSESSMENT — PAIN DESCRIPTION - ORIENTATION: ORIENTATION: MID

## 2024-07-09 ASSESSMENT — PAIN DESCRIPTION - PROGRESSION: CLINICAL_PROGRESSION: GRADUALLY WORSENING

## 2024-07-09 ASSESSMENT — PAIN DESCRIPTION - FREQUENCY: FREQUENCY: CONSTANT/CONTINUOUS

## 2024-07-09 ASSESSMENT — PAIN DESCRIPTION - LOCATION: LOCATION: ABDOMEN

## 2024-07-09 NOTE — H&P
History and Physical Note  Patient: Ramesh Morocho   Age: 83 y.o. Gender: male     History of Present Illness:   Admission Reason: No chief complaint on file.    HPI:   Ramesh Morocho is a 83 y.o. year old male with PMH CAD sp CABG, CHF, A fib, HTN, DM presented to the OSH for abdominal pain. He developed constant dull abdominal pain in mid abdomen day PTA. He has worsened abdominal pain with nausea. Admits to fever high of 101.5F, chills, loose stool, leg swelling. Denies CP, SOB. He is found to have SBO and NG was placed at OSH. He is transferred to [facility] for further management.     Review of Systems:  Review of Systems   Constitutional:  Positive for appetite change, chills and fever.   Respiratory:  Negative for shortness of breath.    Cardiovascular:  Positive for leg swelling. Negative for chest pain.   Gastrointestinal:  Positive for abdominal pain, diarrhea, nausea and vomiting.   Neurological:  Negative for headaches.   All other systems reviewed and are negative.      Allergies:   Allergies   Allergen Reactions    House Dust Other     congestion    Metformin Rash    Mold Other     congestion       Past Medical History:  Past Medical History:   Diagnosis Date    CHF (congestive heart failure) (Multi)     Essential tremor 09/06/2013    Benign familial tremor    Hyperlipidemia     Hypertension     Obstructive sleep apnea (adult) (pediatric) 11/20/2014    BRITT on CPAP    Old myocardial infarction 11/20/2014    History of myocardial infarction    Other intervertebral disc degeneration, lumbar region 11/20/2014    Degenerative disc disease, lumbar    Personal history of other diseases of the circulatory system 11/20/2014    History of coronary atherosclerosis    Personal history of other diseases of the digestive system 11/20/2014    History of gastroesophageal reflux (GERD)    Personal history of other diseases of the musculoskeletal system and connective tissue 11/20/2014    History of arthritis    Personal  history of other diseases of the respiratory system 11/20/2014    History of asthma    Personal history of other endocrine, nutritional and metabolic disease 11/20/2014    History of diabetes mellitus    Personal history of other endocrine, nutritional and metabolic disease 11/20/2014    History of diabetic neuropathy    Personal history of other mental and behavioral disorders 07/14/2017    History of depression    Type 2 diabetes mellitus (Multi)        Past Surgical History:   Past Surgical History:   Procedure Laterality Date    COLONOSCOPY  10/01/2015    CORONARY ARTERY BYPASS GRAFT  11/20/2014    CABG    HERNIA REPAIR      SMALL INTESTINE SURGERY         Family History:  Family History   Problem Relation Name Age of Onset    Other (blood disease) Brother         Social History:  Social History     Tobacco Use   Smoking Status Every Day    Current packs/day: 1.00    Types: Cigarettes   Smokeless Tobacco Not on file       Social History     Substance and Sexual Activity   Alcohol Use Not Currently       Social History     Substance and Sexual Activity   Drug Use Never       Home Medications:  Current Outpatient Medications   Medication Instructions    albuterol (ProAir HFA) 90 mcg/actuation inhaler inhalation    ascorbic acid (VITAMIN C) 1,000 mg, oral, Daily    b complex vitamins capsule 1 capsule, oral, Daily    blood-glucose sensor (FreeStyle Tricia 3 Sensor) device 1 each, miscellaneous, Every 14 days    cholecalciferol (VITAMIN D-3) 50 mcg, oral, Daily    FLUoxetine (PROZAC) 40 mg, oral, Daily    fluticasone (Flonase) 50 mcg/actuation nasal spray 1 spray, Each Nostril, Daily    fluticasone propion-salmeteroL (Advair Diskus) 250-50 mcg/dose diskus inhaler 1 puff, inhalation, 2 times daily RT, Rinse mouth with water after use to reduce aftertaste and incidence of candidiasis. Do not swallow.    gabapentin (NEURONTIN) 600 mg, oral, QID    insulin degludec (Tresiba FlexTouch U-200) 200 unit/mL (3 mL) injection  INJECT 70 UNITS SUBCUTANEOUSLY EVERY DAY 90 DAYS    insulin lispro-aabc (Lyumjev KwikPen U-100 Insulin) 100 unit/mL insulin pen Up to 80 units daily    metoprolol succinate XL (TOPROL-XL) 50 mg, oral, Daily    MULTIVITAMIN ORAL 1 tablet, oral, Daily    nitroglycerin (NITROSTAT) 0.4 mg, sublingual, As needed    pramipexole (Mirapex) 0.125 mg tablet 1 TABLET IN AM AND MIDDAY, 2-4 PILLS AT BEDTIME ORALLY AS DIRECTED <BR>    rosuvastatin (CRESTOR) 40 mg, oral, Daily    spironolactone (Aldactone) 25 mg tablet 0.5 tablets, oral, Daily    traZODone (Desyrel) 50 mg tablet 1 tablet, oral, Nightly PRN       Vitals:  Visit Vitals  /61   Pulse 88   Temp 36.9 °C (98.4 °F)   Resp 16   SpO2 93%   Smoking Status Every Day       Physical Exam  Vitals and nursing note reviewed.   Constitutional:       General: He is not in acute distress.     Appearance: Normal appearance. He is not ill-appearing or toxic-appearing.   HENT:      Head: Normocephalic and atraumatic.      Comments: + NG tube     Nose: Nose normal.      Mouth/Throat:      Mouth: Mucous membranes are moist.   Eyes:      Extraocular Movements: Extraocular movements intact.      Conjunctiva/sclera: Conjunctivae normal.      Pupils: Pupils are equal, round, and reactive to light.   Cardiovascular:      Rate and Rhythm: Normal rate and regular rhythm.      Heart sounds: No murmur heard.     No gallop.   Pulmonary:      Effort: Pulmonary effort is normal. No respiratory distress.      Breath sounds: Normal breath sounds. No wheezing, rhonchi or rales.   Abdominal:      General: There is distension.      Palpations: There is no mass.      Tenderness: There is abdominal tenderness.   Musculoskeletal:         General: Tenderness present. Normal range of motion.      Cervical back: Normal range of motion and neck supple.      Right lower leg: Edema present.      Left lower leg: Edema present.   Skin:     General: Skin is warm and dry.   Neurological:      General: No focal  deficit present.      Mental Status: He is alert and oriented to person, place, and time. Mental status is at baseline.   Psychiatric:         Mood and Affect: Mood normal.         Behavior: Behavior normal.         Thought Content: Thought content normal.         Judgment: Judgment normal.         Labs and Imaging Results:  Lab Results   Component Value Date    WBC 8.0 07/09/2024       XR chest abdomen for OG NG placement  Narrative: Interpreted By:  Finkelstein, Evan,   STUDY:  XR CHEST ABDOMEN FOR OG NG PLACEMENT;  7/9/2024 6:36 am      INDICATION:  Signs/Symptoms:NGT verification.      COMPARISON:  Chest radiograph 07/09/2024. CT abdomen pelvis 07/09/2024.      ACCESSION NUMBER(S):  [id number]      ORDERING CLINICIAN:  ALEJANDRA ANGLIN      FINDINGS:  Interval placement of an NG tube which courses below the diaphragm,  tip overlying the left upper quadrant in the expected location of the  stomach. Dilated loops of bowel in the upper abdomen corresponding to  small-bowel obstruction seen on CT. Bibasilar atelectasis.      Impression: NG tube tip overlies the left upper quadrant in the expected location  of the stomach. Dilated loops of small bowel compatible with  small-bowel obstruction seen on prior CT.          MACRO:  None.      Signed by: Evan Finkelstein 7/9/2024 7:00 AM  Dictation workstation:   [id number]  XR chest 2 views  Narrative: Interpreted By:  Finkelstein, Evan,   STUDY:  XR CHEST 2 VIEWS;  7/9/2024 5:16 am      INDICATION:  Signs/Symptoms:SOB.      COMPARISON:  Chest radiograph 05/02/2024      ACCESSION NUMBER(S):  [id number]      ORDERING CLINICIAN:  ALEJANDRA ANGLIN      FINDINGS:  Redemonstrated left chest wall stimulator with leads coursing into  the neck. Median sternotomy wires are again seen.      CARDIOMEDIASTINAL SILHOUETTE:  Cardiomediastinal silhouette is stable in size and configuration.      LUNGS:  No pulmonary consolidation, pleural effusion or pneumothorax.       ABDOMEN:  No remarkable upper abdominal findings.      BONES:  No acute osseous abnormality.      Impression: No radiographic evidence of acute cardiopulmonary pathology.      MACRO:  None.      Signed by: Evan Finkelstein 7/9/2024 5:40 AM  Dictation workstation:   [id number]  CT abdomen pelvis w IV contrast  Narrative: Interpreted By:  Finkelstein, Evan,   STUDY:  CT ABDOMEN PELVIS W IV CONTRAST;  7/9/2024 5:07 am      INDICATION:  Signs/Symptoms:Abdominal pain, nausea and diarrhea.      COMPARISON:  CT abdomen pelvis 05/22/2023      ACCESSION NUMBER(S):  [id number]      ORDERING CLINICIAN:  ALEJANDRA ANGLIN      TECHNIQUE:  Axial CT images of the abdomen and pelvis with coronal and sagittal  reconstructed images obtained after intravenous administration of 75  mL Omnipaque 350      FINDINGS:  LOWER CHEST: Bibasilar atelectasis.      ABDOMEN:      LIVER: Normal attenuation and contour.  BILE DUCTS: Normal caliber.  GALLBLADDER: Surgically absent.  PORTAL VEIN: Patent  SPLEEN: Calcifications scattered throughout the spleen suggest  sequela of prior granulomatous disease. PANCREAS: Unremarkable.  ADRENALS: Unremarkable.  KIDNEYS, URETERS and URINARY BLADDER: Symmetric renal enhancement. No  hydronephrosis or perinephric fluid collection.  Bladder is within  normal limits REPRODUCTIVE ORGANS: No pelvic masses.      ABDOMINAL WALL: Laxity of the ventral abdominal wall musculature. Fat  containing inguinal hernias bilaterally. PERITONEUM: No ascites, free  air or fluid collection.      BOWEL: The stomach is decompressed, which limits evaluation. Dilated  loops of small bowel throughout the abdomen and pelvis. There is a  transition point in the right mid abdomen and decompressed loops of  distal small bowel. Scattered colonic diverticula without pericolonic  inflammatory stranding. The appendix is not definitively visualized,  without focal pericecal inflammatory stranding.      VESSELS: Moderate aortoiliac  calcifications. No aortic aneurysm.  RETROPERITONEUM: No pathologically enlarged retroperitoneal lymph  nodes.      BONES: No acute osseous abnormality. Degenerative changes throughout  the spine.      Impression: Dilated loops of small bowel throughout the abdomen and pelvis with a  transition point in the right mid abdomen and decompressed loops of  distal small bowel. Findings are compatible with small bowel  obstruction      Scattered colonic diverticulosis without CT evidence of acute  diverticulitis.          MACRO:  None.      Signed by: Evan Finkelstein 7/9/2024 5:39 AM  Dictation workstation:   [id number]      Assessment and Plan:    Principal Problem:    SBO (small bowel obstruction) (Multi)      Patient Active Problem List   Diagnosis    Abnormal gait    Atherosclerosis of coronary artery    Atrial fibrillation (Multi)    Melanocytic nevus of skin    Mixed hyperlipidemia    Benign essential hypertension    Essential tremor    Spinal stenosis of lumbar region    Memory impairment    Nicotine dependence    Impaired psychomotor performance    Peripheral nerve disease    Restless legs syndrome    Dysfunction of thyroid    Type 2 diabetes mellitus (Multi)    Cobalamin deficiency    Acute non-ST segment elevation myocardial infarction (Multi)    Cardiomyopathy (Multi)    Chronic obstructive lung disease (Multi)    Heart failure (Multi)    Pure hypercholesterolemia    Obstructive sleep apnea syndrome    Tobacco use disorder    Dyspnea    Respiratory failure (Multi)    Bacterial pneumonia    Degeneration of intervertebral disc of lumbar region    Generalized pain    History of coronary artery stent placement    History of diabetes mellitus    Hypertensive renal disease    Numbness    Platelet disorder (Multi)    Retention of urine    Small bowel obstruction (Multi)    SBO (small bowel obstruction) (Multi)     SBO   - admit to med/surg  - surg consult  - NG to suction  - IVF  - keep NPO  - morphine PRN  - zofran  PRN    HTN/ Afib  - metoprolol  - aldactone    DM  - hold home insulin given NPO status  - ISS    CAD sp CABG  - statin    COPD  - breo    Neuropathy  - gabapentin    Depression  - prozac    Diet: NPO  DVT ppx: SQ hep  Code status: Full, confirmed with pt  Dispo: admit under inpatient status anticipating greater than forty-eight hours or two midnights stay.       Neelima Pretty MD  Hospitalist

## 2024-07-09 NOTE — CARE PLAN
The patient's goals for the shift include  pain control    The clinical goals for the shift include  pain control

## 2024-07-09 NOTE — PROGRESS NOTES
07/09/24 1402   Discharge Planning   Living Arrangements Spouse/significant other   Support Systems Spouse/significant other   Assistance Needed A&Ox3, independent with ADLs, no DME, room air at baseline, CPAP at HS, drives.   Type of Residence Private residence   Number of Stairs to Enter Residence 1   Number of Stairs Within Residence 15   Do you have animals or pets at home? Yes   Type of Animals or Pets 3 cats   Home or Post Acute Services None   Patient expects to be discharged to: Home no needs   Does the patient need discharge transport arranged? No

## 2024-07-09 NOTE — CONSULTS
General/Trauma Surgery History and Physical      History Of Present Illness  Ramesh Morocho is a 83 y.o. male who is transferred from [facility] after presenting with abdominal pain and nausea for one day. Wife is at bedside and helps with history as patient is fatigued during interview. Yesterday, patient was complaining of severe abdominal pain, which is unusual for him per his wife. He was nauseated and bloated feeling, attempted to vomit to alleviate the pressure but was unable to get anything out. He did notice a temperature at home of 101.5F. ibuprofen. Fever improved after some He has been having diarrhea, although this is a chronic thing and was unchanged over the past day. Pain is epigastric, sharp, cramping and radiates throughout abdomen, worsened with movement. He has had a partial colectomy in the past due to diverticulitis as well as a cholecystectomy and hernia repair. Has never been hospitalized for a bowel obstruction in the past.       Past Medical History  Heart failure  HTN  Hyperlipidemia  BRITT  Diabetes  Anxiety/depression  Atrial fibrillation  Obesity     Surgical History  CABG  Colonoscopy  Partial colectomy  Cholecystectomy  Inguinal Hernia repair   Deep brain stimulator for tremor    Social History  Occasional alcohol  Former smoker  Denies illicits  Lives at home      Family History  family history includes blood disease in his brother.     Allergies  House dust, Metformin, and Mold    Meds  Current Outpatient Medications   Medication Instructions    albuterol (ProAir HFA) 90 mcg/actuation inhaler inhalation    ascorbic acid (VITAMIN C) 1,000 mg, oral, Daily    b complex vitamins capsule 1 capsule, oral, Daily    blood-glucose sensor (FreeStyle Tricia 3 Sensor) device 1 each, miscellaneous, Every 14 days    cholecalciferol (VITAMIN D-3) 50 mcg, oral, Daily    FLUoxetine (PROZAC) 40 mg, oral, Daily    fluticasone (Flonase) 50 mcg/actuation nasal spray 1 spray, Each Nostril, Daily     "fluticasone propion-salmeteroL (Advair Diskus) 250-50 mcg/dose diskus inhaler 1 puff, inhalation, 2 times daily RT, Rinse mouth with water after use to reduce aftertaste and incidence of candidiasis. Do not swallow.    gabapentin (NEURONTIN) 600 mg, oral, QID    insulin degludec (Tresiba FlexTouch U-200) 200 unit/mL (3 mL) injection INJECT 70 UNITS SUBCUTANEOUSLY EVERY DAY 90 DAYS    insulin lispro-aabc (Lyumjev KwikPen U-100 Insulin) 100 unit/mL insulin pen Up to 80 units daily    metoprolol succinate XL (TOPROL-XL) 50 mg, oral, Daily    MULTIVITAMIN ORAL 1 tablet, oral, Daily    nitroglycerin (NITROSTAT) 0.4 mg, sublingual, As needed    pramipexole (Mirapex) 0.125 mg tablet 1 TABLET IN AM AND MIDDAY, 2-4 PILLS AT BEDTIME ORALLY AS DIRECTED <BR>    rosuvastatin (CRESTOR) 40 mg, oral, Daily    spironolactone (Aldactone) 25 mg tablet 0.5 tablets, oral, Daily    traZODone (Desyrel) 50 mg tablet 1 tablet, oral, Nightly PRN        Review of Systems   A complete 10 point review of systems was performed and is negative except as noted in the history of present illness.     Last Recorded Vitals  Blood pressure 132/61, pulse 88, temperature 36.9 °C (98.4 °F), temperature source Tympanic, resp. rate 16, height 1.803 m (5' 10.98\"), weight 101 kg (222 lb 10.6 oz), SpO2 93%.    0-10 (Numeric) Pain Score: 4     Physical Exam  Constitutional: No acute distress. Sitting up in bed.  Neuro: Alert, oriented. Follows commands.   Eyes: Extraocular motions intact. No scleral icterus. Conjunctiva pink.   EENT: Mucous membranes moist. Normal dentition. Hears normal speaking voice. NG in place with thick bilious output.  Neck: Supple. No masses.  Cardiovascular: Regular rate. Palpable pulses bilaterally. No pitting edema.   Respiratory: No increased work of breathing or audible wheeze. Equal expansion.  Abdomen: Soft, obese abdomen. Distended. Tender to epigastrium.   MSK: Moves all extremities. No atrophy.  Lymphatic: No palpable lymph " nodes. No lymphedema.   Skin: Warm, dry, intact. No rashes or lesions.   Psychological: Appropriate mood and behavior.           Relevant Results  Laboratory Results:  Laboratory Results:  [place] in [facility] [place] with no leukocytosis, mild hemoconcentration. Electrolytes without derangement, hyperglycemia      Imaging:  CT abdomen pelvis w IV contrast 07/09/2024  Impression  Dilated loops of small bowel throughout the abdomen and pelvis with a  transition point in the right mid abdomen and decompressed loops of  distal small bowel. Findings are compatible with small bowel  obstruction    Scattered colonic diverticulosis without CT evidence of acute  diverticulitis.          Assessment/Plan   This is a 83 y.o. male who is admitted for concern of a bowel obstruction after presenting to [facility] [place] with complaints of nausea and epigastric pain x 1 day. NG placed with bilious output.     Plan:  -- Continue NG to continuous wall suction  -- NPO, sips with meds okay  -- Multimodal pain regimen  -- DVT ppx   -- Ambulate encouraged, out of bed to chair  -- IVF, daily labs with electrolyte repletion as necessary  -- Hopeful to avoid surgery, will continue to monitor         Seen and discussed with Dr. Lagos who is in agreement with plan. Please doc halo with questions.    Marti Arellano PA-C        I have seen and reviewed the patient with the PA.  I agree with their note as written above.  Any changes or corrections have been made in the body of the note.    Semaj Lagos MD  General Surgery  Office: [phone]  Fax:     [phone]  8:11 PM   07/09/24

## 2024-07-09 NOTE — PROGRESS NOTES
Patient received in sign out from Dr Garcia.  Patient with small bowel obstruction, accepted to [facility] for further management.  No beds available at this time.  After awaiting bed for 4 hours, patient accepted to hospitalist service at [facility].  Parts of this chart were completed with dictation software, please excuse any errors in transcription.

## 2024-07-09 NOTE — ED PROVIDER NOTES
HPI   Chief Complaint   Patient presents with    Abdominal Pain     Epigastric pain x 1 day accompanied with nausea, diarrhea and a temp of 101.5F oral at home. Pt took ibuprofen and fever never returned. Pt unable to throw up. Feels bloated and belly is painful to the touch       83-year-old male with a history of CHF, hypertension, hyperlipidemia, obstructive sleep apnea, diabetes, anxiety and depression, atrial fibrillation and obesity comes to the emergency department with a chief complaint of abdominal pain.  Epigastric pain x 1 day accompanied with nausea, diarrhea and a temp of 101.5F oral at home. Pt took ibuprofen and fever never returned. Pt unable to throw up. Feels bloated and belly is painful to the touch.  His surgical history is remarkable for cholecystectomy, partial colectomy due to diverticulitis and hernia repair.      History provided by:  Patient   used: No                        [place] Coma Scale Score: 15                     Patient History   Past Medical History:   Diagnosis Date    CHF (congestive heart failure) (Multi)     Essential tremor 09/06/2013    Benign familial tremor    Hyperlipidemia     Hypertension     Obstructive sleep apnea (adult) (pediatric) 11/20/2014    BRITT on CPAP    Old myocardial infarction 11/20/2014    History of myocardial infarction    Other intervertebral disc degeneration, lumbar region 11/20/2014    Degenerative disc disease, lumbar    Personal history of other diseases of the circulatory system 11/20/2014    History of coronary atherosclerosis    Personal history of other diseases of the digestive system 11/20/2014    History of gastroesophageal reflux (GERD)    Personal history of other diseases of the musculoskeletal system and connective tissue 11/20/2014    History of arthritis    Personal history of other diseases of the respiratory system 11/20/2014    History of asthma    Personal history of other endocrine, nutritional and metabolic  disease 11/20/2014    History of diabetes mellitus    Personal history of other endocrine, nutritional and metabolic disease 11/20/2014    History of diabetic neuropathy    Personal history of other mental and behavioral disorders 07/14/2017    History of depression    Type 2 diabetes mellitus (Multi)      Past Surgical History:   Procedure Laterality Date    COLONOSCOPY  10/01/2015    CORONARY ARTERY BYPASS GRAFT  11/20/2014    CABG    HERNIA REPAIR      SMALL INTESTINE SURGERY       Family History   Problem Relation Name Age of Onset    Other (blood disease) Brother       Social History     Tobacco Use    Smoking status: Every Day     Current packs/day: 1.00     Types: Cigarettes    Smokeless tobacco: Not on file   Substance Use Topics    Alcohol use: Not Currently    Drug use: Never       Physical Exam   ED Triage Vitals [07/09/24 0340]   Temperature Heart Rate Respirations BP   36.7 °C (98 °F) 67 18 169/80      Pulse Ox Temp Source Heart Rate Source Patient Position   95 % Oral Monitor Sitting      BP Location FiO2 (%)     Right arm --       Physical Exam  Vitals and nursing note reviewed.   Constitutional:       General: He is not in acute distress.     Appearance: He is well-developed. He is obese. He is not toxic-appearing or diaphoretic.   HENT:      Head: Normocephalic and atraumatic.   Eyes:      Conjunctiva/sclera: Conjunctivae normal.   Cardiovascular:      Rate and Rhythm: Normal rate and regular rhythm.      Heart sounds: No murmur heard.  Pulmonary:      Effort: Pulmonary effort is normal. No respiratory distress.      Breath sounds: Normal breath sounds.   Abdominal:      General: Abdomen is protuberant. There is distension.      Palpations: Abdomen is soft.      Tenderness: There is generalized abdominal tenderness. There is guarding. There is no right CVA tenderness, left CVA tenderness or rebound.   Musculoskeletal:         General: No swelling.      Cervical back: Neck supple.   Skin:      General: Skin is warm and dry.   Neurological:      General: No focal deficit present.      Mental Status: He is alert.   Psychiatric:         Mood and Affect: Mood normal.         ED Course & MDM   ED Course as of 07/09/24 0740   Tue Jul 09, 2024   0457 Lipase(!)  Not consistent with pancreatitis [EG]   0457 NT Pro-BNP  Not consistent with acute CHF [EG]   0457 CBC and Auto Differential(!)  Within normal limits [EG]   0457 Comprehensive metabolic panel(!)  Hyperglycemia not consistent with DKA or HHS.  Otherwise unremarkable without acute kidney injury [EG]   0459 BLOOD GAS LACTIC ACID, VENOUS  Less than 2 and not consistent with ischemic bowel or severe shock/sepsis [EG]   0554 CT abdomen pelvis w IV contrast  IMPRESSION:  Dilated loops of small bowel throughout the abdomen and pelvis with a  transition point in the right mid abdomen and decompressed loops of  distal small bowel. Findings are compatible with small bowel  obstruction      Scattered colonic diverticulosis without CT evidence of acute  diverticulitis.   [EG]   0555 XR chest 2 views  IMPRESSION:  No radiographic evidence of acute cardiopulmonary pathology.   [EG]   0730 XR chest abdomen for OG NG placement  IMPRESSION:  NG tube tip overlies the left upper quadrant in the expected location  of the stomach. Dilated loops of small bowel compatible with  small-bowel obstruction seen on prior CT.   [EG]   0730 TC coordinated conversations with providers at [facility].  I did not speak with the general surgeon, but the general surgeon reviewed the case and stated that the patient needed to be admitted to medicine.  I spoke with Dr. Schneider who agrees to admit the patient under the medicine service with surgery consult. [EG]      ED Course User Index  [EG] Neelima Blanca MD         Diagnoses as of 07/09/24 0740   Morbid obesity (Multi)   Small bowel obstruction (Multi)       Medical Decision Making    HPI:  As Above  PMHx/PSHx/Meds/Allergies/SH/FH as  per nursing documentation and reviewed.  Review of systems: Total of 10 systems reviewed and otherwise negative except as noted elsewhere    DDX: As described in MDM    If performed, radiology listed above interpreted by me and confirmed by the Radiologist.  Medications administered during this visit (name and route): see MAR  Social determinants of health considered for this visit: lives at home  If performed, EKG interpreted by me and detailed above    [facility] Summary/considerations:  83-year-old male presenting with generalized abdominal pain with nausea, distention and diarrhea.  He has had multiple abdominal surgeries and there is concern for bowel obstruction from adhesions.  However the differential is broad and also includes kidney infection, inflammatory/ischemic/infectious colitis, appendicitis, gastroenteritis, foodborne illness, pancreatitis, hepatitis.      83-year-old male presenting with abdominal pain and nausea with significant history of abdominal surgeries.  CT scan is consistent with a small bowel obstruction with a lead point.  The patient was ordered n.p.o. diet and an NG tube placed by me and verified by chest x-ray.  It is draining minimal nonbloody and non-coffee-ground gastric contents.  Patient's pain is being controlled with Dilaudid and has responded well with Zofran for nausea.  See ED course for further details.  There are no general surgeons on-call at [facility] today and the case was discussed through the transfer center and the patient will be transferred to [facility] that has general surgery and will be admitted to the hospitalist service.    The patient was seen and triaged by our nursing/medic staff, their vitals were taken and the staff notes were reviewed.  If the patient arrived by an EMS squad or an outside agency, we discussed the case with transporting EMS medic, police, or other historians. My initial assessment was attention to their airway, breathing, and circulatory status.  We  addressed any immediate or life threatening findings and completed a medical history and a physical exam if the patient or those legally responsible were in agreement with this.   Prior to the patient being discharged, I or my PA/NP or the nursing staff discussed the differential, results and discharge plan with the patient and/or family/friend/caregiver if present.  I emphasized the importance of follow-up in 2-3 days unless otherwise specified.  I explained reasons for the patient to return to the Emergency Department. Additional verbal discharge instructions were also given and discussed with the patient to supplement those generated by the EMR. We also discussed medications that were prescribed (if any) including common side effects and interactions. The patient was advised to abstain from driving, operating heavy machinery or making significant decisions while taking medications such as antihistamines, benzodiazepines, opiates and muscle relaxers. All questions were addressed.  They understand return precautions and discharge instructions. The patient and/or family/friend/caregiver expressed understanding.  **Disclaimer:  This note was dictated by speech recognition technology.  Minor errors in transcription may be present.  Please contact for clarification or corrections.    In the case the patient eloped or refused treatment/admission, we offered to the best of our ability to provide care to the patient at the time of this encounter.    Amount and/or Complexity of Data Reviewed  Labs: ordered. Decision-making details documented in ED Course.  Radiology: ordered and independent interpretation performed. Decision-making details documented in ED Course.        Procedure  Gastric Lavage    Performed by: Neelima Blanca MD  Authorized by: Neelima Blanca MD    Consent:     Consent obtained:  Verbal    Consent given by:  Patient    Risks, benefits, and alternatives were discussed: yes      Risks  discussed:  Pain and bleeding    Alternatives discussed:  No treatment  Universal protocol:     Patient identity confirmed:  Verbally with patient  Sedation:     Sedation type:  None  Procedure details:     Patient position:  Sitting    Tube lubrication:  Lidocaine jelly    Insertion location:  R nare    Endoscope used: no      Sorbitol used: no      Aspirate appearance:  Gastric contents  Post-procedure details:     Tube placement/position confirmation:  Auscultation, gastric contents aspirated, patient is able to speak and x-ray    Tube placement difficulty:  None    Tube status:  Intermittent suction    Procedure completion:  Tolerated  Critical Care    Performed by: Neelima Blanca MD  Authorized by: Neelima Blanca MD    Critical care provider statement:     Critical care time (minutes):  35    Critical care time was exclusive of:  Separately billable procedures and treating other patients    Critical care was necessary to treat or prevent imminent or life-threatening deterioration of the following conditions: Small bowel obstruction.    Critical care was time spent personally by me on the following activities:  Development of treatment plan with patient or surrogate, evaluation of patient's response to treatment, examination of patient, obtaining history from patient or surrogate, ordering and performing treatments and interventions, ordering and review of laboratory studies, ordering and review of radiographic studies, pulse oximetry, re-evaluation of patient's condition and review of old charts    Care discussed with: accepting provider at another facility         Neelima Blanca MD  07/09/24 0737       Neelima Blanca MD  07/09/24 0738       Neelima Blanca MD  07/09/24 0740

## 2024-07-10 ENCOUNTER — APPOINTMENT (OUTPATIENT)
Dept: CARDIOLOGY | Facility: HOSPITAL | Age: 84
DRG: 388 | End: 2024-07-10
Payer: MEDICARE

## 2024-07-10 ENCOUNTER — APPOINTMENT (OUTPATIENT)
Dept: RADIOLOGY | Facility: HOSPITAL | Age: 84
DRG: 388 | End: 2024-07-10
Payer: MEDICARE

## 2024-07-10 LAB
ALBUMIN SERPL BCP-MCNC: 3.2 G/DL (ref 3.4–5)
ALP SERPL-CCNC: 37 U/L (ref 33–136)
ALT SERPL W P-5'-P-CCNC: 12 U/L (ref 10–52)
ANION GAP SERPL CALC-SCNC: 12 MMOL/L (ref 10–20)
AORTIC VALVE MEAN GRADIENT: 3.2 MMHG
AORTIC VALVE PEAK VELOCITY: 1.22 M/S
AST SERPL W P-5'-P-CCNC: 15 U/L (ref 9–39)
ATRIAL RATE: 0 BPM
AV PEAK GRADIENT: 6 MMHG
AVA (PEAK VEL): 2.42 CM2
AVA (VTI): 2.71 CM2
BASOPHILS # BLD AUTO: 0.02 X10*3/UL (ref 0–0.1)
BASOPHILS NFR BLD AUTO: 0.4 %
BILIRUB SERPL-MCNC: 0.6 MG/DL (ref 0–1.2)
BUN SERPL-MCNC: 23 MG/DL (ref 6–23)
CALCIUM SERPL-MCNC: 8.2 MG/DL (ref 8.6–10.3)
CHLORIDE SERPL-SCNC: 107 MMOL/L (ref 98–107)
CO2 SERPL-SCNC: 31 MMOL/L (ref 21–32)
CREAT SERPL-MCNC: 0.93 MG/DL (ref 0.5–1.3)
EGFRCR SERPLBLD CKD-EPI 2021: 82 ML/MIN/1.73M*2
EJECTION FRACTION APICAL 4 CHAMBER: 47
EJECTION FRACTION: 53 %
EOSINOPHIL # BLD AUTO: 0.06 X10*3/UL (ref 0–0.4)
EOSINOPHIL NFR BLD AUTO: 1.3 %
ERYTHROCYTE [DISTWIDTH] IN BLOOD BY AUTOMATED COUNT: 15.3 % (ref 11.5–14.5)
GLUCOSE BLD MANUAL STRIP-MCNC: 121 MG/DL (ref 74–99)
GLUCOSE BLD MANUAL STRIP-MCNC: 121 MG/DL (ref 74–99)
GLUCOSE BLD MANUAL STRIP-MCNC: 164 MG/DL (ref 74–99)
GLUCOSE BLD MANUAL STRIP-MCNC: 180 MG/DL (ref 74–99)
GLUCOSE SERPL-MCNC: 126 MG/DL (ref 74–99)
HCT VFR BLD AUTO: 49 % (ref 41–52)
HGB BLD-MCNC: 15.7 G/DL (ref 13.5–17.5)
IMM GRANULOCYTES # BLD AUTO: 0.02 X10*3/UL (ref 0–0.5)
IMM GRANULOCYTES NFR BLD AUTO: 0.4 % (ref 0–0.9)
LEFT ATRIUM VOLUME AREA LENGTH INDEX BSA: 24.9 ML/M2
LEFT VENTRICLE INTERNAL DIMENSION DIASTOLE: 5.58 CM (ref 3.5–6)
LEFT VENTRICULAR OUTFLOW TRACT DIAMETER: 1.98 CM
LYMPHOCYTES # BLD AUTO: 0.73 X10*3/UL (ref 0.8–3)
LYMPHOCYTES NFR BLD AUTO: 16.3 %
MAGNESIUM SERPL-MCNC: 2.07 MG/DL (ref 1.6–2.4)
MCH RBC QN AUTO: 31.8 PG (ref 26–34)
MCHC RBC AUTO-ENTMCNC: 32 G/DL (ref 32–36)
MCV RBC AUTO: 99 FL (ref 80–100)
MITRAL VALVE E/A RATIO: 1.24
MONOCYTES # BLD AUTO: 0.68 X10*3/UL (ref 0.05–0.8)
MONOCYTES NFR BLD AUTO: 15.1 %
NEUTROPHILS # BLD AUTO: 2.98 X10*3/UL (ref 1.6–5.5)
NEUTROPHILS NFR BLD AUTO: 66.5 %
NRBC BLD-RTO: 0 /100 WBCS (ref 0–0)
P OFFSET: 151 MS
P ONSET: 131 MS
PHOSPHATE SERPL-MCNC: 3.7 MG/DL (ref 2.5–4.9)
PLATELET # BLD AUTO: 104 X10*3/UL (ref 150–450)
POLYCHROMASIA BLD QL SMEAR: NORMAL
POTASSIUM SERPL-SCNC: 4.1 MMOL/L (ref 3.5–5.3)
PROT SERPL-MCNC: 5.6 G/DL (ref 6.4–8.2)
Q ONSET: 223 MS
QRS COUNT: 11 BEATS
QRS DURATION: 76 MS
QT INTERVAL: 388 MS
QTC CALCULATION(BAZETT): 412 MS
QTC FREDERICIA: 404 MS
R AXIS: 36 DEGREES
RBC # BLD AUTO: 4.94 X10*6/UL (ref 4.5–5.9)
RBC MORPH BLD: NORMAL
RIGHT VENTRICLE FREE WALL PEAK S': 8 CM/S
SODIUM SERPL-SCNC: 146 MMOL/L (ref 136–145)
T AXIS: 136 DEGREES
T OFFSET: 417 MS
TRICUSPID ANNULAR PLANE SYSTOLIC EXCURSION: 2.2 CM
VENTRICULAR RATE: 68 BPM
WBC # BLD AUTO: 4.5 X10*3/UL (ref 4.4–11.3)

## 2024-07-10 PROCEDURE — 36415 COLL VENOUS BLD VENIPUNCTURE: CPT | Performed by: STUDENT IN AN ORGANIZED HEALTH CARE EDUCATION/TRAINING PROGRAM

## 2024-07-10 PROCEDURE — 2500000004 HC RX 250 GENERAL PHARMACY W/ HCPCS (ALT 636 FOR OP/ED): Performed by: STUDENT IN AN ORGANIZED HEALTH CARE EDUCATION/TRAINING PROGRAM

## 2024-07-10 PROCEDURE — 93306 TTE W/DOPPLER COMPLETE: CPT | Performed by: STUDENT IN AN ORGANIZED HEALTH CARE EDUCATION/TRAINING PROGRAM

## 2024-07-10 PROCEDURE — 1100000001 HC PRIVATE ROOM DAILY

## 2024-07-10 PROCEDURE — 2500000001 HC RX 250 WO HCPCS SELF ADMINISTERED DRUGS (ALT 637 FOR MEDICARE OP)

## 2024-07-10 PROCEDURE — 93005 ELECTROCARDIOGRAM TRACING: CPT

## 2024-07-10 PROCEDURE — 2500000002 HC RX 250 W HCPCS SELF ADMINISTERED DRUGS (ALT 637 FOR MEDICARE OP, ALT 636 FOR OP/ED): Performed by: STUDENT IN AN ORGANIZED HEALTH CARE EDUCATION/TRAINING PROGRAM

## 2024-07-10 PROCEDURE — 2500000004 HC RX 250 GENERAL PHARMACY W/ HCPCS (ALT 636 FOR OP/ED): Performed by: PHYSICIAN ASSISTANT

## 2024-07-10 PROCEDURE — 82947 ASSAY GLUCOSE BLOOD QUANT: CPT

## 2024-07-10 PROCEDURE — 83735 ASSAY OF MAGNESIUM: CPT | Performed by: PHYSICIAN ASSISTANT

## 2024-07-10 PROCEDURE — 99232 SBSQ HOSP IP/OBS MODERATE 35: CPT | Performed by: SURGERY

## 2024-07-10 PROCEDURE — 85025 COMPLETE CBC W/AUTO DIFF WBC: CPT | Performed by: STUDENT IN AN ORGANIZED HEALTH CARE EDUCATION/TRAINING PROGRAM

## 2024-07-10 PROCEDURE — 93306 TTE W/DOPPLER COMPLETE: CPT

## 2024-07-10 PROCEDURE — 84100 ASSAY OF PHOSPHORUS: CPT | Performed by: PHYSICIAN ASSISTANT

## 2024-07-10 PROCEDURE — 71045 X-RAY EXAM CHEST 1 VIEW: CPT

## 2024-07-10 PROCEDURE — 2500000001 HC RX 250 WO HCPCS SELF ADMINISTERED DRUGS (ALT 637 FOR MEDICARE OP): Performed by: STUDENT IN AN ORGANIZED HEALTH CARE EDUCATION/TRAINING PROGRAM

## 2024-07-10 PROCEDURE — 80053 COMPREHEN METABOLIC PANEL: CPT | Performed by: STUDENT IN AN ORGANIZED HEALTH CARE EDUCATION/TRAINING PROGRAM

## 2024-07-10 PROCEDURE — 99222 1ST HOSP IP/OBS MODERATE 55: CPT | Performed by: PHYSICIAN ASSISTANT

## 2024-07-10 PROCEDURE — 71045 X-RAY EXAM CHEST 1 VIEW: CPT | Performed by: RADIOLOGY

## 2024-07-10 PROCEDURE — 2500000001 HC RX 250 WO HCPCS SELF ADMINISTERED DRUGS (ALT 637 FOR MEDICARE OP): Performed by: PHYSICIAN ASSISTANT

## 2024-07-10 PROCEDURE — 99233 SBSQ HOSP IP/OBS HIGH 50: CPT

## 2024-07-10 RX ORDER — INSULIN LISPRO 100 [IU]/ML
0-15 INJECTION, SOLUTION INTRAVENOUS; SUBCUTANEOUS EVERY 6 HOURS
Status: DISCONTINUED | OUTPATIENT
Start: 2024-07-10 | End: 2024-07-14

## 2024-07-10 RX ORDER — PREDNISONE 20 MG/1
40 TABLET ORAL DAILY
Status: DISCONTINUED | OUTPATIENT
Start: 2024-07-10 | End: 2024-07-10

## 2024-07-10 RX ORDER — DEXTROSE MONOHYDRATE 50 MG/ML
75 INJECTION, SOLUTION INTRAVENOUS CONTINUOUS
Status: DISCONTINUED | OUTPATIENT
Start: 2024-07-10 | End: 2024-07-12

## 2024-07-10 RX ORDER — IPRATROPIUM BROMIDE AND ALBUTEROL SULFATE 2.5; .5 MG/3ML; MG/3ML
3 SOLUTION RESPIRATORY (INHALATION) EVERY 2 HOUR PRN
Status: DISCONTINUED | OUTPATIENT
Start: 2024-07-10 | End: 2024-07-11

## 2024-07-10 RX ORDER — IPRATROPIUM BROMIDE AND ALBUTEROL SULFATE 2.5; .5 MG/3ML; MG/3ML
3 SOLUTION RESPIRATORY (INHALATION)
Status: DISCONTINUED | OUTPATIENT
Start: 2024-07-10 | End: 2024-07-10

## 2024-07-10 RX ORDER — METOPROLOL TARTRATE 25 MG/1
25 TABLET, FILM COATED ORAL 2 TIMES DAILY
Status: DISCONTINUED | OUTPATIENT
Start: 2024-07-10 | End: 2024-07-15 | Stop reason: HOSPADM

## 2024-07-10 SDOH — ECONOMIC STABILITY: TRANSPORTATION INSECURITY
IN THE PAST 12 MONTHS, HAS LACK OF TRANSPORTATION KEPT YOU FROM MEETINGS, WORK, OR FROM GETTING THINGS NEEDED FOR DAILY LIVING?: NO

## 2024-07-10 SDOH — HEALTH STABILITY: MENTAL HEALTH: HOW OFTEN DO YOU HAVE 6 OR MORE DRINKS ON ONE OCCASION?: NEVER

## 2024-07-10 SDOH — HEALTH STABILITY: MENTAL HEALTH: HOW MANY STANDARD DRINKS CONTAINING ALCOHOL DO YOU HAVE ON A TYPICAL DAY?: PATIENT DOES NOT DRINK

## 2024-07-10 SDOH — HEALTH STABILITY: MENTAL HEALTH: HOW OFTEN DO YOU HAVE A DRINK CONTAINING ALCOHOL?: NEVER

## 2024-07-10 SDOH — ECONOMIC STABILITY: HOUSING INSECURITY: AT ANY TIME IN THE PAST 12 MONTHS, WERE YOU HOMELESS OR LIVING IN A SHELTER (INCLUDING NOW)?: NO

## 2024-07-10 SDOH — ECONOMIC STABILITY: INCOME INSECURITY: IN THE LAST 12 MONTHS, WAS THERE A TIME WHEN YOU WERE NOT ABLE TO PAY THE MORTGAGE OR RENT ON TIME?: NO

## 2024-07-10 SDOH — ECONOMIC STABILITY: TRANSPORTATION INSECURITY
IN THE PAST 12 MONTHS, HAS THE LACK OF TRANSPORTATION KEPT YOU FROM MEDICAL APPOINTMENTS OR FROM GETTING MEDICATIONS?: NO

## 2024-07-10 SDOH — ECONOMIC STABILITY: INCOME INSECURITY: HOW HARD IS IT FOR YOU TO PAY FOR THE VERY BASICS LIKE FOOD, HOUSING, MEDICAL CARE, AND HEATING?: NOT VERY HARD

## 2024-07-10 SDOH — ECONOMIC STABILITY: HOUSING INSECURITY: IN THE PAST 12 MONTHS, HOW MANY TIMES HAVE YOU MOVED WHERE YOU WERE LIVING?: 1

## 2024-07-10 ASSESSMENT — COGNITIVE AND FUNCTIONAL STATUS - GENERAL
DRESSING REGULAR LOWER BODY CLOTHING: A LITTLE
MOBILITY SCORE: 21
WALKING IN HOSPITAL ROOM: A LITTLE
STANDING UP FROM CHAIR USING ARMS: A LITTLE
TURNING FROM BACK TO SIDE WHILE IN FLAT BAD: A LITTLE
MOVING TO AND FROM BED TO CHAIR: A LITTLE
STANDING UP FROM CHAIR USING ARMS: A LITTLE
DAILY ACTIVITIY SCORE: 23
WALKING IN HOSPITAL ROOM: A LITTLE
DRESSING REGULAR LOWER BODY CLOTHING: A LITTLE
CLIMB 3 TO 5 STEPS WITH RAILING: A LITTLE
MOBILITY SCORE: 19
DAILY ACTIVITIY SCORE: 23
CLIMB 3 TO 5 STEPS WITH RAILING: A LITTLE

## 2024-07-10 ASSESSMENT — PAIN SCALES - GENERAL
PAINLEVEL_OUTOF10: 2
PAINLEVEL_OUTOF10: 0 - NO PAIN
PAINLEVEL_OUTOF10: 10 - WORST POSSIBLE PAIN
PAINLEVEL_OUTOF10: 4
PAINLEVEL_OUTOF10: 7
PAINLEVEL_OUTOF10: 2

## 2024-07-10 ASSESSMENT — LIFESTYLE VARIABLES
AUDIT-C TOTAL SCORE: 0
SKIP TO QUESTIONS 9-10: 1

## 2024-07-10 ASSESSMENT — PAIN DESCRIPTION - DESCRIPTORS
DESCRIPTORS: SHARP
DESCRIPTORS: SHARP

## 2024-07-10 ASSESSMENT — PAIN DESCRIPTION - LOCATION
LOCATION: ABDOMEN

## 2024-07-10 NOTE — CARE PLAN
Problem: Pain  Goal: Takes deep breaths with improved pain control throughout the shift  Outcome: Progressing  Goal: Turns in bed with improved pain control throughout the shift  Outcome: Progressing  Goal: Walks with improved pain control throughout the shift  Outcome: Progressing  Goal: Performs ADL's with improved pain control throughout shift  Outcome: Progressing  Goal: Participates in PT with improved pain control throughout the shift  Outcome: Progressing  Goal: Free from opioid side effects throughout the shift  Outcome: Progressing  Goal: Free from acute confusion related to pain meds throughout the shift  Outcome: Progressing   The patient's goals for the shift include      The clinical goals for the shift include pain control    Over the shift, the patient did not make progress toward the following goals. Barriers to progression include pain. Recommendations to address these barriers include meds.    Pt NG draining yellow-orange, positive flatus, pt very weak, but did stand at the side of bed.  Throat spray ordered due to tube discomfort.

## 2024-07-10 NOTE — PROGRESS NOTES
"Ramesh Morocho is a 83 y.o. male on day 1 of admission presenting with SBO (small bowel obstruction) (Multi).    Subjective   Pt reports slight improvement in his abdominal pain this morning. Still distended and intermittently nauseous, has not vomited. States he also has been passing gas. Has remained afebrile. Was put on O2 overnight because patient stated his O2 dropped into the upper 80s while he was sleeping. States he has not felt SOB. Also denies any CP, vomiting, chills today.        Objective     Physical Exam  Constitutional:       General: He is not in acute distress.     Appearance: He is obese. He is not toxic-appearing or diaphoretic.   HENT:      Head: Normocephalic and atraumatic.   Eyes:      Extraocular Movements: Extraocular movements intact.      Conjunctiva/sclera: Conjunctivae normal.      Pupils: Pupils are equal, round, and reactive to light.   Cardiovascular:      Rate and Rhythm: Normal rate and regular rhythm.      Pulses: Normal pulses.      Heart sounds: Normal heart sounds. No murmur heard.  Pulmonary:      Effort: Pulmonary effort is normal. No respiratory distress.      Breath sounds: Wheezing and rhonchi present.   Abdominal:      General: There is distension.      Tenderness: There is abdominal tenderness.      Comments: Bowel sounds heard on auscultation    Skin:     General: Skin is warm and dry.   Neurological:      Mental Status: He is alert and oriented to person, place, and time.   Psychiatric:         Mood and Affect: Mood normal.         Behavior: Behavior normal.         Last Recorded Vitals  Blood pressure 125/67, pulse 70, temperature 36.5 °C (97.7 °F), temperature source Temporal, resp. rate 16, height 1.803 m (5' 10.98\"), weight 101 kg (222 lb 10.6 oz), SpO2 93%.  Intake/Output last 3 Shifts:  I/O last 3 completed shifts:  In: 1331.7 (13.2 mL/kg) [I.V.:1331.7 (13.2 mL/kg)]  Out: 1200 (11.9 mL/kg) [Emesis/NG output:1200]  Weight: 101 kg     Relevant Results      Scheduled " medications  acetaminophen, 650 mg, nasogastric tube, q6h  FLUoxetine, 40 mg, oral, Daily  fluticasone furoate-vilanteroL, 1 puff, inhalation, Daily  gabapentin, 600 mg, oral, 4x daily  heparin (porcine), 5,000 Units, subcutaneous, q8h JIGNESH  insulin lispro, 0-15 Units, subcutaneous, TID  ketorolac, 15 mg, intravenous, q6h  [START ON 7/11/2024] methylPREDNISolone sodium succinate (PF), 40 mg, intravenous, q24h  metoprolol tartrate, 25 mg, oral, BID  pramipexole, 0.125 mg, oral, TID  rosuvastatin, 40 mg, oral, Daily  spironolactone, 12.5 mg, oral, Daily      Continuous medications  dextrose 5%, 75 mL/hr, Last Rate: 75 mL/hr (07/10/24 0916)      PRN medications  PRN medications: dextrose, dextrose, glucagon, glucagon, ipratropium-albuteroL, morphine **OR** morphine, ondansetron, phenoL     Results for orders placed or performed during the hospital encounter of 07/09/24 (from the past 24 hour(s))   POCT GLUCOSE   Result Value Ref Range    POCT Glucose 132 (H) 74 - 99 mg/dL   POCT GLUCOSE   Result Value Ref Range    POCT Glucose 152 (H) 74 - 99 mg/dL   Comprehensive Metabolic Panel   Result Value Ref Range    Glucose 126 (H) 74 - 99 mg/dL    Sodium 146 (H) 136 - 145 mmol/L    Potassium 4.1 3.5 - 5.3 mmol/L    Chloride 107 98 - 107 mmol/L    Bicarbonate 31 21 - 32 mmol/L    Anion Gap 12 10 - 20 mmol/L    Urea Nitrogen 23 6 - 23 mg/dL    Creatinine 0.93 0.50 - 1.30 mg/dL    eGFR 82 >60 mL/min/1.73m*2    Calcium 8.2 (L) 8.6 - 10.3 mg/dL    Albumin 3.2 (L) 3.4 - 5.0 g/dL    Alkaline Phosphatase 37 33 - 136 U/L    Total Protein 5.6 (L) 6.4 - 8.2 g/dL    AST 15 9 - 39 U/L    Bilirubin, Total 0.6 0.0 - 1.2 mg/dL    ALT 12 10 - 52 U/L   CBC and Auto Differential   Result Value Ref Range    WBC 4.5 4.4 - 11.3 x10*3/uL    nRBC 0.0 0.0 - 0.0 /100 WBCs    RBC 4.94 4.50 - 5.90 x10*6/uL    Hemoglobin 15.7 13.5 - 17.5 g/dL    Hematocrit 49.0 41.0 - 52.0 %    MCV 99 80 - 100 fL    MCH 31.8 26.0 - 34.0 pg    MCHC 32.0 32.0 - 36.0 g/dL     RDW 15.3 (H) 11.5 - 14.5 %    Platelets 104 (L) 150 - 450 x10*3/uL    Neutrophils % 66.5 40.0 - 80.0 %    Immature Granulocytes %, Automated 0.4 0.0 - 0.9 %    Lymphocytes % 16.3 13.0 - 44.0 %    Monocytes % 15.1 2.0 - 10.0 %    Eosinophils % 1.3 0.0 - 6.0 %    Basophils % 0.4 0.0 - 2.0 %    Neutrophils Absolute 2.98 1.60 - 5.50 x10*3/uL    Immature Granulocytes Absolute, Automated 0.02 0.00 - 0.50 x10*3/uL    Lymphocytes Absolute 0.73 (L) 0.80 - 3.00 x10*3/uL    Monocytes Absolute 0.68 0.05 - 0.80 x10*3/uL    Eosinophils Absolute 0.06 0.00 - 0.40 x10*3/uL    Basophils Absolute 0.02 0.00 - 0.10 x10*3/uL   Morphology   Result Value Ref Range    RBC Morphology See Below     Polychromasia Mild    Magnesium   Result Value Ref Range    Magnesium 2.07 1.60 - 2.40 mg/dL   Phosphorus   Result Value Ref Range    Phosphorus 3.7 2.5 - 4.9 mg/dL   POCT GLUCOSE   Result Value Ref Range    POCT Glucose 121 (H) 74 - 99 mg/dL     XR chest 1 view    Result Date: 7/10/2024  Interpreted By:  Mando Almanza, STUDY: XR CHEST 1 VIEW;  7/10/2024 9:17 am   INDICATION: Signs/Symptoms:sob.   COMPARISON: CT scan abdomen and pelvis from 07/09/2024. Chest x-rays, most recent from 07/09/2024.   ACCESSION NUMBER(S): [id number]   ORDERING CLINICIAN: ALEJANDRA CEDENO   TECHNIQUE: Single AP portable view of the chest was obtained.   FINDINGS: MEDIASTINUM/ LUNGS/ BONIFACIO: Previous CABG. Cardiomegaly. No vascular congestion or pleural effusion. There is a stable battery pack overlying the left chest with electrodes  directed cephalad in the left neck. Stable NG tube in place. Scant new haziness at the right lung base. Left lung remains clear.   No pneumothorax. No tracheal deviation. No abnormal hilar fullness or gross mass on either side.   BONES: No lytic or blastic destructive bone lesion.   UPPER ABDOMEN: Grossly intact.       Previous CABG.  Mild cardiomegaly.  Currently without radiographic evidence of CHF  .   Grossly stable NG tube in place.   Mild  new atelectasis versus tiny new infiltrate at the right lung base.   MACRO: None   Signed by: Mando Almanza 7/10/2024 9:41 AM Dictation workstation:   [id number]    XR chest abdomen for OG NG placement    Result Date: 7/9/2024  Interpreted By:  Finkelstein, Evan, STUDY: XR CHEST ABDOMEN FOR OG NG PLACEMENT;  7/9/2024 6:36 am   INDICATION: Signs/Symptoms:NGT verification.   COMPARISON: Chest radiograph 07/09/2024. CT abdomen pelvis 07/09/2024.   ACCESSION NUMBER(S): [id number]   ORDERING CLINICIAN: ALEJANDRA ANGLIN   FINDINGS: Interval placement of an NG tube which courses below the diaphragm, tip overlying the left upper quadrant in the expected location of the stomach. Dilated loops of bowel in the upper abdomen corresponding to small-bowel obstruction seen on CT. Bibasilar atelectasis.       NG tube tip overlies the left upper quadrant in the expected location of the stomach. Dilated loops of small bowel compatible with small-bowel obstruction seen on prior CT.     MACRO: None.   Signed by: Evan Finkelstein 7/9/2024 7:00 AM Dictation workstation:   [id number]    XR chest 2 views    Result Date: 7/9/2024  Interpreted By:  Finkelstein, Evan, STUDY: XR CHEST 2 VIEWS;  7/9/2024 5:16 am   INDICATION: Signs/Symptoms:SOB.   COMPARISON: Chest radiograph 05/02/2024   ACCESSION NUMBER(S): [id number]   ORDERING CLINICIAN: ALEJANDRA ANGLIN   FINDINGS: Redemonstrated left chest wall stimulator with leads coursing into the neck. Median sternotomy wires are again seen.   CARDIOMEDIASTINAL SILHOUETTE: Cardiomediastinal silhouette is stable in size and configuration.   LUNGS: No pulmonary consolidation, pleural effusion or pneumothorax.   ABDOMEN: No remarkable upper abdominal findings.   BONES: No acute osseous abnormality.       No radiographic evidence of acute cardiopulmonary pathology.   MACRO: None.   Signed by: Evan Finkelstein 7/9/2024 5:40 AM Dictation workstation:   [id number]    CT abdomen pelvis w IV  contrast    Result Date: 7/9/2024  Interpreted By:  Finkelstein, Evan, STUDY: CT ABDOMEN PELVIS W IV CONTRAST;  7/9/2024 5:07 am   INDICATION: Signs/Symptoms:Abdominal pain, nausea and diarrhea.   COMPARISON: CT abdomen pelvis 05/22/2023   ACCESSION NUMBER(S): [id number]   ORDERING CLINICIAN: ALEJANDRA ANGLIN   TECHNIQUE: Axial CT images of the abdomen and pelvis with coronal and sagittal reconstructed images obtained after intravenous administration of 75 mL Omnipaque 350   FINDINGS: LOWER CHEST: Bibasilar atelectasis.   ABDOMEN:   LIVER: Normal attenuation and contour. BILE DUCTS: Normal caliber. GALLBLADDER: Surgically absent. PORTAL VEIN: Patent SPLEEN: Calcifications scattered throughout the spleen suggest sequela of prior granulomatous disease. PANCREAS: Unremarkable. ADRENALS: Unremarkable. KIDNEYS, URETERS and URINARY BLADDER: Symmetric renal enhancement. No hydronephrosis or perinephric fluid collection.  Bladder is within normal limits REPRODUCTIVE ORGANS: No pelvic masses.   ABDOMINAL WALL: Laxity of the ventral abdominal wall musculature. Fat containing inguinal hernias bilaterally. PERITONEUM: No ascites, free air or fluid collection.   BOWEL: The stomach is decompressed, which limits evaluation. Dilated loops of small bowel throughout the abdomen and pelvis. There is a transition point in the right mid abdomen and decompressed loops of distal small bowel. Scattered colonic diverticula without pericolonic inflammatory stranding. The appendix is not definitively visualized, without focal pericecal inflammatory stranding.   VESSELS: Moderate aortoiliac calcifications. No aortic aneurysm. RETROPERITONEUM: No pathologically enlarged retroperitoneal lymph nodes.   BONES: No acute osseous abnormality. Degenerative changes throughout the spine.       Dilated loops of small bowel throughout the abdomen and pelvis with a transition point in the right mid abdomen and decompressed loops of distal small  bowel. Findings are compatible with small bowel obstruction   Scattered colonic diverticulosis without CT evidence of acute diverticulitis.     MACRO: None.   Signed by: Evan Finkelstein 7/9/2024 5:39 AM Dictation workstation:   [id number]    MR lumbar spine wo IV contrast    Result Date: 6/14/2024  Interpreted By:  Edd Corona and Ravi Shweta STUDY: MR LUMBAR SPINE WO IV CONTRAST;  6/12/2024 3:23 pm   INDICATION: Signs/Symptoms:Lumbar radiculopathy.   COMPARISON: MRI lumbar spine 02/27/2016   ACCESSION NUMBER(S): [id number]   ORDERING CLINICIAN: OTILIA CHAPMAN   TECHNIQUE: Sagittal T1, T2, STIR, axial T1 and T2 weighted images of the lumbar spine were acquired.   FINDINGS: Alignment: There is straightening of the expected lordotic curvature of the lumbar spine, likely secondary to positioning.   Vertebrae/Intervertebral Discs: The vertebral bodies demonstrate expected height. Multilevel spondylosis is present predominantly involving the lower lumbar spine with note made of anterior marginal osteophytosis involving L4-L5 and L5-S1. At L4-L5 and L5-S1, there are additionally T2 hyperintense, T1 hypointense marrow signal changes without intact endplates favoring Modic type 1 signal abnormality. There is multilevel decreased central T2 hyperintense signal within the intervertebral disc, predominantly involving L2-L3 through L5-S1 favoring disc desiccation. In addition there is loss of intervertebral disc height at L4-L5 and L5-S1 with posterior annular fissures.   Conus medullaris: The lower thoracic cord appears unremarkable. The conus medullaris terminates at L1.   T11-T12: Minimal facet osteoarthropathy. No significant central canal or neural foraminal stenosis.   T12-L1: No significant central canal or foraminal stenosis.   L1-2: No significant central canal stenosis. Minimal foraminal stenosis due to hypertrophic facet change.   L2-3: No significant spinal canal stenosis. Small foraminal herniations  minimally narrow the neuroforamina right-greater-than-left in combination with hypertrophic facet change.   L3-4: Mild bilateral facet osteoarthropathy, ligamentum flavum thickening, and moderate circumferential disc bulge with prominent subarticular component contributes to minimal central canal stenosis and minimal right and moderate left neural foraminal stenosis.   L4-5: Minimal to moderate central and paracentral herniation superimposed upon disc bulge indents the ventral thecal sac and partially effaces the subarticular zones in combination with moderate bilateral facet osteoarthropathy contribute to mild central canal stenosis and moderate bilateral neural foraminal stenosis. Potential impingement of the traversing L5 nerve roots in the subarticular zones.   L5-S1: Posterior endplate osteophytes minimally narrow the neuroforamina in combination with hypertrophic facet change   Fatty atrophic changes are noted of the visualized posterior paraspinal musculature.   There is a T2 hyperintense, T1 hypointense cystic appearing lesion measuring 1.3 x 0.8 cm in the sagittal plane (series 3, image 15) within the subcutaneous tissues which may represent a epidermal inclusion cyst.       1. Degenerative changes of the lumbar spine as above described most prominently at L4-5 where there is partial effacement of the subarticular zones with potential impingement of the traversing L5 nerve roots similar to slightly increasing compared with the prior exam.   I personally reviewed the images/study and I agree with the resident findings as stated by Dianne Newell MD. This study was interpreted at [place], [place].   MACRO: None   Signed by: Edd Corona 6/14/2024 11:27 PM Dictation workstation:   [id number]               Assessment/Plan   Principal Problem:    SBO (small bowel obstruction) (Multi)    SBO  - Surgery following   - NG to suction   - Still NPO  - Continue IVF  -  morphine, zofran PRN     Acute hypoxic respiratory failure 2/2 likely COPD exacerbation  - Put on 3L O2 NC overnight  - CXR pending  - Start DuoNebs   - Start Solumedrol   - Encourage use of incentive spirometry     HTN/Afib   - Continue metoprolol, aldactone    Hypernatremia    - Slightly hypernatremic on morning labs today at 146   - Stop NS, start D5W    - Will continue monitoring with AM labs    DM   - Continue insulin sliding scale   - Home insulin held given NPO status     CAD s/p CABG  - Continue rosuvastatin     COPD   - Continue Breo     Neuropathy   - Continue gabapentin     Depression   - Continue Prozac       Diet: NPO  DVT ppx: subcutaneous heparin   Code: Full   Dispo: continue monitoring, surgery following        Chago Root DO

## 2024-07-10 NOTE — CONSULTS
Inpatient consult to Cardiology  Consult performed by: Agnieszka Farley PA-C  Consult ordered by: Neelima Pretty MD        History Of Present Illness:    Ramesh Morocho is a 83 y.o. male presenting with intractable nausea, diarrhea and fever. On admission patient noted to have small bowel obstruction. NG tube placed. Patient reports mobility at home is limited at baseline by balance issues. Denies any chest pain, chest pressure, palpitations, dizziness, cough, shortness of breath, orthopnea, edema. Exertion limited at home d/t balance issues. Denies any chest pain/pressure with exertion.  States that he follows with his cardiologist. Is not on a blood thinner for Afib, is unsure why. Denies any major bleeding events in the past.      Last Recorded Vitals:  Vitals:    07/10/24 0425 07/10/24 0531 07/10/24 1000 07/10/24 1400   BP: 125/67  132/70 125/70   BP Location: Left arm      Patient Position: Lying      Pulse: 70  60 67   Resp: 16  16 16   Temp: 36.5 °C (97.7 °F)  36.2 °C (97.2 °F) 36.6 °C (97.9 °F)   TempSrc: Temporal      SpO2: 90% 93% 96% 93%   Weight:       Height:           Last Labs:  CBC - 7/10/2024:  6:23 AM  4.5 15.7 104    49.0      CMP - 7/10/2024:  6:23 AM  8.2 5.6 15 --- 0.6   3.7 3.2 12 37      PTT - 7/21/2023: 11:42 AM  1.1   11.8 33     POC HEMOGLOBIN A1c   Date/Time Value Ref Range Status   05/13/2024 09:14 AM 8.2 (A) 4.2 - 6.5 % Final   02/28/2024 11:40 AM 8.2 (A) 4.2 - 6.5 % Final     Hemoglobin A1C   Date/Time Value Ref Range Status   03/19/2024 11:56 AM 8.0 (H) See below % Final     LDL Calculated   Date/Time Value Ref Range Status   12/14/2023 10:51 AM 34 (L) 65 - 130 mg/dL Final   03/02/2023 08:43  65 - 130 MG/DL Final   11/22/2022 02:21 PM 25 (L) 65 - 130 MG/DL Final   11/18/2021 09:37  65 - 130 MG/DL Final      Last I/O:  I/O last 3 completed shifts:  In: 1331.7 (13.2 mL/kg) [I.V.:1331.7 (13.2 mL/kg)]  Out: 1200 (11.9 mL/kg) [Emesis/NG output:1200]  Weight: 101 kg     Past  Cardiology Tests (Last 3 Years):  EKG:  ECG 12 Lead 07/10/2024 (Preliminary) Afib 68 bpm, no acute ischemic changes.     Echo:  Transthoracic Echo (TTE) Complete 07/10/2024  CONCLUSIONS:   1. The left ventricular systolic function is low normal, with a visually estimated ejection fraction of 50-55%.   2. Left ventricular cavity size is mildly dilated.   3. There is normal right ventricular global systolic function.    Transthoracic Echo (TTE) Complete 10/17/2023  CONCLUSIONS:   1. Left ventricular systolic function is moderately decreased with a 40-45% estimated ejection fraction.   2. Spectral Doppler shows an impaired relaxation pattern of left ventricular diastolic filling.   3. RV normal size and function.   4. No pericardial effusion.   5. Trace mitral regurgitation.   6. Aortic valve structurally normal.   7. There is global hypokinesis of the left ventricle with minor regional variations.    Transthoracic Echocardiogram (03/17/2022):  Exam quality: technically difficult  The left ventricular chamber size is normal.  There is moderate to severely decreased left ventricular systolic  function.  Estimated ejection fraction is 40%.  There is normal ventricular diastolic filling observed.  The right ventricular global systolic function is normal.  Aortic Valve is not well visualized.  No evidence of aortic regurgitation.  No evidence of aortic valve stenosis.  Mitral valve is structurally normal. No evidence of regurgitation.  Mild tricuspid regurgitation.  Unable to estimate the right ventricular systolic pressure .    Ejection Fractions:  EF   Date/Time Value Ref Range Status   07/10/2024 11:51 AM 53 %      Cath:  [facility] (03/25/2020):   Severe 3 vessels native disease. 100% occluded saphenous vein graft to the right coronary artery. There is small sub branch of the left circumflex after the anastomosis of the left internal mammary artery that has 90% lesion could be the culprit of patient elevated troponin but this  branch is very small for any intervention size is 1 millimeter. Medical management at this point. Mynx will be used for hemostasis. Will optimized his treatment for heart failure.     Stress Test:  No results found for this or any previous visit from the past 1095 days.    Cardiac Testing:   Holter Monitor (10/2023): Predominant rhythm was atrial fibrillation/flutter. The max , minimum 41, average 75 bp. <1% VE, 5 occurrences of VT w/ longed 19 beats. Afib/flutter burden of >99%.       Past Medical History:  He has a past medical history of CHF (congestive heart failure) (Multi), Essential tremor (09/06/2013), Hyperlipidemia, Hypertension, Obstructive sleep apnea (adult) (pediatric) (11/20/2014), Old myocardial infarction (11/20/2014), Other intervertebral disc degeneration, lumbar region (11/20/2014), Personal history of other diseases of the circulatory system (11/20/2014), Personal history of other diseases of the digestive system (11/20/2014), Personal history of other diseases of the musculoskeletal system and connective tissue (11/20/2014), Personal history of other diseases of the respiratory system (11/20/2014), Personal history of other endocrine, nutritional and metabolic disease (11/20/2014), Personal history of other endocrine, nutritional and metabolic disease (11/20/2014), Personal history of other mental and behavioral disorders (07/14/2017), and Type 2 diabetes mellitus (Multi).    Past Surgical History:  He has a past surgical history that includes Coronary artery bypass graft (11/20/2014); Colonoscopy (10/01/2015); Hernia repair; and Small intestine surgery.      Social History:  He reports that he has been smoking cigarettes. He does not have any smokeless tobacco history on file. He reports that he does not currently use alcohol. He reports that he does not use drugs.    Family History:  Family History   Problem Relation Name Age of Onset    Other (blood disease) Brother           Allergies:  House dust, Metformin, and Mold    Inpatient Medications:  Scheduled medications   Medication Dose Route Frequency    acetaminophen  650 mg nasogastric tube q6h    FLUoxetine  40 mg oral Daily    fluticasone furoate-vilanteroL  1 puff inhalation Daily    gabapentin  600 mg oral 4x daily    heparin (porcine)  5,000 Units subcutaneous q8h JIGNESH    insulin lispro  0-15 Units subcutaneous TID    ketorolac  15 mg intravenous q6h    [START ON 7/11/2024] methylPREDNISolone sodium succinate (PF)  40 mg intravenous q24h    metoprolol tartrate  25 mg oral BID    pramipexole  0.125 mg oral TID    rosuvastatin  40 mg oral Daily    spironolactone  12.5 mg oral Daily     PRN medications   Medication    dextrose    dextrose    glucagon    glucagon    ipratropium-albuteroL    morphine    Or    morphine    ondansetron    phenoL     Continuous Medications   Medication Dose Last Rate    dextrose 5%  75 mL/hr 75 mL/hr (07/10/24 0916)     Outpatient Medications:  Current Outpatient Medications   Medication Instructions    albuterol (ProAir HFA) 90 mcg/actuation inhaler inhalation    ascorbic acid (VITAMIN C) 1,000 mg, oral, Daily    b complex vitamins capsule 1 capsule, oral, Daily    blood-glucose sensor (FreeStyle Tricia 3 Sensor) device 1 each, miscellaneous, Every 14 days    cholecalciferol (VITAMIN D-3) 50 mcg, oral, Daily    FLUoxetine (PROZAC) 40 mg, oral, Daily    fluticasone (Flonase) 50 mcg/actuation nasal spray 1 spray, Each Nostril, Daily    fluticasone propion-salmeteroL (Advair Diskus) 250-50 mcg/dose diskus inhaler 1 puff, inhalation, 2 times daily RT, Rinse mouth with water after use to reduce aftertaste and incidence of candidiasis. Do not swallow.    gabapentin (NEURONTIN) 600 mg, oral, QID    insulin degludec (Tresiba FlexTouch U-200) 200 unit/mL (3 mL) injection INJECT 70 UNITS SUBCUTANEOUSLY EVERY DAY 90 DAYS    insulin lispro-aabc (Lyumjev KwikPen U-100 Insulin) 100 unit/mL insulin pen Up to 80 units  daily    metoprolol succinate XL (TOPROL-XL) 50 mg, oral, Daily    MULTIVITAMIN ORAL 1 tablet, oral, Daily    nitroglycerin (NITROSTAT) 0.4 mg, sublingual, As needed    pramipexole (Mirapex) 0.125 mg tablet 1 TABLET IN AM AND MIDDAY, 2-4 PILLS AT BEDTIME ORALLY AS DIRECTED <BR>    rosuvastatin (CRESTOR) 40 mg, oral, Daily    spironolactone (Aldactone) 25 mg tablet 0.5 tablets, oral, Daily    traZODone (Desyrel) 50 mg tablet 1 tablet, oral, Nightly PRN       Physical Exam:  Physical Exam  Constitutional:       Appearance: Normal appearance.   HENT:      Head: Normocephalic.      Nose: Nose normal.      Mouth/Throat:      Mouth: Mucous membranes are moist.      Comments: NGTube in place    Eyes:      Conjunctiva/sclera: Conjunctivae normal.   Cardiovascular:      Rate and Rhythm: Normal rate. Rhythm irregular.      Heart sounds: No murmur heard.  Pulmonary:      Effort: Pulmonary effort is normal.   Abdominal:      General: There is distension.   Musculoskeletal:         General: Normal range of motion.      Cervical back: Normal range of motion.   Skin:     General: Skin is warm.   Neurological:      General: No focal deficit present.      Mental Status: He is alert. Mental status is at baseline.   Psychiatric:         Mood and Affect: Mood normal.     [id number] Score  Age >= 75: 2   Sex Male: 0   CHF History Yes: 1   HTN No: 0   Stroke/TIA/Thromboembolism No: 0   Vascular Dz: CAD/PAD/Aortic Plaque Yes: 1   DM Yes: 1   Total Score 5            Assessment/Plan   Ramesh Morocho is an 82 yo male with a PMH of Afib, CAD s/p CABG (>10y ago), ICM, BRITT, DM type II, Anxiety and depression who presented with nausea, diarrhea and fever, admitted with SBO. Cardiology consulted for perioperative cardiovascular evaluation.     #Perioperative cardiovascular evaluation  #Afib  #CAD w/ hx of CABG    -Most recent [facility] 2020 with 100% occluded SVG to RCA, Lcx 90% lesion treated with medical mgmt d/t vessel small caliber.   -Echo  recommended, reviewed with low normal LVSF, EF 50-55%, LV dilated, normal RVSF  -Pt reports he is not on a blood thinner and was told he did not need to be. Is unable to clarify further. Denies any sig bleeding events.   -Continue home Toprol, Rosuvastatin and Spironolactone  -Recommend starting ASA 81mg daily  -Pt defers anticoagulation at this time (will discuss with his cardiologist) for [id number] score of 5  -RCRI 3, Class IV. Mod risk for perioperative cardiovascular complication.   -May proceed to OR w/o further testing.         NG/OG/Feeding Tube 16 Fr Right nostril (Active)   Output (mL) 850 mL 07/10/24 1400   Number of days: 1       Code Status:  Full Code    I spent  minutes in the professional and overall care of this patient.        Agnieszka Farley PA-C

## 2024-07-10 NOTE — PROGRESS NOTES
07/10/24 1138   Discharge Planning   Living Arrangements Spouse/significant other   Support Systems Spouse/significant other   Assistance Needed A&Ox3, independent with ADLs, no DME, room air at baseline, CPAP at HS and drives.   Type of Residence Private residence   Number of Stairs to Enter Residence 1   Number of Stairs Within Residence 15   Do you have animals or pets at home? Yes   Type of Animals or Pets 3 cats   Home or Post Acute Services None   Does the patient need discharge transport arranged? No   RoundTrip coordination needed? No   Patient expects to be discharged to: Home no needs

## 2024-07-10 NOTE — PROGRESS NOTES
"General/Trauma Surgery Daily Progress Note    Subjective   Does feel like pain and distention have improved today, reports only mild tenderness in the epigastrium compared to yesterday. The NG had an impressive +2L output since being at [facility],  bilious. Reports flatus, no further bouts of diarrhea or BM. Had some nausea overnight that resolved with IV antiemetic.       Objective   Last Recorded Vitals:  Blood pressure 132/70, pulse 60, temperature 36.2 °C (97.2 °F), resp. rate 16, height 1.803 m (5' 10.98\"), weight 101 kg (222 lb 10.6 oz), SpO2 96%.    Intake/Output last 3 Shifts:  I/O last 3 completed shifts:  In: 1331.7 (13.2 mL/kg) [I.V.:1331.7 (13.2 mL/kg)]  Out: 1200 (11.9 mL/kg) [Emesis/NG output:1200]  Weight: 101 kg     Pain Score:  0-10 (Numeric) Pain Score: 4     Physical Exam:  Constitutional: No acute distress, sitting up in bed.  Neuro: Alert, oriented. Follows commands.   Eyes: EOMI. No scleral icterus. Conjunctiva pink.  ENT: MMM.   Heart: Regular rate.  Respiratory: No increased work of breathing or audible wheeze.  Abdomen: Soft, mildly distended. Tender to epigastrium, improved from yesterday.   MSK: Moves all extremities.  Vascular: Palpable pulses throughout, no pitting edema.  Skin: No rashes.   Psychological: Appropriate mood and behavior.            Relevant Results  Laboratory Results:  CBC:   Lab Results   Component Value Date    WBC 4.5 07/10/2024    RBC 4.94 07/10/2024    HGB 15.7 07/10/2024    HCT 49.0 07/10/2024     (L) 07/10/2024       RFP:   Lab Results   Component Value Date     (H) 07/10/2024    K 4.1 07/10/2024     07/10/2024    CO2 31 07/10/2024    BUN 23 07/10/2024    CREATININE 0.93 07/10/2024    CALCIUM 8.2 (L) 07/10/2024    MG 2.07 07/10/2024    PHOS 3.7 07/10/2024        LFTs:   Lab Results   Component Value Date    PROT 5.6 (L) 07/10/2024    ALBUMIN 3.2 (L) 07/10/2024    BILITOT 0.6 07/10/2024    ALKPHOS 37 07/10/2024    AST 15 07/10/2024    ALT 12 " 07/10/2024             Assessment/Plan   This is a 83 y.o. male who is admitted for concern of a bowel obstruction after presenting to [facility] with complaints of nausea and epigastric pain x 1 day. NG placed with bilious output, > 2L output and bilious.     Will hopefully continue to have improved symptoms, although with high NG output, may need to proceed with diagnostic laparoscopy tomorrow. Will reassess in morning.     Plan:  -- Continue NG to continuous wall suction  -- NPO, sips with meds okay  -- Multimodal pain regimen  -- DVT ppx   -- Ambulate encouraged, out of bed to chair  -- IVF, daily labs with electrolyte repletion as necessary  -- Hopeful to avoid surgery, will continue to monitor. Possibility of diagnostic laparoscopy tomorrow if no improvement overnight              Seen and discussed with Dr. Lagos who is in agreement with plan. Please doc halo with questions.     Marti Arellano PA-C        Possible OR tomorrow depending on NG tube output and bowel movements.    I have seen and reviewed the patient with the PA.  I agree with their note as written above.  Any changes or corrections have been made in the body of the note.    Semaj Lagos MD  General Surgery  Office: [phone]  Fax:     [phone]  8:25 AM   07/11/24

## 2024-07-11 ENCOUNTER — ANESTHESIA EVENT (OUTPATIENT)
Dept: OPERATING ROOM | Facility: HOSPITAL | Age: 84
DRG: 388 | End: 2024-07-11
Payer: MEDICARE

## 2024-07-11 ENCOUNTER — ANESTHESIA (OUTPATIENT)
Dept: OPERATING ROOM | Facility: HOSPITAL | Age: 84
DRG: 388 | End: 2024-07-11
Payer: MEDICARE

## 2024-07-11 LAB
ABO GROUP (TYPE) IN BLOOD: NORMAL
ALBUMIN SERPL BCP-MCNC: 3.1 G/DL (ref 3.4–5)
ALP SERPL-CCNC: 35 U/L (ref 33–136)
ALT SERPL W P-5'-P-CCNC: 11 U/L (ref 10–52)
ANION GAP SERPL CALC-SCNC: 8 MMOL/L (ref 10–20)
ANTIBODY SCREEN: NORMAL
AST SERPL W P-5'-P-CCNC: 15 U/L (ref 9–39)
BASOPHILS # BLD AUTO: 0.02 X10*3/UL (ref 0–0.1)
BASOPHILS NFR BLD AUTO: 0.3 %
BILIRUB SERPL-MCNC: 0.6 MG/DL (ref 0–1.2)
BUN SERPL-MCNC: 31 MG/DL (ref 6–23)
CALCIUM SERPL-MCNC: 8.4 MG/DL (ref 8.6–10.3)
CHLORIDE SERPL-SCNC: 99 MMOL/L (ref 98–107)
CO2 SERPL-SCNC: 41 MMOL/L (ref 21–32)
CREAT SERPL-MCNC: 0.97 MG/DL (ref 0.5–1.3)
EGFRCR SERPLBLD CKD-EPI 2021: 78 ML/MIN/1.73M*2
EOSINOPHIL # BLD AUTO: 0.1 X10*3/UL (ref 0–0.4)
EOSINOPHIL NFR BLD AUTO: 1.7 %
ERYTHROCYTE [DISTWIDTH] IN BLOOD BY AUTOMATED COUNT: 15 % (ref 11.5–14.5)
GLUCOSE BLD MANUAL STRIP-MCNC: 164 MG/DL (ref 74–99)
GLUCOSE BLD MANUAL STRIP-MCNC: 165 MG/DL (ref 74–99)
GLUCOSE BLD MANUAL STRIP-MCNC: 232 MG/DL (ref 74–99)
GLUCOSE BLD MANUAL STRIP-MCNC: 244 MG/DL (ref 74–99)
GLUCOSE SERPL-MCNC: 156 MG/DL (ref 74–99)
HCT VFR BLD AUTO: 46.4 % (ref 41–52)
HGB BLD-MCNC: 14.8 G/DL (ref 13.5–17.5)
IMM GRANULOCYTES # BLD AUTO: 0.02 X10*3/UL (ref 0–0.5)
IMM GRANULOCYTES NFR BLD AUTO: 0.3 % (ref 0–0.9)
LYMPHOCYTES # BLD AUTO: 1.09 X10*3/UL (ref 0.8–3)
LYMPHOCYTES NFR BLD AUTO: 18.7 %
MAGNESIUM SERPL-MCNC: 2.09 MG/DL (ref 1.6–2.4)
MCH RBC QN AUTO: 31.6 PG (ref 26–34)
MCHC RBC AUTO-ENTMCNC: 31.9 G/DL (ref 32–36)
MCV RBC AUTO: 99 FL (ref 80–100)
MONOCYTES # BLD AUTO: 0.67 X10*3/UL (ref 0.05–0.8)
MONOCYTES NFR BLD AUTO: 11.5 %
NEUTROPHILS # BLD AUTO: 3.93 X10*3/UL (ref 1.6–5.5)
NEUTROPHILS NFR BLD AUTO: 67.5 %
NRBC BLD-RTO: 0 /100 WBCS (ref 0–0)
PHOSPHATE SERPL-MCNC: 3 MG/DL (ref 2.5–4.9)
PLATELET # BLD AUTO: 107 X10*3/UL (ref 150–450)
POTASSIUM SERPL-SCNC: 3.5 MMOL/L (ref 3.5–5.3)
PROT SERPL-MCNC: 5.4 G/DL (ref 6.4–8.2)
RBC # BLD AUTO: 4.68 X10*6/UL (ref 4.5–5.9)
RH FACTOR (ANTIGEN D): NORMAL
SODIUM SERPL-SCNC: 144 MMOL/L (ref 136–145)
WBC # BLD AUTO: 5.8 X10*3/UL (ref 4.4–11.3)

## 2024-07-11 PROCEDURE — 85025 COMPLETE CBC W/AUTO DIFF WBC: CPT | Performed by: STUDENT IN AN ORGANIZED HEALTH CARE EDUCATION/TRAINING PROGRAM

## 2024-07-11 PROCEDURE — 2500000001 HC RX 250 WO HCPCS SELF ADMINISTERED DRUGS (ALT 637 FOR MEDICARE OP): Performed by: PHYSICIAN ASSISTANT

## 2024-07-11 PROCEDURE — 99233 SBSQ HOSP IP/OBS HIGH 50: CPT

## 2024-07-11 PROCEDURE — 2500000001 HC RX 250 WO HCPCS SELF ADMINISTERED DRUGS (ALT 637 FOR MEDICARE OP)

## 2024-07-11 PROCEDURE — 36415 COLL VENOUS BLD VENIPUNCTURE: CPT | Performed by: STUDENT IN AN ORGANIZED HEALTH CARE EDUCATION/TRAINING PROGRAM

## 2024-07-11 PROCEDURE — 2500000001 HC RX 250 WO HCPCS SELF ADMINISTERED DRUGS (ALT 637 FOR MEDICARE OP): Performed by: STUDENT IN AN ORGANIZED HEALTH CARE EDUCATION/TRAINING PROGRAM

## 2024-07-11 PROCEDURE — 99232 SBSQ HOSP IP/OBS MODERATE 35: CPT | Performed by: SURGERY

## 2024-07-11 PROCEDURE — 84100 ASSAY OF PHOSPHORUS: CPT | Performed by: PHYSICIAN ASSISTANT

## 2024-07-11 PROCEDURE — 86901 BLOOD TYPING SEROLOGIC RH(D): CPT | Performed by: ANESTHESIOLOGY

## 2024-07-11 PROCEDURE — 2500000002 HC RX 250 W HCPCS SELF ADMINISTERED DRUGS (ALT 637 FOR MEDICARE OP, ALT 636 FOR OP/ED)

## 2024-07-11 PROCEDURE — 1100000001 HC PRIVATE ROOM DAILY

## 2024-07-11 PROCEDURE — 82947 ASSAY GLUCOSE BLOOD QUANT: CPT

## 2024-07-11 PROCEDURE — 2500000004 HC RX 250 GENERAL PHARMACY W/ HCPCS (ALT 636 FOR OP/ED): Performed by: STUDENT IN AN ORGANIZED HEALTH CARE EDUCATION/TRAINING PROGRAM

## 2024-07-11 PROCEDURE — 94664 DEMO&/EVAL PT USE INHALER: CPT

## 2024-07-11 PROCEDURE — 2500000004 HC RX 250 GENERAL PHARMACY W/ HCPCS (ALT 636 FOR OP/ED): Performed by: PHYSICIAN ASSISTANT

## 2024-07-11 PROCEDURE — 94640 AIRWAY INHALATION TREATMENT: CPT

## 2024-07-11 PROCEDURE — 80053 COMPREHEN METABOLIC PANEL: CPT | Performed by: STUDENT IN AN ORGANIZED HEALTH CARE EDUCATION/TRAINING PROGRAM

## 2024-07-11 PROCEDURE — 36415 COLL VENOUS BLD VENIPUNCTURE: CPT | Performed by: ANESTHESIOLOGY

## 2024-07-11 PROCEDURE — 83735 ASSAY OF MAGNESIUM: CPT | Performed by: PHYSICIAN ASSISTANT

## 2024-07-11 PROCEDURE — 2500000004 HC RX 250 GENERAL PHARMACY W/ HCPCS (ALT 636 FOR OP/ED)

## 2024-07-11 PROCEDURE — 2500000002 HC RX 250 W HCPCS SELF ADMINISTERED DRUGS (ALT 637 FOR MEDICARE OP, ALT 636 FOR OP/ED): Performed by: STUDENT IN AN ORGANIZED HEALTH CARE EDUCATION/TRAINING PROGRAM

## 2024-07-11 RX ORDER — IPRATROPIUM BROMIDE AND ALBUTEROL SULFATE 2.5; .5 MG/3ML; MG/3ML
3 SOLUTION RESPIRATORY (INHALATION)
Status: DISCONTINUED | OUTPATIENT
Start: 2024-07-11 | End: 2024-07-11

## 2024-07-11 RX ORDER — GABAPENTIN 300 MG/1
600 CAPSULE ORAL 4 TIMES DAILY
Status: DISCONTINUED | OUTPATIENT
Start: 2024-07-11 | End: 2024-07-15 | Stop reason: HOSPADM

## 2024-07-11 RX ORDER — IPRATROPIUM BROMIDE AND ALBUTEROL SULFATE 2.5; .5 MG/3ML; MG/3ML
3 SOLUTION RESPIRATORY (INHALATION) EVERY 2 HOUR PRN
Status: DISCONTINUED | OUTPATIENT
Start: 2024-07-11 | End: 2024-07-15 | Stop reason: HOSPADM

## 2024-07-11 RX ORDER — POTASSIUM CHLORIDE 14.9 MG/ML
20 INJECTION INTRAVENOUS
Status: DISCONTINUED | OUTPATIENT
Start: 2024-07-11 | End: 2024-07-11

## 2024-07-11 ASSESSMENT — COGNITIVE AND FUNCTIONAL STATUS - GENERAL
DAILY ACTIVITIY SCORE: 22
CLIMB 3 TO 5 STEPS WITH RAILING: A LITTLE
MOBILITY SCORE: 24
MOBILITY SCORE: 23
DAILY ACTIVITIY SCORE: 23
DRESSING REGULAR LOWER BODY CLOTHING: A LITTLE
DRESSING REGULAR LOWER BODY CLOTHING: A LITTLE
HELP NEEDED FOR BATHING: A LITTLE

## 2024-07-11 ASSESSMENT — PAIN - FUNCTIONAL ASSESSMENT
PAIN_FUNCTIONAL_ASSESSMENT: 0-10

## 2024-07-11 ASSESSMENT — PAIN DESCRIPTION - LOCATION: LOCATION: ABDOMEN

## 2024-07-11 ASSESSMENT — PAIN SCALES - GENERAL
PAINLEVEL_OUTOF10: 0 - NO PAIN
PAINLEVEL_OUTOF10: 8

## 2024-07-11 NOTE — CARE PLAN
The patient's goals for the shift include      The clinical goals for the shift include PT pain will be at a 5 or less throughout shift.      Problem: Pain  Goal: Takes deep breaths with improved pain control throughout the shift  Outcome: Progressing  Goal: Turns in bed with improved pain control throughout the shift  Outcome: Progressing  Goal: Walks with improved pain control throughout the shift  Outcome: Progressing  Goal: Performs ADL's with improved pain control throughout shift  Outcome: Progressing  Goal: Participates in PT with improved pain control throughout the shift  Outcome: Progressing  Goal: Free from opioid side effects throughout the shift  Outcome: Progressing  Goal: Free from acute confusion related to pain meds throughout the shift  Outcome: Progressing     Problem: Pain - Adult  Goal: Verbalizes/displays adequate comfort level or baseline comfort level  Outcome: Progressing     Problem: Safety - Adult  Goal: Free from fall injury  Outcome: Progressing     Problem: Discharge Planning  Goal: Discharge to home or other facility with appropriate resources  Outcome: Progressing     Problem: Chronic Conditions and Co-morbidities  Goal: Patient's chronic conditions and co-morbidity symptoms are monitored and maintained or improved  Outcome: Progressing

## 2024-07-11 NOTE — PROGRESS NOTES
"General/Trauma Surgery Daily Progress Note    Subjective   Abdomen remains minimally tender and less distended, although patient complains of nausea this morning that is not being alleviated by antiemetics. NG functioning, somewhat lower volume but remains bilious. Less flatus than previous day. No bowel movement.        Objective   Last Recorded Vitals:  Blood pressure 128/62, pulse 54, temperature 36.4 °C (97.5 °F), temperature source Temporal, resp. rate 18, height 1.803 m (5' 10.98\"), weight 101 kg (222 lb 10.6 oz), SpO2 95%.    Intake/Output last 3 Shifts:  I/O last 3 completed shifts:  In: 2910.4 (28.8 mL/kg) [I.V.:2910.4 (28.8 mL/kg)]  Out: 3500 (34.7 mL/kg) [Emesis/NG output:3500]  Weight: 101 kg     Pain Score:  0-10 (Numeric) Pain Score: 0 - No pain     Physical Exam:  Constitutional: No acute distress, sitting up in bed.  Neuro: Alert, oriented. Follows commands.   Eyes: EOMI. No scleral icterus. Conjunctiva pink.  ENT: NG in place and bilious.  Heart: Regular rate.  Respiratory: No increased work of breathing or audible wheeze.  Abdomen: Soft, nondistended. Nontender.  MSK: Moves all extremities.  Vascular: Palpable pulses throughout, no pitting edema.  Skin: No rashes.   Psychological: Appropriate mood and behavior.            Relevant Results  Laboratory Results:  CBC:   Lab Results   Component Value Date    WBC 5.8 07/11/2024    RBC 4.68 07/11/2024    HGB 14.8 07/11/2024    HCT 46.4 07/11/2024     (L) 07/11/2024       RFP:   Lab Results   Component Value Date     07/11/2024    K 3.5 07/11/2024    CL 99 07/11/2024    CO2 41 (HH) 07/11/2024    BUN 31 (H) 07/11/2024    CREATININE 0.97 07/11/2024    CALCIUM 8.4 (L) 07/11/2024    MG 2.09 07/11/2024    PHOS 3.0 07/11/2024        LFTs:   Lab Results   Component Value Date    PROT 5.4 (L) 07/11/2024    ALBUMIN 3.1 (L) 07/11/2024    BILITOT 0.6 07/11/2024    ALKPHOS 35 07/11/2024    AST 15 07/11/2024    ALT 11 07/11/2024     "         Assessment/Plan   This is a 83 y.o. male who is admitted for concern of a bowel obstruction after presenting to [facility] with complaints of nausea and epigastric pain x 1 day.      Was hoping to avoid surgery this admission, although patient remains with bilious NG output and has nausea uncontrolled with antiemetics this morning. Discussed we will plan for a diagnostic laparoscopy to assess for cause of bowel obstruction today, likely afternoon.     Plan:  -- Continue NG to continuous wall suction  -- NPO, sips with meds okay  -- OK to have morning meds and heparin prior to surgery  -- Multimodal pain regimen  -- Ambulate encouraged, out of bed to chair  -- IVF, daily labs with electrolyte repletion as necessary  -- Plan for diagnostic laparoscopy when anesthesia allows              Seen and discussed with Dr. Lagos who is in agreement with plan. Please secure chat with questions.     Marti Arellano PA-C      I have seen and reviewed the patient with the PA.  I agree with their note as written above.  Any changes or corrections have been made in the body of the note.    Semaj Lagos MD  General Surgery  Office: [phone]  Fax:     [phone]  3:17 PM   07/11/24

## 2024-07-11 NOTE — CARE PLAN
Problem: Pain  Goal: Takes deep breaths with improved pain control throughout the shift  Outcome: Progressing  Goal: Turns in bed with improved pain control throughout the shift  Outcome: Progressing  Goal: Walks with improved pain control throughout the shift  Outcome: Progressing  Goal: Performs ADL's with improved pain control throughout shift  Outcome: Progressing  Goal: Participates in PT with improved pain control throughout the shift  Outcome: Progressing  Goal: Free from opioid side effects throughout the shift  Outcome: Progressing  Goal: Free from acute confusion related to pain meds throughout the shift  Outcome: Progressing   The patient's goals for the shift include  sleep    The clinical goals for the shift include Patient will rate pain at a 5 or less this shift.    Over the shift, the patient did not make progress toward the following goals. Barriers to progression include pain. Recommendations to address these barriers include pain meds, repositioning, and rest. Patient was able to be NPO from midnight until end of shift.

## 2024-07-11 NOTE — PROGRESS NOTES
"Ramesh Morocho is a 83 y.o. male on day 2 of admission presenting with SBO (small bowel obstruction) (Multi).    Subjective   Pt reports continued nausea, states he is passing minimal flatus, has not has a BM since arriving to hospital. Reports mild abdominal pain, tender to epigastrium, similar to yesterday. Abdomen is soft but mildly distended. Pt denies any fever, chills, chest pain, SOB, diarrhea, vomiting. Currently on 2L NC. No significant overnight events. Scheduled for exploratory laparotomy later this afternoon.        Objective     Physical Exam  Constitutional:       General: He is not in acute distress.     Appearance: He is obese. He is not toxic-appearing or diaphoretic.   HENT:      Head: Normocephalic and atraumatic.      Comments: NG Tube in place   Eyes:      Extraocular Movements: Extraocular movements intact.      Conjunctiva/sclera: Conjunctivae normal.      Pupils: Pupils are equal, round, and reactive to light.   Cardiovascular:      Rate: Normal rate       Pulses: Normal pulses.      Heart sounds: No murmur heard.  Pulmonary:      Effort: Pulmonary effort is normal. No respiratory distress.   Abdominal:      General: There is distension.      Tenderness: There is abdominal tenderness.   Skin:     General: Skin is warm and dry.   Neurological:      Mental Status: He is alert and oriented to person, place, and time.   Psychiatric:         Mood and Affect: Mood normal.         Behavior: Behavior normal.        Last Recorded Vitals  Blood pressure 128/62, pulse 54, temperature 36.4 °C (97.5 °F), temperature source Temporal, resp. rate 18, height 1.803 m (5' 10.98\"), weight 101 kg (222 lb 10.6 oz), SpO2 95%.  Intake/Output last 3 Shifts:  I/O last 3 completed shifts:  In: 2910.4 (28.8 mL/kg) [I.V.:2910.4 (28.8 mL/kg)]  Out: 3500 (34.7 mL/kg) [Emesis/NG output:3500]  Weight: 101 kg     Relevant Results      Scheduled medications  acetaminophen, 650 mg, nasogastric tube, q6h  FLUoxetine, 40 mg, oral, " Daily  fluticasone furoate-vilanteroL, 1 puff, inhalation, Daily  gabapentin, 600 mg, oral, 4x daily  heparin (porcine), 5,000 Units, subcutaneous, q8h JIGNESH  insulin lispro, 0-15 Units, subcutaneous, q6h  ketorolac, 15 mg, intravenous, q6h  methylPREDNISolone sodium succinate (PF), 40 mg, intravenous, q24h  metoprolol tartrate, 25 mg, oral, BID  pramipexole, 0.125 mg, oral, TID  rosuvastatin, 40 mg, oral, Daily  spironolactone, 12.5 mg, oral, Daily      Continuous medications  dextrose 5%, 75 mL/hr, Last Rate: 75 mL/hr (07/11/24 0619)      PRN medications  PRN medications: dextrose, dextrose, glucagon, glucagon, ipratropium-albuteroL, morphine **OR** morphine, ondansetron, phenoL     Results for orders placed or performed during the hospital encounter of 07/09/24 (from the past 24 hour(s))   POCT GLUCOSE   Result Value Ref Range    POCT Glucose 164 (H) 74 - 99 mg/dL   Transthoracic Echo (TTE) Complete   Result Value Ref Range    AV pk alma delia 1.22 m/s    AV mn grad 3.2 mmHg    LVOT diam 1.98 cm    MV E/A ratio 1.24     LA vol index A/L 24.9 ml/m2    Tricuspid annular plane systolic excursion 2.2 cm    LV EF 53 %    RV free wall pk S' 8.00 cm/s    LVIDd 5.58 cm    Aortic Valve Area by Continuity of VTI 2.71 cm2    Aortic Valve Area by Continuity of Peak Velocity 2.42 cm2    AV pk grad 6.0 mmHg    LV A4C EF 47.0    POCT GLUCOSE   Result Value Ref Range    POCT Glucose 180 (H) 74 - 99 mg/dL   POCT GLUCOSE   Result Value Ref Range    POCT Glucose 121 (H) 74 - 99 mg/dL   POCT GLUCOSE   Result Value Ref Range    POCT Glucose 165 (H) 74 - 99 mg/dL   Comprehensive Metabolic Panel   Result Value Ref Range    Glucose 156 (H) 74 - 99 mg/dL    Sodium 144 136 - 145 mmol/L    Potassium 3.5 3.5 - 5.3 mmol/L    Chloride 99 98 - 107 mmol/L    Bicarbonate 41 (HH) 21 - 32 mmol/L    Anion Gap 8 (L) 10 - 20 mmol/L    Urea Nitrogen 31 (H) 6 - 23 mg/dL    Creatinine 0.97 0.50 - 1.30 mg/dL    eGFR 78 >60 mL/min/1.73m*2    Calcium 8.4 (L) 8.6 -  10.3 mg/dL    Albumin 3.1 (L) 3.4 - 5.0 g/dL    Alkaline Phosphatase 35 33 - 136 U/L    Total Protein 5.4 (L) 6.4 - 8.2 g/dL    AST 15 9 - 39 U/L    Bilirubin, Total 0.6 0.0 - 1.2 mg/dL    ALT 11 10 - 52 U/L   CBC and Auto Differential   Result Value Ref Range    WBC 5.8 4.4 - 11.3 x10*3/uL    nRBC 0.0 0.0 - 0.0 /100 WBCs    RBC 4.68 4.50 - 5.90 x10*6/uL    Hemoglobin 14.8 13.5 - 17.5 g/dL    Hematocrit 46.4 41.0 - 52.0 %    MCV 99 80 - 100 fL    MCH 31.6 26.0 - 34.0 pg    MCHC 31.9 (L) 32.0 - 36.0 g/dL    RDW 15.0 (H) 11.5 - 14.5 %    Platelets 107 (L) 150 - 450 x10*3/uL    Neutrophils % 67.5 40.0 - 80.0 %    Immature Granulocytes %, Automated 0.3 0.0 - 0.9 %    Lymphocytes % 18.7 13.0 - 44.0 %    Monocytes % 11.5 2.0 - 10.0 %    Eosinophils % 1.7 0.0 - 6.0 %    Basophils % 0.3 0.0 - 2.0 %    Neutrophils Absolute 3.93 1.60 - 5.50 x10*3/uL    Immature Granulocytes Absolute, Automated 0.02 0.00 - 0.50 x10*3/uL    Lymphocytes Absolute 1.09 0.80 - 3.00 x10*3/uL    Monocytes Absolute 0.67 0.05 - 0.80 x10*3/uL    Eosinophils Absolute 0.10 0.00 - 0.40 x10*3/uL    Basophils Absolute 0.02 0.00 - 0.10 x10*3/uL   Magnesium   Result Value Ref Range    Magnesium 2.09 1.60 - 2.40 mg/dL   Phosphorus   Result Value Ref Range    Phosphorus 3.0 2.5 - 4.9 mg/dL   Type And Screen   Result Value Ref Range    ABO TYPE A     Rh TYPE POS     ANTIBODY SCREEN NEG       Transthoracic Echo (TTE) Complete    Result Date: 7/10/2024   [facility], [address] [address]               Tel [phone] and Fax [phone] TRANSTHORACIC ECHOCARDIOGRAM REPORT  Patient Name:      ANNITA Martínez Physician:    [id number] Harman Yanez MD Study Date:        7/10/2024            Ordering Provider:    [id number] SHANTEL DEE MRN/PID:           [id number]             Fellow: Accession#:         [id number]         Nurse:                Zoila Valentine RN Date of Birth/Age: 1940 / 83 years Sonographer:          Zoila Call                                                               RDCS Gender:            M                    Additional Staff: Height:            180.34 cm            Admit Date:           7/9/2024 Weight:            100.70 kg            Admission Status:     Inpatient -                                                               Routine BSA / BMI:         2.20 m2 / 30.96      Encounter#:           [id number]                    kg/m2 Blood Pressure:    132/61 mmHg          Department Location:  [facility] Non                                                               Invasive Study Type:    TRANSTHORACIC ECHO (TTE) COMPLETE Diagnosis/ICD: Heart failure, unspecified-I50.9; Abnormal electrocardiogram                [ECG] [EKG]-R94.31 Indication:    Abnormal EKG, Congestive Heart Failure CPT Code:      Echo Complete w Full Doppler-[id number] Patient History: CABG:              Mulitivessel CABG. Diabetes:          Yes Pertinent History: CAD, CHF, A-Fib and HTN. Study Detail: The following Echo studies were performed: 2D, M-Mode, Doppler and               color flow. Image quality for this study is poor. Technically               challenging study due to poor acoustic windows. Definity used as a               contrast agent for endocardial border definition. Total contrast               used for this procedure was 1 mL via IV push.  PHYSICIAN INTERPRETATION: Left Ventricle: The left ventricular systolic function is low normal, with a visually estimated ejection fraction of 50-55%. There are no regional wall motion abnormalities. The left ventricular cavity size is mildly dilated. There is concentric left ventricular hypertrophy. Left ventricular diastolic filling was indeterminate. Left Atrium: The left atrium was not  well visualized. Right Ventricle: The right ventricle is normal in size. There is normal right ventricular global systolic function. RV free wall is not well visualized. Right Atrium: The right atrium is upper limits of normal in size. Aortic Valve: The aortic valve is trileaflet. There is mild aortic valve cusp calcification. There is no evidence of aortic valve stenosis. The aortic valve dimensionless index is 0.88. There is no evidence of aortic valve regurgitation. The peak instantaneous gradient of the aortic valve is 6.0 mmHg. The mean gradient of the aortic valve is 3.2 mmHg. Mitral Valve: The mitral valve is normal in structure. There is trace to mild mitral valve regurgitation. Tricuspid Valve: The tricuspid valve was not well visualized. There is trace tricuspid regurgitation. The right ventricular systolic pressure is unable to be estimated. Pulmonic Valve: The pulmonic valve is not well visualized. There is physiologic pulmonic valve regurgitation. Pericardium: There is no pericardial effusion noted. Aorta: The aortic root was not well visualized. Systemic Veins: The inferior vena cava appears dilated. There is less than 50% IVC collapse with inspiration. In comparison to the previous echocardiogram(s): There are no prior studies on this patient for comparison purposes.  CONCLUSIONS:  1. The left ventricular systolic function is low normal, with a visually estimated ejection fraction of 50-55%.  2. Left ventricular cavity size is mildly dilated.  3. There is normal right ventricular global systolic function. QUANTITATIVE DATA SUMMARY: 2D MEASUREMENTS:                           Normal Ranges: IVSd:          1.37 cm    (0.6-1.1cm) LVPWd:         1.12 cm    (0.6-1.1cm) LVIDd:         5.58 cm    (3.9-5.9cm) LVIDs:         4.32 cm LV Mass Index: 132.6 g/m2 LV % FS        22.5 % LA VOLUME:                               Normal Ranges: LA Vol A4C:        49.3 ml    (22+/-6mL/m2) LA Vol A2C:        57.7 ml LA Vol  BP:         54.9 ml LA Vol Index A4C:  22.4 ml/m2 LA Vol Index A2C:  26.2 ml/m2 LA Vol Index BP:   24.9 ml/m2 LA Area A4C:       17.7 cm2 LA Area A2C:       18.6 cm2 LA Major Axis A4C: 5.4 cm LA Major Axis A2C: 5.1 cm LA Volume Index:   24.9 ml/m2 LA Vol A4C:        46.6 ml LA Vol A2C:        54.1 ml RA VOLUME BY A/L METHOD:                       Normal Ranges: RA Area A4C: 18.5 cm2 M-MODE MEASUREMENTS:                  Normal Ranges: Ao Root: 3.90 cm (2.0-3.7cm) LAs:     4.41 cm (2.7-4.0cm) LV SYSTOLIC FUNCTION BY 2D PLANIMETRY (MOD):                      Normal Ranges: EF-A4C View:    47 % (>=55%) EF-A2C View:    49 % EF-Biplane:     49 % EF-Visual:      53 % LV EF Reported: 53 % LV DIASTOLIC FUNCTION:                             Normal Ranges: MV Peak E:      1.04 m/s    (0.7-1.2 m/s) MV Peak A:      0.83 m/s    (0.42-0.7 m/s) E/A Ratio:      1.24        (1.0-2.2) MV e'           0.075 m/s   (>8.0) MV lateral e'   0.09 m/s MV medial e'    0.06 m/s MV A Dur:       145.33 msec E/e' Ratio:     13.81       (<8.0) PulmV Sys Pravin:  42.02 cm/s PulmV Dan Pravin: 45.54 cm/s PulmV S/D Pravin:  0.92 MITRAL VALVE:                 Normal Ranges: MV DT: 198 msec (150-240msec) AORTIC VALVE:                                   Normal Ranges: AoV Vmax:                1.22 m/s (<=1.7m/s) AoV Peak P.0 mmHg (<20mmHg) AoV Mean PG:             3.2 mmHg (1.7-11.5mmHg) LVOT Max Pravin:            0.96 m/s (<=1.1m/s) AoV VTI:                 29.46 cm (18-25cm) LVOT VTI:                25.98 cm LVOT Diameter:           1.98 cm  (1.8-2.4cm) AoV Area, VTI:           2.71 cm2 (2.5-5.5cm2) AoV Area,Vmax:           2.42 cm2 (2.5-4.5cm2) AoV Dimensionless Index: 0.88  RIGHT VENTRICLE: RV Basal 3.30 cm RV Mid   2.10 cm RV Major 8.2 cm TAPSE:   22.0 mm RV s'    0.08 m/s TRICUSPID VALVE/RVSP:                   Normal Ranges: IVC Diam: 2.20 cm PULMONIC VALVE:                      Normal Ranges: PV Max Pravin: 0.8 m/s  (0.6-0.9m/s) PV Max P.4  mmHg Pulmonary Veins: PulmV Dan Pravin: 45.54 cm/s PulmV S/D Pravin:  0.92 PulmV Sys Pravin:  42.02 cm/s  [id number] Harman Yanez MD Electronically signed on 7/10/2024 at 2:31:10 PM  ** Final **     XR chest 1 view    Result Date: 7/10/2024  Interpreted By:  Mando Almanza, STUDY: XR CHEST 1 VIEW;  7/10/2024 9:17 am   INDICATION: Signs/Symptoms:sob.   COMPARISON: CT scan abdomen and pelvis from 07/09/2024. Chest x-rays, most recent from 07/09/2024.   ACCESSION NUMBER(S): [id number]   ORDERING CLINICIAN: ALEJANDRA CEDENO   TECHNIQUE: Single AP portable view of the chest was obtained.   FINDINGS: MEDIASTINUM/ LUNGS/ BONIFACIO: Previous CABG. Cardiomegaly. No vascular congestion or pleural effusion. There is a stable battery pack overlying the left chest with electrodes  directed cephalad in the left neck. Stable NG tube in place. Scant new haziness at the right lung base. Left lung remains clear.   No pneumothorax. No tracheal deviation. No abnormal hilar fullness or gross mass on either side.   BONES: No lytic or blastic destructive bone lesion.   UPPER ABDOMEN: Grossly intact.       Previous CABG.  Mild cardiomegaly.  Currently without radiographic evidence of CHF  .   Grossly stable NG tube in place.   Mild new atelectasis versus tiny new infiltrate at the right lung base.   MACRO: None   Signed by: Mando Almanza 7/10/2024 9:41 AM Dictation workstation:   [id number]    ECG 12 Lead    Result Date: 7/10/2024  Atrial fibrillation Low voltage QRS ST & T wave abnormality, consider anterolateral ischemia Abnormal ECG When compared with ECG of 11-OCT-2023 15:44, Atrial fibrillation has replaced Sinus rhythm Borderline criteria for Anterior infarct are no longer Present T wave inversion more evident in Anterolateral leads    XR chest abdomen for OG NG placement    Result Date: 7/9/2024  Interpreted By:  Finkelstein, Evan, STUDY: XR CHEST ABDOMEN FOR OG NG PLACEMENT;  7/9/2024 6:36 am   INDICATION: Signs/Symptoms:NGT verification.   COMPARISON:  Chest radiograph 07/09/2024. CT abdomen pelvis 07/09/2024.   ACCESSION NUMBER(S): [id number]   ORDERING CLINICIAN: ALEJANDRA ANGLIN   FINDINGS: Interval placement of an NG tube which courses below the diaphragm, tip overlying the left upper quadrant in the expected location of the stomach. Dilated loops of bowel in the upper abdomen corresponding to small-bowel obstruction seen on CT. Bibasilar atelectasis.       NG tube tip overlies the left upper quadrant in the expected location of the stomach. Dilated loops of small bowel compatible with small-bowel obstruction seen on prior CT.     MACRO: None.   Signed by: Evan Finkelstein 7/9/2024 7:00 AM Dictation workstation:   [id number]    XR chest 2 views    Result Date: 7/9/2024  Interpreted By:  Finkelstein, Evan, STUDY: XR CHEST 2 VIEWS;  7/9/2024 5:16 am   INDICATION: Signs/Symptoms:SOB.   COMPARISON: Chest radiograph 05/02/2024   ACCESSION NUMBER(S): [id number]   ORDERING CLINICIAN: ALEJANDRA ANGLIN   FINDINGS: Redemonstrated left chest wall stimulator with leads coursing into the neck. Median sternotomy wires are again seen.   CARDIOMEDIASTINAL SILHOUETTE: Cardiomediastinal silhouette is stable in size and configuration.   LUNGS: No pulmonary consolidation, pleural effusion or pneumothorax.   ABDOMEN: No remarkable upper abdominal findings.   BONES: No acute osseous abnormality.       No radiographic evidence of acute cardiopulmonary pathology.   MACRO: None.   Signed by: Evan Finkelstein 7/9/2024 5:40 AM Dictation workstation:   [id number]    CT abdomen pelvis w IV contrast    Result Date: 7/9/2024  Interpreted By:  Finkelstein, Evan, STUDY: CT ABDOMEN PELVIS W IV CONTRAST;  7/9/2024 5:07 am   INDICATION: Signs/Symptoms:Abdominal pain, nausea and diarrhea.   COMPARISON: CT abdomen pelvis 05/22/2023   ACCESSION NUMBER(S): [id number]   ORDERING CLINICIAN: ALEJANDRA ANGLIN   TECHNIQUE: Axial CT images of the abdomen and pelvis with coronal  and sagittal reconstructed images obtained after intravenous administration of 75 mL Omnipaque 350   FINDINGS: LOWER CHEST: Bibasilar atelectasis.   ABDOMEN:   LIVER: Normal attenuation and contour. BILE DUCTS: Normal caliber. GALLBLADDER: Surgically absent. PORTAL VEIN: Patent SPLEEN: Calcifications scattered throughout the spleen suggest sequela of prior granulomatous disease. PANCREAS: Unremarkable. ADRENALS: Unremarkable. KIDNEYS, URETERS and URINARY BLADDER: Symmetric renal enhancement. No hydronephrosis or perinephric fluid collection.  Bladder is within normal limits REPRODUCTIVE ORGANS: No pelvic masses.   ABDOMINAL WALL: Laxity of the ventral abdominal wall musculature. Fat containing inguinal hernias bilaterally. PERITONEUM: No ascites, free air or fluid collection.   BOWEL: The stomach is decompressed, which limits evaluation. Dilated loops of small bowel throughout the abdomen and pelvis. There is a transition point in the right mid abdomen and decompressed loops of distal small bowel. Scattered colonic diverticula without pericolonic inflammatory stranding. The appendix is not definitively visualized, without focal pericecal inflammatory stranding.   VESSELS: Moderate aortoiliac calcifications. No aortic aneurysm. RETROPERITONEUM: No pathologically enlarged retroperitoneal lymph nodes.   BONES: No acute osseous abnormality. Degenerative changes throughout the spine.       Dilated loops of small bowel throughout the abdomen and pelvis with a transition point in the right mid abdomen and decompressed loops of distal small bowel. Findings are compatible with small bowel obstruction   Scattered colonic diverticulosis without CT evidence of acute diverticulitis.     MACRO: None.   Signed by: Evan Finkelstein 7/9/2024 5:39 AM Dictation workstation:   [id number]    MR lumbar spine wo IV contrast    Result Date: 6/14/2024  Interpreted By:  Edd Corona,  and Reece Dean STUDY: MR LUMBAR SPINE WO IV CONTRAST;   6/12/2024 3:23 pm   INDICATION: Signs/Symptoms:Lumbar radiculopathy.   COMPARISON: MRI lumbar spine 02/27/2016   ACCESSION NUMBER(S): [id number]   ORDERING CLINICIAN: OTILIA CHAPMAN   TECHNIQUE: Sagittal T1, T2, STIR, axial T1 and T2 weighted images of the lumbar spine were acquired.   FINDINGS: Alignment: There is straightening of the expected lordotic curvature of the lumbar spine, likely secondary to positioning.   Vertebrae/Intervertebral Discs: The vertebral bodies demonstrate expected height. Multilevel spondylosis is present predominantly involving the lower lumbar spine with note made of anterior marginal osteophytosis involving L4-L5 and L5-S1. At L4-L5 and L5-S1, there are additionally T2 hyperintense, T1 hypointense marrow signal changes without intact endplates favoring Modic type 1 signal abnormality. There is multilevel decreased central T2 hyperintense signal within the intervertebral disc, predominantly involving L2-L3 through L5-S1 favoring disc desiccation. In addition there is loss of intervertebral disc height at L4-L5 and L5-S1 with posterior annular fissures.   Conus medullaris: The lower thoracic cord appears unremarkable. The conus medullaris terminates at L1.   T11-T12: Minimal facet osteoarthropathy. No significant central canal or neural foraminal stenosis.   T12-L1: No significant central canal or foraminal stenosis.   L1-2: No significant central canal stenosis. Minimal foraminal stenosis due to hypertrophic facet change.   L2-3: No significant spinal canal stenosis. Small foraminal herniations minimally narrow the neuroforamina right-greater-than-left in combination with hypertrophic facet change.   L3-4: Mild bilateral facet osteoarthropathy, ligamentum flavum thickening, and moderate circumferential disc bulge with prominent subarticular component contributes to minimal central canal stenosis and minimal right and moderate left neural foraminal stenosis.   L4-5: Minimal to  moderate central and paracentral herniation superimposed upon disc bulge indents the ventral thecal sac and partially effaces the subarticular zones in combination with moderate bilateral facet osteoarthropathy contribute to mild central canal stenosis and moderate bilateral neural foraminal stenosis. Potential impingement of the traversing L5 nerve roots in the subarticular zones.   L5-S1: Posterior endplate osteophytes minimally narrow the neuroforamina in combination with hypertrophic facet change   Fatty atrophic changes are noted of the visualized posterior paraspinal musculature.   There is a T2 hyperintense, T1 hypointense cystic appearing lesion measuring 1.3 x 0.8 cm in the sagittal plane (series 3, image 15) within the subcutaneous tissues which may represent a epidermal inclusion cyst.       1. Degenerative changes of the lumbar spine as above described most prominently at L4-5 where there is partial effacement of the subarticular zones with potential impingement of the traversing L5 nerve roots similar to slightly increasing compared with the prior exam.   I personally reviewed the images/study and I agree with the resident findings as stated by Dianne Newell MD. This study was interpreted at [place], [place].   MACRO: None   Signed by: Edd Corona 6/14/2024 11:27 PM Dictation workstation:   [id number]             Assessment/Plan   Principal Problem:    SBO (small bowel obstruction) (Multi)  Active Problems:    Small bowel obstruction (Multi)    Ramesh Morocho is a 83 y.o. year old male with PMH CAD sp CABG, CHF, A fib, HTN, DM presented to the OSH for abdominal pain and subsequently transferred to [facility].     SBO  - Surgery following, ex lap scheduled for this afternoon    - NG to suction   - Remain NPO  - Continue IVF  - Cardiology following for periop eval, echo yesterday showed 50-55% EF, mild dilatation of LV  - morphine, zofran PRN      Acute hypoxic  respiratory failure 2/2 likely COPD exacerbation  - Currently on 2L NC, was on 3L yesterday   - CXR yesterday showed mild new atelectasis vs tiny new infiltrate at right lung base   - Bicarb this AM was 41, will repeat CMP in AM    - Scheduled DuoNebs  - Continue Solumedrol   - Continue Breo   - Encourage use of incentive spirometry      HTN/Afib   - Continue metoprolol, aldactone  - Cardio following      Hypernatremia    - Resolved   - D5W  - Will continue monitoring with AM labs     DM   - Continue insulin sliding scale   - Home insulin held given NPO status     Thrombocytopenia   - 107 on AM labs, which is near baseline   - Continue monitoring      CAD s/p CABG  - Continue rosuvastatin      Neuropathy   - Continue gabapentin      Depression   - Continue Prozac         Diet: NPO  DVT ppx: subcutaneous heparin   Code: Full   Dispo: continue monitoring, surgery following        Chago Root DO

## 2024-07-12 ENCOUNTER — APPOINTMENT (OUTPATIENT)
Dept: RADIOLOGY | Facility: HOSPITAL | Age: 84
DRG: 388 | End: 2024-07-12
Payer: MEDICARE

## 2024-07-12 LAB
ALBUMIN SERPL BCP-MCNC: 3.1 G/DL (ref 3.4–5)
ALP SERPL-CCNC: 37 U/L (ref 33–136)
ALT SERPL W P-5'-P-CCNC: 12 U/L (ref 10–52)
ANION GAP SERPL CALC-SCNC: ABNORMAL MMOL/L
AST SERPL W P-5'-P-CCNC: 14 U/L (ref 9–39)
BASOPHILS # BLD AUTO: 0.02 X10*3/UL (ref 0–0.1)
BASOPHILS NFR BLD AUTO: 0.2 %
BILIRUB SERPL-MCNC: 0.8 MG/DL (ref 0–1.2)
BUN SERPL-MCNC: 34 MG/DL (ref 6–23)
CALCIUM SERPL-MCNC: 8.4 MG/DL (ref 8.6–10.3)
CHLORIDE SERPL-SCNC: 90 MMOL/L (ref 98–107)
CO2 SERPL-SCNC: >45 MMOL/L (ref 21–32)
CREAT SERPL-MCNC: 0.96 MG/DL (ref 0.5–1.3)
EGFRCR SERPLBLD CKD-EPI 2021: 79 ML/MIN/1.73M*2
EOSINOPHIL # BLD AUTO: 0.03 X10*3/UL (ref 0–0.4)
EOSINOPHIL NFR BLD AUTO: 0.4 %
ERYTHROCYTE [DISTWIDTH] IN BLOOD BY AUTOMATED COUNT: 14 % (ref 11.5–14.5)
GLUCOSE BLD MANUAL STRIP-MCNC: 159 MG/DL (ref 74–99)
GLUCOSE BLD MANUAL STRIP-MCNC: 159 MG/DL (ref 74–99)
GLUCOSE BLD MANUAL STRIP-MCNC: 287 MG/DL (ref 74–99)
GLUCOSE SERPL-MCNC: 164 MG/DL (ref 74–99)
HCT VFR BLD AUTO: 48 % (ref 41–52)
HGB BLD-MCNC: 15.8 G/DL (ref 13.5–17.5)
IMM GRANULOCYTES # BLD AUTO: 0.03 X10*3/UL (ref 0–0.5)
IMM GRANULOCYTES NFR BLD AUTO: 0.4 % (ref 0–0.9)
LYMPHOCYTES # BLD AUTO: 1.34 X10*3/UL (ref 0.8–3)
LYMPHOCYTES NFR BLD AUTO: 15.9 %
MAGNESIUM SERPL-MCNC: 2.23 MG/DL (ref 1.6–2.4)
MCH RBC QN AUTO: 32.3 PG (ref 26–34)
MCHC RBC AUTO-ENTMCNC: 32.9 G/DL (ref 32–36)
MCV RBC AUTO: 98 FL (ref 80–100)
MONOCYTES # BLD AUTO: 0.84 X10*3/UL (ref 0.05–0.8)
MONOCYTES NFR BLD AUTO: 10 %
NEUTROPHILS # BLD AUTO: 6.15 X10*3/UL (ref 1.6–5.5)
NEUTROPHILS NFR BLD AUTO: 73.1 %
NRBC BLD-RTO: 0 /100 WBCS (ref 0–0)
PHOSPHATE SERPL-MCNC: 3.3 MG/DL (ref 2.5–4.9)
PLATELET # BLD AUTO: 118 X10*3/UL (ref 150–450)
POTASSIUM SERPL-SCNC: 3.2 MMOL/L (ref 3.5–5.3)
PROT SERPL-MCNC: 5.6 G/DL (ref 6.4–8.2)
RBC # BLD AUTO: 4.89 X10*6/UL (ref 4.5–5.9)
SODIUM SERPL-SCNC: 143 MMOL/L (ref 136–145)
WBC # BLD AUTO: 8.4 X10*3/UL (ref 4.4–11.3)

## 2024-07-12 PROCEDURE — 99232 SBSQ HOSP IP/OBS MODERATE 35: CPT | Performed by: STUDENT IN AN ORGANIZED HEALTH CARE EDUCATION/TRAINING PROGRAM

## 2024-07-12 PROCEDURE — 2500000004 HC RX 250 GENERAL PHARMACY W/ HCPCS (ALT 636 FOR OP/ED): Performed by: PHYSICIAN ASSISTANT

## 2024-07-12 PROCEDURE — 74250 X-RAY XM SM INT 1CNTRST STD: CPT

## 2024-07-12 PROCEDURE — 2500000004 HC RX 250 GENERAL PHARMACY W/ HCPCS (ALT 636 FOR OP/ED)

## 2024-07-12 PROCEDURE — 36415 COLL VENOUS BLD VENIPUNCTURE: CPT | Performed by: STUDENT IN AN ORGANIZED HEALTH CARE EDUCATION/TRAINING PROGRAM

## 2024-07-12 PROCEDURE — 2500000004 HC RX 250 GENERAL PHARMACY W/ HCPCS (ALT 636 FOR OP/ED): Performed by: SURGERY

## 2024-07-12 PROCEDURE — 1100000001 HC PRIVATE ROOM DAILY

## 2024-07-12 PROCEDURE — 84100 ASSAY OF PHOSPHORUS: CPT | Performed by: PHYSICIAN ASSISTANT

## 2024-07-12 PROCEDURE — 2500000002 HC RX 250 W HCPCS SELF ADMINISTERED DRUGS (ALT 637 FOR MEDICARE OP, ALT 636 FOR OP/ED): Performed by: STUDENT IN AN ORGANIZED HEALTH CARE EDUCATION/TRAINING PROGRAM

## 2024-07-12 PROCEDURE — 80053 COMPREHEN METABOLIC PANEL: CPT | Performed by: STUDENT IN AN ORGANIZED HEALTH CARE EDUCATION/TRAINING PROGRAM

## 2024-07-12 PROCEDURE — C9113 INJ PANTOPRAZOLE SODIUM, VIA: HCPCS | Performed by: SURGERY

## 2024-07-12 PROCEDURE — 2500000001 HC RX 250 WO HCPCS SELF ADMINISTERED DRUGS (ALT 637 FOR MEDICARE OP): Performed by: PHYSICIAN ASSISTANT

## 2024-07-12 PROCEDURE — 85025 COMPLETE CBC W/AUTO DIFF WBC: CPT | Performed by: STUDENT IN AN ORGANIZED HEALTH CARE EDUCATION/TRAINING PROGRAM

## 2024-07-12 PROCEDURE — 2500000004 HC RX 250 GENERAL PHARMACY W/ HCPCS (ALT 636 FOR OP/ED): Performed by: STUDENT IN AN ORGANIZED HEALTH CARE EDUCATION/TRAINING PROGRAM

## 2024-07-12 PROCEDURE — 99231 SBSQ HOSP IP/OBS SF/LOW 25: CPT | Performed by: SURGERY

## 2024-07-12 PROCEDURE — 2500000001 HC RX 250 WO HCPCS SELF ADMINISTERED DRUGS (ALT 637 FOR MEDICARE OP): Performed by: STUDENT IN AN ORGANIZED HEALTH CARE EDUCATION/TRAINING PROGRAM

## 2024-07-12 PROCEDURE — 83735 ASSAY OF MAGNESIUM: CPT | Performed by: STUDENT IN AN ORGANIZED HEALTH CARE EDUCATION/TRAINING PROGRAM

## 2024-07-12 PROCEDURE — 2500000001 HC RX 250 WO HCPCS SELF ADMINISTERED DRUGS (ALT 637 FOR MEDICARE OP)

## 2024-07-12 PROCEDURE — 82947 ASSAY GLUCOSE BLOOD QUANT: CPT

## 2024-07-12 PROCEDURE — 2550000001 HC RX 255 CONTRASTS: Performed by: STUDENT IN AN ORGANIZED HEALTH CARE EDUCATION/TRAINING PROGRAM

## 2024-07-12 RX ORDER — DEXTROSE MONOHYDRATE AND SODIUM CHLORIDE 5; .45 G/100ML; G/100ML
50 INJECTION, SOLUTION INTRAVENOUS CONTINUOUS
Status: DISCONTINUED | OUTPATIENT
Start: 2024-07-12 | End: 2024-07-13

## 2024-07-12 RX ORDER — POTASSIUM CHLORIDE 20 MEQ/1
40 TABLET, EXTENDED RELEASE ORAL ONCE
Status: DISCONTINUED | OUTPATIENT
Start: 2024-07-12 | End: 2024-07-12

## 2024-07-12 RX ORDER — POTASSIUM CHLORIDE 1.5 G/1.58G
40 POWDER, FOR SOLUTION ORAL ONCE
Status: COMPLETED | OUTPATIENT
Start: 2024-07-12 | End: 2024-07-12

## 2024-07-12 RX ORDER — PANTOPRAZOLE SODIUM 40 MG/10ML
40 INJECTION, POWDER, LYOPHILIZED, FOR SOLUTION INTRAVENOUS DAILY
Status: DISCONTINUED | OUTPATIENT
Start: 2024-07-12 | End: 2024-07-15 | Stop reason: HOSPADM

## 2024-07-12 ASSESSMENT — COGNITIVE AND FUNCTIONAL STATUS - GENERAL
STANDING UP FROM CHAIR USING ARMS: A LITTLE
WALKING IN HOSPITAL ROOM: A LITTLE
CLIMB 3 TO 5 STEPS WITH RAILING: A LITTLE
DRESSING REGULAR LOWER BODY CLOTHING: A LITTLE
DAILY ACTIVITIY SCORE: 23
MOBILITY SCORE: 21

## 2024-07-12 ASSESSMENT — PAIN SCALES - GENERAL
PAINLEVEL_OUTOF10: 0 - NO PAIN
PAINLEVEL_OUTOF10: 0 - NO PAIN

## 2024-07-12 NOTE — CARE PLAN
The patient's goals for the shift include sleep well and have surgery in the morning    The clinical goals for the shift include NG tube will remain intact and functional,and pt will continue to have minimal c/o pain this shift

## 2024-07-12 NOTE — PROGRESS NOTES
07/12/24 0933   Discharge Planning   Living Arrangements Spouse/significant other   Support Systems Spouse/significant other   Assistance Needed A&Ox3, independent with ADLs, no DME, room air at baseline, CPAP at HS and drives.   Type of Residence Private residence   Number of Stairs to Enter Residence 1   Number of Stairs Within Residence 15   Do you have animals or pets at home? Yes   Type of Animals or Pets 3 cats   Home or Post Acute Services None   Expected Discharge Disposition Home   Does the patient need discharge transport arranged? No   Patient expects to be discharged to: Home no needs

## 2024-07-12 NOTE — PROGRESS NOTES
"Ramesh Morocho is a 83 y.o. male on day 3 of admission presenting with SBO (small bowel obstruction) (Multi).    Subjective   Pt did not go for OR yesterday due to anesthesia concerns. He continues to have intermittent abdominal pain.       Objective     Physical Exam  Vitals and nursing note reviewed.   Constitutional:       General: He is not in acute distress.     Appearance: Normal appearance. He is not ill-appearing or toxic-appearing.   HENT:      Head: Normocephalic and atraumatic.      Comments: + NG tube     Nose: Nose normal.      Mouth/Throat:      Mouth: Mucous membranes are moist.   Eyes:      Extraocular Movements: Extraocular movements intact.      Conjunctiva/sclera: Conjunctivae normal.      Pupils: Pupils are equal, round, and reactive to light.   Cardiovascular:      Rate and Rhythm: Normal rate and regular rhythm.      Heart sounds: No murmur heard.     No gallop.   Pulmonary:      Effort: Pulmonary effort is normal. No respiratory distress.      Breath sounds: +rhonchi/wheezes, no rales  Abdominal:      General: There is distension.      Palpations: There is no mass.      Tenderness: There is abdominal tenderness.   Musculoskeletal:         General: Tenderness present. Normal range of motion.      Cervical back: Normal range of motion and neck supple.      Right lower leg: Edema present.      Left lower leg: Edema present.   Skin:     General: Skin is warm and dry.   Neurological:      General: No focal deficit present.      Mental Status: He is alert and oriented to person, place, and time. Mental status is at baseline.   Psychiatric:         Mood and Affect: Mood normal.         Behavior: Behavior normal.         Thought Content: Thought content normal.         Judgment: Judgment normal.     Last Recorded Vitals:  /65 (BP Location: Right arm, Patient Position: Sitting)   Pulse 72   Temp 37.1 °C (98.8 °F) (Temporal)   Resp 18   Ht 1.803 m (5' 10.98\")   Wt 101 kg (222 lb 10.6 oz)   " SpO2 90%   BMI 31.07 kg/m²      Scheduled medications:  acetaminophen, 650 mg, nasogastric tube, q6h  FLUoxetine, 40 mg, oral, Daily  fluticasone furoate-vilanteroL, 1 puff, inhalation, Daily  gabapentin, 600 mg, oral, 4x daily  heparin (porcine), 5,000 Units, subcutaneous, q8h JIGNESH  insulin lispro, 0-15 Units, subcutaneous, q6h  ketorolac, 15 mg, intravenous, q6h  methylPREDNISolone sodium succinate (PF), 40 mg, intravenous, q24h  metoprolol tartrate, 25 mg, oral, BID  pantoprazole, 40 mg, intravenous, Daily  pramipexole, 0.125 mg, oral, TID  rosuvastatin, 40 mg, oral, Daily  spironolactone, 12.5 mg, oral, Daily      Continuous medications:  dextrose 5%, 75 mL/hr, Last Rate: 75 mL/hr (07/11/24 0905)      PRN medications:  PRN medications: dextrose, dextrose, glucagon, glucagon, ipratropium-albuteroL, morphine **OR** morphine, ondansetron, phenoL     Relevant Results:  Results for orders placed or performed during the hospital encounter of 07/09/24 (from the past 24 hour(s))   POCT GLUCOSE   Result Value Ref Range    POCT Glucose 164 (H) 74 - 99 mg/dL   POCT GLUCOSE   Result Value Ref Range    POCT Glucose 244 (H) 74 - 99 mg/dL   POCT GLUCOSE   Result Value Ref Range    POCT Glucose 232 (H) 74 - 99 mg/dL   Comprehensive Metabolic Panel   Result Value Ref Range    Glucose 164 (H) 74 - 99 mg/dL    Sodium 143 136 - 145 mmol/L    Potassium 3.2 (L) 3.5 - 5.3 mmol/L    Chloride 90 (L) 98 - 107 mmol/L    Bicarbonate >45 (HH) 21 - 32 mmol/L    Anion Gap      Urea Nitrogen 34 (H) 6 - 23 mg/dL    Creatinine 0.96 0.50 - 1.30 mg/dL    eGFR 79 >60 mL/min/1.73m*2    Calcium 8.4 (L) 8.6 - 10.3 mg/dL    Albumin 3.1 (L) 3.4 - 5.0 g/dL    Alkaline Phosphatase 37 33 - 136 U/L    Total Protein 5.6 (L) 6.4 - 8.2 g/dL    AST 14 9 - 39 U/L    Bilirubin, Total 0.8 0.0 - 1.2 mg/dL    ALT 12 10 - 52 U/L   CBC and Auto Differential   Result Value Ref Range    WBC 8.4 4.4 - 11.3 x10*3/uL    nRBC 0.0 0.0 - 0.0 /100 WBCs    RBC 4.89 4.50 - 5.90  x10*6/uL    Hemoglobin 15.8 13.5 - 17.5 g/dL    Hematocrit 48.0 41.0 - 52.0 %    MCV 98 80 - 100 fL    MCH 32.3 26.0 - 34.0 pg    MCHC 32.9 32.0 - 36.0 g/dL    RDW 14.0 11.5 - 14.5 %    Platelets 118 (L) 150 - 450 x10*3/uL    Neutrophils % 73.1 40.0 - 80.0 %    Immature Granulocytes %, Automated 0.4 0.0 - 0.9 %    Lymphocytes % 15.9 13.0 - 44.0 %    Monocytes % 10.0 2.0 - 10.0 %    Eosinophils % 0.4 0.0 - 6.0 %    Basophils % 0.2 0.0 - 2.0 %    Neutrophils Absolute 6.15 (H) 1.60 - 5.50 x10*3/uL    Immature Granulocytes Absolute, Automated 0.03 0.00 - 0.50 x10*3/uL    Lymphocytes Absolute 1.34 0.80 - 3.00 x10*3/uL    Monocytes Absolute 0.84 (H) 0.05 - 0.80 x10*3/uL    Eosinophils Absolute 0.03 0.00 - 0.40 x10*3/uL    Basophils Absolute 0.02 0.00 - 0.10 x10*3/uL   Magnesium   Result Value Ref Range    Magnesium 2.23 1.60 - 2.40 mg/dL   Phosphorus   Result Value Ref Range    Phosphorus 3.3 2.5 - 4.9 mg/dL   POCT GLUCOSE   Result Value Ref Range    POCT Glucose 159 (H) 74 - 99 mg/dL       Transthoracic Echo (TTE) Complete    Result Date: 7/10/2024   [facility], [address] [place] [address]               Tel [phone] and Fax [phone] TRANSTHORACIC ECHOCARDIOGRAM REPORT  Patient Name:      ANNITA Martínez Physician:    [id number] Harman Yanez MD Study Date:        7/10/2024            Ordering Provider:    [id number] SHANTEL DEE MRN/PID:           [id number]             Fellow: Accession#:        [id number]         Nurse:                Zoila Valentine RN Date of Birth/Age: 1940 / 83 years Sonographer:          Zoila Call RD Gender:            M                    Additional Staff: Height:            180.34 cm             Admit Date:           7/9/2024 Weight:            100.70 kg            Admission Status:     Inpatient -                                                               Routine BSA / BMI:         2.20 m2 / 30.96      Encounter#:           [id number]                    kg/m2 Blood Pressure:    132/61 mmHg          Department Location:  [facility] Non                                                               Invasive Study Type:    TRANSTHORACIC ECHO (TTE) COMPLETE Diagnosis/ICD: Heart failure, unspecified-I50.9; Abnormal electrocardiogram                [ECG] [EKG]-R94.31 Indication:    Abnormal EKG, Congestive Heart Failure CPT Code:      Echo Complete w Full Doppler-[id number] Patient History: CABG:              Mulitivessel CABG. Diabetes:          Yes Pertinent History: CAD, CHF, A-Fib and HTN. Study Detail: The following Echo studies were performed: 2D, M-Mode, Doppler and               color flow. Image quality for this study is poor. Technically               challenging study due to poor acoustic windows. Definity used as a               contrast agent for endocardial border definition. Total contrast               used for this procedure was 1 mL via IV push.  PHYSICIAN INTERPRETATION: Left Ventricle: The left ventricular systolic function is low normal, with a visually estimated ejection fraction of 50-55%. There are no regional wall motion abnormalities. The left ventricular cavity size is mildly dilated. There is concentric left ventricular hypertrophy. Left ventricular diastolic filling was indeterminate. Left Atrium: The left atrium was not well visualized. Right Ventricle: The right ventricle is normal in size. There is normal right ventricular global systolic function. RV free wall is not well visualized. Right Atrium: The right atrium is upper limits of normal in size. Aortic Valve: The aortic valve is trileaflet. There is mild aortic valve cusp calcification. There is no evidence of aortic valve  stenosis. The aortic valve dimensionless index is 0.88. There is no evidence of aortic valve regurgitation. The peak instantaneous gradient of the aortic valve is 6.0 mmHg. The mean gradient of the aortic valve is 3.2 mmHg. Mitral Valve: The mitral valve is normal in structure. There is trace to mild mitral valve regurgitation. Tricuspid Valve: The tricuspid valve was not well visualized. There is trace tricuspid regurgitation. The right ventricular systolic pressure is unable to be estimated. Pulmonic Valve: The pulmonic valve is not well visualized. There is physiologic pulmonic valve regurgitation. Pericardium: There is no pericardial effusion noted. Aorta: The aortic root was not well visualized. Systemic Veins: The inferior vena cava appears dilated. There is less than 50% IVC collapse with inspiration. In comparison to the previous echocardiogram(s): There are no prior studies on this patient for comparison purposes.  CONCLUSIONS:  1. The left ventricular systolic function is low normal, with a visually estimated ejection fraction of 50-55%.  2. Left ventricular cavity size is mildly dilated.  3. There is normal right ventricular global systolic function. QUANTITATIVE DATA SUMMARY: 2D MEASUREMENTS:                           Normal Ranges: IVSd:          1.37 cm    (0.6-1.1cm) LVPWd:         1.12 cm    (0.6-1.1cm) LVIDd:         5.58 cm    (3.9-5.9cm) LVIDs:         4.32 cm LV Mass Index: 132.6 g/m2 LV % FS        22.5 % LA VOLUME:                               Normal Ranges: LA Vol A4C:        49.3 ml    (22+/-6mL/m2) LA Vol A2C:        57.7 ml LA Vol BP:         54.9 ml LA Vol Index A4C:  22.4 ml/m2 LA Vol Index A2C:  26.2 ml/m2 LA Vol Index BP:   24.9 ml/m2 LA Area A4C:       17.7 cm2 LA Area A2C:       18.6 cm2 LA Major Axis A4C: 5.4 cm LA Major Axis A2C: 5.1 cm LA Volume Index:   24.9 ml/m2 LA Vol A4C:        46.6 ml LA Vol A2C:        54.1 ml RA VOLUME BY A/L METHOD:                       Normal Ranges:  RA Area A4C: 18.5 cm2 M-MODE MEASUREMENTS:                  Normal Ranges: Ao Root: 3.90 cm (2.0-3.7cm) LAs:     4.41 cm (2.7-4.0cm) LV SYSTOLIC FUNCTION BY 2D PLANIMETRY (MOD):                      Normal Ranges: EF-A4C View:    47 % (>=55%) EF-A2C View:    49 % EF-Biplane:     49 % EF-Visual:      53 % LV EF Reported: 53 % LV DIASTOLIC FUNCTION:                             Normal Ranges: MV Peak E:      1.04 m/s    (0.7-1.2 m/s) MV Peak A:      0.83 m/s    (0.42-0.7 m/s) E/A Ratio:      1.24        (1.0-2.2) MV e'           0.075 m/s   (>8.0) MV lateral e'   0.09 m/s MV medial e'    0.06 m/s MV A Dur:       145.33 msec E/e' Ratio:     13.81       (<8.0) PulmV Sys Pravin:  42.02 cm/s PulmV Dan Pravin: 45.54 cm/s PulmV S/D Pravin:  0.92 MITRAL VALVE:                 Normal Ranges: MV DT: 198 msec (150-240msec) AORTIC VALVE:                                   Normal Ranges: AoV Vmax:                1.22 m/s (<=1.7m/s) AoV Peak P.0 mmHg (<20mmHg) AoV Mean PG:             3.2 mmHg (1.7-11.5mmHg) LVOT Max Pravin:            0.96 m/s (<=1.1m/s) AoV VTI:                 29.46 cm (18-25cm) LVOT VTI:                25.98 cm LVOT Diameter:           1.98 cm  (1.8-2.4cm) AoV Area, VTI:           2.71 cm2 (2.5-5.5cm2) AoV Area,Vmax:           2.42 cm2 (2.5-4.5cm2) AoV Dimensionless Index: 0.88  RIGHT VENTRICLE: RV Basal 3.30 cm RV Mid   2.10 cm RV Major 8.2 cm TAPSE:   22.0 mm RV s'    0.08 m/s TRICUSPID VALVE/RVSP:                   Normal Ranges: IVC Diam: 2.20 cm PULMONIC VALVE:                      Normal Ranges: PV Max Pravin: 0.8 m/s  (0.6-0.9m/s) PV Max P.4 mmHg Pulmonary Veins: PulmV Dan Pravin: 45.54 cm/s PulmV S/D Pravin:  0.92 PulmV Sys Pravin:  42.02 cm/s  [id number] Harman Yanez MD Electronically signed on 7/10/2024 at 2:31:10 PM  ** Final **            Assessment/Plan   Principal Problem:    SBO (small bowel obstruction) (Multi)  Active Problems:    Atrial fibrillation (Multi)    Mixed hyperlipidemia    Benign  essential hypertension    Heart failure (Multi)    Platelet disorder (Multi)    Small bowel obstruction (Multi)    SBO  - NG  - surg following  - holding off OR at this time per surg  - NPO  - IVF  - pain control  - zofran PRN  - SBFT     Acute hypoxic respiratory failure 2/2 COPD exacerbation  - on 2L NC, wean as tolerate  - duoneb  - solumedrol  - breo     HypoK  - replace    HTN/Afib   - cardio following  - metoprolol  - aldactone  - pt is not on OAC as outpt     Hypernatremia    - Resolved      DM   - hold home insulin  - ISS     Chronic Thrombocytopenia   At baseline  - monior     CAD s/p CABG  - rosuvastatin      Neuropathy   - gabapentin      Depression   - Prozac    DVT ppx: SQ hep  Dispo: monitor clinically, pain control         Neelima Pretty MD  Hospitalist

## 2024-07-12 NOTE — DOCUMENTATION CLARIFICATION NOTE
PATIENT:               ANNITA CHU  ACCT #:                  [id number]  MRN:                       [id number]  :                       1940  ADMIT DATE:       2024 1:31 PM  DISCH DATE:  RESPONDING PROVIDER #:        [id number]          PROVIDER RESPONSE TEXT:    Chronic Diastolic Congestive Heart Failure    CDI QUERY TEXT:    Clarification    Instruction:    Based on your assessment of the patient and the clinical information, please provide the requested documentation by clicking on the appropriate radio button and enter any additional information if prompted.    Question: Please further clarify the type and acuity of congestive heart failure    When answering this query, please exercise your independent professional judgment. The fact that a question is being asked, does not imply that any particular answer is desired or expected.    The patient's clinical indicators include:  Clinical Information: 82 yo male with a PMH of Afib, CAD s/p CABG (>10y ago), ICM, BRITT, DM type II, Anxiety and depression who presented with nausea, diarrhea and fever, admitted with SBO.    Clinical Indicators:  history of CHF noted,  Positive for leg swelling  ECHO 7/10:  low normal LVSF, EF 50-55%, LV dilated, normal RVSF    Treatment: Spironolactone 12.5mg    Risk Factors: cardiomyopathy, CAD, HTN, Afib, COPD  Options provided:  -- Chronic Systolic Congestive Heart Failure  -- Chronic Diastolic Congestive Heart Failure  -- Other - I will add my own diagnosis  -- Refer to Clinical Documentation Reviewer    Query created by: Sara Li on 2024 11:10 AM      Electronically signed by:  SHANTEL DEE PA-C 2024 12:43 PM

## 2024-07-12 NOTE — CARE PLAN
Uneventful night. Pt rested comfortably. Minimal c/o pain this shift, although pt's abdomen remains quite distended. NG tube put out about 1000cc brown liquid this shift.  Pt continues to progress towards meeting goals. NPO for exploratory lap today. VSS. Will continue to monitor.

## 2024-07-13 LAB
ALBUMIN SERPL BCP-MCNC: 3.3 G/DL (ref 3.4–5)
ALP SERPL-CCNC: 39 U/L (ref 33–136)
ALT SERPL W P-5'-P-CCNC: 16 U/L (ref 10–52)
ANION GAP SERPL CALC-SCNC: 9 MMOL/L (ref 10–20)
AST SERPL W P-5'-P-CCNC: 19 U/L (ref 9–39)
BASOPHILS # BLD AUTO: 0.02 X10*3/UL (ref 0–0.1)
BASOPHILS NFR BLD AUTO: 0.2 %
BILIRUB SERPL-MCNC: 0.8 MG/DL (ref 0–1.2)
BUN SERPL-MCNC: 28 MG/DL (ref 6–23)
CALCIUM SERPL-MCNC: 8.6 MG/DL (ref 8.6–10.3)
CHLORIDE SERPL-SCNC: 92 MMOL/L (ref 98–107)
CO2 SERPL-SCNC: 40 MMOL/L (ref 21–32)
CREAT SERPL-MCNC: 0.74 MG/DL (ref 0.5–1.3)
EGFRCR SERPLBLD CKD-EPI 2021: >90 ML/MIN/1.73M*2
EOSINOPHIL # BLD AUTO: 0.03 X10*3/UL (ref 0–0.4)
EOSINOPHIL NFR BLD AUTO: 0.3 %
ERYTHROCYTE [DISTWIDTH] IN BLOOD BY AUTOMATED COUNT: 13.8 % (ref 11.5–14.5)
GLUCOSE BLD MANUAL STRIP-MCNC: 140 MG/DL (ref 74–99)
GLUCOSE BLD MANUAL STRIP-MCNC: 186 MG/DL (ref 74–99)
GLUCOSE BLD MANUAL STRIP-MCNC: 233 MG/DL (ref 74–99)
GLUCOSE BLD MANUAL STRIP-MCNC: 355 MG/DL (ref 74–99)
GLUCOSE SERPL-MCNC: 131 MG/DL (ref 74–99)
HCT VFR BLD AUTO: 49.4 % (ref 41–52)
HGB BLD-MCNC: 16.5 G/DL (ref 13.5–17.5)
HOLD SPECIMEN: NORMAL
IMM GRANULOCYTES # BLD AUTO: 0.05 X10*3/UL (ref 0–0.5)
IMM GRANULOCYTES NFR BLD AUTO: 0.5 % (ref 0–0.9)
LYMPHOCYTES # BLD AUTO: 1.7 X10*3/UL (ref 0.8–3)
LYMPHOCYTES NFR BLD AUTO: 15.6 %
MAGNESIUM SERPL-MCNC: 2.04 MG/DL (ref 1.6–2.4)
MCH RBC QN AUTO: 32.1 PG (ref 26–34)
MCHC RBC AUTO-ENTMCNC: 33.4 G/DL (ref 32–36)
MCV RBC AUTO: 96 FL (ref 80–100)
MONOCYTES # BLD AUTO: 1.21 X10*3/UL (ref 0.05–0.8)
MONOCYTES NFR BLD AUTO: 11.1 %
NEUTROPHILS # BLD AUTO: 7.88 X10*3/UL (ref 1.6–5.5)
NEUTROPHILS NFR BLD AUTO: 72.3 %
NRBC BLD-RTO: 0 /100 WBCS (ref 0–0)
PHOSPHATE SERPL-MCNC: 2.7 MG/DL (ref 2.5–4.9)
PLATELET # BLD AUTO: 123 X10*3/UL (ref 150–450)
POTASSIUM SERPL-SCNC: 3.4 MMOL/L (ref 3.5–5.3)
PROT SERPL-MCNC: 5.7 G/DL (ref 6.4–8.2)
RBC # BLD AUTO: 5.14 X10*6/UL (ref 4.5–5.9)
SODIUM SERPL-SCNC: 138 MMOL/L (ref 136–145)
WBC # BLD AUTO: 10.9 X10*3/UL (ref 4.4–11.3)

## 2024-07-13 PROCEDURE — 2500000002 HC RX 250 W HCPCS SELF ADMINISTERED DRUGS (ALT 637 FOR MEDICARE OP, ALT 636 FOR OP/ED): Performed by: STUDENT IN AN ORGANIZED HEALTH CARE EDUCATION/TRAINING PROGRAM

## 2024-07-13 PROCEDURE — 2500000004 HC RX 250 GENERAL PHARMACY W/ HCPCS (ALT 636 FOR OP/ED)

## 2024-07-13 PROCEDURE — 2500000001 HC RX 250 WO HCPCS SELF ADMINISTERED DRUGS (ALT 637 FOR MEDICARE OP): Performed by: PHYSICIAN ASSISTANT

## 2024-07-13 PROCEDURE — C9113 INJ PANTOPRAZOLE SODIUM, VIA: HCPCS | Performed by: SURGERY

## 2024-07-13 PROCEDURE — 80053 COMPREHEN METABOLIC PANEL: CPT | Performed by: STUDENT IN AN ORGANIZED HEALTH CARE EDUCATION/TRAINING PROGRAM

## 2024-07-13 PROCEDURE — 82947 ASSAY GLUCOSE BLOOD QUANT: CPT

## 2024-07-13 PROCEDURE — 2500000002 HC RX 250 W HCPCS SELF ADMINISTERED DRUGS (ALT 637 FOR MEDICARE OP, ALT 636 FOR OP/ED)

## 2024-07-13 PROCEDURE — 36415 COLL VENOUS BLD VENIPUNCTURE: CPT | Performed by: STUDENT IN AN ORGANIZED HEALTH CARE EDUCATION/TRAINING PROGRAM

## 2024-07-13 PROCEDURE — 83735 ASSAY OF MAGNESIUM: CPT | Performed by: STUDENT IN AN ORGANIZED HEALTH CARE EDUCATION/TRAINING PROGRAM

## 2024-07-13 PROCEDURE — 2500000004 HC RX 250 GENERAL PHARMACY W/ HCPCS (ALT 636 FOR OP/ED): Performed by: STUDENT IN AN ORGANIZED HEALTH CARE EDUCATION/TRAINING PROGRAM

## 2024-07-13 PROCEDURE — 99232 SBSQ HOSP IP/OBS MODERATE 35: CPT

## 2024-07-13 PROCEDURE — 99231 SBSQ HOSP IP/OBS SF/LOW 25: CPT | Performed by: SURGERY

## 2024-07-13 PROCEDURE — 2500000001 HC RX 250 WO HCPCS SELF ADMINISTERED DRUGS (ALT 637 FOR MEDICARE OP)

## 2024-07-13 PROCEDURE — 1100000001 HC PRIVATE ROOM DAILY

## 2024-07-13 PROCEDURE — 84100 ASSAY OF PHOSPHORUS: CPT | Performed by: PHYSICIAN ASSISTANT

## 2024-07-13 PROCEDURE — 2500000004 HC RX 250 GENERAL PHARMACY W/ HCPCS (ALT 636 FOR OP/ED): Performed by: SURGERY

## 2024-07-13 PROCEDURE — 85025 COMPLETE CBC W/AUTO DIFF WBC: CPT | Performed by: STUDENT IN AN ORGANIZED HEALTH CARE EDUCATION/TRAINING PROGRAM

## 2024-07-13 PROCEDURE — 2500000001 HC RX 250 WO HCPCS SELF ADMINISTERED DRUGS (ALT 637 FOR MEDICARE OP): Performed by: STUDENT IN AN ORGANIZED HEALTH CARE EDUCATION/TRAINING PROGRAM

## 2024-07-13 RX ORDER — SODIUM CHLORIDE 9 MG/ML
50 INJECTION, SOLUTION INTRAVENOUS CONTINUOUS
Status: DISCONTINUED | OUTPATIENT
Start: 2024-07-13 | End: 2024-07-14

## 2024-07-13 RX ORDER — ACETAMINOPHEN 160 MG/5ML
650 SOLUTION ORAL EVERY 6 HOURS
Status: DISCONTINUED | OUTPATIENT
Start: 2024-07-13 | End: 2024-07-13

## 2024-07-13 RX ORDER — ACETAMINOPHEN 325 MG/1
650 TABLET ORAL EVERY 6 HOURS
Status: DISCONTINUED | OUTPATIENT
Start: 2024-07-13 | End: 2024-07-15

## 2024-07-13 RX ORDER — POTASSIUM CHLORIDE 20 MEQ/1
40 TABLET, EXTENDED RELEASE ORAL ONCE
Status: COMPLETED | OUTPATIENT
Start: 2024-07-13 | End: 2024-07-13

## 2024-07-13 ASSESSMENT — COGNITIVE AND FUNCTIONAL STATUS - GENERAL
CLIMB 3 TO 5 STEPS WITH RAILING: A LITTLE
EATING MEALS: A LITTLE
DRESSING REGULAR LOWER BODY CLOTHING: A LITTLE
STANDING UP FROM CHAIR USING ARMS: A LITTLE
DAILY ACTIVITIY SCORE: 24
DAILY ACTIVITIY SCORE: 19
PERSONAL GROOMING: A LITTLE
HELP NEEDED FOR BATHING: A LITTLE
MOBILITY SCORE: 20
WALKING IN HOSPITAL ROOM: A LITTLE
MOVING TO AND FROM BED TO CHAIR: A LITTLE
MOBILITY SCORE: 24
DRESSING REGULAR UPPER BODY CLOTHING: A LITTLE

## 2024-07-13 ASSESSMENT — PAIN - FUNCTIONAL ASSESSMENT: PAIN_FUNCTIONAL_ASSESSMENT: 0-10

## 2024-07-13 ASSESSMENT — PAIN SCALES - GENERAL
PAINLEVEL_OUTOF10: 0 - NO PAIN
PAINLEVEL_OUTOF10: 0 - NO PAIN

## 2024-07-13 NOTE — PROGRESS NOTES
"Ramesh Morocho is a 83 y.o. male on day 4 of admission presenting with SBO (small bowel obstruction) (Multi).    Subjective   No complaints today       Objective has been passing gas and still moving his bowels  Minimal out the nasogastric tube    Physical Exam abdomen is flat soft and nontender    Last Recorded Vitals  Blood pressure 150/82, pulse 50, temperature 36.2 °C (97.2 °F), resp. rate 16, height 1.803 m (5' 10.98\"), weight 101 kg (222 lb 10.6 oz), SpO2 93%.  Intake/Output last 3 Shifts:  I/O last 3 completed shifts:  In: 1826.7 (18.1 mL/kg) [P.O.:270; I.V.:1556.7 (15.4 mL/kg)]  Out: 2075 (20.5 mL/kg) [Urine:400 (0.1 mL/kg/hr); Emesis/NG output:1675]  Weight: 101 kg     Relevant Results              Magnesium normal, phosphorus normal, white blood cell count normal platelets slightly low but stable  Bicarbonate improved with changing IV fluids still some hypokalemia             Assessment/Plan   Principal Problem:    SBO (small bowel obstruction) (Multi)  Active Problems:    Atrial fibrillation (Multi)    Mixed hyperlipidemia    Benign essential hypertension    Heart failure (Multi)    Platelet disorder (Multi)    Small bowel obstruction (Multi)    Nasogastric tube removed try clears later today       I spent 15 minutes in the professional and overall care of this patient.      Arti Malone MD      "

## 2024-07-13 NOTE — CARE PLAN
Problem: Pain  Goal: Takes deep breaths with improved pain control throughout the shift  Outcome: Progressing  Goal: Turns in bed with improved pain control throughout the shift  Outcome: Progressing  Goal: Walks with improved pain control throughout the shift  Outcome: Progressing  Goal: Performs ADL's with improved pain control throughout shift  Outcome: Progressing  Goal: Participates in PT with improved pain control throughout the shift  Outcome: Progressing  Goal: Free from opioid side effects throughout the shift  Outcome: Progressing  Goal: Free from acute confusion related to pain meds throughout the shift  Outcome: Progressing     Problem: Pain - Adult  Goal: Verbalizes/displays adequate comfort level or baseline comfort level  Outcome: Progressing     Problem: Safety - Adult  Goal: Free from fall injury  Outcome: Progressing     Problem: Discharge Planning  Goal: Discharge to home or other facility with appropriate resources  Outcome: Progressing     Problem: Chronic Conditions and Co-morbidities  Goal: Patient's chronic conditions and co-morbidity symptoms are monitored and maintained or improved  Outcome: Progressing   The patient's goals for the shift include  sleep    The clinical goals for the shift include Patient will be able to have BM this shift    Patient did not have any complaints of pain. Patient was able to tolerate ambulation to the bathroom this shift to have a BM, but still states he feels bloated. Patient was able to sleep at least 6 hours this shift, on and off.

## 2024-07-13 NOTE — PROGRESS NOTES
"aRmesh Morocho is a 83 y.o. male on day 4 of admission presenting with SBO (small bowel obstruction) (Multi).    Subjective   Pt reports mild abdominal pain with deep palpation unchanged from yesterday. Has been passing flatus, has had continued bowel movements. States they are mostly diarrhea but has had some solid stool. Is still distended, admits to bloating but unchanged from yesterday. On 1 L of NC today, was on 2L yesterday.        Objective     Physical Exam  Constitutional:       General: He is not in acute distress.     Appearance: He is obese. He is not toxic-appearing.   HENT:      Head: Normocephalic and atraumatic.      Comments: NG tube removed this AM  Eyes:      Extraocular Movements: Extraocular movements intact.      Conjunctiva/sclera: Conjunctivae normal.      Pupils: Pupils are equal, round, and reactive to light.   Cardiovascular:      Rate and Rhythm: Normal rate and regular rhythm.      Pulses: Normal pulses.      Heart sounds: Normal heart sounds. No murmur heard.  Pulmonary:      Effort: Pulmonary effort is normal. No respiratory distress.      Breath sounds: Wheezing and rhonchi present.   Abdominal:      General: Abdomen is flat. There is distension.      Palpations: Abdomen is soft.      Comments: Tenderness on deep palpation   Musculoskeletal:      Right lower leg: No edema.      Left lower leg: No edema.   Skin:     General: Skin is warm and dry.   Neurological:      Mental Status: He is alert and oriented to person, place, and time.   Psychiatric:         Mood and Affect: Mood normal.         Behavior: Behavior normal.         Last Recorded Vitals  Blood pressure 150/82, pulse 50, temperature 36.2 °C (97.2 °F), resp. rate 16, height 1.803 m (5' 10.98\"), weight 101 kg (222 lb 10.6 oz), SpO2 93%.  Intake/Output last 3 Shifts:  I/O last 3 completed shifts:  In: 1826.7 (18.1 mL/kg) [P.O.:270; I.V.:1556.7 (15.4 mL/kg)]  Out: 2075 (20.5 mL/kg) [Urine:400 (0.1 mL/kg/hr); Emesis/NG " output:8825]  Weight: 101 kg     Relevant Results    Scheduled medications  acetaminophen, 650 mg, nasogastric tube, q6h  FLUoxetine, 40 mg, oral, Daily  fluticasone furoate-vilanteroL, 1 puff, inhalation, Daily  gabapentin, 600 mg, oral, 4x daily  heparin (porcine), 5,000 Units, subcutaneous, q8h JIGNESH  insulin lispro, 0-15 Units, subcutaneous, q6h  methylPREDNISolone sodium succinate (PF), 40 mg, intravenous, q24h  metoprolol tartrate, 25 mg, oral, BID  pantoprazole, 40 mg, intravenous, Daily  pramipexole, 0.125 mg, oral, TID  rosuvastatin, 40 mg, oral, Daily  spironolactone, 12.5 mg, oral, Daily      Continuous medications  dextrose 5%-0.45 % sodium chloride, 100 mL/hr, Last Rate: 100 mL/hr (07/13/24 1023)      PRN medications  PRN medications: dextrose, dextrose, glucagon, glucagon, ipratropium-albuteroL, morphine **OR** morphine, ondansetron, phenoL      Results for orders placed or performed during the hospital encounter of 07/09/24 (from the past 24 hour(s))   POCT GLUCOSE   Result Value Ref Range    POCT Glucose 287 (H) 74 - 99 mg/dL   POCT GLUCOSE   Result Value Ref Range    POCT Glucose 159 (H) 74 - 99 mg/dL   POCT GLUCOSE   Result Value Ref Range    POCT Glucose 140 (H) 74 - 99 mg/dL   Comprehensive Metabolic Panel   Result Value Ref Range    Glucose 131 (H) 74 - 99 mg/dL    Sodium 138 136 - 145 mmol/L    Potassium 3.4 (L) 3.5 - 5.3 mmol/L    Chloride 92 (L) 98 - 107 mmol/L    Bicarbonate 40 (HH) 21 - 32 mmol/L    Anion Gap 9 (L) 10 - 20 mmol/L    Urea Nitrogen 28 (H) 6 - 23 mg/dL    Creatinine 0.74 0.50 - 1.30 mg/dL    eGFR >90 >60 mL/min/1.73m*2    Calcium 8.6 8.6 - 10.3 mg/dL    Albumin 3.3 (L) 3.4 - 5.0 g/dL    Alkaline Phosphatase 39 33 - 136 U/L    Total Protein 5.7 (L) 6.4 - 8.2 g/dL    AST 19 9 - 39 U/L    Bilirubin, Total 0.8 0.0 - 1.2 mg/dL    ALT 16 10 - 52 U/L   CBC and Auto Differential   Result Value Ref Range    WBC 10.9 4.4 - 11.3 x10*3/uL    nRBC 0.0 0.0 - 0.0 /100 WBCs    RBC 5.14 4.50 -  5.90 x10*6/uL    Hemoglobin 16.5 13.5 - 17.5 g/dL    Hematocrit 49.4 41.0 - 52.0 %    MCV 96 80 - 100 fL    MCH 32.1 26.0 - 34.0 pg    MCHC 33.4 32.0 - 36.0 g/dL    RDW 13.8 11.5 - 14.5 %    Platelets 123 (L) 150 - 450 x10*3/uL    Neutrophils % 72.3 40.0 - 80.0 %    Immature Granulocytes %, Automated 0.5 0.0 - 0.9 %    Lymphocytes % 15.6 13.0 - 44.0 %    Monocytes % 11.1 2.0 - 10.0 %    Eosinophils % 0.3 0.0 - 6.0 %    Basophils % 0.2 0.0 - 2.0 %    Neutrophils Absolute 7.88 (H) 1.60 - 5.50 x10*3/uL    Immature Granulocytes Absolute, Automated 0.05 0.00 - 0.50 x10*3/uL    Lymphocytes Absolute 1.70 0.80 - 3.00 x10*3/uL    Monocytes Absolute 1.21 (H) 0.05 - 0.80 x10*3/uL    Eosinophils Absolute 0.03 0.00 - 0.40 x10*3/uL    Basophils Absolute 0.02 0.00 - 0.10 x10*3/uL   Magnesium   Result Value Ref Range    Magnesium 2.04 1.60 - 2.40 mg/dL   Phosphorus   Result Value Ref Range    Phosphorus 2.7 2.5 - 4.9 mg/dL   Light Blue Top   Result Value Ref Range    Extra Tube Hold for add-ons.       FL small bowel series    Result Date: 7/12/2024  Interpreted By:  Sherrie Salinas, STUDY: FL SMALL BOWEL SERIES;  7/12/2024 11:24 am   INDICATION: Signs/Symptoms:sbo.   COMPARISON: CT abdomen pelvis with contrast dated 07/09/2024   ACCESSION NUMBER(S): [id number]   ORDERING CLINICIAN: MARK GOMEZ   TECHNIQUE: An initial radiograph of the abdomen and pelvis was obtained. Following the oral administration of approximately   240 mL of Gastrografin, delayed static fluoroscopic images of the abdomen in the posteroanterior projection were obtained at 0, 20 and 40 minute intervals until contrast was observed to reach the colon.   FINDINGS: Initial  image demonstrates  the distal segment of nasogastric tube with its tip projecting over the expected region of the gastric fundus. Postsurgical changes of median sternotomy are partially included in the field of view. Postsurgical changes of cholecystectomy are noted with metallic clips  projecting over the right upper quadrant. Visualized lung bases are clear.   The transit time of contrast to colon is approximately 40 minutes. The opacified small bowel loops are normal in caliber.       Unremarkable small bowel follow-through with normal transit time to colon.   MACRO: None   Signed by: Sherrie Salinas 7/12/2024 3:33 PM Dictation workstation:   [id number]    Transthoracic Echo (TTE) Complete    Result Date: 7/10/2024   [facility], [address] [place] [address]               Tel [phone] and Fax [phone] TRANSTHORACIC ECHOCARDIOGRAM REPORT  Patient Name:      ANNITA CHU        Reading Physician:    [id number] Harman Yanez MD Study Date:        7/10/2024            Ordering Provider:    [id number] SHANTEL DEE MRN/PID:           [id number]             Fellow: Accession#:        [id number]         Nurse:                Zoila Valentine RN Date of Birth/Age: 1940 / 83 years Sonographer:          Zoila Call                                                               [facility] Gender:            M                    Additional Staff: Height:            180.34 cm            Admit Date:           7/9/2024 Weight:            100.70 kg            Admission Status:     Inpatient -                                                               Routine BSA / BMI:         2.20 m2 / 30.96      Encounter#:           [id number]                    kg/m2 Blood Pressure:    132/61 mmHg          Department Location:  [facility] Non                                                               Invasive Study Type:    TRANSTHORACIC ECHO (TTE) COMPLETE Diagnosis/ICD: Heart failure, unspecified-I50.9; Abnormal electrocardiogram                [ECG] [EKG]-R94.31 Indication:    Abnormal EKG,  Congestive Heart Failure CPT Code:      Echo Complete w Full Doppler-[id number] Patient History: CABG:              Mulitivessel CABG. Diabetes:          Yes Pertinent History: CAD, CHF, A-Fib and HTN. Study Detail: The following Echo studies were performed: 2D, M-Mode, Doppler and               color flow. Image quality for this study is poor. Technically               challenging study due to poor acoustic windows. Definity used as a               contrast agent for endocardial border definition. Total contrast               used for this procedure was 1 mL via IV push.  PHYSICIAN INTERPRETATION: Left Ventricle: The left ventricular systolic function is low normal, with a visually estimated ejection fraction of 50-55%. There are no regional wall motion abnormalities. The left ventricular cavity size is mildly dilated. There is concentric left ventricular hypertrophy. Left ventricular diastolic filling was indeterminate. Left Atrium: The left atrium was not well visualized. Right Ventricle: The right ventricle is normal in size. There is normal right ventricular global systolic function. RV free wall is not well visualized. Right Atrium: The right atrium is upper limits of normal in size. Aortic Valve: The aortic valve is trileaflet. There is mild aortic valve cusp calcification. There is no evidence of aortic valve stenosis. The aortic valve dimensionless index is 0.88. There is no evidence of aortic valve regurgitation. The peak instantaneous gradient of the aortic valve is 6.0 mmHg. The mean gradient of the aortic valve is 3.2 mmHg. Mitral Valve: The mitral valve is normal in structure. There is trace to mild mitral valve regurgitation. Tricuspid Valve: The tricuspid valve was not well visualized. There is trace tricuspid regurgitation. The right ventricular systolic pressure is unable to be estimated. Pulmonic Valve: The pulmonic valve is not well visualized. There is physiologic pulmonic valve regurgitation.  Pericardium: There is no pericardial effusion noted. Aorta: The aortic root was not well visualized. Systemic Veins: The inferior vena cava appears dilated. There is less than 50% IVC collapse with inspiration. In comparison to the previous echocardiogram(s): There are no prior studies on this patient for comparison purposes.  CONCLUSIONS:  1. The left ventricular systolic function is low normal, with a visually estimated ejection fraction of 50-55%.  2. Left ventricular cavity size is mildly dilated.  3. There is normal right ventricular global systolic function. QUANTITATIVE DATA SUMMARY: 2D MEASUREMENTS:                           Normal Ranges: IVSd:          1.37 cm    (0.6-1.1cm) LVPWd:         1.12 cm    (0.6-1.1cm) LVIDd:         5.58 cm    (3.9-5.9cm) LVIDs:         4.32 cm LV Mass Index: 132.6 g/m2 LV % FS        22.5 % LA VOLUME:                               Normal Ranges: LA Vol A4C:        49.3 ml    (22+/-6mL/m2) LA Vol A2C:        57.7 ml LA Vol BP:         54.9 ml LA Vol Index A4C:  22.4 ml/m2 LA Vol Index A2C:  26.2 ml/m2 LA Vol Index BP:   24.9 ml/m2 LA Area A4C:       17.7 cm2 LA Area A2C:       18.6 cm2 LA Major Axis A4C: 5.4 cm LA Major Axis A2C: 5.1 cm LA Volume Index:   24.9 ml/m2 LA Vol A4C:        46.6 ml LA Vol A2C:        54.1 ml RA VOLUME BY A/L METHOD:                       Normal Ranges: RA Area A4C: 18.5 cm2 M-MODE MEASUREMENTS:                  Normal Ranges: Ao Root: 3.90 cm (2.0-3.7cm) LAs:     4.41 cm (2.7-4.0cm) LV SYSTOLIC FUNCTION BY 2D PLANIMETRY (MOD):                      Normal Ranges: EF-A4C View:    47 % (>=55%) EF-A2C View:    49 % EF-Biplane:     49 % EF-Visual:      53 % LV EF Reported: 53 % LV DIASTOLIC FUNCTION:                             Normal Ranges: MV Peak E:      1.04 m/s    (0.7-1.2 m/s) MV Peak A:      0.83 m/s    (0.42-0.7 m/s) E/A Ratio:      1.24        (1.0-2.2) MV e'           0.075 m/s   (>8.0) MV lateral e'   0.09 m/s MV medial e'    0.06 m/s MV A Dur:        145.33 msec E/e' Ratio:     13.81       (<8.0) PulmV Sys Pravin:  42.02 cm/s PulmV Dan Pravin: 45.54 cm/s PulmV S/D Pravin:  0.92 MITRAL VALVE:                 Normal Ranges: MV DT: 198 msec (150-240msec) AORTIC VALVE:                                   Normal Ranges: AoV Vmax:                1.22 m/s (<=1.7m/s) AoV Peak P.0 mmHg (<20mmHg) AoV Mean PG:             3.2 mmHg (1.7-11.5mmHg) LVOT Max Pravin:            0.96 m/s (<=1.1m/s) AoV VTI:                 29.46 cm (18-25cm) LVOT VTI:                25.98 cm LVOT Diameter:           1.98 cm  (1.8-2.4cm) AoV Area, VTI:           2.71 cm2 (2.5-5.5cm2) AoV Area,Vmax:           2.42 cm2 (2.5-4.5cm2) AoV Dimensionless Index: 0.88  RIGHT VENTRICLE: RV Basal 3.30 cm RV Mid   2.10 cm RV Major 8.2 cm TAPSE:   22.0 mm RV s'    0.08 m/s TRICUSPID VALVE/RVSP:                   Normal Ranges: IVC Diam: 2.20 cm PULMONIC VALVE:                      Normal Ranges: PV Max Pravin: 0.8 m/s  (0.6-0.9m/s) PV Max P.4 mmHg Pulmonary Veins: PulmV Dan Pravin: 45.54 cm/s PulmV S/D Pravin:  0.92 PulmV Sys Pravin:  42.02 cm/s  [id number] Harman Yanez MD Electronically signed on 7/10/2024 at 2:31:10 PM  ** Final **     XR chest 1 view    Result Date: 7/10/2024  Interpreted By:  Mando Almanza, STUDY: XR CHEST 1 VIEW;  7/10/2024 9:17 am   INDICATION: Signs/Symptoms:sob.   COMPARISON: CT scan abdomen and pelvis from 2024. Chest x-rays, most recent from 2024.   ACCESSION NUMBER(S): [id number]   ORDERING CLINICIAN: ALEJANDRA CEDENO   TECHNIQUE: Single AP portable view of the chest was obtained.   FINDINGS: MEDIASTINUM/ LUNGS/ BONIFACIO: Previous CABG. Cardiomegaly. No vascular congestion or pleural effusion. There is a stable battery pack overlying the left chest with electrodes  directed cephalad in the left neck. Stable NG tube in place. Scant new haziness at the right lung base. Left lung remains clear.   No pneumothorax. No tracheal deviation. No abnormal hilar fullness or gross mass on  either side.   BONES: No lytic or blastic destructive bone lesion.   UPPER ABDOMEN: Grossly intact.       Previous CABG.  Mild cardiomegaly.  Currently without radiographic evidence of CHF  .   Grossly stable NG tube in place.   Mild new atelectasis versus tiny new infiltrate at the right lung base.   MACRO: None   Signed by: Mando Almanza 7/10/2024 9:41 AM Dictation workstation:   [id number]    ECG 12 Lead    Result Date: 7/10/2024  Atrial fibrillation Low voltage QRS ST & T wave abnormality, consider anterolateral ischemia Abnormal ECG When compared with ECG of 11-OCT-2023 15:44, Atrial fibrillation has replaced Sinus rhythm Borderline criteria for Anterior infarct are no longer Present T wave inversion more evident in Anterolateral leads    XR chest abdomen for OG NG placement    Result Date: 7/9/2024  Interpreted By:  Finkelstein, Evan, STUDY: XR CHEST ABDOMEN FOR OG NG PLACEMENT;  7/9/2024 6:36 am   INDICATION: Signs/Symptoms:NGT verification.   COMPARISON: Chest radiograph 07/09/2024. CT abdomen pelvis 07/09/2024.   ACCESSION NUMBER(S): [id number]   ORDERING CLINICIAN: ALEJANDRA ANGLIN   FINDINGS: Interval placement of an NG tube which courses below the diaphragm, tip overlying the left upper quadrant in the expected location of the stomach. Dilated loops of bowel in the upper abdomen corresponding to small-bowel obstruction seen on CT. Bibasilar atelectasis.       NG tube tip overlies the left upper quadrant in the expected location of the stomach. Dilated loops of small bowel compatible with small-bowel obstruction seen on prior CT.     MACRO: None.   Signed by: Evan Finkelstein 7/9/2024 7:00 AM Dictation workstation:   [id number]    XR chest 2 views    Result Date: 7/9/2024  Interpreted By:  Finkelstein, Evan, STUDY: XR CHEST 2 VIEWS;  7/9/2024 5:16 am   INDICATION: Signs/Symptoms:SOB.   COMPARISON: Chest radiograph 05/02/2024   ACCESSION NUMBER(S): [id number]   ORDERING CLINICIAN: ALEJANDRA  DERREK   FINDINGS: Redemonstrated left chest wall stimulator with leads coursing into the neck. Median sternotomy wires are again seen.   CARDIOMEDIASTINAL SILHOUETTE: Cardiomediastinal silhouette is stable in size and configuration.   LUNGS: No pulmonary consolidation, pleural effusion or pneumothorax.   ABDOMEN: No remarkable upper abdominal findings.   BONES: No acute osseous abnormality.       No radiographic evidence of acute cardiopulmonary pathology.   MACRO: None.   Signed by: Evan Finkelstein 7/9/2024 5:40 AM Dictation workstation:   [id number]    CT abdomen pelvis w IV contrast    Result Date: 7/9/2024  Interpreted By:  Finkelstein, Evan, STUDY: CT ABDOMEN PELVIS W IV CONTRAST;  7/9/2024 5:07 am   INDICATION: Signs/Symptoms:Abdominal pain, nausea and diarrhea.   COMPARISON: CT abdomen pelvis 05/22/2023   ACCESSION NUMBER(S): [id number]   ORDERING CLINICIAN: ALEJANDRA ANGLIN   TECHNIQUE: Axial CT images of the abdomen and pelvis with coronal and sagittal reconstructed images obtained after intravenous administration of 75 mL Omnipaque 350   FINDINGS: LOWER CHEST: Bibasilar atelectasis.   ABDOMEN:   LIVER: Normal attenuation and contour. BILE DUCTS: Normal caliber. GALLBLADDER: Surgically absent. PORTAL VEIN: Patent SPLEEN: Calcifications scattered throughout the spleen suggest sequela of prior granulomatous disease. PANCREAS: Unremarkable. ADRENALS: Unremarkable. KIDNEYS, URETERS and URINARY BLADDER: Symmetric renal enhancement. No hydronephrosis or perinephric fluid collection.  Bladder is within normal limits REPRODUCTIVE ORGANS: No pelvic masses.   ABDOMINAL WALL: Laxity of the ventral abdominal wall musculature. Fat containing inguinal hernias bilaterally. PERITONEUM: No ascites, free air or fluid collection.   BOWEL: The stomach is decompressed, which limits evaluation. Dilated loops of small bowel throughout the abdomen and pelvis. There is a transition point in the right mid abdomen  and decompressed loops of distal small bowel. Scattered colonic diverticula without pericolonic inflammatory stranding. The appendix is not definitively visualized, without focal pericecal inflammatory stranding.   VESSELS: Moderate aortoiliac calcifications. No aortic aneurysm. RETROPERITONEUM: No pathologically enlarged retroperitoneal lymph nodes.   BONES: No acute osseous abnormality. Degenerative changes throughout the spine.       Dilated loops of small bowel throughout the abdomen and pelvis with a transition point in the right mid abdomen and decompressed loops of distal small bowel. Findings are compatible with small bowel obstruction   Scattered colonic diverticulosis without CT evidence of acute diverticulitis.     MACRO: None.   Signed by: Evan Finkelstein 7/9/2024 5:39 AM Dictation workstation:   [id number]         Assessment/Plan   Principal Problem:    SBO (small bowel obstruction) (Multi)  Active Problems:    Atrial fibrillation (Multi)    Mixed hyperlipidemia    Benign essential hypertension    Heart failure (Multi)    Platelet disorder (Multi)    Small bowel obstruction (Multi)    SBO  - NG removed this AM  - Surgery following   - Small bowel follow through yesterday, unremarkable   - Advance to clears later today per surgery   - Continue IVF   - Pain control PRN   - Zofran PRN     Acute hypoxic respiratory failure 2/2 COPD exacerbation   - On 1L NC this AM, continue weaning   - Continue Breo, Duonebs, day 3 of Solumedrol       Hypokalemia   - Improving, 3.4 today  - Klor Con today   - Continue monitoring     HTN/Afib   - cardio following  - metoprolol  - Spironolactone  - ASA   - Not on OAC outpatient     Hypernatremia  - Resolved   - 138 today  - D5W decreased to 50ml/hr     DM   - Home insulin continue to hold   - Sliding scale insulin   - Glucose checks   - Continue monitoring     Chronic Thrombocytopenia   - At baseline, 123 today   - Continue monitoring      CAD s/p CABG  - Continue  rosuvastatin      Neuropathy   - Continue gabapentin      Depression   - Continue Prozac     Code: Full   Diet: Advance to clears this afternoon per surgery  DVT ppx: SQ hep  Dispo: monitor clinically, pain control      Chago Root DO

## 2024-07-13 NOTE — CARE PLAN
The patient's goals for the shift include  ambulate, tolerate diet.    The clinical goals for the shift include pt will tolerate having NG tube out and advancement of diet.      Problem: Pain - Adult  Goal: Verbalizes/displays adequate comfort level or baseline comfort level  Outcome: Progressing     Problem: Safety - Adult  Goal: Free from fall injury  Outcome: Progressing     Problem: Discharge Planning  Goal: Discharge to home or other facility with appropriate resources  Outcome: Progressing     Problem: Chronic Conditions and Co-morbidities  Goal: Patient's chronic conditions and co-morbidity symptoms are monitored and maintained or improved  Outcome: Progressing

## 2024-07-14 VITALS
RESPIRATION RATE: 18 BRPM | HEIGHT: 71 IN | TEMPERATURE: 98.6 F | SYSTOLIC BLOOD PRESSURE: 152 MMHG | DIASTOLIC BLOOD PRESSURE: 72 MMHG | OXYGEN SATURATION: 94 % | BODY MASS INDEX: 31.17 KG/M2 | HEART RATE: 65 BPM | WEIGHT: 222.66 LBS

## 2024-07-14 LAB
ALBUMIN SERPL BCP-MCNC: 3.3 G/DL (ref 3.4–5)
ALP SERPL-CCNC: 39 U/L (ref 33–136)
ALT SERPL W P-5'-P-CCNC: 24 U/L (ref 10–52)
ANION GAP SERPL CALC-SCNC: 9 MMOL/L (ref 10–20)
AST SERPL W P-5'-P-CCNC: 27 U/L (ref 9–39)
BASOPHILS # BLD AUTO: 0.02 X10*3/UL (ref 0–0.1)
BASOPHILS NFR BLD AUTO: 0.2 %
BILIRUB SERPL-MCNC: 0.9 MG/DL (ref 0–1.2)
BUN SERPL-MCNC: 18 MG/DL (ref 6–23)
CALCIUM SERPL-MCNC: 8.6 MG/DL (ref 8.6–10.3)
CHLORIDE SERPL-SCNC: 98 MMOL/L (ref 98–107)
CO2 SERPL-SCNC: 34 MMOL/L (ref 21–32)
CREAT SERPL-MCNC: 0.72 MG/DL (ref 0.5–1.3)
EGFRCR SERPLBLD CKD-EPI 2021: >90 ML/MIN/1.73M*2
EOSINOPHIL # BLD AUTO: 0.06 X10*3/UL (ref 0–0.4)
EOSINOPHIL NFR BLD AUTO: 0.6 %
ERYTHROCYTE [DISTWIDTH] IN BLOOD BY AUTOMATED COUNT: 14 % (ref 11.5–14.5)
GLUCOSE BLD MANUAL STRIP-MCNC: 104 MG/DL (ref 74–99)
GLUCOSE BLD MANUAL STRIP-MCNC: 212 MG/DL (ref 74–99)
GLUCOSE BLD MANUAL STRIP-MCNC: 243 MG/DL (ref 74–99)
GLUCOSE BLD MANUAL STRIP-MCNC: 343 MG/DL (ref 74–99)
GLUCOSE SERPL-MCNC: 101 MG/DL (ref 74–99)
HCT VFR BLD AUTO: 49.9 % (ref 41–52)
HGB BLD-MCNC: 16.4 G/DL (ref 13.5–17.5)
IMM GRANULOCYTES # BLD AUTO: 0.07 X10*3/UL (ref 0–0.5)
IMM GRANULOCYTES NFR BLD AUTO: 0.7 % (ref 0–0.9)
LYMPHOCYTES # BLD AUTO: 2 X10*3/UL (ref 0.8–3)
LYMPHOCYTES NFR BLD AUTO: 20.8 %
MAGNESIUM SERPL-MCNC: 1.92 MG/DL (ref 1.6–2.4)
MCH RBC QN AUTO: 31.8 PG (ref 26–34)
MCHC RBC AUTO-ENTMCNC: 32.9 G/DL (ref 32–36)
MCV RBC AUTO: 97 FL (ref 80–100)
MONOCYTES # BLD AUTO: 0.97 X10*3/UL (ref 0.05–0.8)
MONOCYTES NFR BLD AUTO: 10.1 %
NEUTROPHILS # BLD AUTO: 6.5 X10*3/UL (ref 1.6–5.5)
NEUTROPHILS NFR BLD AUTO: 67.6 %
NRBC BLD-RTO: 0 /100 WBCS (ref 0–0)
PHOSPHATE SERPL-MCNC: 2.5 MG/DL (ref 2.5–4.9)
PLATELET # BLD AUTO: 127 X10*3/UL (ref 150–450)
POTASSIUM SERPL-SCNC: 3.8 MMOL/L (ref 3.5–5.3)
PROT SERPL-MCNC: 5.8 G/DL (ref 6.4–8.2)
RBC # BLD AUTO: 5.16 X10*6/UL (ref 4.5–5.9)
SODIUM SERPL-SCNC: 137 MMOL/L (ref 136–145)
WBC # BLD AUTO: 9.6 X10*3/UL (ref 4.4–11.3)

## 2024-07-14 PROCEDURE — 99232 SBSQ HOSP IP/OBS MODERATE 35: CPT

## 2024-07-14 PROCEDURE — 99231 SBSQ HOSP IP/OBS SF/LOW 25: CPT | Performed by: SURGERY

## 2024-07-14 PROCEDURE — 80053 COMPREHEN METABOLIC PANEL: CPT | Performed by: STUDENT IN AN ORGANIZED HEALTH CARE EDUCATION/TRAINING PROGRAM

## 2024-07-14 PROCEDURE — 2500000002 HC RX 250 W HCPCS SELF ADMINISTERED DRUGS (ALT 637 FOR MEDICARE OP, ALT 636 FOR OP/ED)

## 2024-07-14 PROCEDURE — 2500000004 HC RX 250 GENERAL PHARMACY W/ HCPCS (ALT 636 FOR OP/ED): Performed by: SURGERY

## 2024-07-14 PROCEDURE — 2500000004 HC RX 250 GENERAL PHARMACY W/ HCPCS (ALT 636 FOR OP/ED)

## 2024-07-14 PROCEDURE — 2500000004 HC RX 250 GENERAL PHARMACY W/ HCPCS (ALT 636 FOR OP/ED): Performed by: STUDENT IN AN ORGANIZED HEALTH CARE EDUCATION/TRAINING PROGRAM

## 2024-07-14 PROCEDURE — 2500000001 HC RX 250 WO HCPCS SELF ADMINISTERED DRUGS (ALT 637 FOR MEDICARE OP)

## 2024-07-14 PROCEDURE — 83735 ASSAY OF MAGNESIUM: CPT | Performed by: STUDENT IN AN ORGANIZED HEALTH CARE EDUCATION/TRAINING PROGRAM

## 2024-07-14 PROCEDURE — 2500000001 HC RX 250 WO HCPCS SELF ADMINISTERED DRUGS (ALT 637 FOR MEDICARE OP): Performed by: STUDENT IN AN ORGANIZED HEALTH CARE EDUCATION/TRAINING PROGRAM

## 2024-07-14 PROCEDURE — 85025 COMPLETE CBC W/AUTO DIFF WBC: CPT | Performed by: STUDENT IN AN ORGANIZED HEALTH CARE EDUCATION/TRAINING PROGRAM

## 2024-07-14 PROCEDURE — 2500000002 HC RX 250 W HCPCS SELF ADMINISTERED DRUGS (ALT 637 FOR MEDICARE OP, ALT 636 FOR OP/ED): Performed by: STUDENT IN AN ORGANIZED HEALTH CARE EDUCATION/TRAINING PROGRAM

## 2024-07-14 PROCEDURE — 84100 ASSAY OF PHOSPHORUS: CPT | Performed by: PHYSICIAN ASSISTANT

## 2024-07-14 PROCEDURE — 36415 COLL VENOUS BLD VENIPUNCTURE: CPT | Performed by: STUDENT IN AN ORGANIZED HEALTH CARE EDUCATION/TRAINING PROGRAM

## 2024-07-14 PROCEDURE — 1100000001 HC PRIVATE ROOM DAILY

## 2024-07-14 PROCEDURE — 82947 ASSAY GLUCOSE BLOOD QUANT: CPT

## 2024-07-14 PROCEDURE — 2500000001 HC RX 250 WO HCPCS SELF ADMINISTERED DRUGS (ALT 637 FOR MEDICARE OP): Performed by: SURGERY

## 2024-07-14 PROCEDURE — C9113 INJ PANTOPRAZOLE SODIUM, VIA: HCPCS | Performed by: SURGERY

## 2024-07-14 RX ORDER — NYSTATIN 100000 U/G
CREAM TOPICAL 2 TIMES DAILY
Status: DISCONTINUED | OUTPATIENT
Start: 2024-07-14 | End: 2024-07-15 | Stop reason: HOSPADM

## 2024-07-14 RX ORDER — POTASSIUM CHLORIDE 20 MEQ/1
20 TABLET, EXTENDED RELEASE ORAL ONCE
Status: COMPLETED | OUTPATIENT
Start: 2024-07-14 | End: 2024-07-14

## 2024-07-14 RX ORDER — INSULIN LISPRO 100 [IU]/ML
0-15 INJECTION, SOLUTION INTRAVENOUS; SUBCUTANEOUS
Status: DISCONTINUED | OUTPATIENT
Start: 2024-07-14 | End: 2024-07-15 | Stop reason: HOSPADM

## 2024-07-14 ASSESSMENT — COGNITIVE AND FUNCTIONAL STATUS - GENERAL
WALKING IN HOSPITAL ROOM: A LITTLE
MOBILITY SCORE: 22
DAILY ACTIVITIY SCORE: 24
MOBILITY SCORE: 24
DAILY ACTIVITIY SCORE: 24
CLIMB 3 TO 5 STEPS WITH RAILING: A LITTLE

## 2024-07-14 ASSESSMENT — PAIN - FUNCTIONAL ASSESSMENT
PAIN_FUNCTIONAL_ASSESSMENT: 0-10
PAIN_FUNCTIONAL_ASSESSMENT: 0-10

## 2024-07-14 ASSESSMENT — PAIN SCALES - GENERAL
PAINLEVEL_OUTOF10: 0 - NO PAIN
PAINLEVEL_OUTOF10: 0 - NO PAIN

## 2024-07-14 NOTE — CARE PLAN
Problem: Pain  Goal: Takes deep breaths with improved pain control throughout the shift  Outcome: Progressing  Goal: Turns in bed with improved pain control throughout the shift  Outcome: Progressing  Goal: Walks with improved pain control throughout the shift  Outcome: Progressing  Goal: Performs ADL's with improved pain control throughout shift  Outcome: Progressing  Goal: Participates in PT with improved pain control throughout the shift  Outcome: Progressing  Goal: Free from opioid side effects throughout the shift  Outcome: Progressing  Goal: Free from acute confusion related to pain meds throughout the shift  Outcome: Progressing     Problem: Pain - Adult  Goal: Verbalizes/displays adequate comfort level or baseline comfort level  Outcome: Progressing     Problem: Safety - Adult  Goal: Free from fall injury  Outcome: Progressing     Problem: Discharge Planning  Goal: Discharge to home or other facility with appropriate resources  Outcome: Progressing   The patient's goals for the shift include      The clinical goals for the shift include Pt. will remain free from N/V throughout this shift.

## 2024-07-14 NOTE — CARE PLAN
Problem: Pain  Goal: Takes deep breaths with improved pain control throughout the shift  Outcome: Progressing  Goal: Turns in bed with improved pain control throughout the shift  Outcome: Progressing  Goal: Walks with improved pain control throughout the shift  Outcome: Progressing  Goal: Performs ADL's with improved pain control throughout shift  Outcome: Progressing  Goal: Participates in PT with improved pain control throughout the shift  Outcome: Progressing  Goal: Free from opioid side effects throughout the shift  Outcome: Progressing  Goal: Free from acute confusion related to pain meds throughout the shift  Outcome: Progressing     Problem: Pain - Adult  Goal: Verbalizes/displays adequate comfort level or baseline comfort level  Outcome: Progressing     Problem: Safety - Adult  Goal: Free from fall injury  Outcome: Progressing     Problem: Discharge Planning  Goal: Discharge to home or other facility with appropriate resources  Outcome: Progressing     Problem: Chronic Conditions and Co-morbidities  Goal: Patient's chronic conditions and co-morbidity symptoms are monitored and maintained or improved  Outcome: Progressing   The patient's goals for the shift include      The clinical goals for the shift include Pt. will remain free from N/V throughout this shift.

## 2024-07-14 NOTE — PROGRESS NOTES
"Ramesh Morocho is a 83 y.o. male on day 5 of admission presenting with SBO (small bowel obstruction) (Multi).    Subjective   Feels well       Objective vital signs stable afebrile    Physical Exam abdomen is soft and nontender lungs are clear extremities are unremarkable    Last Recorded Vitals  Blood pressure 154/72, pulse 57, temperature 36.3 °C (97.3 °F), temperature source Temporal, resp. rate 18, height 1.803 m (5' 10.98\"), weight 101 kg (222 lb 10.6 oz), SpO2 92%.  Intake/Output last 3 Shifts:  I/O last 3 completed shifts:  In: 2291.3 (22.7 mL/kg) [P.O.:190; I.V.:2101.3 (20.8 mL/kg)]  Out: 625 (6.2 mL/kg) [Emesis/NG output:625]  Weight: 101 kg     Relevant Results                Labs are basically normal             Assessment/Plan   Principal Problem:    SBO (small bowel obstruction) (Multi)  Active Problems:    Atrial fibrillation (Multi)    Mixed hyperlipidemia    Benign essential hypertension    Heart failure (Multi)    Platelet disorder (Multi)    Small bowel obstruction (Multi)    Bowel obstruction is now resolved will advance diet possible discharge tomorrow       I spent 15 minutes in the professional and overall care of this patient.      Arti Malone MD      "

## 2024-07-14 NOTE — PROGRESS NOTES
"Ramesh Morocho is a 83 y.o. male on day 5 of admission presenting with SBO (small bowel obstruction) (Multi).    Subjective   Pt reports he is doing well, no complaints today. Reports flatus, is having bowel movements, although mostly diarrhea still. Currently on RA. Denies any F/C, N/V, abdominal pain, chest pain, SOB.       Objective     Physical Exam  Constitutional:       General: He is not in acute distress.     Appearance: He is obese.   HENT:      Head: Normocephalic and atraumatic.   Eyes:      Extraocular Movements: Extraocular movements intact.      Conjunctiva/sclera: Conjunctivae normal.      Pupils: Pupils are equal, round, and reactive to light.   Cardiovascular:      Rate and Rhythm: Normal rate and regular rhythm.      Heart sounds: No murmur heard.  Pulmonary:      Effort: Pulmonary effort is normal.      Breath sounds: Wheezing and rhonchi present.   Abdominal:      General: Bowel sounds are normal.      Palpations: Abdomen is soft.      Tenderness: There is no abdominal tenderness.   Skin:     General: Skin is warm and dry.   Neurological:      Mental Status: He is alert and oriented to person, place, and time.   Psychiatric:         Mood and Affect: Mood normal.         Behavior: Behavior normal.         Last Recorded Vitals  Blood pressure 154/72, pulse 57, temperature 36.3 °C (97.3 °F), temperature source Temporal, resp. rate 18, height 1.803 m (5' 10.98\"), weight 101 kg (222 lb 10.6 oz), SpO2 92%.  Intake/Output last 3 Shifts:  I/O last 3 completed shifts:  In: 2291.3 (22.7 mL/kg) [P.O.:190; I.V.:2101.3 (20.8 mL/kg)]  Out: 625 (6.2 mL/kg) [Emesis/NG output:625]  Weight: 101 kg     Relevant Results  Scheduled medications  acetaminophen, 650 mg, oral, q6h  FLUoxetine, 40 mg, oral, Daily  fluticasone furoate-vilanteroL, 1 puff, inhalation, Daily  gabapentin, 600 mg, oral, 4x daily  heparin (porcine), 5,000 Units, subcutaneous, q8h JIGNESH  insulin lispro, 0-15 Units, subcutaneous, Before meals & " nightly  methylPREDNISolone sodium succinate (PF), 40 mg, intravenous, q24h  metoprolol tartrate, 25 mg, oral, BID  pantoprazole, 40 mg, intravenous, Daily  pramipexole, 0.125 mg, oral, TID  rosuvastatin, 40 mg, oral, Daily  spironolactone, 12.5 mg, oral, Daily      Continuous medications  sodium chloride 0.9%, 75 mL/hr, Last Rate: 75 mL/hr (07/14/24 8504)      PRN medications  PRN medications: dextrose, dextrose, glucagon, ipratropium-albuteroL, morphine **OR** morphine, ondansetron, phenoL         Results for orders placed or performed during the hospital encounter of 07/09/24 (from the past 24 hour(s))   POCT GLUCOSE   Result Value Ref Range    POCT Glucose 355 (H) 74 - 99 mg/dL   POCT GLUCOSE   Result Value Ref Range    POCT Glucose 186 (H) 74 - 99 mg/dL   POCT GLUCOSE   Result Value Ref Range    POCT Glucose 104 (H) 74 - 99 mg/dL   Comprehensive Metabolic Panel   Result Value Ref Range    Glucose 101 (H) 74 - 99 mg/dL    Sodium 137 136 - 145 mmol/L    Potassium 3.8 3.5 - 5.3 mmol/L    Chloride 98 98 - 107 mmol/L    Bicarbonate 34 (H) 21 - 32 mmol/L    Anion Gap 9 (L) 10 - 20 mmol/L    Urea Nitrogen 18 6 - 23 mg/dL    Creatinine 0.72 0.50 - 1.30 mg/dL    eGFR >90 >60 mL/min/1.73m*2    Calcium 8.6 8.6 - 10.3 mg/dL    Albumin 3.3 (L) 3.4 - 5.0 g/dL    Alkaline Phosphatase 39 33 - 136 U/L    Total Protein 5.8 (L) 6.4 - 8.2 g/dL    AST 27 9 - 39 U/L    Bilirubin, Total 0.9 0.0 - 1.2 mg/dL    ALT 24 10 - 52 U/L   CBC and Auto Differential   Result Value Ref Range    WBC 9.6 4.4 - 11.3 x10*3/uL    nRBC 0.0 0.0 - 0.0 /100 WBCs    RBC 5.16 4.50 - 5.90 x10*6/uL    Hemoglobin 16.4 13.5 - 17.5 g/dL    Hematocrit 49.9 41.0 - 52.0 %    MCV 97 80 - 100 fL    MCH 31.8 26.0 - 34.0 pg    MCHC 32.9 32.0 - 36.0 g/dL    RDW 14.0 11.5 - 14.5 %    Platelets 127 (L) 150 - 450 x10*3/uL    Neutrophils % 67.6 40.0 - 80.0 %    Immature Granulocytes %, Automated 0.7 0.0 - 0.9 %    Lymphocytes % 20.8 13.0 - 44.0 %    Monocytes % 10.1 2.0 -  10.0 %    Eosinophils % 0.6 0.0 - 6.0 %    Basophils % 0.2 0.0 - 2.0 %    Neutrophils Absolute 6.50 (H) 1.60 - 5.50 x10*3/uL    Immature Granulocytes Absolute, Automated 0.07 0.00 - 0.50 x10*3/uL    Lymphocytes Absolute 2.00 0.80 - 3.00 x10*3/uL    Monocytes Absolute 0.97 (H) 0.05 - 0.80 x10*3/uL    Eosinophils Absolute 0.06 0.00 - 0.40 x10*3/uL    Basophils Absolute 0.02 0.00 - 0.10 x10*3/uL   Magnesium   Result Value Ref Range    Magnesium 1.92 1.60 - 2.40 mg/dL   Phosphorus   Result Value Ref Range    Phosphorus 2.5 2.5 - 4.9 mg/dL   POCT GLUCOSE   Result Value Ref Range    POCT Glucose 212 (H) 74 - 99 mg/dL        FL small bowel series    Result Date: 7/12/2024  Interpreted By:  Sherrie Salinas, STUDY: FL SMALL BOWEL SERIES;  7/12/2024 11:24 am   INDICATION: Signs/Symptoms:sbo.   COMPARISON: CT abdomen pelvis with contrast dated 07/09/2024   ACCESSION NUMBER(S): [id number]   ORDERING CLINICIAN: MARK GOMEZ   TECHNIQUE: An initial radiograph of the abdomen and pelvis was obtained. Following the oral administration of approximately   240 mL of Gastrografin, delayed static fluoroscopic images of the abdomen in the posteroanterior projection were obtained at 0, 20 and 40 minute intervals until contrast was observed to reach the colon.   FINDINGS: Initial  image demonstrates  the distal segment of nasogastric tube with its tip projecting over the expected region of the gastric fundus. Postsurgical changes of median sternotomy are partially included in the field of view. Postsurgical changes of cholecystectomy are noted with metallic clips projecting over the right upper quadrant. Visualized lung bases are clear.   The transit time of contrast to colon is approximately 40 minutes. The opacified small bowel loops are normal in caliber.       Unremarkable small bowel follow-through with normal transit time to colon.   MACRO: None   Signed by: Sherrie Salinas 7/12/2024 3:33 PM Dictation workstation:    [id number]    Transthoracic Echo (TTE) Complete    Result Date: 7/10/2024   [facility], [address] [address]               Tel [phone] and Fax [phone] TRANSTHORACIC ECHOCARDIOGRAM REPORT  Patient Name:      ANNITA CHU        Reading Physician:    [id number] Harman Yanez MD Study Date:        7/10/2024            Ordering Provider:    [id number] SHANTEL DEE MRN/PID:           [id number]             Fellow: Accession#:        [id number]         Nurse:                Zoila Valentine RN Date of Birth/Age: 1940 / 83 years Sonographer:          Zoila Call                                                               RDMERARI Gender:            M                    Additional Staff: Height:            180.34 cm            Admit Date:           7/9/2024 Weight:            100.70 kg            Admission Status:     Inpatient -                                                               Routine BSA / BMI:         2.20 m2 / 30.96      Encounter#:           [id number]                    kg/m2 Blood Pressure:    132/61 mmHg          Department Location:  [facility] Non                                                               Invasive Study Type:    TRANSTHORACIC ECHO (TTE) COMPLETE Diagnosis/ICD: Heart failure, unspecified-I50.9; Abnormal electrocardiogram                [ECG] [EKG]-R94.31 Indication:    Abnormal EKG, Congestive Heart Failure CPT Code:      Echo Complete w Full Doppler-20425 Patient History: CABG:              Mulitivessel CABG. Diabetes:          Yes Pertinent History: CAD, CHF, A-Fib and HTN. Study Detail: The following Echo studies were performed: 2D, M-Mode, Doppler and               color flow. Image quality for this study is poor. Technically                challenging study due to poor acoustic windows. Definity used as a               contrast agent for endocardial border definition. Total contrast               used for this procedure was 1 mL via IV push.  PHYSICIAN INTERPRETATION: Left Ventricle: The left ventricular systolic function is low normal, with a visually estimated ejection fraction of 50-55%. There are no regional wall motion abnormalities. The left ventricular cavity size is mildly dilated. There is concentric left ventricular hypertrophy. Left ventricular diastolic filling was indeterminate. Left Atrium: The left atrium was not well visualized. Right Ventricle: The right ventricle is normal in size. There is normal right ventricular global systolic function. RV free wall is not well visualized. Right Atrium: The right atrium is upper limits of normal in size. Aortic Valve: The aortic valve is trileaflet. There is mild aortic valve cusp calcification. There is no evidence of aortic valve stenosis. The aortic valve dimensionless index is 0.88. There is no evidence of aortic valve regurgitation. The peak instantaneous gradient of the aortic valve is 6.0 mmHg. The mean gradient of the aortic valve is 3.2 mmHg. Mitral Valve: The mitral valve is normal in structure. There is trace to mild mitral valve regurgitation. Tricuspid Valve: The tricuspid valve was not well visualized. There is trace tricuspid regurgitation. The right ventricular systolic pressure is unable to be estimated. Pulmonic Valve: The pulmonic valve is not well visualized. There is physiologic pulmonic valve regurgitation. Pericardium: There is no pericardial effusion noted. Aorta: The aortic root was not well visualized. Systemic Veins: The inferior vena cava appears dilated. There is less than 50% IVC collapse with inspiration. In comparison to the previous echocardiogram(s): There are no prior studies on this patient for comparison purposes.  CONCLUSIONS:  1. The left ventricular  systolic function is low normal, with a visually estimated ejection fraction of 50-55%.  2. Left ventricular cavity size is mildly dilated.  3. There is normal right ventricular global systolic function. QUANTITATIVE DATA SUMMARY: 2D MEASUREMENTS:                           Normal Ranges: IVSd:          1.37 cm    (0.6-1.1cm) LVPWd:         1.12 cm    (0.6-1.1cm) LVIDd:         5.58 cm    (3.9-5.9cm) LVIDs:         4.32 cm LV Mass Index: 132.6 g/m2 LV % FS        22.5 % LA VOLUME:                               Normal Ranges: LA Vol A4C:        49.3 ml    (22+/-6mL/m2) LA Vol A2C:        57.7 ml LA Vol BP:         54.9 ml LA Vol Index A4C:  22.4 ml/m2 LA Vol Index A2C:  26.2 ml/m2 LA Vol Index BP:   24.9 ml/m2 LA Area A4C:       17.7 cm2 LA Area A2C:       18.6 cm2 LA Major Axis A4C: 5.4 cm LA Major Axis A2C: 5.1 cm LA Volume Index:   24.9 ml/m2 LA Vol A4C:        46.6 ml LA Vol A2C:        54.1 ml RA VOLUME BY A/L METHOD:                       Normal Ranges: RA Area A4C: 18.5 cm2 M-MODE MEASUREMENTS:                  Normal Ranges: Ao Root: 3.90 cm (2.0-3.7cm) LAs:     4.41 cm (2.7-4.0cm) LV SYSTOLIC FUNCTION BY 2D PLANIMETRY (MOD):                      Normal Ranges: EF-A4C View:    47 % (>=55%) EF-A2C View:    49 % EF-Biplane:     49 % EF-Visual:      53 % LV EF Reported: 53 % LV DIASTOLIC FUNCTION:                             Normal Ranges: MV Peak E:      1.04 m/s    (0.7-1.2 m/s) MV Peak A:      0.83 m/s    (0.42-0.7 m/s) E/A Ratio:      1.24        (1.0-2.2) MV e'           0.075 m/s   (>8.0) MV lateral e'   0.09 m/s MV medial e'    0.06 m/s MV A Dur:       145.33 msec E/e' Ratio:     13.81       (<8.0) PulmV Sys Pravin:  42.02 cm/s PulmV Dan Pravin: 45.54 cm/s PulmV S/D Pravin:  0.92 MITRAL VALVE:                 Normal Ranges: MV DT: 198 msec (150-240msec) AORTIC VALVE:                                   Normal Ranges: AoV Vmax:                1.22 m/s (<=1.7m/s) AoV Peak P.0 mmHg (<20mmHg) AoV Mean  PG:             3.2 mmHg (1.7-11.5mmHg) LVOT Max Pravin:            0.96 m/s (<=1.1m/s) AoV VTI:                 29.46 cm (18-25cm) LVOT VTI:                25.98 cm LVOT Diameter:           1.98 cm  (1.8-2.4cm) AoV Area, VTI:           2.71 cm2 (2.5-5.5cm2) AoV Area,Vmax:           2.42 cm2 (2.5-4.5cm2) AoV Dimensionless Index: 0.88  RIGHT VENTRICLE: RV Basal 3.30 cm RV Mid   2.10 cm RV Major 8.2 cm TAPSE:   22.0 mm RV s'    0.08 m/s TRICUSPID VALVE/RVSP:                   Normal Ranges: IVC Diam: 2.20 cm PULMONIC VALVE:                      Normal Ranges: PV Max Pravin: 0.8 m/s  (0.6-0.9m/s) PV Max P.4 mmHg Pulmonary Veins: PulmV Dan Pravin: 45.54 cm/s PulmV S/D Pravin:  0.92 PulmV Sys Pravin:  42.02 cm/s  [id number] Harman Yanez MD Electronically signed on 7/10/2024 at 2:31:10 PM  ** Final **     XR chest 1 view    Result Date: 7/10/2024  Interpreted By:  Mando Almanza, STUDY: XR CHEST 1 VIEW;  7/10/2024 9:17 am   INDICATION: Signs/Symptoms:sob.   COMPARISON: CT scan abdomen and pelvis from 2024. Chest x-rays, most recent from 2024.   ACCESSION NUMBER(S): [id number]   ORDERING CLINICIAN: ALEJANDRA CEDENO   TECHNIQUE: Single AP portable view of the chest was obtained.   FINDINGS: MEDIASTINUM/ LUNGS/ BONIFACIO: Previous CABG. Cardiomegaly. No vascular congestion or pleural effusion. There is a stable battery pack overlying the left chest with electrodes  directed cephalad in the left neck. Stable NG tube in place. Scant new haziness at the right lung base. Left lung remains clear.   No pneumothorax. No tracheal deviation. No abnormal hilar fullness or gross mass on either side.   BONES: No lytic or blastic destructive bone lesion.   UPPER ABDOMEN: Grossly intact.       Previous CABG.  Mild cardiomegaly.  Currently without radiographic evidence of CHF  .   Grossly stable NG tube in place.   Mild new atelectasis versus tiny new infiltrate at the right lung base.   MACRO: None   Signed by: Mando Almanza 7/10/2024 9:41 AM  Dictation workstation:   [id number]    ECG 12 Lead    Result Date: 7/10/2024  Atrial fibrillation Low voltage QRS ST & T wave abnormality, consider anterolateral ischemia Abnormal ECG When compared with ECG of 11-OCT-2023 15:44, Atrial fibrillation has replaced Sinus rhythm Borderline criteria for Anterior infarct are no longer Present T wave inversion more evident in Anterolateral leads    XR chest abdomen for OG NG placement    Result Date: 7/9/2024  Interpreted By:  Finkelstein, Evan, STUDY: XR CHEST ABDOMEN FOR OG NG PLACEMENT;  7/9/2024 6:36 am   INDICATION: Signs/Symptoms:NGT verification.   COMPARISON: Chest radiograph 07/09/2024. CT abdomen pelvis 07/09/2024.   ACCESSION NUMBER(S): [id number]   ORDERING CLINICIAN: ALEJANDRA ANGLIN   FINDINGS: Interval placement of an NG tube which courses below the diaphragm, tip overlying the left upper quadrant in the expected location of the stomach. Dilated loops of bowel in the upper abdomen corresponding to small-bowel obstruction seen on CT. Bibasilar atelectasis.       NG tube tip overlies the left upper quadrant in the expected location of the stomach. Dilated loops of small bowel compatible with small-bowel obstruction seen on prior CT.     MACRO: None.   Signed by: Evan Finkelstein 7/9/2024 7:00 AM Dictation workstation:   [id number]    XR chest 2 views    Result Date: 7/9/2024  Interpreted By:  Finkelstein, Evan, STUDY: XR CHEST 2 VIEWS;  7/9/2024 5:16 am   INDICATION: Signs/Symptoms:SOB.   COMPARISON: Chest radiograph 05/02/2024   ACCESSION NUMBER(S): [id number]   ORDERING CLINICIAN: ALEJANDRA ANGLIN   FINDINGS: Redemonstrated left chest wall stimulator with leads coursing into the neck. Median sternotomy wires are again seen.   CARDIOMEDIASTINAL SILHOUETTE: Cardiomediastinal silhouette is stable in size and configuration.   LUNGS: No pulmonary consolidation, pleural effusion or pneumothorax.   ABDOMEN: No remarkable upper abdominal findings.    BONES: No acute osseous abnormality.       No radiographic evidence of acute cardiopulmonary pathology.   MACRO: None.   Signed by: Evan Finkelstein 7/9/2024 5:40 AM Dictation workstation:   [id number]    CT abdomen pelvis w IV contrast    Result Date: 7/9/2024  Interpreted By:  Finkelstein, Evan, STUDY: CT ABDOMEN PELVIS W IV CONTRAST;  7/9/2024 5:07 am   INDICATION: Signs/Symptoms:Abdominal pain, nausea and diarrhea.   COMPARISON: CT abdomen pelvis 05/22/2023   ACCESSION NUMBER(S): [id number]   ORDERING CLINICIAN: ALEJANDRA ANGLIN   TECHNIQUE: Axial CT images of the abdomen and pelvis with coronal and sagittal reconstructed images obtained after intravenous administration of 75 mL Omnipaque 350   FINDINGS: LOWER CHEST: Bibasilar atelectasis.   ABDOMEN:   LIVER: Normal attenuation and contour. BILE DUCTS: Normal caliber. GALLBLADDER: Surgically absent. PORTAL VEIN: Patent SPLEEN: Calcifications scattered throughout the spleen suggest sequela of prior granulomatous disease. PANCREAS: Unremarkable. ADRENALS: Unremarkable. KIDNEYS, URETERS and URINARY BLADDER: Symmetric renal enhancement. No hydronephrosis or perinephric fluid collection.  Bladder is within normal limits REPRODUCTIVE ORGANS: No pelvic masses.   ABDOMINAL WALL: Laxity of the ventral abdominal wall musculature. Fat containing inguinal hernias bilaterally. PERITONEUM: No ascites, free air or fluid collection.   BOWEL: The stomach is decompressed, which limits evaluation. Dilated loops of small bowel throughout the abdomen and pelvis. There is a transition point in the right mid abdomen and decompressed loops of distal small bowel. Scattered colonic diverticula without pericolonic inflammatory stranding. The appendix is not definitively visualized, without focal pericecal inflammatory stranding.   VESSELS: Moderate aortoiliac calcifications. No aortic aneurysm. RETROPERITONEUM: No pathologically enlarged retroperitoneal lymph nodes.   BONES: No  acute osseous abnormality. Degenerative changes throughout the spine.       Dilated loops of small bowel throughout the abdomen and pelvis with a transition point in the right mid abdomen and decompressed loops of distal small bowel. Findings are compatible with small bowel obstruction   Scattered colonic diverticulosis without CT evidence of acute diverticulitis.     MACRO: None.   Signed by: Evan Finkelstein 7/9/2024 5:39 AM Dictation workstation:   [id number]               Assessment/Plan   Principal Problem:    SBO (small bowel obstruction) (Multi)  Active Problems:    Atrial fibrillation (Multi)    Mixed hyperlipidemia    Benign essential hypertension    Heart failure (Multi)    Platelet disorder (Multi)    Small bowel obstruction (Multi)    SBO  - Surgery following   - Small bowel follow through unremarkable on 7/12  - Advancing to low fiber solids today per surgery   - Stop IVF   - Pain control with acetaminophen    - Zofran PRN      Acute hypoxic respiratory failure 2/2 COPD exacerbation   - On RA overnight and this AM, 92-93 SpO2   - Continue Breo, Duonebs, Solumedrol      Hypokalemia   - Improving, 3.8 today  - Klor Con 20 mEq today   - Continue monitoring      HTN/Afib   - cardio following  - metoprolol  - Spironolactone  - ASA   - Not on OAC outpatient      Hypernatremia  - Resolved   - 137 today     DM   - Home insulin continue to hold   - Sliding scale insulin   - Glucose checks ACHS    - Continue monitoring      Chronic Thrombocytopenia   - Remains near his baseline, 127 today  - Continue monitoring      CAD s/p CABG  - Continue rosuvastatin      Neuropathy   - Continue gabapentin      Depression   - Continue Prozac     Code: Full   Diet: Advancing to low fiber diet today   DVT ppx: SQ hep  Dispo: monitor clinically, may be cleared for discharge if pt tolerates solids        Chago Root,

## 2024-07-15 VITALS
WEIGHT: 222.66 LBS | TEMPERATURE: 97.3 F | OXYGEN SATURATION: 92 % | RESPIRATION RATE: 18 BRPM | SYSTOLIC BLOOD PRESSURE: 161 MMHG | HEART RATE: 63 BPM | HEIGHT: 71 IN | DIASTOLIC BLOOD PRESSURE: 78 MMHG | BODY MASS INDEX: 31.17 KG/M2

## 2024-07-15 PROBLEM — K56.609 SBO (SMALL BOWEL OBSTRUCTION) (MULTI): Status: RESOLVED | Noted: 2024-07-09 | Resolved: 2024-07-15

## 2024-07-15 PROBLEM — K56.609 SMALL BOWEL OBSTRUCTION (MULTI): Status: RESOLVED | Noted: 2024-07-09 | Resolved: 2024-07-15

## 2024-07-15 PROBLEM — R21 GROIN RASH: Chronic | Status: ACTIVE | Noted: 2024-07-15

## 2024-07-15 LAB
ALBUMIN SERPL BCP-MCNC: 3.6 G/DL (ref 3.4–5)
ALP SERPL-CCNC: 42 U/L (ref 33–136)
ALT SERPL W P-5'-P-CCNC: 44 U/L (ref 10–52)
ANION GAP SERPL CALC-SCNC: 11 MMOL/L (ref 10–20)
AST SERPL W P-5'-P-CCNC: 35 U/L (ref 9–39)
BASOPHILS # BLD AUTO: 0.02 X10*3/UL (ref 0–0.1)
BASOPHILS NFR BLD AUTO: 0.2 %
BILIRUB SERPL-MCNC: 0.9 MG/DL (ref 0–1.2)
BUN SERPL-MCNC: 17 MG/DL (ref 6–23)
CALCIUM SERPL-MCNC: 9.3 MG/DL (ref 8.6–10.3)
CHLORIDE SERPL-SCNC: 100 MMOL/L (ref 98–107)
CO2 SERPL-SCNC: 33 MMOL/L (ref 21–32)
CREAT SERPL-MCNC: 0.75 MG/DL (ref 0.5–1.3)
EGFRCR SERPLBLD CKD-EPI 2021: >90 ML/MIN/1.73M*2
EOSINOPHIL # BLD AUTO: 0.06 X10*3/UL (ref 0–0.4)
EOSINOPHIL NFR BLD AUTO: 0.6 %
ERYTHROCYTE [DISTWIDTH] IN BLOOD BY AUTOMATED COUNT: 13.7 % (ref 11.5–14.5)
GLUCOSE BLD MANUAL STRIP-MCNC: 149 MG/DL (ref 74–99)
GLUCOSE BLD MANUAL STRIP-MCNC: 284 MG/DL (ref 74–99)
GLUCOSE SERPL-MCNC: 112 MG/DL (ref 74–99)
HCT VFR BLD AUTO: 52 % (ref 41–52)
HGB BLD-MCNC: 17.4 G/DL (ref 13.5–17.5)
IMM GRANULOCYTES # BLD AUTO: 0.12 X10*3/UL (ref 0–0.5)
IMM GRANULOCYTES NFR BLD AUTO: 1.1 % (ref 0–0.9)
LYMPHOCYTES # BLD AUTO: 2.33 X10*3/UL (ref 0.8–3)
LYMPHOCYTES NFR BLD AUTO: 21.9 %
MCH RBC QN AUTO: 32.3 PG (ref 26–34)
MCHC RBC AUTO-ENTMCNC: 33.5 G/DL (ref 32–36)
MCV RBC AUTO: 97 FL (ref 80–100)
MONOCYTES # BLD AUTO: 0.97 X10*3/UL (ref 0.05–0.8)
MONOCYTES NFR BLD AUTO: 9.1 %
NEUTROPHILS # BLD AUTO: 7.12 X10*3/UL (ref 1.6–5.5)
NEUTROPHILS NFR BLD AUTO: 67.1 %
NRBC BLD-RTO: 0 /100 WBCS (ref 0–0)
PLATELET # BLD AUTO: 143 X10*3/UL (ref 150–450)
POTASSIUM SERPL-SCNC: 4.6 MMOL/L (ref 3.5–5.3)
PROT SERPL-MCNC: 6.3 G/DL (ref 6.4–8.2)
RBC # BLD AUTO: 5.39 X10*6/UL (ref 4.5–5.9)
SODIUM SERPL-SCNC: 139 MMOL/L (ref 136–145)
WBC # BLD AUTO: 10.6 X10*3/UL (ref 4.4–11.3)

## 2024-07-15 PROCEDURE — 2500000004 HC RX 250 GENERAL PHARMACY W/ HCPCS (ALT 636 FOR OP/ED): Performed by: STUDENT IN AN ORGANIZED HEALTH CARE EDUCATION/TRAINING PROGRAM

## 2024-07-15 PROCEDURE — 82947 ASSAY GLUCOSE BLOOD QUANT: CPT

## 2024-07-15 PROCEDURE — 36415 COLL VENOUS BLD VENIPUNCTURE: CPT | Performed by: STUDENT IN AN ORGANIZED HEALTH CARE EDUCATION/TRAINING PROGRAM

## 2024-07-15 PROCEDURE — 85025 COMPLETE CBC W/AUTO DIFF WBC: CPT | Performed by: STUDENT IN AN ORGANIZED HEALTH CARE EDUCATION/TRAINING PROGRAM

## 2024-07-15 PROCEDURE — 2500000004 HC RX 250 GENERAL PHARMACY W/ HCPCS (ALT 636 FOR OP/ED): Performed by: SURGERY

## 2024-07-15 PROCEDURE — 99239 HOSP IP/OBS DSCHRG MGMT >30: CPT

## 2024-07-15 PROCEDURE — 2500000001 HC RX 250 WO HCPCS SELF ADMINISTERED DRUGS (ALT 637 FOR MEDICARE OP)

## 2024-07-15 PROCEDURE — C9113 INJ PANTOPRAZOLE SODIUM, VIA: HCPCS | Performed by: SURGERY

## 2024-07-15 PROCEDURE — 2500000001 HC RX 250 WO HCPCS SELF ADMINISTERED DRUGS (ALT 637 FOR MEDICARE OP): Performed by: STUDENT IN AN ORGANIZED HEALTH CARE EDUCATION/TRAINING PROGRAM

## 2024-07-15 PROCEDURE — 2500000004 HC RX 250 GENERAL PHARMACY W/ HCPCS (ALT 636 FOR OP/ED)

## 2024-07-15 PROCEDURE — 80053 COMPREHEN METABOLIC PANEL: CPT | Performed by: STUDENT IN AN ORGANIZED HEALTH CARE EDUCATION/TRAINING PROGRAM

## 2024-07-15 PROCEDURE — 2500000002 HC RX 250 W HCPCS SELF ADMINISTERED DRUGS (ALT 637 FOR MEDICARE OP, ALT 636 FOR OP/ED): Performed by: STUDENT IN AN ORGANIZED HEALTH CARE EDUCATION/TRAINING PROGRAM

## 2024-07-15 RX ORDER — INSULIN DEGLUDEC 200 U/ML
50 INJECTION, SOLUTION SUBCUTANEOUS NIGHTLY
Start: 2024-07-15

## 2024-07-15 RX ORDER — ACETAMINOPHEN 325 MG/1
650 TABLET ORAL EVERY 6 HOURS PRN
Status: DISCONTINUED | OUTPATIENT
Start: 2024-07-15 | End: 2024-07-15 | Stop reason: HOSPADM

## 2024-07-15 RX ORDER — NYSTATIN 100000 U/G
CREAM TOPICAL 2 TIMES DAILY
Qty: 15 G | Refills: 0 | Status: SHIPPED | OUTPATIENT
Start: 2024-07-15

## 2024-07-15 ASSESSMENT — PAIN SCALES - GENERAL: PAINLEVEL_OUTOF10: 0 - NO PAIN

## 2024-07-15 ASSESSMENT — COGNITIVE AND FUNCTIONAL STATUS - GENERAL
MOBILITY SCORE: 24
DAILY ACTIVITIY SCORE: 24

## 2024-07-15 ASSESSMENT — PAIN - FUNCTIONAL ASSESSMENT: PAIN_FUNCTIONAL_ASSESSMENT: 0-10

## 2024-07-15 NOTE — DISCHARGE SUMMARY
Discharge Diagnosis  SBO (small bowel obstruction) (Multi)    Issues Requiring Follow-Up  Follow up with PCP in 1 week for chronic thrombocytopenia, HTN, a fib, COPD, DM      Test Results Pending At Discharge  Pending Labs       No current pending labs.            Hospital Course   Patient is an 82 yo M w/ PMHx of CHF, COPD, HTN, HLD, sleep apnea, diabetes, anxiety, depression, and A Fib who presented to [facility] ED on 7/9/24 with epigastric pain x 1 day along with nausea, diarrhea, and a temp of 101.5F oral at home. Pt felt bloated and abdomen was TTP. Pt's had a CT abdomen pelvis which showed findings compatible with small bowel obstruction, and was subsequently put on an NPO diet and had an NG tube placed in the ED. Pain was controlled with Dilaudid and did respond well to Zofran. Was then transferred to [facility], where he was evaluated by surgery. Patient was given IVF while remaining NPO with NG in place. Patient had a small bowel follow through with gastrografin on 7/12 which was unremarkable. NG tube was removed on 7/13 and patient's diet was advanced to clears, tolerated well. Advanced to solids on 7/14 and tolerated well. Surgery was following pt and said SBO was resolved on 7/14. Has been passing gas and having bowel movements for the last 3 days. During course of stay, pt was put on NC due to spO2 falling to upper 80s, likely 2/2 COPD exacerbation. Also received DuoNebs, Breo, and 5 days of IV Solumedrol. Has been on RA for the past 2 days with spO2 92-94, denies any SOB. Patient's platelets were consistently low throughout admission, which appears to be his baseline after reviewing prior CBCs. Did complain of an itchy white rash near his left groin yesterday and was given nystatin cream by surgery, which pt can continue at home. Patient reports no abdominal pain, F/C, N/V, or chest pain today and is hemodynamically stable for discharge.     Pertinent Physical Exam At Time of Discharge  Physical  Exam  Vitals reviewed.   Constitutional:       General: He is not in acute distress.     Appearance: He is obese. He is not ill-appearing, toxic-appearing or diaphoretic.   HENT:      Head: Normocephalic and atraumatic.   Eyes:      Extraocular Movements: Extraocular movements intact.      Conjunctiva/sclera: Conjunctivae normal.      Pupils: Pupils are equal, round, and reactive to light.   Cardiovascular:      Rate and Rhythm: Normal rate and regular rhythm.      Pulses: Normal pulses.      Heart sounds: No murmur heard.  Pulmonary:      Effort: Pulmonary effort is normal. No respiratory distress.      Breath sounds: Wheezing present.   Abdominal:      General: Abdomen is flat. Bowel sounds are normal. There is no distension.      Palpations: Abdomen is soft.      Tenderness: There is no abdominal tenderness. There is no guarding.   Skin:     General: Skin is warm and dry.   Neurological:      Mental Status: He is alert and oriented to person, place, and time.   Psychiatric:         Mood and Affect: Mood normal.         Behavior: Behavior normal.         Home Medications     Medication List      START taking these medications     nystatin cream; Commonly known as: Mycostatin; Apply topically 2 times a   day.     CHANGE how you take these medications     insulin degludec 200 unit/mL (3 mL) injection; Commonly known as:   Tresiba FlexTouch U-200; Inject 50 Units under the skin once daily at   bedtime. Take as directed per insulin instructions.; What changed: See the   new instructions.     CONTINUE taking these medications     b complex vitamins capsule   cholecalciferol 50 MCG (2000 UT) tablet; Commonly known as: Vitamin D-3   FLUoxetine 40 mg capsule; Commonly known as: PROzac; Take 1 capsule (40   mg) by mouth once daily.   fluticasone 50 mcg/actuation nasal spray; Commonly known as: Flonase   fluticasone propion-salmeteroL 250-50 mcg/dose diskus inhaler; Commonly   known as: Advair Diskus; INHALE 1 PUFF 2 TIMES  A DAY. RINSE MOUTH WITH   WATER AFTER USE TO REDUCE AFTERTASTE AND INCIDENCE OF CANDIDIASIS. DO NOT   SWALLOW.   FreeStyle Tricia 3 Sensor device; Generic drug: blood-glucose sensor; 1   each every 14 (fourteen) days.   gabapentin 600 mg tablet; Commonly known as: Neurontin   Lyumjev KwikPen U-100 Insulin 100 unit/mL insulin pen; Generic drug:   insulin lispro-aabc; Up to 80 units daily   metoprolol succinate XL 50 mg 24 hr tablet; Commonly known as: Toprol-XL   MULTIVITAMIN ORAL   Nitrostat 0.4 mg SL tablet; Generic drug: nitroglycerin   pramipexole 0.125 mg tablet; Commonly known as: Mirapex   ProAir HFA 90 mcg/actuation inhaler; Generic drug: albuterol   rosuvastatin 40 mg tablet; Commonly known as: Crestor   spironolactone 25 mg tablet; Commonly known as: Aldactone   traZODone 50 mg tablet; Commonly known as: Desyrel   Vitamin C 1,000 mg tablet; Generic drug: ascorbic acid       Outpatient Follow-Up  Future Appointments   Date Time Provider Department Center   8/13/2024 11:30 AM Alejandro Fitzpatrick, APRN-CNP [id number] [facility]       Chago Root DO

## 2024-07-15 NOTE — CARE PLAN
Problem: Pain  Goal: Takes deep breaths with improved pain control throughout the shift  Outcome: Progressing  Goal: Turns in bed with improved pain control throughout the shift  Outcome: Progressing  Goal: Walks with improved pain control throughout the shift  Outcome: Progressing  Goal: Performs ADL's with improved pain control throughout shift  Outcome: Progressing     Problem: Pain - Adult  Goal: Verbalizes/displays adequate comfort level or baseline comfort level  Outcome: Progressing     Problem: Safety - Adult  Goal: Free from fall injury  Outcome: Progressing       Problem: Chronic Conditions and Co-morbidities  Goal: Patient's chronic conditions and co-morbidity symptoms are monitored and maintained or improved  Outcome: Progressing   The patient's goals for the shift include      The clinical goals for the shift include Pt. will remain free from N/V throughout this shift.

## 2024-07-15 NOTE — CARE PLAN
The patient's goals for the shift include      The clinical goals for the shift include patient remain hemodynamically stable during shift    Patient discharged, IV taken out.. Patient discharge instructions along with education given to the patient. Patient left via wheelchair .

## 2024-07-15 NOTE — DISCHARGE INSTRUCTIONS
Take Miralax once daily as needed to help with constipation. If needed, can titrate Miralax up to 4 scoops daily, or if having too many bowel movements, can reduce to half cap a day or every other day.    Follow a soft diet until feeling completely resolved from symptoms. Then incorporate regular foods as tolerated.   18-Apr-2018

## 2024-07-16 ENCOUNTER — DOCUMENTATION (OUTPATIENT)
Dept: PRIMARY CARE | Facility: CLINIC | Age: 84
End: 2024-07-16
Payer: MEDICARE

## 2024-07-16 DIAGNOSIS — G47.00 INSOMNIA, UNSPECIFIED TYPE: ICD-10-CM

## 2024-07-17 RX ORDER — TRAZODONE HYDROCHLORIDE 50 MG/1
50-100 TABLET ORAL NIGHTLY
Qty: 180 TABLET | Refills: 1 | Status: SHIPPED | OUTPATIENT
Start: 2024-07-17

## 2024-07-17 NOTE — SIGNIFICANT EVENT
Follow Up Phone Call    Outgoing phone call    Spoke to: Ramesh Morocho Relationship:self   Phone number: [phone]      Outcome: I left a message on answering machine   No chief complaint on file.         Diagnosis:Not applicable

## 2024-07-18 NOTE — SIGNIFICANT EVENT
Follow Up Phone Call    Outgoing phone call    Spoke to: Ramesh Morocho Relationship:self   Phone number: [phone]      Outcome: contacted patient/ family   No chief complaint on file.         Diagnosis:Not applicable    States he is feeling better. Bowels are moving. No further questions or concerns.        \

## 2024-07-22 NOTE — PROGRESS NOTES
Discharge Facility:  [facility]   Discharge Diagnosis: SBO (small bowel obstruction) (Multi)   Admission Date: 7/9/24   Discharge Date:  7/15/24     PCP Appointment Date:  7/25/24   Specialist Appointment Date:   Hospital Encounter and Summary Linked: Yes  Unable to reach patient x2 attempts. Voicemail left with call back number.    Issues Requiring Follow-Up  Follow up with PCP in 1 week for chronic thrombocytopenia, HTN, a fib, COPD, DM

## 2024-07-25 ENCOUNTER — APPOINTMENT (OUTPATIENT)
Dept: PRIMARY CARE | Facility: CLINIC | Age: 84
End: 2024-07-25
Payer: MEDICARE

## 2024-07-25 VITALS
HEART RATE: 91 BPM | RESPIRATION RATE: 18 BRPM | SYSTOLIC BLOOD PRESSURE: 118 MMHG | BODY MASS INDEX: 29.3 KG/M2 | OXYGEN SATURATION: 94 % | DIASTOLIC BLOOD PRESSURE: 76 MMHG | WEIGHT: 210 LBS

## 2024-07-25 DIAGNOSIS — K56.609 SMALL BOWEL OBSTRUCTION (MULTI): ICD-10-CM

## 2024-07-25 DIAGNOSIS — J44.9 CHRONIC OBSTRUCTIVE PULMONARY DISEASE, UNSPECIFIED COPD TYPE (MULTI): Primary | ICD-10-CM

## 2024-07-25 PROCEDURE — 3074F SYST BP LT 130 MM HG: CPT | Performed by: PHYSICIAN ASSISTANT

## 2024-07-25 PROCEDURE — 4004F PT TOBACCO SCREEN RCVD TLK: CPT | Performed by: PHYSICIAN ASSISTANT

## 2024-07-25 PROCEDURE — 99495 TRANSJ CARE MGMT MOD F2F 14D: CPT | Performed by: PHYSICIAN ASSISTANT

## 2024-07-25 PROCEDURE — 1123F ACP DISCUSS/DSCN MKR DOCD: CPT | Performed by: PHYSICIAN ASSISTANT

## 2024-07-25 PROCEDURE — 1111F DSCHRG MED/CURRENT MED MERGE: CPT | Performed by: PHYSICIAN ASSISTANT

## 2024-07-25 PROCEDURE — 1159F MED LIST DOCD IN RCRD: CPT | Performed by: PHYSICIAN ASSISTANT

## 2024-07-25 PROCEDURE — 1126F AMNT PAIN NOTED NONE PRSNT: CPT | Performed by: PHYSICIAN ASSISTANT

## 2024-07-25 PROCEDURE — 3078F DIAST BP <80 MM HG: CPT | Performed by: PHYSICIAN ASSISTANT

## 2024-07-25 PROCEDURE — 1160F RVW MEDS BY RX/DR IN RCRD: CPT | Performed by: PHYSICIAN ASSISTANT

## 2024-07-25 ASSESSMENT — ENCOUNTER SYMPTOMS
ANAL BLEEDING: 0
ABDOMINAL DISTENTION: 0
DIARRHEA: 1
CONSTIPATION: 1
DIZZINESS: 0
ABDOMINAL PAIN: 0
NAUSEA: 0
APPETITE CHANGE: 0
RECTAL PAIN: 0
BLOOD IN STOOL: 0
VOMITING: 0
ACTIVITY CHANGE: 0
LIGHT-HEADEDNESS: 0
FEVER: 0
DEPRESSION: 0
OCCASIONAL FEELINGS OF UNSTEADINESS: 0
LOSS OF SENSATION IN FEET: 0

## 2024-07-25 ASSESSMENT — PAIN SCALES - GENERAL: PAINLEVEL: 0-NO PAIN

## 2024-07-25 ASSESSMENT — PATIENT HEALTH QUESTIONNAIRE - PHQ9
1. LITTLE INTEREST OR PLEASURE IN DOING THINGS: NOT AT ALL
SUM OF ALL RESPONSES TO PHQ9 QUESTIONS 1 AND 2: 0
2. FEELING DOWN, DEPRESSED OR HOPELESS: NOT AT ALL

## 2024-07-25 NOTE — PROGRESS NOTES
"Subjective   Patient ID: Ramesh Morocho is a 83 y.o. male who presents for Follow-up (Patient is here for a [facility] follow up for twisted bowel).             Objective   /76 (BP Location: Left arm, Patient Position: Sitting, BP Cuff Size: Large adult)   Pulse 91   Resp 18   Wt 95.3 kg (210 lb)   SpO2 94%   BMI 29.30 kg/m²         Patient: Ramesh Morocho  : 1940  PCP: Karla Newsome PA-C  MRN: [id number]  Program: Transitional Care Management  Status: Enrolled  Effective Dates: 2024 - present  Responsible Staff: Monserrat De La Garza RN  Social Determinants to be Addressed: Physical Activity, Social Connections, Stress, Tobacco Use         Ramesh Morocho is a 83 y.o. male presenting today for follow-up after being discharged from the hospital 10 days ago. The main problem requiring admission was SBO. The discharge summary and/or Transitional Care Management documentation was reviewed. Medication reconciliation was performed as indicated via the \"Venkata as Reviewed\" timestamp.     Ramesh Morocho was contacted by Transitional Care Management services two days after his discharge. This encounter and supporting documentation was reviewed.  Patient had a small bowel obstruction and was in for 6 days.  He says he feels much better he still little bit of bloating appetite is slowly improving.Patient also had a COPD issue and was hypoxic for a bit.  Since being discharged she is doing much better after DuoNeb treatments and IV Solu-Medrol.  Review of Systems   Constitutional:  Negative for activity change, appetite change and fever.   Gastrointestinal:  Positive for constipation and diarrhea. Negative for abdominal distention, abdominal pain, anal bleeding, blood in stool, nausea, rectal pain and vomiting.   Neurological:  Negative for dizziness and light-headedness.       /76 (BP Location: Left arm, Patient Position: Sitting, BP Cuff Size: Large adult)   Pulse 91   Resp 18   Wt 95.3 kg (210 lb) "   SpO2 94%   BMI 29.30 kg/m²     Physical Exam  Cardiovascular:      Rate and Rhythm: Normal rate and regular rhythm.   Pulmonary:      Effort: Pulmonary effort is normal.      Comments: Decreased breath sounds at bases.  Abdominal:      General: Bowel sounds are normal. There is no distension.      Tenderness: There is no abdominal tenderness. There is no guarding or rebound.   Neurological:      General: No focal deficit present.      Mental Status: He is alert and oriented to person, place, and time. Mental status is at baseline.   Psychiatric:         Mood and Affect: Mood normal.         Behavior: Behavior normal.         Thought Content: Thought content normal.         Judgment: Judgment normal.         The complexity of medical decision making for this patient's transitional care is moderate.    Assessment/Plan   Diagnoses and all orders for this visit:  Chronic obstructive pulmonary disease, unspecified COPD type (Multi)  Small bowel obstruction (Multi)  Discussed importance of monitoring diet fiber intake as well.  As far as his breathing goes he is back to baseline.  Unfortunately he still smokes.  Continue current regimen otherwise and follow-up with his endocrinologist as scheduled.

## 2024-07-30 ENCOUNTER — PATIENT OUTREACH (OUTPATIENT)
Dept: PRIMARY CARE | Facility: CLINIC | Age: 84
End: 2024-07-30
Payer: MEDICARE

## 2024-07-30 NOTE — PROGRESS NOTES
Unable to reach patient for call back after patient's follow up appointment with PCP on 7/25/2024     [facility] with call back number for patient to call if needed   If no voicemail available call attempts x 2 were made to contact the patient to assist with any questions or concerns patient may have.    L/M patient encouraged to call providers for any questions concerns or change in condition

## 2024-08-09 ENCOUNTER — PATIENT OUTREACH (OUTPATIENT)
Dept: PRIMARY CARE | Facility: CLINIC | Age: 84
End: 2024-08-09
Payer: MEDICARE

## 2024-08-09 LAB
ATRIAL RATE: 0 BPM
P OFFSET: 151 MS
P ONSET: 131 MS
Q ONSET: 223 MS
QRS COUNT: 11 BEATS
QRS DURATION: 76 MS
QT INTERVAL: 388 MS
QTC CALCULATION(BAZETT): 412 MS
QTC FREDERICIA: 404 MS
R AXIS: 36 DEGREES
T AXIS: 136 DEGREES
T OFFSET: 417 MS
VENTRICULAR RATE: 68 BPM

## 2024-08-09 NOTE — PROGRESS NOTES
Unable to reach Patient for a F/U call     LVM with call back number for patient to call if needed   If no voicemail available call attempts x 2 were made to contact the patient to assist with any questions or concerns patient may have.     L/M encouraged patient to call providers for any questions concerns or change in condition

## 2024-08-13 ENCOUNTER — APPOINTMENT (OUTPATIENT)
Dept: ENDOCRINOLOGY | Facility: CLINIC | Age: 84
End: 2024-08-13
Payer: MEDICARE

## 2024-08-13 VITALS
WEIGHT: 215 LBS | SYSTOLIC BLOOD PRESSURE: 109 MMHG | DIASTOLIC BLOOD PRESSURE: 70 MMHG | HEART RATE: 79 BPM | BODY MASS INDEX: 30 KG/M2

## 2024-08-13 DIAGNOSIS — E11.42 TYPE 2 DIABETES MELLITUS WITH DIABETIC POLYNEUROPATHY, WITH LONG-TERM CURRENT USE OF INSULIN (MULTI): ICD-10-CM

## 2024-08-13 DIAGNOSIS — Z79.4 TYPE 2 DIABETES MELLITUS WITH DIABETIC POLYNEUROPATHY, WITH LONG-TERM CURRENT USE OF INSULIN (MULTI): ICD-10-CM

## 2024-08-13 DIAGNOSIS — Z79.4 TYPE 2 DIABETES MELLITUS WITH DIABETIC POLYNEUROPATHY, WITH LONG-TERM CURRENT USE OF INSULIN (MULTI): Primary | ICD-10-CM

## 2024-08-13 DIAGNOSIS — I10 ESSENTIAL (PRIMARY) HYPERTENSION: ICD-10-CM

## 2024-08-13 DIAGNOSIS — E78.2 MIXED HYPERLIPIDEMIA: ICD-10-CM

## 2024-08-13 DIAGNOSIS — E11.42 TYPE 2 DIABETES MELLITUS WITH DIABETIC POLYNEUROPATHY, WITH LONG-TERM CURRENT USE OF INSULIN (MULTI): Primary | ICD-10-CM

## 2024-08-13 LAB — POC HEMOGLOBIN A1C: 7.8 % (ref 4.2–6.5)

## 2024-08-13 PROCEDURE — 3078F DIAST BP <80 MM HG: CPT | Performed by: NURSE PRACTITIONER

## 2024-08-13 PROCEDURE — 4004F PT TOBACCO SCREEN RCVD TLK: CPT | Performed by: NURSE PRACTITIONER

## 2024-08-13 PROCEDURE — 1126F AMNT PAIN NOTED NONE PRSNT: CPT | Performed by: NURSE PRACTITIONER

## 2024-08-13 PROCEDURE — 99213 OFFICE O/P EST LOW 20 MIN: CPT | Performed by: NURSE PRACTITIONER

## 2024-08-13 PROCEDURE — 1111F DSCHRG MED/CURRENT MED MERGE: CPT | Performed by: NURSE PRACTITIONER

## 2024-08-13 PROCEDURE — RXMED WILLOW AMBULATORY MEDICATION CHARGE

## 2024-08-13 PROCEDURE — 1123F ACP DISCUSS/DSCN MKR DOCD: CPT | Performed by: NURSE PRACTITIONER

## 2024-08-13 PROCEDURE — 3074F SYST BP LT 130 MM HG: CPT | Performed by: NURSE PRACTITIONER

## 2024-08-13 PROCEDURE — 83036 HEMOGLOBIN GLYCOSYLATED A1C: CPT | Performed by: NURSE PRACTITIONER

## 2024-08-13 RX ORDER — BLOOD-GLUCOSE,RECEIVER,CONT
EACH MISCELLANEOUS
Qty: 1 EACH | Refills: 0 | Status: SHIPPED | OUTPATIENT
Start: 2024-08-13

## 2024-08-13 ASSESSMENT — ENCOUNTER SYMPTOMS
LOSS OF SENSATION IN FEET: 1
DEPRESSION: 1
OCCASIONAL FEELINGS OF UNSTEADINESS: 1

## 2024-08-13 ASSESSMENT — PATIENT HEALTH QUESTIONNAIRE - PHQ9: 2. FEELING DOWN, DEPRESSED OR HOPELESS: SEVERAL DAYS

## 2024-08-13 ASSESSMENT — PAIN SCALES - GENERAL: PAINLEVEL: 0-NO PAIN

## 2024-08-13 NOTE — PROGRESS NOTES
HPI   Presents for follow up/metabolic management 84 yo with DM Type 2 + polyneuropathy, diagnosed in his 50s. History CAD with CABG, HLD, HTN, CHF, current smoker/no desire to quit. Current A1c 7.8% was 8.2%. Patient testing sugars 4 times day with tricia 3. Not following a carbohrdrate diet, knows reasonable carb allowances. Patient not able to afford tall their medications. Patient is not exercising.    -CGM Tricia 3 with sensor. Currently on insulin checking BS at least 4 xs a day adjustments made based on readings/frequent visits to manage.  -INSULIN Tresiba 50 units, Lyumjev base 12 ISF 50>150 +2- takes AFTER meals and snacks  -Metformin - does not tolerate,  GLP1 cannot afford,  SGLT2 Farxiga, no TZD history HF  -HTN Entresto, Isosobide, Metoprolol, Spironolactone, daily at goal  -STATIN Atorvastatin high dose LDL    Libre3 data reviewed, time in target 62% was 46%, lows 1% was 6%. Avg blood sugar 160, pattern: mid 100's overnight and waking, upper 200's after breakfasts, upper 100's mid day, after lunch and dinner mid 200's, bedtime mid 100's.  New RX reader sent, having issues with current reader.     Was recently in the hospital due to SBO, no surgical intervention required.     Current Outpatient Medications:     albuterol (ProAir HFA) 90 mcg/actuation inhaler, Inhale., Disp: , Rfl:     ascorbic acid (Vitamin C) 1,000 mg tablet, Take 1 tablet (1,000 mg) by mouth once daily., Disp: , Rfl:     b complex vitamins capsule, Take 1 capsule by mouth once daily., Disp: , Rfl:     blood-glucose sensor (FreeStyle Tricia 3 Sensor) device, 1 each every 14 (fourteen) days., Disp: 6 each, Rfl: 1    cholecalciferol (Vitamin D-3) 50 MCG (2000 UT) tablet, Take 1 tablet (50 mcg) by mouth once daily., Disp: , Rfl:     FLUoxetine (PROzac) 40 mg capsule, Take 1 capsule (40 mg) by mouth once daily., Disp: 90 capsule, Rfl: 1    fluticasone (Flonase) 50 mcg/actuation nasal spray, Administer 1 spray into each nostril once daily.,  Disp: , Rfl:     fluticasone propion-salmeteroL (Advair Diskus) 250-50 mcg/dose diskus inhaler, INHALE 1 PUFF 2 TIMES A DAY. RINSE MOUTH WITH WATER AFTER USE TO REDUCE AFTERTASTE AND INCIDENCE OF CANDIDIASIS. DO NOT SWALLOW., Disp: 60 each, Rfl: 2    gabapentin (Neurontin) 600 mg tablet, Take 1 tablet (600 mg) by mouth. QID, Disp: , Rfl:     insulin degludec (Tresiba FlexTouch U-200) 200 unit/mL (3 mL) injection, Inject 50 Units under the skin once daily at bedtime. Take as directed per insulin instructions., Disp: , Rfl:     insulin lispro-aabc (Lyumjev KwikPen U-100 Insulin) 100 unit/mL insulin pen, Up to 80 units daily, Disp: 75 mL, Rfl: 3    metoprolol succinate XL (Toprol-XL) 50 mg 24 hr tablet, Take 1 tablet (50 mg) by mouth once daily., Disp: , Rfl:     MULTIVITAMIN ORAL, Take 1 tablet by mouth once daily., Disp: , Rfl:     nitroglycerin (Nitrostat) 0.4 mg SL tablet, Place 1 tablet (0.4 mg) under the tongue. As needed, Disp: , Rfl:     nystatin (Mycostatin) cream, Apply topically 2 times a day., Disp: 15 g, Rfl: 0    pramipexole (Mirapex) 0.125 mg tablet, 1 TABLET IN AM AND MIDDAY, 2-4 PILLS AT BEDTIME ORALLY AS DIRECTED, Disp: , Rfl:     spironolactone (Aldactone) 25 mg tablet, Take 0.5 tablets (12.5 mg) by mouth once daily., Disp: , Rfl:     traZODone (Desyrel) 50 mg tablet, TAKE 1 OR 2 TABLETS BY MOUTH AT BEDTIME, Disp: 180 tablet, Rfl: 1    flash glucose scanning reader misc, KIMMY 3 READER Use as instructed, Disp: 1 each, Rfl: 0    rosuvastatin (Crestor) 40 mg tablet, Take 1 tablet (40 mg) by mouth once daily., Disp: , Rfl:   No current facility-administered medications for this visit.    Facility-Administered Medications Ordered in Other Visits:     oxygen (O2) therapy, , inhalation, Continuous - Inhalation, Brunilda Mcdonough APRN-CNP    Allergies as of 08/13/2024 - Reviewed 08/13/2024   Allergen Reaction Noted    House dust Other 04/03/2024    Metformin Rash 09/14/2023    Mold Other 04/03/2024       BP  109/70 (BP Location: Right arm, Patient Position: Sitting)   Pulse 79   Wt 97.5 kg (215 lb)   BMI 30.00 kg/m²     Labs:   Lab Results   Component Value Date    WBC 10.6 07/15/2024    NRBC 0.0 07/15/2024    RBC 5.39 07/15/2024    HGB 17.4 07/15/2024    HCT 52.0 07/15/2024     (L) 07/15/2024     Lab Results   Component Value Date    CALCIUM 9.3 07/15/2024    AST 35 07/15/2024    ALKPHOS 42 07/15/2024    BILITOT 0.9 07/15/2024    PROT 6.3 (L) 07/15/2024    ALBUMIN 3.6 07/15/2024    GLOB 2.8 05/30/2023    AGR 1.3 (L) 05/30/2023     07/15/2024    K 4.6 07/15/2024     07/15/2024    CO2 33 (H) 07/15/2024    ANIONGAP 11 07/15/2024    BUN 17 07/15/2024    CREATININE 0.75 07/15/2024    UREACREAUR 10.0 05/30/2023    GLUCOSE 112 (H) 07/15/2024    ALT 44 07/15/2024    EGFR >90 07/15/2024     Lab Results   Component Value Date    CHOL 120 (L) 12/14/2023    TRIG 287 (H) 12/14/2023    HDL 29.0 (L) 12/14/2023    LDLCALC 34 (L) 12/14/2023     Lab Results   Component Value Date    MICROALBCREA  07/03/2024      Comment:      One or more analytes used in this calculation is outside of the analytical measurement range. Calculation cannot be performed.     Lab Results   Component Value Date    TSH 0.65 03/24/2020     Lab Results   Component Value Date    OYQQYLDN24 454 11/22/2022     Lab Results   Component Value Date    HGBA1C 7.8 (A) 08/13/2024       Assessment/Plan   1. Type 2 diabetes mellitus with diabetic polyneuropathy, with long-term current use of insulin (Multi)  -A1c decreasing and near target  -reviewed target BS  -reviewed adjustment to basal insulin based on waking BS  -abraham 3 data unable to be downloaded, data reviewed   -reinforced the need to inject lyumjev per scale before meals, inject 6 units before high carb snacks  -reinforced if taking lyumjev after meal, do not use scale and to inject 12 units only    2. Essential (primary) hypertension  -at target    3. Mixed hyperlipidemia  -on statin and  tolerating    Follow up: Annalise 3 months     -labs/tests/notes reviewed  -reviewed and counseled patient on medication monitoring and side effect

## 2024-08-14 ENCOUNTER — PHARMACY VISIT (OUTPATIENT)
Dept: PHARMACY | Facility: CLINIC | Age: 84
End: 2024-08-14
Payer: COMMERCIAL

## 2024-08-29 ENCOUNTER — OFFICE VISIT (OUTPATIENT)
Dept: PRIMARY CARE | Facility: CLINIC | Age: 84
End: 2024-08-29
Payer: MEDICARE

## 2024-08-29 VITALS
DIASTOLIC BLOOD PRESSURE: 62 MMHG | BODY MASS INDEX: 30.52 KG/M2 | WEIGHT: 218 LBS | SYSTOLIC BLOOD PRESSURE: 98 MMHG | HEART RATE: 75 BPM | OXYGEN SATURATION: 94 % | HEIGHT: 71 IN

## 2024-08-29 DIAGNOSIS — M70.22 OLECRANON BURSITIS OF LEFT ELBOW: Primary | ICD-10-CM

## 2024-08-29 DIAGNOSIS — M65.351 TRIGGER LITTLE FINGER OF RIGHT HAND: ICD-10-CM

## 2024-08-29 PROCEDURE — 99212 OFFICE O/P EST SF 10 MIN: CPT | Performed by: PHYSICIAN ASSISTANT

## 2024-08-29 PROCEDURE — 3074F SYST BP LT 130 MM HG: CPT | Performed by: PHYSICIAN ASSISTANT

## 2024-08-29 PROCEDURE — 3078F DIAST BP <80 MM HG: CPT | Performed by: PHYSICIAN ASSISTANT

## 2024-08-29 PROCEDURE — 1159F MED LIST DOCD IN RCRD: CPT | Performed by: PHYSICIAN ASSISTANT

## 2024-08-29 PROCEDURE — 1123F ACP DISCUSS/DSCN MKR DOCD: CPT | Performed by: PHYSICIAN ASSISTANT

## 2024-08-29 ASSESSMENT — PATIENT HEALTH QUESTIONNAIRE - PHQ9
SUM OF ALL RESPONSES TO PHQ9 QUESTIONS 1 AND 2: 0
2. FEELING DOWN, DEPRESSED OR HOPELESS: NOT AT ALL
1. LITTLE INTEREST OR PLEASURE IN DOING THINGS: NOT AT ALL

## 2024-08-29 NOTE — PROGRESS NOTES
"Subjective   Patient ID: Ramesh oMrocho is a 83 y.o. male who presents for Joint Swelling (Left elbow is swollen and red doesn't hurt or itchy x couple months now /Right pinky is also swollen and it clicks ) and Spots and/or Floaters (Pt has white spot on nose would like to freeze it off ).    HPI   Patient is here for left elbow swelling this been present for the past few days.  He denies any injury or fall.  Review of Systems   Musculoskeletal:  Positive for arthralgias, joint swelling and myalgias.       Objective   BP 98/62 (BP Location: Left arm, Patient Position: Sitting, BP Cuff Size: Adult)   Pulse 75   Ht 1.803 m (5' 11\")   Wt 98.9 kg (218 lb)   SpO2 94%   BMI 30.40 kg/m²     Physical Exam  Musculoskeletal:      Left elbow: Swelling and deformity present. Normal range of motion. Tenderness present.      Comments: Has an olecranon bursitis of his left elbow.   Neurological:      Mental Status: He is alert.   Psychiatric:         Mood and Affect: Mood normal.         Thought Content: Thought content normal.         Judgment: Judgment normal.     Also has mild erythema of both elbows that are slightly warm to touch.    Assessment/Plan   Diagnoses and all orders for this visit:  Olecranon bursitis of left elbow  -     Referral to Orthopaedic Surgery; Future  Trigger little finger of right hand  -     Referral to Orthopaedic Surgery; Future  Also has trigger finger of his right hand fifth finger.  Will certainly monitor for any increased erythema that would warrant antibiotic.  Will refer to Ortho for both issues.  Did suggest compression and using ice on his elbow for now.       "

## 2024-09-02 ASSESSMENT — ENCOUNTER SYMPTOMS
ARTHRALGIAS: 1
JOINT SWELLING: 1
MYALGIAS: 1

## 2024-09-06 ENCOUNTER — TELEPHONE (OUTPATIENT)
Dept: PRIMARY CARE | Facility: CLINIC | Age: 84
End: 2024-09-06
Payer: MEDICARE

## 2024-09-06 DIAGNOSIS — K61.0 PERIANAL ABSCESS: ICD-10-CM

## 2024-09-06 NOTE — TELEPHONE ENCOUNTER
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    Patient can be reached at [phone]

## 2024-09-10 ENCOUNTER — OFFICE VISIT (OUTPATIENT)
Dept: PRIMARY CARE | Facility: CLINIC | Age: 84
End: 2024-09-10
Payer: MEDICARE

## 2024-09-10 VITALS
HEART RATE: 53 BPM | HEIGHT: 71 IN | BODY MASS INDEX: 31.36 KG/M2 | OXYGEN SATURATION: 93 % | DIASTOLIC BLOOD PRESSURE: 70 MMHG | TEMPERATURE: 98.3 F | SYSTOLIC BLOOD PRESSURE: 122 MMHG | WEIGHT: 224 LBS

## 2024-09-10 DIAGNOSIS — L02.31 ABSCESS OF RIGHT BUTTOCK: Primary | ICD-10-CM

## 2024-09-10 PROCEDURE — 1159F MED LIST DOCD IN RCRD: CPT | Performed by: NURSE PRACTITIONER

## 2024-09-10 PROCEDURE — 99213 OFFICE O/P EST LOW 20 MIN: CPT | Performed by: NURSE PRACTITIONER

## 2024-09-10 PROCEDURE — 3074F SYST BP LT 130 MM HG: CPT | Performed by: NURSE PRACTITIONER

## 2024-09-10 PROCEDURE — 1125F AMNT PAIN NOTED PAIN PRSNT: CPT | Performed by: NURSE PRACTITIONER

## 2024-09-10 PROCEDURE — 3078F DIAST BP <80 MM HG: CPT | Performed by: NURSE PRACTITIONER

## 2024-09-10 PROCEDURE — 1123F ACP DISCUSS/DSCN MKR DOCD: CPT | Performed by: NURSE PRACTITIONER

## 2024-09-10 RX ORDER — AMOXICILLIN AND CLAVULANATE POTASSIUM 875; 125 MG/1; MG/1
875 TABLET, FILM COATED ORAL 2 TIMES DAILY
Qty: 20 TABLET | Refills: 0 | Status: SHIPPED | OUTPATIENT
Start: 2024-09-10 | End: 2024-09-20

## 2024-09-10 ASSESSMENT — ENCOUNTER SYMPTOMS
FEVER: 1
RESPIRATORY NEGATIVE: 1
CHILLS: 0
CARDIOVASCULAR NEGATIVE: 1
GASTROINTESTINAL NEGATIVE: 1
WOUND: 1

## 2024-09-10 ASSESSMENT — PATIENT HEALTH QUESTIONNAIRE - PHQ9
2. FEELING DOWN, DEPRESSED OR HOPELESS: NOT AT ALL
1. LITTLE INTEREST OR PLEASURE IN DOING THINGS: NOT AT ALL
SUM OF ALL RESPONSES TO PHQ9 QUESTIONS 1 AND 2: 0

## 2024-09-10 ASSESSMENT — PAIN SCALES - GENERAL: PAINLEVEL: 9

## 2024-09-10 NOTE — PROGRESS NOTES
"Subjective   Patient ID: Ramesh Morocho is a 83 y.o. male who presents for Cyst (On right buttock x 5 days.  Painful and irritated.   Has an appointment on Friday 9/13/2024 with surgeon to have abscess/cyst looked at.  Unsure of doctor's name).    HPI   Ramesh is an 84 yo M who complains of a recurrent right buttock abscess.   Sx began 5 days ago.   Has pain, redness to buttock. Last BM today  Is difficult to sit  Has appt with surgeon Friday 9/13/24    Last abscess 5/23 per chart review    Review of Systems   Constitutional:  Positive for fever (99.9). Negative for chills.   Respiratory: Negative.     Cardiovascular: Negative.    Gastrointestinal: Negative.    Genitourinary: Negative.    Skin:  Positive for wound (right buttock abscess with redness, warmth, pain).       Objective   /70 (BP Location: Left arm, Patient Position: Sitting, BP Cuff Size: Adult)   Pulse 53   Temp 36.8 °C (98.3 °F)   Ht 1.803 m (5' 11\")   Wt 102 kg (224 lb)   SpO2 93%   BMI 31.24 kg/m²     Physical Exam  A/O x3 Uncomfortable but not in distress  Lungs CTA bilaterally  Heart with RRR  Abd obese, soft NT/ND  There is a large 5x7cm Non fluctuant abscess to right buttock/gluteal fold. Area tender.   Neuro grossly intact    Assessment/Plan   Diagnoses and all orders for this visit:  Abscess of right buttock  -     amoxicillin-pot clavulanate (Augmentin) 875-125 mg tablet; Take 1 tablet (875 mg) by mouth 2 times a day for 10 days.  Large abscess of right buttock   Will start antibiotic   Advised ED due to extent of abscess and concern for worsening infection - pt will consider  Warm compresses, sitz bath  Follow up with surgeon as scheduled     "

## 2024-09-13 ENCOUNTER — OFFICE VISIT (OUTPATIENT)
Dept: SURGERY | Facility: CLINIC | Age: 84
End: 2024-09-13
Payer: MEDICARE

## 2024-09-13 VITALS
SYSTOLIC BLOOD PRESSURE: 120 MMHG | OXYGEN SATURATION: 92 % | WEIGHT: 221 LBS | DIASTOLIC BLOOD PRESSURE: 66 MMHG | BODY MASS INDEX: 30.82 KG/M2 | HEART RATE: 85 BPM

## 2024-09-13 DIAGNOSIS — K61.0 PERIANAL ABSCESS: Primary | ICD-10-CM

## 2024-09-13 PROCEDURE — 87205 SMEAR GRAM STAIN: CPT | Mod: GEALAB | Performed by: SURGERY

## 2024-09-13 NOTE — PROGRESS NOTES
General Surgery Follow-Up Note    Referring Provider:   BHAVANI Pulido, EZEQUIEL Salazar*      Chief Complaint:  Anal abscess      History of Present Illness:  This is a 83 y.o. male who presents for an anal abscess.  He was last seen a few months ago for small bowel obstruction that was treated nonoperatively.  He reports that he has a longstanding history of recurrent anal abscesses, and has been told that he should have an anal fistulotomy in the past.  He returns with 1 week of severe right perianal and gluteal pain and swelling.  He presented to his primary care provider's office earlier this week, and was started on antibiotics.  He reports the fevers stopped after starting the antibiotics.  However, he had spontaneous drainage of bloody purulent fluid 2 to 3 days ago.      Vitals:   Vitals:    09/13/24 1124   BP: 120/66   Pulse: 85   SpO2: 92%   Weight: 100 kg (221 lb)         Physical Exam:  General: No acute distress. Sitting up in bed.   Neuro: Alert and oriented ×3. Follows commands.  Head: Atraumatic  Eyes: Pupils equal reactive to light. Extraocular motions intact.  Ears: Hears normal speaking voice.  Mouth, Nose, Throat: Mucous membranes moist.  Normal dentition.  Neck: Supple. No appreciable masses.  Chest: No crepitus.  No appreciable scars.  Heart: Regular rate and rhythm.  Lung: Clear to auscultation bilaterally.  Vascular: No carotid bruits.  Palpable radial pulses bilaterally.  Abdomen: Soft. Nondistended. Nontender.   Rectal: Right anal abscess extending onto the right gluteus with significant overlying erythema.  Pinpoint opening in the skin over the gluteus with expressible drainage.  Musculoskeletal: Moves all extremities.  Normal range of motion.  Lymphatic: No palpable lymph nodes.  Skin: No rashes or lesions.  Psychological: Normal affect    Labs:  No new      Imaging:   None      Assessment:  This is a 83 y.o.-year-old male who presents for a recurrent anal abscess.  We  discussed at length that although it is spontaneously drained, I do recommend drainage with placement of a drain to allow the infection to completely clear.  We discussed that he should continue his antibiotics.  He should continue warm compresses.  He should plan on an anal exam under anesthesia with seton placement once he recovers from the abscess.  We discussed that if he gets worse over the next couple of days, he should return to the emergency department.      Plan:  --Incision and drainage  -- Remove the drain in the office next week  -- Wound culture  -- Complete the course of antibiotics  -- Warm compresses    Semaj Lagos MD  General Surgery  Office: [phone]  Fax: [phone]  11:54 AM  09/13/24        * Surgery not found *     Operative Date: 09/13/24  Patient's Name: Ramesh Morocho  Patient's YOB: 1940  Patient's MRN: [id number]  Patient's Age: 83 y.o.  Operating Room Location: [facility]  Patient's ASA: [email or web address]@  Patient's Estimated Blood Loss: 5 mL        PREOPERATIVE DIAGNOSIS:   Right perianal abscess        POSTOPERATIVE DIAGNOSIS:   Right perianal abscess        OPERATION/PROCEDURE:   Incision and drainage of right perianal abscess        SURGEON:   Giles Lagos MD.         ASSISTANT:   Scrub assist.           INDICATIONS:   This is a 83 y.o. male who presented with an abscess in the right perianal region extending onto his right gluteus.  We discussed the recommend incision and drainage with a Penrose drain placement.        OPERATION:   The reasons for, benefits to, and risks of surgery were discussed with the patient.  The risks included, but not limited to, bleeding, infection, recurrence, and need for further interventions.  The patient appeared to understand and consented for the procedure.  He was therefore prepped in the standard fashion.    The surgical field over the abscess of right gluteus and perianal region was prepped  with Betadine and draped off.  Standard precautions were followed.  The abscess area was locally anesthetized with 20 mL of 1% lidocaine.  After the local anesthetic had time to take effect, a 1.5 cm incision was made over the abscess cavity.  There was immediate return of approximately 5 mL of purulent fluid.  The cavity was then bluntly deloculated.  After ensuring adequate drainage, we placed a Penrose drain between the new incision and the spontaneous opening.  We then tied the Penrose to itself with 0 silk suture.        DISPOSITION:   The patient tolerated the procedure well.  There were no immediate complications.  He was taught how to pack the wound, and given instructions to pack the wound once to twice daily until the cavity closes, usually about 1 week.  Heshould return to my office for a wound check in 2 weeks if the wound has not completely healed by then.  He should return sooner if there is any worsening in symptoms.    I was present for the entire procedure.      CPT Code:  51748      Operating Room Staff:  [email or web address]  * Surgery not found *      Giles Lagos MD  General Surgery  Office: [phone]  Fax:     [phone]  12:12 PM  09/13/24         Past Medical History:  Past Medical History:   Diagnosis Date    Arthritis 11/20/2014    Asthma ([facility]) 11/20/2014    Atrial fibrillation     CAD (coronary artery disease) 11/20/2014    History of coronary atherosclerosis    Degenerative disc disease, lumbar 11/20/2014    Diabetic neuropathy 11/20/2014    History of diabetic neuropathy    Diverticulosis of colon 07/09/2024    CT Scattered    DM (diabetes mellitus), type 2 11/20/2014    Essential tremor 09/06/2013    Benign familial tremor    History of depression 07/14/2017    History of gastroesophageal reflux (GERD) 11/20/2014    Hyperlipidemia     Hypertension     Inguinal hernia, bilateral 07/09/2024    CT fat containing    Old myocardial infarction 11/20/2014    History of myocardial  infarction    BRITT on CPAP 11/20/2014    Small bowel obstruction 07/09/2024    CT    Systolic CHF         Past Surgical History:  Past Surgical History:   Procedure Laterality Date    CHOLECYSTECTOMY      COLONOSCOPY  10/01/2015    CORONARY ARTERY BYPASS GRAFT  11/20/2014    CABG    HERNIA REPAIR      SMALL INTESTINE SURGERY      Partial resection        Medications:  Current Outpatient Medications   Medication Instructions    albuterol (ProAir HFA) 90 mcg/actuation inhaler inhalation    amoxicillin-pot clavulanate (Augmentin) 875-125 mg tablet 875 mg, oral, 2 times daily    ascorbic acid (VITAMIN C) 1,000 mg, oral, Daily    b complex vitamins capsule 1 capsule, oral, Daily    blood-glucose sensor (FreeStyle Tricia 3 Sensor) device 1 each, miscellaneous, Every 14 days    cholecalciferol (VITAMIN D-3) 50 mcg, oral, Daily    FLUoxetine (PROZAC) 40 mg, oral, Daily    fluticasone propion-salmeteroL (Advair Diskus) 250-50 mcg/dose diskus inhaler 1 puff, inhalation, 2 times daily RT, Rinse mouth with water after use to reduce aftertaste and incidence of candidiasis. Do not swallow.    FreeStyle Tricia 3 [place] misc Use as instructed    gabapentin (NEURONTIN) 600 mg, oral, QID    insulin degludec (TRESIBA FLEXTOUCH U-200) 50 Units, subcutaneous, Nightly, Take as directed per insulin instructions.    insulin lispro-aabc (Lyumjev KwikPen U-100 Insulin) 100 unit/mL insulin pen Up to 80 units daily    metoprolol succinate XL (TOPROL-XL) 50 mg, oral, Daily    MULTIVITAMIN ORAL 1 tablet, oral, Daily    nitroglycerin (NITROSTAT) 0.4 mg, sublingual, As needed    nystatin (Mycostatin) cream Topical, 2 times daily    pramipexole (Mirapex) 0.125 mg tablet 1 TABLET IN AM AND MIDDAY, 2-4 PILLS AT BEDTIME ORALLY AS DIRECTED <BR>    rosuvastatin (CRESTOR) 40 mg, oral, Daily    spironolactone (Aldactone) 25 mg tablet 0.5 tablets, oral, Daily    traZODone (DESYREL)  mg, oral, Nightly        Allergies:  Allergies   Allergen Reactions     House Dust Other     congestion    Metformin Rash    Mold Other     congestion        Family History:  Family History   Problem Relation Name Age of Onset    Other (blood disease) Brother          Social History:  Social History     Socioeconomic History    Marital status:    Tobacco Use    Smoking status: Every Day     Current packs/day: 1.00     Types: Cigarettes     Passive exposure: Current   Vaping Use    Vaping status: Never Used   Substance and Sexual Activity    Alcohol use: Yes     Comment: occasionally    Drug use: Never    Sexual activity: Defer     Social Determinants of Health     Financial Resource Strain: Low Risk  (7/10/2024)    Overall Financial Resource Strain (CARDIA)     Difficulty of Paying Living Expenses: Not very hard   Transportation Needs: No Transportation Needs (7/10/2024)    PRAPARE - Transportation     Lack of Transportation (Medical): No     Lack of Transportation (Non-Medical): No   Housing Stability: Low Risk  (7/10/2024)    Housing Stability Vital Sign     Unable to Pay for Housing in the Last Year: No     Number of Times Moved in the Last Year: 1     Homeless in the Last Year: No        Review of Systems:  A complete 12 point review of systems was performed and is negative except as noted in the history of present illness.

## 2024-09-15 LAB
BACTERIA SPEC CULT: NORMAL
GRAM STN SPEC: NORMAL
GRAM STN SPEC: NORMAL

## 2024-09-16 LAB
BACTERIA SPEC CULT: NORMAL
GRAM STN SPEC: NORMAL
GRAM STN SPEC: NORMAL

## 2024-09-18 ENCOUNTER — PATIENT OUTREACH (OUTPATIENT)
Dept: PRIMARY CARE | Facility: CLINIC | Age: 84
End: 2024-09-18
Payer: MEDICARE

## 2024-09-18 NOTE — PROGRESS NOTES
Patient DC home, 7/15/2024 , no contact has been made sine , upon F/U call today patient ans. Phone states he is doing well, healing from procedure, has all medications needed at this time, aware to call providers for any questions concerns or change in condition. Patient has F/U with providers as directed .     At time of outreach call the patient feels as if their condition has improved  since last visit.    Reviewed the PCP appointment with the pt and addressed any questions or concerns, has no questions or concerns at this time

## 2024-09-20 ENCOUNTER — APPOINTMENT (OUTPATIENT)
Dept: SURGERY | Facility: CLINIC | Age: 84
End: 2024-09-20
Payer: MEDICARE

## 2024-09-20 DIAGNOSIS — K61.0 PERIANAL ABSCESS: Primary | ICD-10-CM

## 2024-09-20 DIAGNOSIS — Z09 POSTOPERATIVE EXAMINATION: ICD-10-CM

## 2024-09-20 PROCEDURE — 1159F MED LIST DOCD IN RCRD: CPT | Performed by: PHYSICIAN ASSISTANT

## 2024-09-20 PROCEDURE — 1123F ACP DISCUSS/DSCN MKR DOCD: CPT | Performed by: PHYSICIAN ASSISTANT

## 2024-09-20 PROCEDURE — 99024 POSTOP FOLLOW-UP VISIT: CPT | Performed by: PHYSICIAN ASSISTANT

## 2024-09-20 PROCEDURE — 1160F RVW MEDS BY RX/DR IN RCRD: CPT | Performed by: PHYSICIAN ASSISTANT

## 2024-09-20 PROCEDURE — 4004F PT TOBACCO SCREEN RCVD TLK: CPT | Performed by: PHYSICIAN ASSISTANT

## 2024-09-20 RX ORDER — TRAMADOL HYDROCHLORIDE 50 MG/1
50 TABLET ORAL EVERY 8 HOURS PRN
Qty: 10 TABLET | Refills: 0 | Status: SHIPPED | OUTPATIENT
Start: 2024-09-20 | End: 2024-09-23

## 2024-09-20 NOTE — PROGRESS NOTES
General Surgery Established Visit    Subjective:   This is a 83 y.o. year old male who presents for drain removal following a right perirectal abscess I&D last week. He had a penrose placed to facilitate drainage while he completed his Augmentin. Patient still notices a decent amount of pain, but does report it feels better than when he first presented. He has been wearing a depends to help collect the drainage, notes a few spots of brown tinged drainage from about 8am this morning. Overall, the area is less swollen and erythema has greatly improved. Patient states his fevers broke last week shortly after the I&D. Denies chills, nausea, intolerance of PO.      ROS:   A complete 10 point review of systems was performed and is negative except as noted in the history of present illness.     Objective:   There were no vitals filed for this visit.     Physical Exam:   Constitutional: No acute distress, sitting up in bedside chair.  Neuro: Alert, oriented. Follows commands.   Eyes: EOMI. No scleral icterus. Conjunctiva pink.  ENT: MMM.   Heart: Regular rate.  Respiratory: No increased work of breathing or audible wheeze.  Abdomen: Soft, nondistended.   Rectal: Right perirectal area with penrose drain in place, scant moist exudate from both skin openings. No malodor, purulence. Erythema is minimal at this point. No fluctuance or expressible fluids.  MSK: Moves all extremities.  Vascular: Palpable pulses throughout, no pitting edema.  Skin: No rashes.   Psychological: Appropriate mood and behavior.     Laboratory Results:  Micro: stain with no organisms, culture with rare mixed skin organisms    Imaging Results:   none    Assessment/Plan:   This is a 83 y.o. year old male presenting for drain removal following a perirectal abscess I&D. It is healing well, drainage is still present although much less now. Pain also improved but still present. Overall, wound looks improved.      Plan:   -- Drain removed today in office  --  Continue antibiotic regimen until it is finished  -- Use warm compresses  -- Tylenol and motrin  -- Short script of tramadol sent in  -- Follow up in 2 weeks, discussed if this does not completely resolve or recurs, may need EUA with possible 2 stage fistulotomy          Discussed with Dr. Lagos who is in agreement with plan.       Marti Arellano PA-C  General and Trauma Surgery  Doc halo with questions

## 2024-09-23 ENCOUNTER — APPOINTMENT (OUTPATIENT)
Dept: PRIMARY CARE | Facility: CLINIC | Age: 84
End: 2024-09-23
Payer: MEDICARE

## 2024-09-24 ENCOUNTER — OFFICE VISIT (OUTPATIENT)
Dept: PRIMARY CARE | Facility: CLINIC | Age: 84
End: 2024-09-24
Payer: MEDICARE

## 2024-09-24 ENCOUNTER — LAB (OUTPATIENT)
Dept: LAB | Facility: LAB | Age: 84
End: 2024-09-24
Payer: MEDICARE

## 2024-09-24 VITALS
SYSTOLIC BLOOD PRESSURE: 110 MMHG | HEART RATE: 67 BPM | DIASTOLIC BLOOD PRESSURE: 64 MMHG | OXYGEN SATURATION: 94 % | TEMPERATURE: 98 F

## 2024-09-24 DIAGNOSIS — D69.1 PLATELET DISORDER (MULTI): ICD-10-CM

## 2024-09-24 DIAGNOSIS — E55.9 VITAMIN D DEFICIENCY: ICD-10-CM

## 2024-09-24 DIAGNOSIS — R10.13 EPIGASTRIC PAIN: ICD-10-CM

## 2024-09-24 DIAGNOSIS — I48.91 ATRIAL FIBRILLATION, UNSPECIFIED TYPE (MULTI): ICD-10-CM

## 2024-09-24 DIAGNOSIS — I10 BENIGN ESSENTIAL HYPERTENSION: ICD-10-CM

## 2024-09-24 DIAGNOSIS — R10.13 EPIGASTRIC PAIN: Primary | ICD-10-CM

## 2024-09-24 DIAGNOSIS — R14.0 DISTENDED ABDOMEN: ICD-10-CM

## 2024-09-24 DIAGNOSIS — K57.90 DIVERTICULOSIS: ICD-10-CM

## 2024-09-24 DIAGNOSIS — E78.2 MIXED HYPERLIPIDEMIA: ICD-10-CM

## 2024-09-24 DIAGNOSIS — E11.9 TYPE 2 DIABETES MELLITUS WITHOUT COMPLICATION, WITH LONG-TERM CURRENT USE OF INSULIN (MULTI): ICD-10-CM

## 2024-09-24 DIAGNOSIS — Z79.4 TYPE 2 DIABETES MELLITUS WITHOUT COMPLICATION, WITH LONG-TERM CURRENT USE OF INSULIN (MULTI): ICD-10-CM

## 2024-09-24 LAB
25(OH)D3 SERPL-MCNC: 44 NG/ML (ref 30–100)
ALBUMIN SERPL BCP-MCNC: 3.7 G/DL (ref 3.4–5)
ALP SERPL-CCNC: 54 U/L (ref 33–136)
ALT SERPL W P-5'-P-CCNC: 18 U/L (ref 10–52)
ANION GAP SERPL CALCULATED.3IONS-SCNC: 11 MMOL/L (ref 10–20)
AST SERPL W P-5'-P-CCNC: 16 U/L (ref 9–39)
BASOPHILS # BLD AUTO: 0.03 X10*3/UL (ref 0–0.1)
BASOPHILS NFR BLD AUTO: 0.4 %
BILIRUB SERPL-MCNC: 0.4 MG/DL (ref 0–1.2)
BUN SERPL-MCNC: 11 MG/DL (ref 6–23)
CALCIUM SERPL-MCNC: 9 MG/DL (ref 8.6–10.3)
CHLORIDE SERPL-SCNC: 100 MMOL/L (ref 98–107)
CHOLEST SERPL-MCNC: 117 MG/DL (ref 0–199)
CHOLEST/HDLC SERPL: 4.3 {RATIO}
CO2 SERPL-SCNC: 29 MMOL/L (ref 21–32)
CREAT SERPL-MCNC: 0.75 MG/DL (ref 0.5–1.3)
EGFRCR SERPLBLD CKD-EPI 2021: >90 ML/MIN/1.73M*2
EOSINOPHIL # BLD AUTO: 0.22 X10*3/UL (ref 0–0.4)
EOSINOPHIL NFR BLD AUTO: 2.9 %
ERYTHROCYTE [DISTWIDTH] IN BLOOD BY AUTOMATED COUNT: 13.7 % (ref 11.5–14.5)
GLUCOSE SERPL-MCNC: 253 MG/DL (ref 74–99)
HCT VFR BLD AUTO: 45.5 % (ref 41–52)
HDLC SERPL-MCNC: 27.5 MG/DL
HGB BLD-MCNC: 14.9 G/DL (ref 13.5–17.5)
IMM GRANULOCYTES # BLD AUTO: 0.04 X10*3/UL (ref 0–0.5)
IMM GRANULOCYTES NFR BLD AUTO: 0.5 % (ref 0–0.9)
LDLC SERPL CALC-MCNC: 32 MG/DL
LIPASE SERPL-CCNC: 41 U/L (ref 9–82)
LYMPHOCYTES # BLD AUTO: 1.62 X10*3/UL (ref 0.8–3)
LYMPHOCYTES NFR BLD AUTO: 21.4 %
MCH RBC QN AUTO: 32.1 PG (ref 26–34)
MCHC RBC AUTO-ENTMCNC: 32.7 G/DL (ref 32–36)
MCV RBC AUTO: 98 FL (ref 80–100)
MONOCYTES # BLD AUTO: 0.54 X10*3/UL (ref 0.05–0.8)
MONOCYTES NFR BLD AUTO: 7.1 %
NEUTROPHILS # BLD AUTO: 5.13 X10*3/UL (ref 1.6–5.5)
NEUTROPHILS NFR BLD AUTO: 67.7 %
NON HDL CHOLESTEROL: 90 MG/DL (ref 0–149)
NRBC BLD-RTO: 0 /100 WBCS (ref 0–0)
PLATELET # BLD AUTO: 176 X10*3/UL (ref 150–450)
POTASSIUM SERPL-SCNC: 4.6 MMOL/L (ref 3.5–5.3)
PROT SERPL-MCNC: 5.9 G/DL (ref 6.4–8.2)
RBC # BLD AUTO: 4.64 X10*6/UL (ref 4.5–5.9)
SODIUM SERPL-SCNC: 135 MMOL/L (ref 136–145)
TRIGL SERPL-MCNC: 287 MG/DL (ref 0–149)
TSH SERPL-ACNC: 1.68 MIU/L (ref 0.44–3.98)
VLDL: 57 MG/DL (ref 0–40)
WBC # BLD AUTO: 7.6 X10*3/UL (ref 4.4–11.3)

## 2024-09-24 PROCEDURE — 1159F MED LIST DOCD IN RCRD: CPT | Performed by: STUDENT IN AN ORGANIZED HEALTH CARE EDUCATION/TRAINING PROGRAM

## 2024-09-24 PROCEDURE — 85025 COMPLETE CBC W/AUTO DIFF WBC: CPT

## 2024-09-24 PROCEDURE — 3078F DIAST BP <80 MM HG: CPT | Performed by: STUDENT IN AN ORGANIZED HEALTH CARE EDUCATION/TRAINING PROGRAM

## 2024-09-24 PROCEDURE — 3074F SYST BP LT 130 MM HG: CPT | Performed by: STUDENT IN AN ORGANIZED HEALTH CARE EDUCATION/TRAINING PROGRAM

## 2024-09-24 PROCEDURE — 84443 ASSAY THYROID STIM HORMONE: CPT

## 2024-09-24 PROCEDURE — 1126F AMNT PAIN NOTED NONE PRSNT: CPT | Performed by: STUDENT IN AN ORGANIZED HEALTH CARE EDUCATION/TRAINING PROGRAM

## 2024-09-24 PROCEDURE — 80053 COMPREHEN METABOLIC PANEL: CPT

## 2024-09-24 PROCEDURE — 83690 ASSAY OF LIPASE: CPT

## 2024-09-24 PROCEDURE — 36415 COLL VENOUS BLD VENIPUNCTURE: CPT

## 2024-09-24 PROCEDURE — 4004F PT TOBACCO SCREEN RCVD TLK: CPT | Performed by: STUDENT IN AN ORGANIZED HEALTH CARE EDUCATION/TRAINING PROGRAM

## 2024-09-24 PROCEDURE — 1123F ACP DISCUSS/DSCN MKR DOCD: CPT | Performed by: STUDENT IN AN ORGANIZED HEALTH CARE EDUCATION/TRAINING PROGRAM

## 2024-09-24 PROCEDURE — 99213 OFFICE O/P EST LOW 20 MIN: CPT | Performed by: STUDENT IN AN ORGANIZED HEALTH CARE EDUCATION/TRAINING PROGRAM

## 2024-09-24 PROCEDURE — 80061 LIPID PANEL: CPT

## 2024-09-24 PROCEDURE — 82306 VITAMIN D 25 HYDROXY: CPT

## 2024-09-24 RX ORDER — SUCRALFATE 1 G/10ML
1 SUSPENSION ORAL 4 TIMES DAILY
Qty: 1419 ML | Refills: 0 | Status: SHIPPED | OUTPATIENT
Start: 2024-09-24 | End: 2024-10-24

## 2024-09-24 RX ORDER — PANTOPRAZOLE SODIUM 40 MG/1
40 TABLET, DELAYED RELEASE ORAL 2 TIMES DAILY
Qty: 60 TABLET | Refills: 1 | Status: SHIPPED | OUTPATIENT
Start: 2024-09-24 | End: 2024-11-23

## 2024-09-24 ASSESSMENT — ENCOUNTER SYMPTOMS
DYSPHORIC MOOD: 0
RHINORRHEA: 0
WHEEZING: 0
LIGHT-HEADEDNESS: 0
ABDOMINAL PAIN: 1
VOMITING: 0
POLYDIPSIA: 0
SLEEP DISTURBANCE: 0
CONSTIPATION: 0
FREQUENCY: 0
MYALGIAS: 0
NAUSEA: 0
WOUND: 0
DIARRHEA: 0
FATIGUE: 0
COUGH: 0
SHORTNESS OF BREATH: 0
ARTHRALGIAS: 0
DYSURIA: 0
CHILLS: 0
ABDOMINAL DISTENTION: 1
HEADACHES: 0
FEVER: 0
DIZZINESS: 0
NERVOUS/ANXIOUS: 0
SINUS PRESSURE: 0
PALPITATIONS: 0
SINUS PAIN: 0

## 2024-09-24 ASSESSMENT — PAIN SCALES - GENERAL: PAINLEVEL: 0-NO PAIN

## 2024-09-24 NOTE — PROGRESS NOTES
Subjective   Patient ID: Ramesh Morocho is a 83 y.o. male who presents for Abdominal Pain (X 3 days/Sharp pains/Pt states the pain is off and on).    Abrupt onset of abdominal pain on Saturday, 9/21/24.  Felt better on Sunday, 9/22/24.  Continues to have pain in epigastric region about every 3 hours.  Food and drink do not change it.  Patient has had partial colon resection for diverticulosis.  Bowel and bladder function have been normal.      Abdominal Pain  This is a new problem. The current episode started in the past 7 days. The onset quality is sudden. The problem occurs 2 to 4 times per day. The problem has been waxing and waning. The pain is located in the epigastric region. Pertinent negatives include no arthralgias, constipation, diarrhea, dysuria, fever, frequency, headaches, myalgias, nausea or vomiting.       Review of Systems   Constitutional:  Negative for chills, fatigue and fever.   HENT:  Negative for congestion, hearing loss, rhinorrhea, sinus pressure, sinus pain and tinnitus.    Eyes:  Negative for visual disturbance.   Respiratory:  Negative for cough, shortness of breath and wheezing.    Cardiovascular:  Negative for chest pain, palpitations and leg swelling.   Gastrointestinal:  Positive for abdominal distention and abdominal pain. Negative for constipation, diarrhea, nausea and vomiting.   Endocrine: Negative for cold intolerance, heat intolerance, polydipsia and polyuria.   Genitourinary:  Negative for dysuria, frequency and urgency.   Musculoskeletal:  Negative for arthralgias and myalgias.   Skin:  Negative for pallor, rash and wound.   Neurological:  Negative for dizziness, light-headedness and headaches.   Psychiatric/Behavioral:  Negative for dysphoric mood and sleep disturbance. The patient is not nervous/anxious.        Objective     Visit Vitals  /64 (BP Location: Left arm)   Pulse 67   Temp 36.7 °C (98 °F) (Temporal)   SpO2 94%   Smoking Status Every Day         Physical  Exam  Constitutional:       Appearance: Normal appearance. He is obese.   HENT:      Head: Normocephalic and atraumatic.      Right Ear: Tympanic membrane, ear canal and external ear normal.      Left Ear: Tympanic membrane, ear canal and external ear normal.      Nose: Nose normal.      Mouth/Throat:      Mouth: Mucous membranes are moist.      Pharynx: Oropharynx is clear.   Eyes:      Extraocular Movements: Extraocular movements intact.      Conjunctiva/sclera: Conjunctivae normal.      Pupils: Pupils are equal, round, and reactive to light.   Cardiovascular:      Rate and Rhythm: Normal rate and regular rhythm.      Pulses: Normal pulses.      Heart sounds: Normal heart sounds.   Pulmonary:      Effort: Pulmonary effort is normal.      Breath sounds: Normal breath sounds.   Abdominal:      General: There is distension.      Palpations: Abdomen is soft. There is no mass.      Tenderness: There is abdominal tenderness. There is no guarding or rebound.      Hernia: No hernia is present.   Musculoskeletal:         General: Normal range of motion.   Skin:     General: Skin is warm and dry.   Neurological:      Mental Status: He is alert and oriented to person, place, and time. Mental status is at baseline.   Psychiatric:         Mood and Affect: Mood normal.         Behavior: Behavior normal.         Assessment & Plan  Epigastric pain    Orders:    CT abdomen pelvis w and wo IV contrast; Future    Comprehensive metabolic panel; Future    CBC; Future    sucralfate (Carafate) 100 mg/mL suspension; Take 10 mL (1 g) by mouth 4 times a day.    pantoprazole (ProtoNix) 40 mg EC tablet; Take 1 tablet (40 mg) by mouth 2 times a day. Do not crush, chew, or split.    Lipase; Future  Patient with epigastric abdominal pain tenderness and distention.  Differential is broad at this time.  Possible etiologies would include gastric or duodenal ulcer, pancreatitis, gallbladder disease, ascites.  Will obtain blood work at this time.  I  did order a CT scan of the abdomen as this would give us some information about all of the above organs.    If laboratory studies and imaging are normal, I would consider GI referral to see if an EGD is appropriate.  Distended abdomen    Orders:    CT abdomen pelvis w and wo IV contrast; Future    Comprehensive metabolic panel; Future    CBC; Future    Diverticulosis    Orders:    CT abdomen pelvis w and wo IV contrast; Future       I will follow-up with patient regarding laboratory and imaging results.  Patient was advised that if his symptoms do acutely worsen he should proceed immediately to the emergency department.  Reviewed and approved by ROLLY APPIAH on 9/24/24 at 11:06 AM.

## 2024-09-25 NOTE — RESULT ENCOUNTER NOTE
Blood work showed normal blood counts.  Did not show any pancreatitis or abnormal liver or kidney numbers.  I will keep an eye out for his CT scan results and follow-up with him once those are available.

## 2024-10-03 ENCOUNTER — HOSPITAL ENCOUNTER (OUTPATIENT)
Dept: RADIOLOGY | Facility: HOSPITAL | Age: 84
Discharge: HOME | End: 2024-10-03
Payer: MEDICARE

## 2024-10-03 DIAGNOSIS — R10.13 EPIGASTRIC PAIN: ICD-10-CM

## 2024-10-03 DIAGNOSIS — R14.0 DISTENDED ABDOMEN: ICD-10-CM

## 2024-10-03 DIAGNOSIS — K57.90 DIVERTICULOSIS: ICD-10-CM

## 2024-10-03 PROCEDURE — 74177 CT ABD & PELVIS W/CONTRAST: CPT

## 2024-10-03 PROCEDURE — 2550000001 HC RX 255 CONTRASTS: Performed by: STUDENT IN AN ORGANIZED HEALTH CARE EDUCATION/TRAINING PROGRAM

## 2024-10-07 ENCOUNTER — APPOINTMENT (OUTPATIENT)
Dept: PRIMARY CARE | Facility: CLINIC | Age: 84
End: 2024-10-07
Payer: MEDICARE

## 2024-10-07 VITALS
DIASTOLIC BLOOD PRESSURE: 68 MMHG | OXYGEN SATURATION: 97 % | HEART RATE: 52 BPM | TEMPERATURE: 97.3 F | SYSTOLIC BLOOD PRESSURE: 116 MMHG

## 2024-10-07 DIAGNOSIS — K21.00 GASTROESOPHAGEAL REFLUX DISEASE WITH ESOPHAGITIS WITHOUT HEMORRHAGE: ICD-10-CM

## 2024-10-07 DIAGNOSIS — R10.13 EPIGASTRIC PAIN: ICD-10-CM

## 2024-10-07 DIAGNOSIS — N32.89 BLADDER WALL THICKENING: ICD-10-CM

## 2024-10-07 DIAGNOSIS — N40.0 ENLARGED PROSTATE: Primary | ICD-10-CM

## 2024-10-07 PROCEDURE — 1126F AMNT PAIN NOTED NONE PRSNT: CPT | Performed by: STUDENT IN AN ORGANIZED HEALTH CARE EDUCATION/TRAINING PROGRAM

## 2024-10-07 PROCEDURE — 99214 OFFICE O/P EST MOD 30 MIN: CPT | Performed by: STUDENT IN AN ORGANIZED HEALTH CARE EDUCATION/TRAINING PROGRAM

## 2024-10-07 PROCEDURE — 1123F ACP DISCUSS/DSCN MKR DOCD: CPT | Performed by: STUDENT IN AN ORGANIZED HEALTH CARE EDUCATION/TRAINING PROGRAM

## 2024-10-07 PROCEDURE — 3074F SYST BP LT 130 MM HG: CPT | Performed by: STUDENT IN AN ORGANIZED HEALTH CARE EDUCATION/TRAINING PROGRAM

## 2024-10-07 PROCEDURE — 3078F DIAST BP <80 MM HG: CPT | Performed by: STUDENT IN AN ORGANIZED HEALTH CARE EDUCATION/TRAINING PROGRAM

## 2024-10-07 PROCEDURE — 1159F MED LIST DOCD IN RCRD: CPT | Performed by: STUDENT IN AN ORGANIZED HEALTH CARE EDUCATION/TRAINING PROGRAM

## 2024-10-07 PROCEDURE — 4004F PT TOBACCO SCREEN RCVD TLK: CPT | Performed by: STUDENT IN AN ORGANIZED HEALTH CARE EDUCATION/TRAINING PROGRAM

## 2024-10-07 RX ORDER — PANTOPRAZOLE SODIUM 40 MG/1
40 TABLET, DELAYED RELEASE ORAL 2 TIMES DAILY
Qty: 180 TABLET | Refills: 1 | Status: SHIPPED | OUTPATIENT
Start: 2024-10-07 | End: 2025-04-05

## 2024-10-07 ASSESSMENT — ENCOUNTER SYMPTOMS
DIZZINESS: 0
FREQUENCY: 0
FEVER: 0
ARTHRALGIAS: 0
RHINORRHEA: 0
POLYDIPSIA: 0
COUGH: 0
WHEEZING: 0
CHILLS: 0
MYALGIAS: 0
FATIGUE: 0
WOUND: 0
NERVOUS/ANXIOUS: 0
SLEEP DISTURBANCE: 0
VOMITING: 0
NAUSEA: 0
PALPITATIONS: 0
SHORTNESS OF BREATH: 0
CONSTIPATION: 0
DYSPHORIC MOOD: 0
DYSURIA: 0
LIGHT-HEADEDNESS: 0
HEADACHES: 0
SINUS PAIN: 0
SINUS PRESSURE: 0
DIARRHEA: 0

## 2024-10-07 ASSESSMENT — PAIN SCALES - GENERAL: PAINLEVEL: 0-NO PAIN

## 2024-10-07 NOTE — PROGRESS NOTES
Subjective   Patient ID: Ramesh Morocho is a 83 y.o. male who presents for Follow-up (Ct).    Today in follow-up.  He was seen by myself for abdominal pain recently.  He did have CT done as requested.    He has been taking pantoprazole twice daily as well as sucralfate.  He said these have mostly resolved his symptoms.  Unfortunately it is not easy to tell exactly which one of the 2 yielded the most benefit.    Patient is not interested in smoking cessation at this time.        Review of Systems   Constitutional:  Negative for chills, fatigue and fever.   HENT:  Negative for congestion, hearing loss, rhinorrhea, sinus pressure, sinus pain and tinnitus.    Eyes:  Negative for visual disturbance.   Respiratory:  Negative for cough, shortness of breath and wheezing.    Cardiovascular:  Negative for chest pain, palpitations and leg swelling.   Gastrointestinal:  Negative for constipation, diarrhea, nausea and vomiting.   Endocrine: Negative for cold intolerance, heat intolerance, polydipsia and polyuria.   Genitourinary:  Negative for dysuria, frequency and urgency.   Musculoskeletal:  Negative for arthralgias and myalgias.   Skin:  Negative for pallor, rash and wound.   Neurological:  Negative for dizziness, light-headedness and headaches.   Psychiatric/Behavioral:  Negative for dysphoric mood and sleep disturbance. The patient is not nervous/anxious.        Objective     Visit Vitals  /68 (BP Location: Left arm)   Pulse 52   Temp 36.3 °C (97.3 °F) (Temporal)   SpO2 97%   Smoking Status Every Day         Physical Exam  Constitutional:       Appearance: Normal appearance. He is obese.   HENT:      Head: Normocephalic and atraumatic.      Right Ear: Tympanic membrane, ear canal and external ear normal.      Left Ear: Tympanic membrane, ear canal and external ear normal.      Nose: Nose normal.      Mouth/Throat:      Mouth: Mucous membranes are moist.      Pharynx: Oropharynx is clear.   Eyes:      Extraocular  Movements: Extraocular movements intact.      Conjunctiva/sclera: Conjunctivae normal.      Pupils: Pupils are equal, round, and reactive to light.   Cardiovascular:      Rate and Rhythm: Normal rate and regular rhythm.      Pulses: Normal pulses.      Heart sounds: Normal heart sounds.   Pulmonary:      Effort: Pulmonary effort is normal.      Breath sounds: Normal breath sounds.   Abdominal:      General: There is distension.      Palpations: Abdomen is soft.      Tenderness: There is no abdominal tenderness.   Musculoskeletal:         General: Normal range of motion.   Skin:     General: Skin is warm and dry.   Neurological:      Mental Status: He is alert and oriented to person, place, and time. Mental status is at baseline.   Psychiatric:         Mood and Affect: Mood normal.         Behavior: Behavior normal.         Assessment & Plan  Enlarged prostate    Orders:    PSA; Future    Referral to Urology; Future    Bladder wall thickening    Orders:    PSA; Future    Referral to Urology; Future  CT did show enlarged prostate pressing on bladder and some bladder inflammation.  Will obtain a PSA and refer to urology.  Patient has had a prostate debulking procedure in the distant past, around 20 years ago.  He is unsure who did it and it is unclear if that person would still be in practice or not.  Epigastric pain    Orders:    pantoprazole (ProtoNix) 40 mg EC tablet; Take 1 tablet (40 mg) by mouth 2 times a day. Do not crush, chew, or split.    Gastroesophageal reflux disease with esophagitis without hemorrhage    Orders:    pantoprazole (ProtoNix) 40 mg EC tablet; Take 1 tablet (40 mg) by mouth 2 times a day. Do not crush, chew, or split.  We will continue pantoprazole, as there was notable sequelae of reflux esophagitis on CT scan.  If his abdominal pain recurs, we can restart sucralfate if needed.     I will contact patient with PSA results.  He should plan to schedule with urology for further evaluation as  well.    Reviewed and approved by ROLLY APPIAH on 10/7/24 at 2:21 PM.

## 2024-10-10 ENCOUNTER — LAB (OUTPATIENT)
Dept: LAB | Facility: LAB | Age: 84
End: 2024-10-10
Payer: MEDICARE

## 2024-10-10 DIAGNOSIS — N40.0 ENLARGED PROSTATE: ICD-10-CM

## 2024-10-10 DIAGNOSIS — N32.89 BLADDER WALL THICKENING: ICD-10-CM

## 2024-10-10 LAB — PSA SERPL-MCNC: 0.6 NG/ML

## 2024-10-10 PROCEDURE — 36415 COLL VENOUS BLD VENIPUNCTURE: CPT

## 2024-10-10 PROCEDURE — 84153 ASSAY OF PSA TOTAL: CPT

## 2024-10-11 NOTE — RESULT ENCOUNTER NOTE
PSA result was normal.  Inflammation of the is less likely (although not impossible) to be causing symptoms.

## 2024-10-23 ENCOUNTER — TELEPHONE (OUTPATIENT)
Dept: PRIMARY CARE | Facility: CLINIC | Age: 84
End: 2024-10-23
Payer: MEDICARE

## 2024-10-23 DIAGNOSIS — E78.2 MIXED HYPERLIPIDEMIA: ICD-10-CM

## 2024-10-23 RX ORDER — ROSUVASTATIN CALCIUM 40 MG/1
40 TABLET, COATED ORAL DAILY
Qty: 90 TABLET | Refills: 1 | Status: SHIPPED | OUTPATIENT
Start: 2024-10-23

## 2024-10-23 NOTE — TELEPHONE ENCOUNTER
Patient requests refill for Rosuvastatin 40mg.  Patient uses [facility] on [address] in [place]. He can be reached at [phone]

## 2024-10-29 DIAGNOSIS — E11.42 TYPE 2 DIABETES MELLITUS WITH DIABETIC POLYNEUROPATHY, WITH LONG-TERM CURRENT USE OF INSULIN: ICD-10-CM

## 2024-10-29 DIAGNOSIS — Z79.4 TYPE 2 DIABETES MELLITUS WITH DIABETIC POLYNEUROPATHY, WITH LONG-TERM CURRENT USE OF INSULIN: ICD-10-CM

## 2024-10-29 RX ORDER — BLOOD-GLUCOSE SENSOR
1 EACH MISCELLANEOUS
Qty: 2 EACH | Refills: 11 | Status: SHIPPED | OUTPATIENT
Start: 2024-10-29

## 2024-11-13 ENCOUNTER — TELEPHONE (OUTPATIENT)
Dept: ENDOCRINOLOGY | Facility: CLINIC | Age: 84
End: 2024-11-13

## 2024-11-13 ENCOUNTER — APPOINTMENT (OUTPATIENT)
Dept: ENDOCRINOLOGY | Facility: CLINIC | Age: 84
End: 2024-11-13
Payer: MEDICARE

## 2024-11-13 NOTE — TELEPHONE ENCOUNTER
Pt's wife called to R/S Pt's appt, stating that the Pt and herself came down with the flu overnight. Appt has been R/S.

## 2024-11-16 ENCOUNTER — APPOINTMENT (OUTPATIENT)
Dept: RADIOLOGY | Facility: HOSPITAL | Age: 84
End: 2024-11-16
Payer: MEDICARE

## 2024-11-16 ENCOUNTER — HOSPITAL ENCOUNTER (EMERGENCY)
Facility: HOSPITAL | Age: 84
Discharge: HOME | End: 2024-11-16
Attending: EMERGENCY MEDICINE
Payer: MEDICARE

## 2024-11-16 VITALS
HEART RATE: 87 BPM | OXYGEN SATURATION: 97 % | BODY MASS INDEX: 30.68 KG/M2 | TEMPERATURE: 97.9 F | WEIGHT: 219.14 LBS | RESPIRATION RATE: 18 BRPM | SYSTOLIC BLOOD PRESSURE: 148 MMHG | HEIGHT: 71 IN | DIASTOLIC BLOOD PRESSURE: 96 MMHG

## 2024-11-16 DIAGNOSIS — B34.9 NONSPECIFIC SYNDROME SUGGESTIVE OF VIRAL ILLNESS: ICD-10-CM

## 2024-11-16 DIAGNOSIS — R11.0 NAUSEA: ICD-10-CM

## 2024-11-16 DIAGNOSIS — R53.81 MALAISE: ICD-10-CM

## 2024-11-16 DIAGNOSIS — R19.7 DIARRHEA OF PRESUMED INFECTIOUS ORIGIN: Primary | ICD-10-CM

## 2024-11-16 LAB
ALBUMIN SERPL BCP-MCNC: 3.8 G/DL (ref 3.4–5)
ALP SERPL-CCNC: 39 U/L (ref 33–136)
ALT SERPL W P-5'-P-CCNC: 20 U/L (ref 10–52)
ANION GAP SERPL CALCULATED.3IONS-SCNC: 12 MMOL/L (ref 10–20)
APPEARANCE UR: CLEAR
AST SERPL W P-5'-P-CCNC: 25 U/L (ref 9–39)
BACTERIA #/AREA URNS AUTO: ABNORMAL /HPF
BASOPHILS # BLD AUTO: 0.01 X10*3/UL (ref 0–0.1)
BASOPHILS NFR BLD AUTO: 0.2 %
BILIRUB SERPL-MCNC: 0.8 MG/DL (ref 0–1.2)
BILIRUB UR STRIP.AUTO-MCNC: NEGATIVE MG/DL
BUN SERPL-MCNC: 11 MG/DL (ref 6–23)
CALCIUM SERPL-MCNC: 8.7 MG/DL (ref 8.6–10.3)
CHLORIDE SERPL-SCNC: 103 MMOL/L (ref 98–107)
CO2 SERPL-SCNC: 25 MMOL/L (ref 21–32)
COLOR UR: YELLOW
CREAT SERPL-MCNC: 0.71 MG/DL (ref 0.5–1.3)
EGFRCR SERPLBLD CKD-EPI 2021: >90 ML/MIN/1.73M*2
EOSINOPHIL # BLD AUTO: 0.11 X10*3/UL (ref 0–0.4)
EOSINOPHIL NFR BLD AUTO: 2.2 %
ERYTHROCYTE [DISTWIDTH] IN BLOOD BY AUTOMATED COUNT: 13.7 % (ref 11.5–14.5)
GLUCOSE SERPL-MCNC: 152 MG/DL (ref 74–99)
GLUCOSE UR STRIP.AUTO-MCNC: NORMAL MG/DL
HCT VFR BLD AUTO: 44.5 % (ref 41–52)
HGB BLD-MCNC: 14.8 G/DL (ref 13.5–17.5)
HOLD SPECIMEN: NORMAL
IMM GRANULOCYTES # BLD AUTO: 0.02 X10*3/UL (ref 0–0.5)
IMM GRANULOCYTES NFR BLD AUTO: 0.4 % (ref 0–0.9)
KETONES UR STRIP.AUTO-MCNC: ABNORMAL MG/DL
LEUKOCYTE ESTERASE UR QL STRIP.AUTO: NEGATIVE
LIPASE SERPL-CCNC: 20 U/L (ref 9–82)
LYMPHOCYTES # BLD AUTO: 1.37 X10*3/UL (ref 0.8–3)
LYMPHOCYTES NFR BLD AUTO: 28 %
MCH RBC QN AUTO: 30.8 PG (ref 26–34)
MCHC RBC AUTO-ENTMCNC: 33.3 G/DL (ref 32–36)
MCV RBC AUTO: 93 FL (ref 80–100)
MONOCYTES # BLD AUTO: 0.54 X10*3/UL (ref 0.05–0.8)
MONOCYTES NFR BLD AUTO: 11 %
MUCOUS THREADS #/AREA URNS AUTO: ABNORMAL /LPF
NEUTROPHILS # BLD AUTO: 2.85 X10*3/UL (ref 1.6–5.5)
NEUTROPHILS NFR BLD AUTO: 58.2 %
NITRITE UR QL STRIP.AUTO: NEGATIVE
NRBC BLD-RTO: 0 /100 WBCS (ref 0–0)
PH UR STRIP.AUTO: 6 [PH]
PLATELET # BLD AUTO: 132 X10*3/UL (ref 150–450)
POTASSIUM SERPL-SCNC: 3.5 MMOL/L (ref 3.5–5.3)
PROT SERPL-MCNC: 6.4 G/DL (ref 6.4–8.2)
PROT UR STRIP.AUTO-MCNC: ABNORMAL MG/DL
RBC # BLD AUTO: 4.81 X10*6/UL (ref 4.5–5.9)
RBC # UR STRIP.AUTO: NEGATIVE /UL
RBC #/AREA URNS AUTO: ABNORMAL /HPF
SODIUM SERPL-SCNC: 136 MMOL/L (ref 136–145)
SP GR UR STRIP.AUTO: 1.02
UROBILINOGEN UR STRIP.AUTO-MCNC: ABNORMAL MG/DL
WBC # BLD AUTO: 4.9 X10*3/UL (ref 4.4–11.3)
WBC #/AREA URNS AUTO: ABNORMAL /HPF

## 2024-11-16 PROCEDURE — 83690 ASSAY OF LIPASE: CPT | Performed by: EMERGENCY MEDICINE

## 2024-11-16 PROCEDURE — 81001 URINALYSIS AUTO W/SCOPE: CPT | Performed by: EMERGENCY MEDICINE

## 2024-11-16 PROCEDURE — 71046 X-RAY EXAM CHEST 2 VIEWS: CPT | Performed by: STUDENT IN AN ORGANIZED HEALTH CARE EDUCATION/TRAINING PROGRAM

## 2024-11-16 PROCEDURE — 2500000001 HC RX 250 WO HCPCS SELF ADMINISTERED DRUGS (ALT 637 FOR MEDICARE OP): Performed by: EMERGENCY MEDICINE

## 2024-11-16 PROCEDURE — 85025 COMPLETE CBC W/AUTO DIFF WBC: CPT | Performed by: EMERGENCY MEDICINE

## 2024-11-16 PROCEDURE — 36415 COLL VENOUS BLD VENIPUNCTURE: CPT | Performed by: EMERGENCY MEDICINE

## 2024-11-16 PROCEDURE — 99283 EMERGENCY DEPT VISIT LOW MDM: CPT | Mod: 25

## 2024-11-16 PROCEDURE — 71046 X-RAY EXAM CHEST 2 VIEWS: CPT

## 2024-11-16 PROCEDURE — 84075 ASSAY ALKALINE PHOSPHATASE: CPT | Performed by: EMERGENCY MEDICINE

## 2024-11-16 RX ORDER — ONDANSETRON 4 MG/1
4 TABLET, ORALLY DISINTEGRATING ORAL EVERY 8 HOURS PRN
Qty: 30 TABLET | Refills: 0 | Status: SHIPPED | OUTPATIENT
Start: 2024-11-16

## 2024-11-16 RX ORDER — WATER
100 LIQUID (ML) MISCELLANEOUS
Status: DISCONTINUED | OUTPATIENT
Start: 2024-11-16 | End: 2024-11-16 | Stop reason: HOSPADM

## 2024-11-16 RX ORDER — ACETAMINOPHEN 325 MG/1
975 TABLET ORAL ONCE
Status: COMPLETED | OUTPATIENT
Start: 2024-11-16 | End: 2024-11-16

## 2024-11-16 RX ADMIN — Medication 100 ML: at 08:43

## 2024-11-16 RX ADMIN — ACETAMINOPHEN 975 MG: 325 TABLET ORAL at 07:44

## 2024-11-16 RX ADMIN — Medication 100 ML: at 07:43

## 2024-11-16 ASSESSMENT — PAIN SCALES - GENERAL
PAINLEVEL_OUTOF10: 0 - NO PAIN
PAINLEVEL_OUTOF10: 0 - NO PAIN

## 2024-11-16 ASSESSMENT — COLUMBIA-SUICIDE SEVERITY RATING SCALE - C-SSRS
1. IN THE PAST MONTH, HAVE YOU WISHED YOU WERE DEAD OR WISHED YOU COULD GO TO SLEEP AND NOT WAKE UP?: NO
6. HAVE YOU EVER DONE ANYTHING, STARTED TO DO ANYTHING, OR PREPARED TO DO ANYTHING TO END YOUR LIFE?: NO
2. HAVE YOU ACTUALLY HAD ANY THOUGHTS OF KILLING YOURSELF?: NO

## 2024-11-16 ASSESSMENT — PAIN - FUNCTIONAL ASSESSMENT: PAIN_FUNCTIONAL_ASSESSMENT: 0-10

## 2024-11-16 NOTE — DISCHARGE INSTRUCTIONS
Your symptoms are likely due to a viral infection that may last 7-14 days.  Your body will fight off this infection on its own, and the typical care is to treat the symptoms during this time.  Tylenol or ibuprofen as needed for pain or fever.  You may also take over-the-counter medications (or the prescribed medications if applicable) as needed for diarrhea or other symptoms you may have.  Small sips of fluid more frequently or better tolerate.  Eat a bland diet until symptoms improving then you may advance as tolerated to your standard diet.  Please return immediately to the emergency department if you develop any new or worsening symptoms such as a fever lasting more than 5 days, increasing abdominal pain or inability to stay hydrated with oral fluid intake.    More than 50% of abdominal pain that comes to the Emergency Department goes undiagnosed and that there were no emergent findings in your workup today.  If your symptoms get worse, developed high fever, or persistent vomiting you should come back to the Emergency Department. Often times an appendicitis, colitis, diverticulitis, cholelithiasis and many other such illnesses are undetectable early on in their course and will not be seen on the first emergency department visit.  I recommend that you be re-examined in 12-24 hours if your symptoms are not significantly improved.

## 2024-11-16 NOTE — ED PROVIDER NOTES
HPI   Chief Complaint   Patient presents with    Diarrhea     On Tuesday night I ate hamburger and my wife ate the same thing and we both got sick but she got over it quicker I still have diarrhea vomiting bloating and chills        84-year-old male presents for generalized malaise, fatigue, chills generally not feeling well.  States that on Tuesday, 5 days ago he had what he thought was food poisoning after eating a hamburger wife had similar symptoms.  He was having diarrhea vomiting, bloating at that time, but the symptoms seem to have resolved and now he just continues to feel very fatigued and malaised with no appetite.  No abdominal pain.  States he has not been taking most of his oral home medications due to not feeling well although continues to take his gabapentin.  No fever.      History provided by:  Patient          Patient History   Past Medical History:   Diagnosis Date    Arthritis 11/20/2014    Asthma 11/20/2014    Atrial fibrillation     CAD (coronary artery disease) 11/20/2014    History of coronary atherosclerosis    Degenerative disc disease, lumbar 11/20/2014    Diabetic neuropathy 11/20/2014    History of diabetic neuropathy    Diverticulosis of colon 07/09/2024    CT Scattered    DM (diabetes mellitus), type 2 11/20/2014    Essential tremor 09/06/2013    Benign familial tremor    History of depression 07/14/2017    History of gastroesophageal reflux (GERD) 11/20/2014    Hyperlipidemia     Hypertension     Inguinal hernia, bilateral 07/09/2024    CT fat containing    Old myocardial infarction 11/20/2014    History of myocardial infarction    BRITT on CPAP 11/20/2014    Small bowel obstruction 07/09/2024    CT    Systolic CHF      Past Surgical History:   Procedure Laterality Date    CHOLECYSTECTOMY      COLONOSCOPY  10/01/2015    CORONARY ARTERY BYPASS GRAFT  11/20/2014    CABG    HERNIA REPAIR      SMALL INTESTINE SURGERY      Partial resection     Family History   Problem Relation Name Age of  Onset    Other (blood disease) Brother       Social History     Tobacco Use    Smoking status: Every Day     Current packs/day: 1.00     Types: Cigarettes     Passive exposure: Current    Smokeless tobacco: Not on file   Vaping Use    Vaping status: Never Used   Substance Use Topics    Alcohol use: Yes     Comment: occasionally    Drug use: Never       Physical Exam   ED Triage Vitals [11/16/24 0723]   Temperature Heart Rate Respirations BP   36.6 °C (97.9 °F) 87 18 (!) 148/96      Pulse Ox Temp Source Heart Rate Source Patient Position   97 % Temporal Monitor Sitting      BP Location FiO2 (%)     Left arm --       Physical Exam  Vitals and nursing note reviewed.     General: Vitals reviewed. Awake, alert, well-developed, well-nourished, NAD  HEENT: NC/AT, PERRL, MMM, oropharynx clear, posterior nasal drainage present  Neck: Supple, trachea midline, full range of motion intact  Respiratory: No respiratory distress, lungs clear to auscultation bilaterally, no wheezes, rhonchi, or rales  CV: Regular rate and regular rhythm, no murmur/gallop/rubs  Abdomen/GI: Soft, non-tender, non-distended, no rebound, guarding, or rigidity, normal bowel sounds  Extremities: Moving all extremities, no deformities  Neuro: A/O, normal speech  Skin: Warm, dry. No rashes identified      ED Course & MDM   Diagnoses as of 11/16/24 0906   Diarrhea of presumed infectious origin   Malaise   Nonspecific syndrome suggestive of viral illness   Nausea                 No data recorded     Whittaker Coma Scale Score: 15 (11/16/24 0733 : Cordelia Burger RN)                           Medical Decision Making  84-year-old male presents for malaise and fatigue in the setting of additional symptoms such as diarrhea and vomiting and wife sick with similar symptoms that have subsequently resolved suggestive of viral illness versus foodborne component.  I have considered alternative/bacterial etiologies such as otitis media, streptococcal pharyngitis,  meningitis/encephalitis, pneumonia, sepsis, urinary tract infection/pyelonephritis among others however based on history and physical exam these have been ruled out.  Does have mild cough will obtain chest x-ray to rule out pneumonia.  Will obtain labs to check electrolytes but abdominal exam is benign nonobstruction with normal bowel sounds therefore low suspicion for acute intra-abdominal process including but not limited to pancreatitis, diverticulitis, appendicitis among others.  Exam is overall benign and patient is well appearing, afebrile, and hemodynamically stable.  Was given p.o. fluids with improvement, tolerated well.  Labs without significant abnormality.  Urinalysis negative for UTI.  Chest x-ray on my independent interpretation with no acute cardiopulmonary process.  Suspect viral etiology that can be treated with supportive care.   Return precautions discussed.    Amount and/or Complexity of Data Reviewed  Independent Historian: spouse  External Data Reviewed: notes.     Details: 10/7/24 outpatient primary care note reviewed  Labs: ordered. Decision-making details documented in ED Course.  Radiology: ordered and independent interpretation performed. Decision-making details documented in ED Course.        Procedure  Procedures     Brigid Carrillo MD  11/16/24 0903

## 2024-12-02 ENCOUNTER — APPOINTMENT (OUTPATIENT)
Dept: ENDOCRINOLOGY | Facility: CLINIC | Age: 84
End: 2024-12-02
Payer: MEDICARE

## 2024-12-02 ENCOUNTER — TELEPHONE (OUTPATIENT)
Dept: ENDOCRINOLOGY | Facility: CLINIC | Age: 84
End: 2024-12-02

## 2024-12-02 NOTE — TELEPHONE ENCOUNTER
Ramesh Morocho   1940   [id number]   [phone]       Called patient and left voice message in regards to canceling 12/2/25 appt due to weather advisory.

## 2024-12-02 NOTE — PROGRESS NOTES
HPI   Presents for follow up/metabolic management 85 yo with DM Type 2 + polyneuropathy, diagnosed in his 50s. History CAD with CABG, HLD, HTN, CHF, current smoker/no desire to quit. Current A1c 7.6% was 7.8%. Patient testing sugars 4 times day with tricia 3. Low blood sugars 1-2 times/week. Not following a carbohrdrate diet, knows reasonable carb allowances. Patient not able to afford tall their medications. Patient is not exercising.      -taking tresiba 50 units, lyumjev base 12,  ISF +2 50>150 - mostly takes AFTER meals   -farxiga 10 mg-stopped due to cost  -Metformin - does not tolerate,  GLP1 unable to afford, no TZD history HF    -HTN Entresto, Isosobide, Metoprolol, Spironolactone  -taking crestor 40 mg    Libre3 data reviewed, time in target 54% , lows 3%, pattern: mid 100's overnight and waking, upper 200's after breakfasts, upper 100's mid day, after lunch and dinner mid 200's, bedtime upper 100's.      Current Outpatient Medications:     albuterol (ProAir HFA) 90 mcg/actuation inhaler, Inhale., Disp: , Rfl:     ascorbic acid (Vitamin C) 1,000 mg tablet, Take 1 tablet (1,000 mg) by mouth once daily., Disp: , Rfl:     b complex vitamins capsule, Take 1 capsule by mouth once daily., Disp: , Rfl:     blood-glucose sensor (FreeStyle Tricia 3 Sensor) device, 1 EACH EVERY 14 (FOURTEEN) DAYS., Disp: 2 each, Rfl: 11    cholecalciferol (Vitamin D-3) 50 MCG (2000 UT) tablet, Take 1 tablet (50 mcg) by mouth once daily., Disp: , Rfl:     FLUoxetine (PROzac) 40 mg capsule, Take 1 capsule (40 mg) by mouth once daily., Disp: 90 capsule, Rfl: 1    fluticasone propion-salmeteroL (Advair Diskus) 250-50 mcg/dose diskus inhaler, INHALE 1 PUFF 2 TIMES A DAY. RINSE MOUTH WITH WATER AFTER USE TO REDUCE AFTERTASTE AND INCIDENCE OF CANDIDIASIS. DO NOT SWALLOW., Disp: 60 each, Rfl: 2    FreeStyle Tricia 3 Spreckels misc, Use as instructed, Disp: 1 each, Rfl: 0    gabapentin (Neurontin) 600 mg tablet, Take 1 tablet (600 mg) by mouth.  QID, Disp: , Rfl:     insulin degludec (Tresiba FlexTouch U-200) 200 unit/mL (3 mL) injection, Inject 50 Units under the skin once daily at bedtime. Take as directed per insulin instructions., Disp: , Rfl:     insulin lispro-aabc (Lyumjev KwikPen U-100 Insulin) 100 unit/mL insulin pen, Up to 80 units daily, Disp: 75 mL, Rfl: 3    metoprolol succinate XL (Toprol-XL) 50 mg 24 hr tablet, Take 1 tablet (50 mg) by mouth once daily., Disp: , Rfl:     MULTIVITAMIN ORAL, Take 1 tablet by mouth once daily., Disp: , Rfl:     nitroglycerin (Nitrostat) 0.4 mg SL tablet, Place 1 tablet (0.4 mg) under the tongue. As needed, Disp: , Rfl:     nystatin (Mycostatin) cream, Apply topically 2 times a day., Disp: 15 g, Rfl: 0    ondansetron ODT (Zofran-ODT) 4 mg disintegrating tablet, Take 1 tablet (4 mg) by mouth every 8 hours if needed for nausea or vomiting., Disp: 30 tablet, Rfl: 0    pantoprazole (ProtoNix) 40 mg EC tablet, Take 1 tablet (40 mg) by mouth 2 times a day. Do not crush, chew, or split., Disp: 180 tablet, Rfl: 1    pramipexole (Mirapex) 0.125 mg tablet, 1 TABLET IN AM AND MIDDAY, 2-4 PILLS AT BEDTIME ORALLY AS DIRECTED, Disp: , Rfl:     rosuvastatin (Crestor) 40 mg tablet, Take 1 tablet (40 mg) by mouth once daily., Disp: 90 tablet, Rfl: 1    spironolactone (Aldactone) 25 mg tablet, Take 0.5 tablets (12.5 mg) by mouth once daily., Disp: , Rfl:     traZODone (Desyrel) 50 mg tablet, TAKE 1 OR 2 TABLETS BY MOUTH AT BEDTIME, Disp: 180 tablet, Rfl: 1  No current facility-administered medications for this visit.    Facility-Administered Medications Ordered in Other Visits:     oxygen (O2) therapy, , inhalation, Continuous - Inhalation, KEVON Waters-CNP    Allergies as of 12/04/2024 - Reviewed 12/04/2024   Allergen Reaction Noted    House dust Other 04/03/2024    Metformin Rash 09/14/2023    Mold Other 04/03/2024       /72 (BP Location: Left arm, Patient Position: Sitting)   Pulse 64   Wt 104 kg (228 lb 9.6  oz)   BMI 31.88 kg/m²     Labs:   Lab Results   Component Value Date    WBC 4.9 11/16/2024    NRBC 0.0 11/16/2024    RBC 4.81 11/16/2024    HGB 14.8 11/16/2024    HCT 44.5 11/16/2024     (L) 11/16/2024     Lab Results   Component Value Date    CALCIUM 8.7 11/16/2024    AST 25 11/16/2024    ALKPHOS 39 11/16/2024    BILITOT 0.8 11/16/2024    PROT 6.4 11/16/2024    ALBUMIN 3.8 11/16/2024    GLOB 2.8 05/30/2023    AGR 1.3 (L) 05/30/2023     11/16/2024    K 3.5 11/16/2024     11/16/2024    CO2 25 11/16/2024    ANIONGAP 12 11/16/2024    BUN 11 11/16/2024    CREATININE 0.71 11/16/2024    UREACREAUR 10.0 05/30/2023    GLUCOSE 152 (H) 11/16/2024    ALT 20 11/16/2024    EGFR >90 11/16/2024     Lab Results   Component Value Date    CHOL 117 09/24/2024    TRIG 287 (H) 09/24/2024    HDL 27.5 09/24/2024    LDLCALC 32 09/24/2024     Lab Results   Component Value Date    MICROALBCREA  07/03/2024      Comment:      One or more analytes used in this calculation is outside of the analytical measurement range. Calculation cannot be performed.     Lab Results   Component Value Date    TSH 1.68 09/24/2024     Lab Results   Component Value Date    BHABGTVC54 454 11/22/2022     Lab Results   Component Value Date    HGBA1C 7.6 (A) 12/04/2024       Assessment/Plan   1. Type 2 diabetes mellitus with diabetic polyneuropathy, with long-term current use of insulin  -A1c ordered and reviewed  -labs reviewed  -abraham 3 data reviewed with patient (scanned in epic), reviewed glycemic targets, reinforced looking for patterns and trends    -injecting lyumjev after meals resulting in hypoglycemia; reinforced injecting before meals and only using ISF chart before meals  -reviewed symptoms, treatment and prevention of hypoglycemia, reinforced carrying glucose source at all times.    -apply to  PAP- drop off 1040 to office pharmD  -if approved, start farxiga 10 mg, oxempic 0.25 mg weekly for 4 weeks, then 0.5 mg weekly  -if bs < 100  during the day, stop lyumjev    The pros and cons, risks and benefits and mechanism of action of the GLP-1 mimetics were discussed with the patient.   The patient was instructed to not eat if not hungry.   The patient was instructed on self-administration of the injected GLP-1 mimetic.     2. Essential (primary) hypertension  -at target    3. Mixed hyperlipidemia  -on statin and tolerating      Follow up: Dr. Olivier 3-4 months    -labs/tests/notes reviewed  -reviewed and counseled patient on medication monitoring and side effects    Treatment and plan discussed with Dr. Olivier. Juany MALCOLM Certified Diabetes Care and     Medical Decision Making  Complexity of problem: Chronic illness of diabetes mellitus uncontrolled, progressing  Data analyzed and reviewed: Reviewed prior notes, blood glucose data, labs including HgbA1c, lipids, serum chemistries.  Ordered tests.   Risk of complications and morbidities: Is definite because of use of insulin and risk of hypoglycemia.  Prescription medications reviewed and modifications made.  Compliance assessed.  Addressed social determinants of health including food insecurity.

## 2024-12-04 ENCOUNTER — CLINICAL SUPPORT (OUTPATIENT)
Dept: ENDOCRINOLOGY | Facility: CLINIC | Age: 84
End: 2024-12-04
Payer: MEDICARE

## 2024-12-04 VITALS
HEART RATE: 64 BPM | SYSTOLIC BLOOD PRESSURE: 116 MMHG | DIASTOLIC BLOOD PRESSURE: 72 MMHG | WEIGHT: 228.6 LBS | BODY MASS INDEX: 31.88 KG/M2

## 2024-12-04 DIAGNOSIS — I10 ESSENTIAL (PRIMARY) HYPERTENSION: ICD-10-CM

## 2024-12-04 DIAGNOSIS — E11.42 TYPE 2 DIABETES MELLITUS WITH DIABETIC POLYNEUROPATHY, WITH LONG-TERM CURRENT USE OF INSULIN: ICD-10-CM

## 2024-12-04 DIAGNOSIS — E78.2 MIXED HYPERLIPIDEMIA: ICD-10-CM

## 2024-12-04 DIAGNOSIS — Z79.4 TYPE 2 DIABETES MELLITUS WITH DIABETIC POLYNEUROPATHY, WITH LONG-TERM CURRENT USE OF INSULIN: ICD-10-CM

## 2024-12-04 LAB — POC HEMOGLOBIN A1C: 7.6 % (ref 4.2–6.5)

## 2024-12-04 PROCEDURE — 99214 OFFICE O/P EST MOD 30 MIN: CPT | Performed by: INTERNAL MEDICINE

## 2024-12-04 PROCEDURE — 95251 CONT GLUC MNTR ANALYSIS I&R: CPT | Performed by: INTERNAL MEDICINE

## 2024-12-04 PROCEDURE — 83036 HEMOGLOBIN GLYCOSYLATED A1C: CPT | Performed by: INTERNAL MEDICINE

## 2024-12-04 RX ORDER — HYDROCHLOROTHIAZIDE 12.5 MG/1
1 CAPSULE ORAL
Qty: 6 EACH | Refills: 1 | Status: SHIPPED | OUTPATIENT
Start: 2024-12-04

## 2024-12-04 ASSESSMENT — PAIN SCALES - GENERAL: PAINLEVEL_OUTOF10: 0-NO PAIN

## 2024-12-04 NOTE — PATIENT INSTRUCTIONS
Inject 12 units lyumjev BEFORE meals  -keep pen on table, place note on dining table    Drop off tax form 1040 to our office pharmacist, Catarina, to apply to  patient assistance program    Plan is to restart farxiga 10 mg  And   Ozempic 0.25 mg weekly for 4 weeks, then 0.5mg weekly    If blood sugars running less than 100 during the day, stop lyumjev

## 2024-12-04 NOTE — PROGRESS NOTES
Attestation signed by Louis Olivier MD on 12/4/24 at 10:14 AM.    I, Dr Louis Olivier, have reviewed this progress note, medication list, vital signs, any pertinent lab values, and any CGM data if present with the Certified Diabetes Care and  face to face during this visit today. This note reflects the treatment plan that was made under my direction after reviewing the above mentioned elements while face to face with the patient and CDE.  I personally answered and addressed any questions and concerns the patient had during the visit today.  The CDE entered the data in this note under my direction and I personally reviewed it, signed any lab or medication orders that I instructed to be completed. I am the billing provider for this visit and the level of service was determined by my involvement in the Medical Decision Making Component of this visit while face to face with the patient.                  Louis Olivier MD

## 2024-12-09 ENCOUNTER — APPOINTMENT (OUTPATIENT)
Dept: UROLOGY | Facility: CLINIC | Age: 84
End: 2024-12-09
Payer: MEDICARE

## 2024-12-20 ENCOUNTER — HOSPITAL ENCOUNTER (OUTPATIENT)
Dept: CARDIOLOGY | Facility: HOSPITAL | Age: 84
Discharge: HOME | End: 2024-12-20
Payer: MEDICARE

## 2024-12-20 DIAGNOSIS — I25.10 ATHEROSCLEROTIC HEART DISEASE OF NATIVE CORONARY ARTERY WITHOUT ANGINA PECTORIS: ICD-10-CM

## 2024-12-20 DIAGNOSIS — Z95.1 PRESENCE OF AORTOCORONARY BYPASS GRAFT: ICD-10-CM

## 2024-12-20 DIAGNOSIS — I25.2 OLD MYOCARDIAL INFARCTION: ICD-10-CM

## 2024-12-20 DIAGNOSIS — I48.0 PAROXYSMAL ATRIAL FIBRILLATION (MULTI): ICD-10-CM

## 2024-12-20 PROCEDURE — 93306 TTE W/DOPPLER COMPLETE: CPT | Performed by: INTERNAL MEDICINE

## 2024-12-20 PROCEDURE — 93306 TTE W/DOPPLER COMPLETE: CPT

## 2024-12-21 LAB
AORTIC VALVE MEAN GRADIENT: 3 MMHG
AORTIC VALVE PEAK VELOCITY: 1.23 M/S
AV PEAK GRADIENT: 6 MMHG
AVA (PEAK VEL): 2.57 CM2
AVA (VTI): 3.08 CM2
EJECTION FRACTION APICAL 4 CHAMBER: 49.8
EJECTION FRACTION: 38 %
LEFT ATRIUM VOLUME AREA LENGTH INDEX BSA: 31.6 ML/M2
LEFT VENTRICULAR OUTFLOW TRACT DIAMETER: 2.1 CM
LV EJECTION FRACTION BIPLANE: 45 %
MITRAL VALVE E/A RATIO: 3.32
RIGHT VENTRICLE FREE WALL PEAK S': 9.81 CM/S
TRICUSPID ANNULAR PLANE SYSTOLIC EXCURSION: 2 CM

## 2024-12-29 DIAGNOSIS — F32.A DEPRESSION, UNSPECIFIED DEPRESSION TYPE: ICD-10-CM

## 2024-12-30 ENCOUNTER — TELEPHONE (OUTPATIENT)
Dept: PRIMARY CARE | Facility: CLINIC | Age: 84
End: 2024-12-30
Payer: MEDICARE

## 2024-12-30 RX ORDER — FLUOXETINE HYDROCHLORIDE 40 MG/1
40 CAPSULE ORAL DAILY
Qty: 90 CAPSULE | Refills: 1 | Status: SHIPPED | OUTPATIENT
Start: 2024-12-30

## 2024-12-30 NOTE — TELEPHONE ENCOUNTER
Notified patient to see surgeon or go to urgent care today per [facility]. Gave patient phone number to previous surgeon seen a few years ago - Chastity Lazaro MD.

## 2024-12-30 NOTE — TELEPHONE ENCOUNTER
Patient called because he has a recurrence of infection on his right buttock, which appeared 4 days ago. It is now hurting and he feels it needs to be drained - he wants to know if he should make an appointment with you or go to a surgeon. Please advise.

## 2024-12-31 ENCOUNTER — OFFICE VISIT (OUTPATIENT)
Dept: SURGERY | Facility: CLINIC | Age: 84
End: 2024-12-31
Payer: MEDICARE

## 2024-12-31 VITALS
HEIGHT: 71 IN | DIASTOLIC BLOOD PRESSURE: 71 MMHG | SYSTOLIC BLOOD PRESSURE: 129 MMHG | BODY MASS INDEX: 31.84 KG/M2 | OXYGEN SATURATION: 96 % | WEIGHT: 227.4 LBS | TEMPERATURE: 98 F | HEART RATE: 69 BPM

## 2024-12-31 DIAGNOSIS — K61.2 ABSCESS OF ANAL OR RECTAL REGION: Primary | ICD-10-CM

## 2024-12-31 PROCEDURE — 3078F DIAST BP <80 MM HG: CPT | Performed by: STUDENT IN AN ORGANIZED HEALTH CARE EDUCATION/TRAINING PROGRAM

## 2024-12-31 PROCEDURE — 87205 SMEAR GRAM STAIN: CPT | Mod: WESLAB | Performed by: STUDENT IN AN ORGANIZED HEALTH CARE EDUCATION/TRAINING PROGRAM

## 2024-12-31 PROCEDURE — 1123F ACP DISCUSS/DSCN MKR DOCD: CPT | Performed by: STUDENT IN AN ORGANIZED HEALTH CARE EDUCATION/TRAINING PROGRAM

## 2024-12-31 PROCEDURE — 99215 OFFICE O/P EST HI 40 MIN: CPT | Mod: 25 | Performed by: STUDENT IN AN ORGANIZED HEALTH CARE EDUCATION/TRAINING PROGRAM

## 2024-12-31 PROCEDURE — 1160F RVW MEDS BY RX/DR IN RCRD: CPT | Performed by: STUDENT IN AN ORGANIZED HEALTH CARE EDUCATION/TRAINING PROGRAM

## 2024-12-31 PROCEDURE — 99215 OFFICE O/P EST HI 40 MIN: CPT | Performed by: STUDENT IN AN ORGANIZED HEALTH CARE EDUCATION/TRAINING PROGRAM

## 2024-12-31 PROCEDURE — 46050 I&D PERIANAL ABSCESS SUPFC: CPT | Performed by: STUDENT IN AN ORGANIZED HEALTH CARE EDUCATION/TRAINING PROGRAM

## 2024-12-31 PROCEDURE — 4004F PT TOBACCO SCREEN RCVD TLK: CPT | Performed by: STUDENT IN AN ORGANIZED HEALTH CARE EDUCATION/TRAINING PROGRAM

## 2024-12-31 PROCEDURE — 3074F SYST BP LT 130 MM HG: CPT | Performed by: STUDENT IN AN ORGANIZED HEALTH CARE EDUCATION/TRAINING PROGRAM

## 2024-12-31 PROCEDURE — 10060 I&D ABSCESS SIMPLE/SINGLE: CPT | Performed by: STUDENT IN AN ORGANIZED HEALTH CARE EDUCATION/TRAINING PROGRAM

## 2024-12-31 PROCEDURE — 1159F MED LIST DOCD IN RCRD: CPT | Performed by: STUDENT IN AN ORGANIZED HEALTH CARE EDUCATION/TRAINING PROGRAM

## 2024-12-31 PROCEDURE — 10061 I&D ABSCESS COMP/MULTIPLE: CPT | Performed by: STUDENT IN AN ORGANIZED HEALTH CARE EDUCATION/TRAINING PROGRAM

## 2024-12-31 PROCEDURE — 1125F AMNT PAIN NOTED PAIN PRSNT: CPT | Performed by: STUDENT IN AN ORGANIZED HEALTH CARE EDUCATION/TRAINING PROGRAM

## 2024-12-31 PROCEDURE — 87070 CULTURE OTHR SPECIMN AEROBIC: CPT | Mod: WESLAB | Performed by: STUDENT IN AN ORGANIZED HEALTH CARE EDUCATION/TRAINING PROGRAM

## 2024-12-31 RX ORDER — TRAMADOL HYDROCHLORIDE 50 MG/1
50 TABLET ORAL EVERY 8 HOURS PRN
Qty: 10 TABLET | Refills: 0 | Status: SHIPPED | OUTPATIENT
Start: 2024-12-31 | End: 2025-01-05

## 2024-12-31 RX ORDER — DOCUSATE SODIUM 100 MG/1
100 CAPSULE, LIQUID FILLED ORAL 2 TIMES DAILY
Qty: 60 CAPSULE | Refills: 0 | Status: SHIPPED | OUTPATIENT
Start: 2024-12-31

## 2024-12-31 RX ORDER — DOXYCYCLINE HYCLATE 50 MG/1
50 CAPSULE, GELATIN COATED ORAL 2 TIMES DAILY
Qty: 20 CAPSULE | Refills: 0 | Status: SHIPPED | OUTPATIENT
Start: 2024-12-31 | End: 2025-01-10

## 2024-12-31 ASSESSMENT — LIFESTYLE VARIABLES
HAS A RELATIVE, FRIEND, DOCTOR, OR ANOTHER HEALTH PROFESSIONAL EXPRESSED CONCERN ABOUT YOUR DRINKING OR SUGGESTED YOU CUT DOWN: NO
AUDIT TOTAL SCORE: 1
SKIP TO QUESTIONS 9-10: 1
HOW OFTEN DURING THE LAST YEAR HAVE YOU FOUND THAT YOU WERE NOT ABLE TO STOP DRINKING ONCE YOU HAD STARTED: NEVER
HOW OFTEN DO YOU HAVE SIX OR MORE DRINKS ON ONE OCCASION: NEVER
HOW OFTEN DURING THE LAST YEAR HAVE YOU FAILED TO DO WHAT WAS NORMALLY EXPECTED FROM YOU BECAUSE OF DRINKING: NEVER
HOW OFTEN DURING THE LAST YEAR HAVE YOU HAD A FEELING OF GUILT OR REMORSE AFTER DRINKING: NEVER
HOW MANY STANDARD DRINKS CONTAINING ALCOHOL DO YOU HAVE ON A TYPICAL DAY: 1 OR 2
HAVE YOU OR SOMEONE ELSE BEEN INJURED AS A RESULT OF YOUR DRINKING: NO
HOW OFTEN DURING THE LAST YEAR HAVE YOU NEEDED AN ALCOHOLIC DRINK FIRST THING IN THE MORNING TO GET YOURSELF GOING AFTER A NIGHT OF HEAVY DRINKING: NEVER
AUDIT-C TOTAL SCORE: 1
HOW OFTEN DO YOU HAVE A DRINK CONTAINING ALCOHOL: MONTHLY OR LESS
HOW OFTEN DURING THE LAST YEAR HAVE YOU BEEN UNABLE TO REMEMBER WHAT HAPPENED THE NIGHT BEFORE BECAUSE YOU HAD BEEN DRINKING: NEVER

## 2024-12-31 ASSESSMENT — COLUMBIA-SUICIDE SEVERITY RATING SCALE - C-SSRS
6. HAVE YOU EVER DONE ANYTHING, STARTED TO DO ANYTHING, OR PREPARED TO DO ANYTHING TO END YOUR LIFE?: NO
1. IN THE PAST MONTH, HAVE YOU WISHED YOU WERE DEAD OR WISHED YOU COULD GO TO SLEEP AND NOT WAKE UP?: NO
2. HAVE YOU ACTUALLY HAD ANY THOUGHTS OF KILLING YOURSELF?: NO

## 2024-12-31 ASSESSMENT — ENCOUNTER SYMPTOMS
LOSS OF SENSATION IN FEET: 1
DEPRESSION: 0
OCCASIONAL FEELINGS OF UNSTEADINESS: 1

## 2024-12-31 ASSESSMENT — PATIENT HEALTH QUESTIONNAIRE - PHQ9
1. LITTLE INTEREST OR PLEASURE IN DOING THINGS: NOT AT ALL
SUM OF ALL RESPONSES TO PHQ9 QUESTIONS 1 & 2: 0
2. FEELING DOWN, DEPRESSED OR HOPELESS: NOT AT ALL

## 2024-12-31 ASSESSMENT — PAIN SCALES - GENERAL: PAINLEVEL_OUTOF10: 6

## 2024-12-31 NOTE — PROGRESS NOTES
"Subjective   Patient ID: Ramesh Morocho \"Aura" is a 84 y.o. male who presents for Abscess (Recurrent right darnell-anal abscess).  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  Referred to General Surgery for acute RIGHT perianal abscess. Patient indicates he has had procedures before and also mentioned that he has discussed the possibility of fistulas in the past.  This most recent infection flared up again 4 days ago.  Patient denies bleeding, spontaneous drainage, fevers, chills, diarrhea, melena, constipation, urinary retention.        Review of Systems   Constitutional: Negative.    HENT: Negative.     Respiratory: Negative.     Gastrointestinal: Negative.    Skin:  Positive for wound. Negative for rash.   All other systems reviewed and are negative.      Objective   Physical Exam  Constitutional:       General: He is not in acute distress.     Appearance: He is not ill-appearing.   HENT:      Head: Normocephalic and atraumatic.      Nose: Nose normal.      Mouth/Throat:      Mouth: Mucous membranes are moist.      Pharynx: Oropharynx is clear.   Eyes:      General: No scleral icterus.     Conjunctiva/sclera: Conjunctivae normal.   Cardiovascular:      Rate and Rhythm: Normal rate.   Pulmonary:      Effort: Pulmonary effort is normal. No respiratory distress.   Abdominal:      General: Abdomen is flat. There is no distension.      Tenderness: There is no abdominal tenderness.   Genitourinary:     Penis: Normal.       Testes: Normal.      Rectum: Normal.      Comments: Right anterior approx 4cm area of erythema/induration and fluctuance. Tender to palpation    NICOLE: no blood or masses appreciated inside rectal vault.  No rectal drainage  Musculoskeletal:         General: Tenderness present.      Cervical back: No rigidity or tenderness.      Right lower leg: No edema.      Left lower leg: No edema.   Skin:     General: Skin is warm.      Findings: No bruising.   Neurological:      General: No focal " "deficit present.      Mental Status: He is alert. Mental status is at baseline.      Sensory: No sensory deficit.      Motor: No weakness.   Psychiatric:         Mood and Affect: Mood normal.         Behavior: Behavior normal.         Thought Content: Thought content normal.         Judgment: Judgment normal.         Assessment/Plan   Problem List Items Addressed This Visit             ICD-10-CM       Gastrointestinal and Abdominal    Abscess of anal or rectal region - Primary K61.2    Relevant Medications    docusate sodium (Colace) 100 mg capsule    Other Relevant Orders    MR pelvis w and wo IV contrast    Tissue/Wound Culture/Smear (Completed)        84M with recurrent darnell-anal abscess.  Concerning for underlying fistula given the frequency of these infections.      Will perform I&D of abscess in office, as below  Ordered MRI to rule out fistula  Cultures sent - will prescribe doxycycline    Patient ID: Ramesh Morocho \"Aura" is a 84 y.o. male.    Incision & Drainage    Date/Time: 12/31/2024 9:33 AM    Performed by: Pia Kincaid MD  Authorized by: Pia Kincaid MD    Consent:     Consent obtained:  Verbal and written    Consent given by:  Patient    Risks, benefits, and alternatives were discussed: yes      Risks discussed:  Bleeding, incomplete drainage and pain    Alternatives discussed:  No treatment, delayed treatment and observation  Universal protocol:     Procedure explained and questions answered to patient or proxy's satisfaction: yes      Relevant documents present and verified: yes      Test results available : yes      Imaging studies available: yes      Required blood products, implants, devices, and special equipment available: yes      Site/side marked: yes      Immediately prior to procedure, a time out was called: yes      Patient identity confirmed:  Verbally with patient  Location:     Type:  Abscess    Size:  4cm    Location:  Anogenital    Anogenital location:  " Perianal  Pre-procedure details:     Skin preparation:  Povidone-iodine  Sedation:     Sedation type:  None  Anesthesia:     Anesthesia method:  Local infiltration    Local anesthetic:  Lidocaine 1% w/o epi  Procedure type:     Complexity:  Complex  Procedure details:     Ultrasound guidance: no      Needle aspiration: no      Incision types:  Elliptical    Incision depth:  Subcutaneous    Wound management:  Probed and deloculated and irrigated with saline    Drainage:  Purulent    Drainage amount:  Copious    Wound treatment:  Wound left open    Packing materials:  1/2 in iodoform gauze  Post-procedure details:     Procedure completion:  Tolerated well, no immediate complications        Pia Kincaid MD 01/21/25 11:35 AM

## 2024-12-31 NOTE — PATIENT INSTRUCTIONS
Antibiotics  Pain medication  Stool softener  MRI to look for anal fistula  Follow up with Colorectal Surgeon

## 2025-01-05 LAB
B-LACTAMASE ORGANISM ISLT: NEGATIVE
BACTERIA SPEC CULT: ABNORMAL
BACTERIA SPEC CULT: ABNORMAL
GRAM STN SPEC: ABNORMAL
GRAM STN SPEC: ABNORMAL

## 2025-01-11 DIAGNOSIS — J44.9 CHRONIC OBSTRUCTIVE PULMONARY DISEASE, UNSPECIFIED COPD TYPE (MULTI): ICD-10-CM

## 2025-01-13 RX ORDER — FLUTICASONE PROPIONATE AND SALMETEROL 250; 50 UG/1; UG/1
1 POWDER RESPIRATORY (INHALATION)
Qty: 60 EACH | Refills: 3 | Status: SHIPPED | OUTPATIENT
Start: 2025-01-13 | End: 2026-01-13

## 2025-01-14 ENCOUNTER — APPOINTMENT (OUTPATIENT)
Dept: CARDIOLOGY | Facility: CLINIC | Age: 85
End: 2025-01-14
Payer: MEDICARE

## 2025-01-14 ENCOUNTER — OFFICE VISIT (OUTPATIENT)
Dept: SURGERY | Facility: CLINIC | Age: 85
End: 2025-01-14
Payer: MEDICARE

## 2025-01-14 VITALS
DIASTOLIC BLOOD PRESSURE: 68 MMHG | TEMPERATURE: 97.4 F | OXYGEN SATURATION: 94 % | HEART RATE: 64 BPM | HEIGHT: 72 IN | SYSTOLIC BLOOD PRESSURE: 110 MMHG | WEIGHT: 227 LBS | BODY MASS INDEX: 30.75 KG/M2

## 2025-01-14 DIAGNOSIS — K60.30 PERIANAL FISTULA: Primary | ICD-10-CM

## 2025-01-14 PROCEDURE — 1159F MED LIST DOCD IN RCRD: CPT | Performed by: STUDENT IN AN ORGANIZED HEALTH CARE EDUCATION/TRAINING PROGRAM

## 2025-01-14 PROCEDURE — 99213 OFFICE O/P EST LOW 20 MIN: CPT | Mod: 25 | Performed by: STUDENT IN AN ORGANIZED HEALTH CARE EDUCATION/TRAINING PROGRAM

## 2025-01-14 PROCEDURE — 3074F SYST BP LT 130 MM HG: CPT | Performed by: STUDENT IN AN ORGANIZED HEALTH CARE EDUCATION/TRAINING PROGRAM

## 2025-01-14 PROCEDURE — 3078F DIAST BP <80 MM HG: CPT | Performed by: STUDENT IN AN ORGANIZED HEALTH CARE EDUCATION/TRAINING PROGRAM

## 2025-01-14 PROCEDURE — 1123F ACP DISCUSS/DSCN MKR DOCD: CPT | Performed by: STUDENT IN AN ORGANIZED HEALTH CARE EDUCATION/TRAINING PROGRAM

## 2025-01-14 PROCEDURE — 99213 OFFICE O/P EST LOW 20 MIN: CPT | Performed by: STUDENT IN AN ORGANIZED HEALTH CARE EDUCATION/TRAINING PROGRAM

## 2025-01-14 PROCEDURE — 46600 DIAGNOSTIC ANOSCOPY SPX: CPT | Performed by: STUDENT IN AN ORGANIZED HEALTH CARE EDUCATION/TRAINING PROGRAM

## 2025-01-14 ASSESSMENT — ENCOUNTER SYMPTOMS
DIARRHEA: 0
WOUND: 0
TROUBLE SWALLOWING: 0
NAUSEA: 0
ABDOMINAL PAIN: 0
BLOOD IN STOOL: 0
VOICE CHANGE: 0
UNEXPECTED WEIGHT CHANGE: 0
SPEECH DIFFICULTY: 0
FEVER: 0
FACIAL ASYMMETRY: 0
ADENOPATHY: 0
PALPITATIONS: 0
BRUISES/BLEEDS EASILY: 0
CHILLS: 0
SHORTNESS OF BREATH: 0
VOMITING: 0
HEMATURIA: 0
SORE THROAT: 0
CHEST TIGHTNESS: 0
HEADACHES: 0
ARTHRALGIAS: 0
DYSURIA: 0

## 2025-01-14 ASSESSMENT — PATIENT HEALTH QUESTIONNAIRE - PHQ9
2. FEELING DOWN, DEPRESSED OR HOPELESS: SEVERAL DAYS
1. LITTLE INTEREST OR PLEASURE IN DOING THINGS: NOT AT ALL
SUM OF ALL RESPONSES TO PHQ9 QUESTIONS 1 AND 2: 1
10. IF YOU CHECKED OFF ANY PROBLEMS, HOW DIFFICULT HAVE THESE PROBLEMS MADE IT FOR YOU TO DO YOUR WORK, TAKE CARE OF THINGS AT HOME, OR GET ALONG WITH OTHER PEOPLE: SOMEWHAT DIFFICULT

## 2025-01-14 NOTE — PROGRESS NOTES
History Of Present Illness  Kang Morocho is a 84 y.o. male presenting after incision and drainage of a right sided perianal abscess by Dr. Kincaid on 12/31/2024.  This has happened twice before in the same spot, the most recent one was 1 year ago.  He was given antibiotics which she has since completed and is feeling much better.  No perianal pain, no longer having any drainage.  His last colonoscopy was 3 or 4 years ago and done outside of the [facility] system     Past Medical History  He has a past medical history of Abnormal ECG, Arthritis (11/20/2014), Asthma (11/20/2014), Atrial fibrillation, CAD (coronary artery disease) (11/20/2014), Degenerative disc disease, lumbar (11/20/2014), Diabetic neuropathy (11/20/2014), Diverticulitis of colon, Diverticulosis of colon (07/09/2024), DM (diabetes mellitus), type 2 (11/20/2014), Essential tremor (09/06/2013), Fissure, anal, History of depression (07/14/2017), History of gastroesophageal reflux (GERD) (11/20/2014), Hyperlipidemia, Hypertension, Inguinal hernia, bilateral (07/09/2024), Lung disease, Old myocardial infarction (11/20/2014), BRITT on CPAP (11/20/2014), Small bowel obstruction (07/09/2024), and Systolic CHF.    Surgical History  He has a past surgical history that includes Coronary artery bypass graft (11/20/2014); Colonoscopy (10/01/2015); Hernia repair; Small intestine surgery; and Cholecystectomy.     Social History  He reports that he has been smoking cigarettes. He has a 60 pack-year smoking history. He has been exposed to tobacco smoke. He does not have any smokeless tobacco history on file. He reports current alcohol use. He reports that he does not use drugs.    Family History  Family History   Problem Relation Name Age of Onset    Arthritis Mother opal     Other (blood disease) Brother      Blood Disorder Brother carlitos     Early natural death Brother lola         Allergies  Cat dander, House dust, Metformin, and Mold    Review of Systems    Constitutional:  Negative for chills, fever and unexpected weight change.   HENT:  Negative for sneezing, sore throat, trouble swallowing and voice change.    Respiratory:  Negative for chest tightness and shortness of breath.    Cardiovascular:  Negative for chest pain and palpitations.   Gastrointestinal:  Negative for abdominal pain, blood in stool, diarrhea, nausea and vomiting.   Endocrine: Negative for cold intolerance and heat intolerance.   Genitourinary:  Negative for decreased urine volume, dysuria and hematuria.   Musculoskeletal:  Negative for arthralgias and gait problem.   Skin:  Negative for rash and wound.   Neurological:  Negative for facial asymmetry, speech difficulty and headaches.   Hematological:  Negative for adenopathy. Does not bruise/bleed easily.   Psychiatric/Behavioral:  Negative for self-injury and suicidal ideas.         Physical Exam  Vitals and nursing note reviewed.   Constitutional:       Appearance: Normal appearance.   HENT:      Head: Normocephalic and atraumatic.      Mouth/Throat:      Mouth: Mucous membranes are moist.      Pharynx: Oropharynx is clear.   Eyes:      Extraocular Movements: Extraocular movements intact.      Pupils: Pupils are equal, round, and reactive to light.   Cardiovascular:      Rate and Rhythm: Normal rate and regular rhythm.      Pulses: Normal pulses.   Pulmonary:      Effort: Pulmonary effort is normal.      Breath sounds: Normal breath sounds.   Abdominal:      General: There is no distension.      Palpations: Abdomen is soft.      Tenderness: There is no abdominal tenderness.   Genitourinary:      Musculoskeletal:      Cervical back: Normal range of motion and neck supple.   Skin:     General: Skin is warm and dry.   Neurological:      General: No focal deficit present.      Mental Status: He is alert and oriented to person, place, and time.   Psychiatric:         Mood and Affect: Mood normal.         Behavior: Behavior normal.          Last  "Recorded Vitals  Blood pressure 110/68, pulse 64, temperature 36.3 °C (97.4 °F), temperature source Oral, height 1.822 m (5' 11.75\"), weight 103 kg (227 lb), SpO2 94%.       Assessment/Plan   Problem List Items Addressed This Visit    None  Visit Diagnoses         Codes    Perianal fistula    -  Primary K60.30          Now healed after incision and drainage of perianal abscess.  Discussed the etiology of perianal abscess and the sequela of perianal fistula.  Explained other reasons for recurrent abscesses including hidradenitis and folliculitis.  May also be related to the comedones seen around his anal verge however the recurrence in the same spot suggest more of a fistula.  He has an MRI of the rectum scheduled in 3 weeks, and will follow-up with me once this is complete to discuss the results and next steps as necessary.  All questions were answered.  Will call in the meantime with additional questions or concerns.      Aziza Cunningham MD  Patient ID: Ramesh Morocho \"Aura" is a 84 y.o. male.    Anoscopy    Date/Time: 1/14/2025 10:15 AM    Performed by: Aziza Cunningham MD  Authorized by: Aziza Cunningham MD    Consent:     Consent obtained:  Verbal and written    Consent given by:  Patient    Risks, benefits, and alternatives were discussed: yes      Risks discussed:  Bleeding and pain    Alternatives discussed:  No treatment and alternative treatment  Universal protocol:     Procedure explained and questions answered to patient or proxy's satisfaction: yes      Immediately prior to procedure, a time out was called: yes      Patient identity confirmed:  Verbally with patient  Indications:     Indications comment:  Recurrent abscess  Procedure details:     Anal fissures: no      Anal fistulae: no (No obvious internal opening visualized)      Abscess: no      Blood in rectal vault: no    Post-procedure details:     Procedure completion:  Tolerated well, no immediate complications    "

## 2025-01-21 ENCOUNTER — OFFICE VISIT (OUTPATIENT)
Dept: CARDIOLOGY | Facility: CLINIC | Age: 85
End: 2025-01-21
Payer: MEDICARE

## 2025-01-21 VITALS
SYSTOLIC BLOOD PRESSURE: 143 MMHG | HEIGHT: 71 IN | DIASTOLIC BLOOD PRESSURE: 69 MMHG | WEIGHT: 230.1 LBS | HEART RATE: 69 BPM | OXYGEN SATURATION: 98 % | BODY MASS INDEX: 32.21 KG/M2

## 2025-01-21 DIAGNOSIS — Z95.1 HX OF CABG: ICD-10-CM

## 2025-01-21 DIAGNOSIS — I48.91 ATRIAL FIBRILLATION, UNSPECIFIED TYPE (MULTI): ICD-10-CM

## 2025-01-21 DIAGNOSIS — I50.9 CHRONIC HEART FAILURE, UNSPECIFIED HEART FAILURE TYPE: Primary | ICD-10-CM

## 2025-01-21 DIAGNOSIS — F17.200 SMOKING: ICD-10-CM

## 2025-01-21 PROBLEM — K61.2 ABSCESS OF ANAL OR RECTAL REGION: Status: ACTIVE | Noted: 2023-09-14

## 2025-01-21 PROCEDURE — 1159F MED LIST DOCD IN RCRD: CPT | Performed by: HOSPITALIST

## 2025-01-21 PROCEDURE — 1123F ACP DISCUSS/DSCN MKR DOCD: CPT | Performed by: HOSPITALIST

## 2025-01-21 PROCEDURE — 99215 OFFICE O/P EST HI 40 MIN: CPT | Performed by: HOSPITALIST

## 2025-01-21 PROCEDURE — 1126F AMNT PAIN NOTED NONE PRSNT: CPT | Performed by: HOSPITALIST

## 2025-01-21 PROCEDURE — 3078F DIAST BP <80 MM HG: CPT | Performed by: HOSPITALIST

## 2025-01-21 PROCEDURE — 1160F RVW MEDS BY RX/DR IN RCRD: CPT | Performed by: HOSPITALIST

## 2025-01-21 PROCEDURE — 3077F SYST BP >= 140 MM HG: CPT | Performed by: HOSPITALIST

## 2025-01-21 RX ORDER — DAPAGLIFLOZIN 10 MG/1
10 TABLET, FILM COATED ORAL DAILY
Qty: 30 TABLET | Refills: 11 | Status: SHIPPED | OUTPATIENT
Start: 2025-01-21 | End: 2026-01-21

## 2025-01-21 RX ORDER — SACUBITRIL AND VALSARTAN 24; 26 MG/1; MG/1
1 TABLET, FILM COATED ORAL 2 TIMES DAILY
Qty: 60 TABLET | Refills: 11 | Status: SHIPPED | OUTPATIENT
Start: 2025-01-21 | End: 2026-01-21

## 2025-01-21 ASSESSMENT — ENCOUNTER SYMPTOMS
RESPIRATORY NEGATIVE: 1
CONSTITUTIONAL NEGATIVE: 1
WOUND: 1
GASTROINTESTINAL NEGATIVE: 1

## 2025-01-21 ASSESSMENT — PAIN SCALES - GENERAL: PAINLEVEL_OUTOF10: 0-NO PAIN

## 2025-01-21 NOTE — PATIENT INSTRUCTIONS
Thank you so much for visiting us today.    We are going to start you on Eliquis 5 mg twice daily to prevent stroke related to your A-fib/a flutter.    We are also going to start you on few medication to strengthen your heart.    Will see back in 6 months, please call us at [phone] if you have any question need help.

## 2025-01-21 NOTE — PROGRESS NOTES
"Briseida Morocho \"Kang\" is a 84 y.o. male with PMH of A-fib, CAD status post three-vessel CABG [see below heart catheterization report for details], ischemic cardiomyopathy, HTN, HLD, diabetes, COPD, BRITT on CPAP, essential tremor, spinal stenosis, memory impairment, and other comorbidities, who is to see Dr. Forbes before he retired.  Patient is here to establish cardiac care.  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  He is not very physically active.  He reports chronic stable ACKERMAN if he walks more than 5 minutes flat level or if he climbs the stairs.  Patient denies any orthopnea or PND.  He denies any palpitation or dizziness.  He is compliant with his CPAP.  Patient is not on any aspirin or blood thinners.  Patient stated that he has been previously on blood thinner 2-3 years ago but he stopped it due to cost.  Patient denies any history of bleeding or black stool.  Patient has been smoking 1 pack/day for 65 years.    Patient is on metoprolol succinate 50 mg once daily, Aldactone 12.5 mg once daily, rosuvastatin 40 mg once daily, and other noncardiac meds.      Today's blood pressure is 143/69, heart rate 69.    EKG from 7/10/2024:  Atrial fibrillation  Low voltage QRS  ST & T wave abnormality, consider anterolateral ischemia  QRS is 76 milliseconds.    Blood work from 11/16/2024 with creatinine 0.71, normal hemoglobin and platelet 132 [lowest 104K]. A1c was 8 from March 2024.    TTE on 12/20/2024  1. The left ventricular systolic function is moderately decreased, with a visually estimated ejection fraction of 35-40%.   2. Abnormal wall motion.   3. Left ventricular cavity size is moderately dilated.   4. Abnormal septal motion consistent with post-operative status.   5. There is severe hypokinesis of the basal and mid inferoposterior wall.   6. There is normal right ventricular global systolic function.   7. Mild mitral valve regurgitation.   8. The inferior vena cava " appears mildly dilated, with IVC inspiratory collapse greater than 50%.   9. There is plaque visualized in the ascending aorta.  Note: I reviewed all 3 echocardiograms of the patient's system, his EF appears similar among all echoes; close to 40%.    14-day heart monitor in 2023: Predominant rhythm of more than 99% is a flutter/A-fib.  Longest A-fib episode was 6 days and 16 hours, fastest episode was 111 bpm.  Heart rate between 41 and 142, average 75 bpm.  Rare PACs and PVCs.  5 occurrences of V. tach, longest for 19 beats and fastest for 142 bpm.  No triggers.    Coronary angiogram on 3/20/2020 with severe native three-vessel disease [10% left main, 100% occluded LAD after a small diagonal 1, 100% occluded LCx after small OM1, and 100% proximal RCA].  Occluded SVG to RCA.  Reported small subbranch of left circumflex after the anastomosis of the LIMA that has 90% lesion and appears to be culprit of patient's elevated troponin but the branch deemed to be very small for any intervention per report.  SVG to LAD is patent    Review of Systems  ROS is negative other than in HPI.      Objective   Physical Exam  General: NAD  HEENT: IEOM, PERRL   Neck: No JVD or carotid bruit  Lungs: CTAB  Heart: RRR, normal S1 and S2, no loud murmurs  Abdomen: Soft, nontender, positive bowel sounds  Extremities: No edema  Neurologic: No FND  Psychiatric: Normal mood and affect    Assessment/Plan   1-CAD:  -Coronary angiogram on 3/20/2020 with severe native three-vessel disease [10% left main, 100% occluded LAD after a small diagonal 1, 100% occluded LCx after small OM1, and 100% proximal RCA occlusion].  Occluded SVG to RCA.  Reported small subbranch of left circumflex after the anastomosis of the LIMA that has 90% lesion and appears to be culprit of patient's elevated troponin but the branch deemed to be very small for any intervention per report.  SVG to LAD is patent].  -His most recent echocardiogram from 12/20/2024 with LVEF around  40%, I reviewed all 3 echocardiograms available in the system, his LVEF has always been around 40% with global hypokinesis but worse in the inferior, inferoseptal, and inferolateral walls  -Patient has a chronic stable ACKERMAN likely related to his COPD, cardiomyopathy, and A-fib.  He denies any chest pain or chest pressure.  -Continue atorvastatin, no need for aspirin as we will be starting DOAC for his A-fib.    2-cardiomyopathy:  -ACC/AHA stage C, NYHA class II.  -His most recent echocardiogram from 12/20/2024 with LVEF around 40%, I reviewed all 3 echocardiograms available in the system, his LVEF has always been around 40% with global hypokinesis but worse in the inferior, inferoseptal, and inferolateral walls.  -Etiology is likely multifactorial; CAD and A-fib.  -He has a chronic stable ACKERMAN which could be also related to his COPD.  -His most recent echocardiogram with CVP of 8, he appears euvolemic on today's exam.  -Will continue metoprolol succinate 50 mg once daily and Aldactone 12.5 mg once daily.  Will add Jardiance and Entresto.  We will refer to heart failure pharmacy for further titration of his medicine and optimization.  -Daily weight.  Salt and fluid restriction.    3- A-fib:  -14-day heart monitor in 2023: Predominant rhythm of more than 99% is a flutter/A-fib.  Longest A-fib episode was 6 days and 16 hours, fastest episode was 111 bpm.  Heart rate between 41 and 142, average 75 bpm.  Rare PACs and PVCs.  5 occurrences of V. tach, longest for 19 beats and fastest for 142 bpm.  No triggers.  -[id number]-EGGg score of 6 with 9% annual risk of stroke.  -Will start patient Eliquis 5 mg twice daily.  Continue metoprolol.    4-smoking:  -Patient smokes 1 pack/day for last 65 years.  Patient is not willing to quit, not interested in smoking cessation aids.      Terra Hood MD

## 2025-02-02 DIAGNOSIS — G47.00 INSOMNIA, UNSPECIFIED TYPE: ICD-10-CM

## 2025-02-03 ENCOUNTER — TELEPHONE (OUTPATIENT)
Dept: PRIMARY CARE | Facility: CLINIC | Age: 85
End: 2025-02-03
Payer: MEDICARE

## 2025-02-03 DIAGNOSIS — J44.9 CHRONIC OBSTRUCTIVE PULMONARY DISEASE, UNSPECIFIED COPD TYPE (MULTI): ICD-10-CM

## 2025-02-03 RX ORDER — TRAZODONE HYDROCHLORIDE 50 MG/1
TABLET ORAL
Qty: 90 TABLET | Refills: 0 | Status: SHIPPED | OUTPATIENT
Start: 2025-02-03

## 2025-02-03 RX ORDER — FLUTICASONE PROPIONATE AND SALMETEROL 250; 50 UG/1; UG/1
1 POWDER RESPIRATORY (INHALATION)
Qty: 60 EACH | Refills: 1 | Status: SHIPPED | OUTPATIENT
Start: 2025-02-03 | End: 2026-02-03

## 2025-02-03 NOTE — TELEPHONE ENCOUNTER
Patient is calling in for a refill on Fluticasone inhaler patient, please send to [facility] on [address], last seen for medication management in 08/2024

## 2025-02-05 ENCOUNTER — TELEPHONE (OUTPATIENT)
Dept: PRIMARY CARE | Facility: CLINIC | Age: 85
End: 2025-02-05
Payer: MEDICARE

## 2025-02-05 NOTE — TELEPHONE ENCOUNTER
Prescription, Fluticasone is not covered by his insurance.  Patient would like something else called over to his pharmacy, [facility] on [address] in [place].

## 2025-02-06 ENCOUNTER — OFFICE VISIT (OUTPATIENT)
Dept: NEUROLOGY | Facility: HOSPITAL | Age: 85
End: 2025-02-06
Payer: MEDICARE

## 2025-02-06 ENCOUNTER — HOSPITAL ENCOUNTER (OUTPATIENT)
Dept: RADIOLOGY | Facility: HOSPITAL | Age: 85
Discharge: HOME | End: 2025-02-06
Payer: MEDICARE

## 2025-02-06 DIAGNOSIS — G25.0 ESSENTIAL TREMOR: Primary | ICD-10-CM

## 2025-02-06 DIAGNOSIS — K61.2 ABSCESS OF ANAL OR RECTAL REGION: ICD-10-CM

## 2025-02-06 PROCEDURE — 72197 MRI PELVIS W/O & W/DYE: CPT

## 2025-02-06 PROCEDURE — 2550000001 HC RX 255 CONTRASTS: Performed by: STUDENT IN AN ORGANIZED HEALTH CARE EDUCATION/TRAINING PROGRAM

## 2025-02-06 PROCEDURE — 95970 ALYS NPGT W/O PRGRMG: CPT | Performed by: NURSE PRACTITIONER

## 2025-02-06 PROCEDURE — 1123F ACP DISCUSS/DSCN MKR DOCD: CPT | Performed by: NURSE PRACTITIONER

## 2025-02-06 PROCEDURE — A9575 INJ GADOTERATE MEGLUMI 0.1ML: HCPCS | Performed by: STUDENT IN AN ORGANIZED HEALTH CARE EDUCATION/TRAINING PROGRAM

## 2025-02-06 RX ORDER — GADOTERATE MEGLUMINE 376.9 MG/ML
20 INJECTION INTRAVENOUS
Status: COMPLETED | OUTPATIENT
Start: 2025-02-06 | End: 2025-02-06

## 2025-02-06 RX ADMIN — GADOTERATE MEGLUMINE 20 ML: 376.9 INJECTION INTRAVENOUS at 13:41

## 2025-02-06 NOTE — PROGRESS NOTES
Chief Complaint   Patient presents with    Tremors         Subjective:    Patient presents for DBS Interrogation for MRI eligibility w/ wife.       Current Outpatient Medications:     albuterol (ProAir HFA) 90 mcg/actuation inhaler, Inhale., Disp: , Rfl:     apixaban (Eliquis) 5 mg tablet, Take 1 tablet (5 mg) by mouth 2 times a day., Disp: 60 tablet, Rfl: 11    ascorbic acid (Vitamin C) 1,000 mg tablet, Take 1 tablet (1,000 mg) by mouth once daily., Disp: , Rfl:     b complex vitamins capsule, Take 1 capsule by mouth once daily., Disp: , Rfl:     blood-glucose sensor (FreeStyle Tricia 3 Sensor) device, 1 EACH EVERY 14 (FOURTEEN) DAYS., Disp: 2 each, Rfl: 11    cholecalciferol (Vitamin D-3) 50 MCG (2000 UT) tablet, Take 1 tablet (50 mcg) by mouth once daily., Disp: , Rfl:     dapagliflozin propanediol (Farxiga) 10 mg, Take 1 tablet (10 mg) by mouth once daily., Disp: 30 tablet, Rfl: 11    docusate sodium (Colace) 100 mg capsule, Take 1 capsule (100 mg) by mouth 2 times a day. Stool softener while taking pain medications, Disp: 60 capsule, Rfl: 0    FLUoxetine (PROzac) 40 mg capsule, TAKE 1 CAPSULE BY MOUTH ONCE DAILY, Disp: 90 capsule, Rfl: 1    fluticasone propion-salmeteroL (Advair Diskus) 250-50 mcg/dose diskus inhaler, Inhale 1 puff 2 times a day. Rinse mouth with water after use to reduce aftertaste and incidence of candidiasis. Do not swallow., Disp: 60 each, Rfl: 1    FreeStyle Tricia 3 [place] misc, Use as instructed, Disp: 1 each, Rfl: 0    gabapentin (Neurontin) 600 mg tablet, Take 1 tablet (600 mg) by mouth. QID, Disp: , Rfl:     insulin degludec (Tresiba FlexTouch U-200) 200 unit/mL (3 mL) injection, Inject 50 Units under the skin once daily at bedtime. Take as directed per insulin instructions., Disp: , Rfl:     insulin lispro-aabc (Lyumjev KwikPen U-100 Insulin) 100 unit/mL insulin pen, Up to 80 units daily, Disp: 75 mL, Rfl: 3    metoprolol succinate XL (Toprol-XL) 50 mg 24 hr tablet, Take 1 tablet (50  mg) by mouth once daily., Disp: , Rfl:     MULTIVITAMIN ORAL, Take 1 tablet by mouth once daily., Disp: , Rfl:     nitroglycerin (Nitrostat) 0.4 mg SL tablet, Place 1 tablet (0.4 mg) under the tongue. As needed, Disp: , Rfl:     nystatin (Mycostatin) cream, Apply topically 2 times a day., Disp: 15 g, Rfl: 0    ondansetron ODT (Zofran-ODT) 4 mg disintegrating tablet, Take 1 tablet (4 mg) by mouth every 8 hours if needed for nausea or vomiting. (Patient not taking: Reported on 1/21/2025), Disp: 30 tablet, Rfl: 0    pantoprazole (ProtoNix) 40 mg EC tablet, Take 1 tablet (40 mg) by mouth 2 times a day. Do not crush, chew, or split. (Patient not taking: Reported on 1/21/2025), Disp: 180 tablet, Rfl: 1    pramipexole (Mirapex) 0.125 mg tablet, 1 TABLET IN AM AND MIDDAY, 2-4 PILLS AT BEDTIME ORALLY AS DIRECTED, Disp: , Rfl:     rosuvastatin (Crestor) 40 mg tablet, Take 1 tablet (40 mg) by mouth once daily., Disp: 90 tablet, Rfl: 1    sacubitriL-valsartan (Entresto) 24-26 mg tablet, Take 1 tablet by mouth 2 times a day., Disp: 60 tablet, Rfl: 11    spironolactone (Aldactone) 25 mg tablet, Take 0.5 tablets (12.5 mg) by mouth once daily., Disp: , Rfl:     traZODone (Desyrel) 50 mg tablet, TAKE 1 TO 2 TABLETS BY MOUTH AT BEDTIME, Disp: 90 tablet, Rfl: 0  No current facility-administered medications for this visit.    Facility-Administered Medications Ordered in Other Visits:     oxygen (O2) therapy, , inhalation, Continuous - Inhalation, Brunilda Mcdonough, APRN-CNP      DBS Procedure:     IPG 2.95v  Impedances: L VIM- 462, R VIM- 887    Impedances were normal and DBS was turned to MRI mode. The patient proceeded to MRI and will turn own device back on once the MRI is over.     When in MRI mode tremors immediately return.     PLAN  OK for MRI, patient placed in MRI mode and will turn self back on by exiting MRI mode once completed. Consent form reviewed with patient and signed, MRI eligibility worksheet also completed and patient  to take both to radiology.        No changes to DBS programming as noted above-checking settings, stimulator events, energy level, and impedances and updating settings in the chart.       Ramiro Velazquez NP-C  Adult/Gerontological Nurse Practitioner   [facility], Department of Neurology  [facility] [place]  [facility]  [address] [address] [id number]  Phone: [phone]  Fax: [phone]

## 2025-02-10 ENCOUNTER — TELEPHONE (OUTPATIENT)
Dept: CARDIOLOGY | Facility: CLINIC | Age: 85
End: 2025-02-10
Payer: MEDICARE

## 2025-02-11 NOTE — TELEPHONE ENCOUNTER
Patient scheduled for pharmacy visit next week regarding cost of medications. Dr. Hood aware of same. TCT patient and left [facility] encouraging him to keep that pharmacy visit and to call with any other questions or concerns

## 2025-02-18 ENCOUNTER — OFFICE VISIT (OUTPATIENT)
Dept: SURGERY | Facility: CLINIC | Age: 85
End: 2025-02-18
Payer: MEDICARE

## 2025-02-18 VITALS
OXYGEN SATURATION: 96 % | HEIGHT: 71 IN | SYSTOLIC BLOOD PRESSURE: 112 MMHG | WEIGHT: 226.2 LBS | DIASTOLIC BLOOD PRESSURE: 50 MMHG | TEMPERATURE: 97.6 F | HEART RATE: 65 BPM | BODY MASS INDEX: 31.67 KG/M2

## 2025-02-18 DIAGNOSIS — K60.30 PERIANAL FISTULA: Primary | ICD-10-CM

## 2025-02-18 PROCEDURE — 1126F AMNT PAIN NOTED NONE PRSNT: CPT | Performed by: STUDENT IN AN ORGANIZED HEALTH CARE EDUCATION/TRAINING PROGRAM

## 2025-02-18 PROCEDURE — 3078F DIAST BP <80 MM HG: CPT | Performed by: STUDENT IN AN ORGANIZED HEALTH CARE EDUCATION/TRAINING PROGRAM

## 2025-02-18 PROCEDURE — 1159F MED LIST DOCD IN RCRD: CPT | Performed by: STUDENT IN AN ORGANIZED HEALTH CARE EDUCATION/TRAINING PROGRAM

## 2025-02-18 PROCEDURE — 99213 OFFICE O/P EST LOW 20 MIN: CPT | Performed by: STUDENT IN AN ORGANIZED HEALTH CARE EDUCATION/TRAINING PROGRAM

## 2025-02-18 PROCEDURE — 3074F SYST BP LT 130 MM HG: CPT | Performed by: STUDENT IN AN ORGANIZED HEALTH CARE EDUCATION/TRAINING PROGRAM

## 2025-02-18 PROCEDURE — 1123F ACP DISCUSS/DSCN MKR DOCD: CPT | Performed by: STUDENT IN AN ORGANIZED HEALTH CARE EDUCATION/TRAINING PROGRAM

## 2025-02-18 ASSESSMENT — PATIENT HEALTH QUESTIONNAIRE - PHQ9
3. TROUBLE FALLING OR STAYING ASLEEP OR SLEEPING TOO MUCH: NOT AT ALL
8. MOVING OR SPEAKING SO SLOWLY THAT OTHER PEOPLE COULD HAVE NOTICED. OR THE OPPOSITE, BEING SO FIGETY OR RESTLESS THAT YOU HAVE BEEN MOVING AROUND A LOT MORE THAN USUAL: NEARLY EVERY DAY
2. FEELING DOWN, DEPRESSED OR HOPELESS: NEARLY EVERY DAY
SUM OF ALL RESPONSES TO PHQ QUESTIONS 1-9: 15
10. IF YOU CHECKED OFF ANY PROBLEMS, HOW DIFFICULT HAVE THESE PROBLEMS MADE IT FOR YOU TO DO YOUR WORK, TAKE CARE OF THINGS AT HOME, OR GET ALONG WITH OTHER PEOPLE: VERY DIFFICULT
6. FEELING BAD ABOUT YOURSELF - OR THAT YOU ARE A FAILURE OR HAVE LET YOURSELF OR YOUR FAMILY DOWN: NOT AT ALL
5. POOR APPETITE OR OVEREATING: NEARLY EVERY DAY
9. THOUGHTS THAT YOU WOULD BE BETTER OFF DEAD, OR OF HURTING YOURSELF: NOT AT ALL
SUM OF ALL RESPONSES TO PHQ9 QUESTIONS 1 AND 2: 6
7. TROUBLE CONCENTRATING ON THINGS, SUCH AS READING THE NEWSPAPER OR WATCHING TELEVISION: NOT AT ALL
1. LITTLE INTEREST OR PLEASURE IN DOING THINGS: NEARLY EVERY DAY
4. FEELING TIRED OR HAVING LITTLE ENERGY: NEARLY EVERY DAY

## 2025-02-18 ASSESSMENT — PAIN SCALES - GENERAL: PAINLEVEL_OUTOF10: 0-NO PAIN

## 2025-02-19 ENCOUNTER — APPOINTMENT (OUTPATIENT)
Dept: PHARMACY | Facility: HOSPITAL | Age: 85
End: 2025-02-19
Payer: MEDICARE

## 2025-02-19 DIAGNOSIS — I50.9 CHRONIC HEART FAILURE, UNSPECIFIED HEART FAILURE TYPE: ICD-10-CM

## 2025-02-19 NOTE — ASSESSMENT & PLAN NOTE
Patient is currently tolerating 2 GMDT medication well. Patient has not started Entresto and Farxiga due to cost, and discussed with patient  cost assistance programs.

## 2025-02-19 NOTE — PROGRESS NOTES
I reviewed the progress note and agree with the resident's findings and plans as written. Case discussed with resident.    Fabricio Burch  [phone]

## 2025-02-19 NOTE — PROGRESS NOTES
"  Pharmacist Clinic: Cardiology Management    Ramesh Morocho \"Kang\" is a 84 y.o. male was referred to Clinical Pharmacy Team for heart failure management.     Referring Provider: Terra Hood MD    THIS IS A NEW PATIENT APPOINTMENT. PATIENT WILL BE ESTABLISHING CARE WITH CLINICAL PHARMACY.    Appointment was completed by Ramesh who was reached at [phone].    REVIEW OF PAST APPNT (IF APPLICABLE):   1/21/25 Dr. Hood - initiated Jardiance and Entresto. Continues to tolerate Metoprolol succinate 50 mg and Aldactone 12.5 mg well, patient was euvolemic at the time of the appointment.     Allergies Reviewed? Yes    Allergies   Allergen Reactions    Cat Dander Runny nose    House Dust Other     congestion    Metformin Rash    Mold Other     congestion       Past Medical History:   Diagnosis Date    Abnormal ECG     Arthritis 11/20/2014    Asthma 11/20/2014    Atrial fibrillation     CAD (coronary artery disease) 11/20/2014    History of coronary atherosclerosis    Carotid artery occlusion     CHF (congestive heart failure)     COPD (chronic obstructive pulmonary disease) (Multi)     Degenerative disc disease, lumbar 11/20/2014    Depression     Diabetic neuropathy 11/20/2014    History of diabetic neuropathy    Difficulty walking     Diverticulitis of colon     Diverticulosis of colon 07/09/2024    CT Scattered    DM (diabetes mellitus), type 2 11/20/2014    Essential tremor 09/06/2013    Benign familial tremor    Fissure, anal     History of depression 07/14/2017    History of gastroesophageal reflux (GERD) 11/20/2014    HL (hearing loss)     Hyperlipidemia     Hypertension     Inguinal hernia, bilateral 07/09/2024    CT fat containing    Lung disease     Old myocardial infarction 11/20/2014    History of myocardial infarction    BRITT on CPAP 11/20/2014    Peripheral neuropathy     Restless leg syndrome     Shingles     Small bowel obstruction 07/09/2024    CT    Systolic CHF        Current Outpatient Medications on " File Prior to Visit   Medication Sig Dispense Refill    ascorbic acid (Vitamin C) 1,000 mg tablet Take 1 tablet (1,000 mg) by mouth once daily.      b complex vitamins capsule Take 1 capsule by mouth once daily.      cholecalciferol (Vitamin D-3) 50 MCG (2000 UT) tablet Take 1 tablet (50 mcg) by mouth once daily.      FLUoxetine (PROzac) 40 mg capsule TAKE 1 CAPSULE BY MOUTH ONCE DAILY 90 capsule 1    gabapentin (Neurontin) 600 mg tablet Take 1 tablet (600 mg) by mouth. QID      insulin degludec (Tresiba FlexTouch U-200) 200 unit/mL (3 mL) injection Inject 50 Units under the skin once daily at bedtime. Take as directed per insulin instructions.      insulin lispro-aabc (Lyumjev KwikPen U-100 Insulin) 100 unit/mL insulin pen Up to 80 units daily 75 mL 3    metoprolol succinate XL (Toprol-XL) 50 mg 24 hr tablet Take 1 tablet (50 mg) by mouth once daily.      MULTIVITAMIN ORAL Take 1 tablet by mouth once daily.      nystatin (Mycostatin) cream Apply topically 2 times a day. 15 g 0    rosuvastatin (Crestor) 40 mg tablet Take 1 tablet (40 mg) by mouth once daily. 90 tablet 1    spironolactone (Aldactone) 25 mg tablet Take 0.5 tablets (12.5 mg) by mouth once daily.      traZODone (Desyrel) 50 mg tablet TAKE 1 TO 2 TABLETS BY MOUTH AT BEDTIME 90 tablet 0    albuterol (ProAir HFA) 90 mcg/actuation inhaler Inhale. (Patient not taking: Reported on 2/18/2025)      apixaban (Eliquis) 5 mg tablet Take 1 tablet (5 mg) by mouth 2 times a day. (Patient not taking: Reported on 2/19/2025) 60 tablet 11    blood-glucose sensor (FreeStyle Tricia 3 Sensor) device 1 EACH EVERY 14 (FOURTEEN) DAYS. 2 each 11    dapagliflozin propanediol (Farxiga) 10 mg Take 1 tablet (10 mg) by mouth once daily. (Patient not taking: Reported on 2/19/2025) 30 tablet 11    docusate sodium (Colace) 100 mg capsule Take 1 capsule (100 mg) by mouth 2 times a day. Stool softener while taking pain medications (Patient not taking: Reported on 2/19/2025) 60 capsule 0     fluticasone propion-salmeteroL (Advair Diskus) 250-50 mcg/dose diskus inhaler Inhale 1 puff 2 times a day. Rinse mouth with water after use to reduce aftertaste and incidence of candidiasis. Do not swallow. (Patient not taking: Reported on 2/18/2025) 60 each 1    FreeStyle Tricia 3 [place] misc Use as instructed (Patient not taking: Reported on 2/18/2025) 1 each 0    nitroglycerin (Nitrostat) 0.4 mg SL tablet Place 1 tablet (0.4 mg) under the tongue. As needed (Patient not taking: Reported on 2/19/2025)      ondansetron ODT (Zofran-ODT) 4 mg disintegrating tablet Take 1 tablet (4 mg) by mouth every 8 hours if needed for nausea or vomiting. (Patient not taking: Reported on 2/19/2025) 30 tablet 0    pantoprazole (ProtoNix) 40 mg EC tablet Take 1 tablet (40 mg) by mouth 2 times a day. Do not crush, chew, or split. (Patient not taking: Reported on 2/19/2025) 180 tablet 1    pramipexole (Mirapex) 0.125 mg tablet 1 TABLET IN AM AND MIDDAY, 2-4 PILLS AT BEDTIME ORALLY AS DIRECTED (Patient not taking: Reported on 2/19/2025)      sacubitriL-valsartan (Entresto) 24-26 mg tablet Take 1 tablet by mouth 2 times a day. (Patient not taking: Reported on 2/19/2025) 60 tablet 11     Current Facility-Administered Medications on File Prior to Visit   Medication Dose Route Frequency Provider Last Rate Last Admin    oxygen (O2) therapy   inhalation Continuous - Inhalation Brunilda Mcdonough, APRN-CNP             RELEVANT LAB RESULTS:  Lab Results   Component Value Date    BILITOT 0.8 11/16/2024    CALCIUM 8.7 11/16/2024    CO2 25 11/16/2024     11/16/2024    CREATININE 0.71 11/16/2024    GLUCOSE 152 (H) 11/16/2024    ALKPHOS 39 11/16/2024    K 3.5 11/16/2024    PROT 6.4 11/16/2024     11/16/2024    AST 25 11/16/2024    ALT 20 11/16/2024    BUN 11 11/16/2024    ANIONGAP 12 11/16/2024    MG 1.92 07/14/2024    PHOS 2.5 07/14/2024    ALBUMIN 3.8 11/16/2024    LIPASE 20 11/16/2024    GFRMALE 90 07/21/2023     Lab Results   Component  "Value Date    TRIG 287 (H) 09/24/2024    CHOL 117 09/24/2024    LDLCALC 32 09/24/2024    HDL 27.5 09/24/2024     No results found for: \"BMCBC\", \"CBCDIF\"     PHARMACEUTICAL ASSESSMENT:    MEDICATION RECONCILIATION    Home Pharmacy Reviewed? Yes, describe: [facility] - [place], [place]     Added:  - none  Changed:  - none  Removed:  - Albuterol inhaler   - Eliquis 5 mg  - Farxiga 10 mg  - Colace 100 mg   - Advair Diskus 250-50 mcg  - Nitrostat 0.4 mg  - Ondansetron 4 mg   - Pantoprazole 40 mg   - Pramipexole 0.125 mg   - Entresto 24-26 mg    Drug Interactions? No    Medication Documentation Review Audit       Reviewed by Marium Carter, PharmD (Pharmacist) on 02/19/25 at 1030      Medication Order Taking? Sig Documenting Provider Last Dose Status   albuterol (ProAir HFA) 90 mcg/actuation inhaler [id number]  Inhale.   Patient not taking: Reported on 2/18/2025    Historical Provider, MD  Active   apixaban (Eliquis) 5 mg tablet [id number]  Take 1 tablet (5 mg) by mouth 2 times a day.   Patient not taking: Reported on 2/19/2025    Terra Hood MD  Active   ascorbic acid (Vitamin C) 1,000 mg tablet [id number] Yes Take 1 tablet (1,000 mg) by mouth once daily. Historical Provider, MD  Active   b complex vitamins capsule [id number] Yes Take 1 capsule by mouth once daily. Historical Provider, MD  Active   blood-glucose sensor (FreeStyle Tricia 3 Sensor) device [id number]  1 EACH EVERY 14 (FOURTEEN) DAYS. Louis Olivier MD  Active   cholecalciferol (Vitamin D-3) 50 MCG (2000 UT) tablet [id number] Yes Take 1 tablet (50 mcg) by mouth once daily. Historical Provider, MD  Active   dapagliflozin propanediol (Farxiga) 10 mg [id number]  Take 1 tablet (10 mg) by mouth once daily.   Patient not taking: Reported on 2/19/2025    Terra Hood MD  Active   docusate sodium (Colace) 100 mg capsule [id number]  Take 1 capsule (100 mg) by mouth 2 times a day. Stool softener while taking pain medications   Patient not taking: Reported on 2/19/2025    " Pia Kincaid MD  Active   FLUoxetine (PROzac) 40 mg capsule [id number] Yes TAKE 1 CAPSULE BY MOUTH ONCE DAILY Karla Newsome PA-C  Active   fluticasone propion-salmeteroL (Advair Diskus) 250-50 mcg/dose diskus inhaler [id number]  Inhale 1 puff 2 times a day. Rinse mouth with water after use to reduce aftertaste and incidence of candidiasis. Do not swallow.   Patient not taking: Reported on 2/18/2025    Karla Newsome PA-C  Active   FreeStyle Tricia 3 [place] misc [id number]  Use as instructed   Patient not taking: Reported on 2/18/2025    KATIE Marte  Active   gabapentin (Neurontin) 600 mg tablet [id number] Yes Take 1 tablet (600 mg) by mouth. QID Historical Provider, MD  Active   insulin degludec (Tresiba FlexTouch U-200) 200 unit/mL (3 mL) injection [id number] Yes Inject 50 Units under the skin once daily at bedtime. Take as directed per insulin instructions. Chago Root DO  Active   insulin lispro-aabc (Lyumjev KwikPen U-100 Insulin) 100 unit/mL insulin pen [id number] Yes Up to 80 units daily KATIE Marte  Active   metoprolol succinate XL (Toprol-XL) 50 mg 24 hr tablet [id number] Yes Take 1 tablet (50 mg) by mouth once daily. Historical Provider, MD  Active   MULTIVITAMIN ORAL [id number] Yes Take 1 tablet by mouth once daily. Historical Provider, MD  Active   nitroglycerin (Nitrostat) 0.4 mg SL tablet [id number]  Place 1 tablet (0.4 mg) under the tongue. As needed   Patient not taking: Reported on 2/19/2025    Historical Provider, MD  Active   nystatin (Mycostatin) cream [id number] Yes Apply topically 2 times a day. Chago Root DO  Active   ondansetron ODT (Zofran-ODT) 4 mg disintegrating tablet [id number]  Take 1 tablet (4 mg) by mouth every 8 hours if needed for nausea or vomiting.   Patient not taking: Reported on 2/19/2025    Brigid Carrillo MD  Active   oxygen (O2) therapy [id number]   Brunilda Mcdonough, APRN-CNP  Active   pantoprazole (ProtoNix) 40 mg EC  tablet [id number]  Take 1 tablet (40 mg) by mouth 2 times a day. Do not crush, chew, or split.   Patient not taking: Reported on 2/19/2025    Matthew Merino DO  Active   pramipexole (Mirapex) 0.125 mg tablet [id number]  1 TABLET IN AM AND MIDDAY, 2-4 PILLS AT BEDTIME ORALLY AS DIRECTED   Patient not taking: Reported on 2/19/2025    Historical Provider, MD  Active   rosuvastatin (Crestor) 40 mg tablet [id number] Yes Take 1 tablet (40 mg) by mouth once daily. Karla Newsome PA-C  Active   sacubitriL-valsartan (Entresto) 24-26 mg tablet [id number]  Take 1 tablet by mouth 2 times a day.   Patient not taking: Reported on 2/19/2025    Terra Hood MD  Active   spironolactone (Aldactone) 25 mg tablet [id number] Yes Take 0.5 tablets (12.5 mg) by mouth once daily. Historical Provider, MD  Active   traZODone (Desyrel) 50 mg tablet [id number] Yes TAKE 1 TO 2 TABLETS BY MOUTH AT BEDTIME Karla Newsome PA-C  Active                    DISEASE MANAGEMENT ASSESSMENT:     CHF ASSESSMENT     Symptom/Staging: C  -Most recent ejection fraction: 35-40%  -NYHA Stage: II    Results for orders placed during the hospital encounter of 12/20/24    Transthoracic Echo (TTE) Complete    [facility]  [address] [place] [address], [place] [id number]  Phone [phone]    TRANSTHORACIC ECHOCARDIOGRAM REPORT    Patient Name:       ANNITA Martínez Physician:    [id number] Danny Villaseñor MD  Study Date:         12/20/2024          Ordering Provider:    [id number] JESUSITA SOLIS  MRN/PID:            [id number]            Fellow:  Accession#:         [id number]        Nurse:  Date of Birth/Age:  1940 / 84      Sonographer:          Emily umanzor RDCS  Gender Assigned at                     Additional Staff:     Ramona Bentley  Birth:                                                        Student  Height:             180.34 cm           Admit Date:  Weight:             103.42 kg            Admission Status:     Outpatient  BSA / BMI:          2.23 m2 / 31.80     [facility]  kg/m2  Blood Pressure: 116 /72 mmHg    Study Type:    TRANSTHORACIC ECHO (TTE) COMPLETE  Diagnosis/ICD: Old myocardial infarction-I25.2; Presence of aortocoronary bypass  graft-Z95.1; Atherosclerotic heart disease of native coronary  artery without angina pectoris-I25.10; Paroxysmal atrial  fibrillation-I48.0  Indication:    Old MI, CABG, Angina, Afib  CPT Codes:     Echo Complete w Full Doppler-70155    Patient History:  Smoker:            Current.  Pertinent History: A-Fib, HTN, Hyperlipidemia, Cardiomyopathy and COPD.  hypercholesterolemia.    Study Detail: The following Echo studies were performed: 2D, M-Mode, Doppler and  color flow.      PHYSICIAN INTERPRETATION:  Left Ventricle: The left ventricular systolic function is moderately decreased, with a visually estimated ejection fraction of 35-40%. Wall motion is abnormal. The left ventricular cavity size is moderately dilated. Abnormal (paradoxical) septal motion consistent with post-operative status. Spectral Doppler shows a normal pattern of left ventricular diastolic filling. There is severe hypokinesis of the basal and mid inferoposterior wall.  Left Atrium: The left atrium is upper limits of normal in size.  Right Ventricle: The right ventricle is normal in size. There is normal right ventriclar wall thickness. There is normal right ventricular global systolic function.  Right Atrium: The right atrium is normal in size.  Aortic Valve: The aortic valve is trileaflet. There is no aortic valve cusp calcification noted. There is no evidence of reduced aortic valve leaflet excursion excursion. The aortic valve dimensionless index is 0.93. There is no evidence of aortic valve regurgitation. The peak instantaneous gradient of the aortic valve is 6 mmHg. The mean gradient of the aortic valve is 3 mmHg.  Mitral Valve: The mitral valve is normal in  structure. There is normal mitral valve leaflet mobility. There is mild mitral valve regurgitation.  Tricuspid Valve: The tricuspid valve is structurally normal. There is normal tricuspid valve leaflet mobility. There is trace tricuspid regurgitation.  Pulmonic Valve: The pulmonic valve is structurally normal. There is trace pulmonic valve regurgitation.  Pericardium: No pericardial effusion noted.  Aorta: The aortic root is normal. There is no dilatation of the aortic arch. There is no dilatation of the ascending aorta. There is no dilatation of the aortic root. There is plaque visualized in the ascending aorta, which is classified as a Grade 2 [mild (focal or diffuse) intimal thickening of 2-3 mm] atherosclerosis.  Pulmonary Artery: The pulmonary artery is normal in size. The estimated pulmonary artery pressure is normal.  Systemic Veins: The inferior vena cava appears mildly dilated, with IVC inspiratory collapse greater than 50%.      CONCLUSIONS:  1. The left ventricular systolic function is moderately decreased, with a visually estimated ejection fraction of 35-40%.  2. Abnormal wall motion.  3. Left ventricular cavity size is moderately dilated.  4. Abnormal septal motion consistent with post-operative status.  5. There is severe hypokinesis of the basal and mid inferoposterior wall.  6. There is normal right ventricular global systolic function.  7. Mild mitral valve regurgitation.  8. The inferior vena cava appears mildly dilated, with IVC inspiratory collapse greater than 50%.  9. There is plaque visualized in the ascending aorta.    QUANTITATIVE DATA SUMMARY:    LA VOLUME:                    Normal Ranges:  LA Vol A4C:        63.7 ml    (22+/-6mL/m2)  LA Vol A2C:        77.5 ml  LA Vol BP:         70.4 ml  LA Vol Index A4C:  28.6ml/m2  LA Vol Index A2C:  34.8 ml/m2  LA Vol Index BP:   31.6 ml/m2  LA Area A4C:       20.7 cm2  LA Area A2C:       22.9 cm2  LA Major Axis A4C: 5.7 cm  LA Major Axis A2C: 5.8  cm  LA Volume Index:   30.1 ml/m2  LA Vol A4C:        59.8 ml  LA Vol A2C:        75.6 ml  LA Vol Index BSA:  30.4 ml/m2      RA VOLUME BY A/L METHOD:            Normal Ranges:  RA Vol A4C:              39.4 ml    (8.3-19.5ml)  RA Vol Index A4C:        17.7 ml/m2  RA Area A4C:             16.3 cm2  RA Major Axis A4C:       5.7 cm      M-MODE MEASUREMENTS:         Normal Ranges:  Ao Root:             4.00 cm (2.0-3.7cm)      AORTA MEASUREMENTS:         Normal Ranges:  Asc Ao, d:          3.40 cm (2.1-3.4cm)      LV SYSTOLIC FUNCTION BY 2D PLANIMETRY (MOD):  Normal Ranges:  EF-A4C View:    50 % (>=55%)  EF-A2C View:    39 %  EF-Biplane:     45 %  EF-Visual:      38 %  LV EF Reported: 38 %      LV DIASTOLIC FUNCTION:           Normal Ranges:  MV Peak E:             1.30 m/s  (0.7-1.2 m/s)  MV Peak A:             0.39 m/s  (0.42-0.7 m/s)  E/A Ratio:             3.32      (1.0-2.2)  MV e'                  0.044 m/s (>8.0)  MV lateral e'          0.05 m/s  MV medial e'           0.04 m/s  E/e' Ratio:            29.25     (<8.0)      MITRAL VALVE:          Normal Ranges:  MV DT:        288 msec (150-240msec)      AORTIC VALVE:                     Normal Ranges:  AoV Vmax:                1.23 m/s (<=1.7m/s)  AoV Peak P.1 mmHg (<20mmHg)  AoV Mean PG:             3.0 mmHg (1.7-11.5mmHg)  LVOT Max Pravin:            0.96 m/s (<=1.1m/s)  AoV VTI:                 24.00 cm (18-25cm)  LVOT VTI:                22.40 cm  LVOT Diameter:           2.10 cm  (1.8-2.4cm)  AoV Area, VTI:           3.08 cm2 (2.5-5.5cm2)  AoV Area,Vmax:           2.57 cm2 (2.5-4.5cm2)  AoV Dimensionless Index: 0.93      RIGHT VENTRICLE:  RV Basal 4.61 cm  RV Mid   2.97 cm  RV Major 9.0 cm  TAPSE:   20.3 mm  RV s'    0.10 m/s      TRICUSPID VALVE/RVSP:         Normal Ranges:  IVC Diam:             2.18 cm      [id number] Danny Villaseñor MD  Electronically signed on 2024 at 10:19:33 AM        ** Final **      Guideline-Directed Medical  Therapy:  -ARNI: No   -If no, then ACEi/ARB?: No  -Beta Blocker: Yes, describe: Metoprolol succinate 50 mg  -MRA: Yes, describe: Spironolactone 12.5 mg  -SGLT2i: No    Secondary Prevention:  -The ASCVD Risk score (Huong SARMIENTO, et al., 2019) failed to calculate for the following reasons:    The 2019 ASCVD risk score is only valid for ages 40 to 79    Risk score cannot be calculated because patient has a medical history suggesting prior/existing ASCVD   -Aspirin 81mg? no  -Statin?: Yes, describe: Rosuvastatin 40 mg  -HTN?: Yes, describe: controlled    CURRENT PHARMACOTHERAPY:   Metoprolol succinate 50 mg once daily   Spironolactone 12.5 mg once daily     Affordability: Patient not eligible for  PAP due to  retail pharmacy not being a preferred pharmacy  Adherence/Compliance: no issues  Adverse Effects: none reported    Monitoring Weights at Home: No  Home Weight Recordings: N/A    Past In Office Weight Readings:   Wt Readings from Last 6 Encounters:   02/18/25 103 kg (226 lb 3.2 oz)   01/21/25 104 kg (230 lb 1.6 oz)   01/14/25 103 kg (227 lb)   02/06/25 102 kg (225 lb)   12/31/24 103 kg (227 lb 6.4 oz)   12/04/24 104 kg (228 lb 9.6 oz)       Monitoring Blood Pressure at Home: Yes  Home BP Recordings: patient reports avg. 120/75    Past In Office BP Readings:   BP Readings from Last 6 Encounters:   02/18/25 112/50   01/21/25 143/69   01/14/25 110/68   12/31/24 129/71   12/04/24 116/72   11/16/24 (!) 148/96       HEALTH MANAGEMENT    Maintaining fluid restriction (<2 L/day): No  Edema/swelling: Yes  Shortness of breath: Yes  Trouble sleeping/lying down: No  Dry/hacking cough: No  Recent Hospitalizations: No    EDUCATION/COUNSELING:   - Counseled patient on MOA, expectations, duration of therapy, contraindications, administration, and monitoring parameters  - Counseled patient on lifestyle modifications that can decrease your risk of having complications (smoking cessation, losing weight, daily weights, vaccines)  -  Counseled patient on fluid intake and weight management. Recommended to not consume more than 2 liters of fliuids per day. If they have gained more than 2-3 pounds within a 24 hours period (or 5 pounds in a week), contact their cardiologist  - Answered all patient questions and concerns        DISCUSSION/NOTES:   Patient reports shortness of breath related to COPD and peripheral edema. Patient reports no other s/sx of fluid retention and states that body weight has not increased.   Patient was recently prescribed Entresto and Farxiga, however has not started medication due to cost. Discussed with patient that his insurance does not cover the cost of his medication when filled at a  retail pharmacy and therefore will not qualify for  PAP. Discussed with patient  cost assistance programs and provided information.   Discussed with patient that if he is does not qualify for  cost assistance programs, can explore GDMT alternatives.     ASSESSMENT:    Assessment/Plan   Problem List Items Addressed This Visit       Heart failure     Patient is currently tolerating 2 GMDT medication well. Patient has not started Entresto and Farxiga due to cost, and discussed with patient  cost assistance programs.          Relevant Orders    Referral to Clinical Pharmacy       RECOMMENDATIONS/PLAN:    CONTINUE   Metoprolol succinate 50 mg once daily   Spironolactone 12.5 mg once daily     Last Appnt with Referring Provider: 1/21/2025  Next Appnt with Referring Provider: needs to be scheduled  Clinical Pharmacist follow up: 3/26/25  VAF/Application Expiration: No  Type of Encounter: Brandon Carter  PGY-1 Pharmacy Resident     Verbal consent to manage patient's drug therapy was obtained from the patient . They were informed they may decline to participate or withdraw from participation in pharmacy services at any time.    Continue all meds under the continuation of care with the referring  provider and clinical pharmacy team.

## 2025-02-23 ASSESSMENT — ENCOUNTER SYMPTOMS
CHEST TIGHTNESS: 0
VOICE CHANGE: 0
TROUBLE SWALLOWING: 0
ADENOPATHY: 0
SPEECH DIFFICULTY: 0
DYSURIA: 0
ARTHRALGIAS: 0
BLOOD IN STOOL: 0
WOUND: 0
SHORTNESS OF BREATH: 0
DIARRHEA: 0
NAUSEA: 0
BRUISES/BLEEDS EASILY: 0
FACIAL ASYMMETRY: 0
CHILLS: 0
PALPITATIONS: 0
VOMITING: 0
SORE THROAT: 0
HEADACHES: 0
ABDOMINAL PAIN: 0
HEMATURIA: 0
FEVER: 0
UNEXPECTED WEIGHT CHANGE: 0

## 2025-02-24 NOTE — PROGRESS NOTES
History Of Present Illness  Kang Morocho is a 84 y.o. male following up after MRI for recurrent perianal abscess, concern for fistula. No new symptoms or concerns since last seen.     Past Medical History  He has a past medical history of Abnormal ECG, Arthritis (11/20/2014), Asthma (11/20/2014), Atrial fibrillation, CAD (coronary artery disease) (11/20/2014), Carotid artery occlusion, CHF (congestive heart failure), COPD (chronic obstructive pulmonary disease) (Multi), Degenerative disc disease, lumbar (11/20/2014), Depression, Diabetic neuropathy (11/20/2014), Difficulty walking, Diverticulitis of colon, Diverticulosis of colon (07/09/2024), DM (diabetes mellitus), type 2 (11/20/2014), Essential tremor (09/06/2013), Fissure, anal, History of depression (07/14/2017), History of gastroesophageal reflux (GERD) (11/20/2014), HL (hearing loss), Hyperlipidemia, Hypertension, Inguinal hernia, bilateral (07/09/2024), Lung disease, Old myocardial infarction (11/20/2014), BRITT on CPAP (11/20/2014), Peripheral neuropathy, Restless leg syndrome, Shingles, Small bowel obstruction (07/09/2024), and Systolic CHF.    Surgical History  He has a past surgical history that includes Coronary artery bypass graft (11/20/2014); Colonoscopy (10/01/2015); Hernia repair; Small intestine surgery; Cholecystectomy; Carotid stent; and Coronary stent placement.     Social History  He reports that he has been smoking cigarettes. He has a 60 pack-year smoking history. He has been exposed to tobacco smoke. He does not have any smokeless tobacco history on file. He reports current alcohol use. He reports that he does not use drugs.    Family History  Family History   Problem Relation Name Age of Onset    Arthritis Mother opal     Other (blood disease) Brother      Blood Disorder Brother carlitos     Early natural death Brother lola     Alzheimer's disease Sister nancy         Allergies  Cat dander, House dust, Metformin, and Mold    Review of Systems    Constitutional:  Negative for chills, fever and unexpected weight change.   HENT:  Negative for sneezing, sore throat, trouble swallowing and voice change.    Respiratory:  Negative for chest tightness and shortness of breath.    Cardiovascular:  Negative for chest pain and palpitations.   Gastrointestinal:  Negative for abdominal pain, blood in stool, diarrhea, nausea and vomiting.   Endocrine: Negative for cold intolerance and heat intolerance.   Genitourinary:  Negative for decreased urine volume, dysuria and hematuria.   Musculoskeletal:  Negative for arthralgias and gait problem.   Skin:  Negative for rash and wound.   Neurological:  Negative for facial asymmetry, speech difficulty and headaches.   Hematological:  Negative for adenopathy. Does not bruise/bleed easily.   Psychiatric/Behavioral:  Negative for self-injury and suicidal ideas.         Physical Exam  Vitals and nursing note reviewed.   Constitutional:       Appearance: Normal appearance.   HENT:      Head: Normocephalic and atraumatic.      Mouth/Throat:      Mouth: Mucous membranes are moist.      Pharynx: Oropharynx is clear.   Eyes:      Extraocular Movements: Extraocular movements intact.      Pupils: Pupils are equal, round, and reactive to light.   Cardiovascular:      Rate and Rhythm: Normal rate and regular rhythm.      Pulses: Normal pulses.   Pulmonary:      Effort: Pulmonary effort is normal.      Breath sounds: Normal breath sounds.   Abdominal:      General: There is no distension.      Palpations: Abdomen is soft.      Tenderness: There is no abdominal tenderness.   Musculoskeletal:      Cervical back: Normal range of motion and neck supple.   Skin:     General: Skin is warm and dry.   Neurological:      General: No focal deficit present.      Mental Status: He is alert and oriented to person, place, and time.   Psychiatric:         Mood and Affect: Mood normal.         Behavior: Behavior normal.          Last Recorded Vitals  Blood  "pressure 112/50, pulse 65, temperature 36.4 °C (97.6 °F), temperature source Oral, height 1.803 m (5' 11\"), weight 103 kg (226 lb 3.2 oz), SpO2 96%.    Relevant Results  IMPRESSION:  1. Enhancing T2 hyperintense linear tract within the right gluteal  cleft extending anteriorly to the region of the anal verge,  consistent with a perianal fistula. The anal canal otherwise appears  normal with no discrete fluid collection identified.  2. Additional chronic findings as above.     Assessment/Plan   Problem List Items Addressed This Visit    None  Visit Diagnoses         Codes    Perianal fistula    -  Primary K60.30            Reviewed the mri findings which is concerning for a fistula. At this time he is having no further symptoms. Discussed proceeding with eua which may or may not yield a benefit since the fistula tract has healed. Other options include waiting until symptoms arise and proceeding with EUA at that time. He wishes to wait and monitor for symptoms and will call back to discuss surgery if he starts to have recurrent abscess or drainage      Aziza Cunningham MD    "

## 2025-02-27 ENCOUNTER — TELEPHONE (OUTPATIENT)
Dept: PRIMARY CARE | Facility: CLINIC | Age: 85
End: 2025-02-27
Payer: MEDICARE

## 2025-02-27 ASSESSMENT — ENCOUNTER SYMPTOMS
ANOREXIA: 0
DYSURIA: 0
MYALGIAS: 0
FREQUENCY: 0
DIARRHEA: 0
HEMATURIA: 0
BELCHING: 0
HEMATOCHEZIA: 0
WEIGHT LOSS: 0
FEVER: 0
CONSTIPATION: 0
ABDOMINAL PAIN: 1
FLATUS: 0
ARTHRALGIAS: 1
VOMITING: 0
NAUSEA: 1
HEADACHES: 0

## 2025-02-27 NOTE — TELEPHONE ENCOUNTER
Pt called he has had stomach pain for past 4-5 days upper middle he threw up the 1st day but hasn't anymore when the pain is bad his pain level is a 7 but normally is a 3 or 4, he is eating, drinking and going to the bathroom ok  Per Karla he can be seen tomorrow,if he Is not getting worse,  informed pt scheduled appt with [facility]  2-33

## 2025-02-28 ENCOUNTER — OFFICE VISIT (OUTPATIENT)
Dept: PRIMARY CARE | Facility: CLINIC | Age: 85
End: 2025-02-28
Payer: MEDICARE

## 2025-02-28 VITALS
DIASTOLIC BLOOD PRESSURE: 70 MMHG | HEIGHT: 71 IN | SYSTOLIC BLOOD PRESSURE: 116 MMHG | OXYGEN SATURATION: 96 % | HEART RATE: 83 BPM | WEIGHT: 223 LBS | BODY MASS INDEX: 31.22 KG/M2

## 2025-02-28 DIAGNOSIS — R10.12 LEFT UPPER QUADRANT ABDOMINAL PAIN: ICD-10-CM

## 2025-02-28 DIAGNOSIS — R10.13 EPIGASTRIC PAIN: Primary | ICD-10-CM

## 2025-02-28 DIAGNOSIS — R10.11 RIGHT UPPER QUADRANT ABDOMINAL PAIN: ICD-10-CM

## 2025-02-28 LAB
POC APPEARANCE, URINE: CLEAR
POC BILIRUBIN, URINE: NEGATIVE
POC BLOOD, URINE: NEGATIVE
POC COLOR, URINE: YELLOW
POC GLUCOSE, URINE: ABNORMAL MG/DL
POC KETONES, URINE: ABNORMAL MG/DL
POC LEUKOCYTES, URINE: NEGATIVE
POC NITRITE,URINE: NEGATIVE
POC PH, URINE: 5.5 PH
POC PROTEIN, URINE: NEGATIVE MG/DL
POC SPECIFIC GRAVITY, URINE: 1.02
POC UROBILINOGEN, URINE: 1 EU/DL

## 2025-02-28 PROCEDURE — 81003 URINALYSIS AUTO W/O SCOPE: CPT

## 2025-02-28 PROCEDURE — 1123F ACP DISCUSS/DSCN MKR DOCD: CPT

## 2025-02-28 PROCEDURE — 99214 OFFICE O/P EST MOD 30 MIN: CPT

## 2025-02-28 PROCEDURE — 1160F RVW MEDS BY RX/DR IN RCRD: CPT

## 2025-02-28 PROCEDURE — 3074F SYST BP LT 130 MM HG: CPT

## 2025-02-28 PROCEDURE — 1159F MED LIST DOCD IN RCRD: CPT

## 2025-02-28 PROCEDURE — 3078F DIAST BP <80 MM HG: CPT

## 2025-02-28 PROCEDURE — 1126F AMNT PAIN NOTED NONE PRSNT: CPT

## 2025-02-28 RX ORDER — OMEPRAZOLE 40 MG/1
40 CAPSULE, DELAYED RELEASE ORAL
Qty: 30 CAPSULE | Refills: 0 | Status: SHIPPED | OUTPATIENT
Start: 2025-02-28 | End: 2025-03-30

## 2025-02-28 ASSESSMENT — ENCOUNTER SYMPTOMS
HEMATURIA: 0
DIARRHEA: 0
COUGH: 0
FEVER: 0
ANOREXIA: 0
ARTHRALGIAS: 0
VOMITING: 0
SHORTNESS OF BREATH: 0
PALPITATIONS: 0
CHILLS: 0
DIFFICULTY URINATING: 0
HEMATOCHEZIA: 0
BELCHING: 0
FREQUENCY: 0
MYALGIAS: 0
NAUSEA: 0
CONSTIPATION: 0
HEADACHES: 0
WEIGHT LOSS: 0
DYSURIA: 0
ABDOMINAL PAIN: 1
FLATUS: 0

## 2025-02-28 ASSESSMENT — PATIENT HEALTH QUESTIONNAIRE - PHQ9
1. LITTLE INTEREST OR PLEASURE IN DOING THINGS: NOT AT ALL
2. FEELING DOWN, DEPRESSED OR HOPELESS: NOT AT ALL
SUM OF ALL RESPONSES TO PHQ9 QUESTIONS 1 AND 2: 0

## 2025-02-28 ASSESSMENT — PAIN SCALES - GENERAL: PAINLEVEL_OUTOF10: 0-NO PAIN

## 2025-02-28 ASSESSMENT — COLUMBIA-SUICIDE SEVERITY RATING SCALE - C-SSRS: 1. IN THE PAST MONTH, HAVE YOU WISHED YOU WERE DEAD OR WISHED YOU COULD GO TO SLEEP AND NOT WAKE UP?: NO

## 2025-02-28 NOTE — PROGRESS NOTES
"Subjective   Patient ID: Ramesh Morocho \"Kang\" is a 84 y.o. male who presents for Abdominal Pain (Patient states he is having upper stomach pain for the past week . No lower back pain or flank pain . Normal BM . No new foods ).    Has been having abdominal pain for a week with sharp pains in the epigastric area. Lasts only a few seconds.   No relation to food or activity. Has no problem eating.  Does not have during sleep.   No nausea vomiting diarrhea or constipation -- no blood in stool. Normal bowel movements  No pain with urination.     Previously had diverticulosis and had some small intestines removed - 30 years ago  Has hx of COPD and quadruple bypass.     Has not been sick recently- no fevers.   Has tried tums with no relief.    Abdominal Pain  This is a new problem. The current episode started 1 to 4 weeks ago. The onset quality is sudden. The problem occurs 2 to 4 times per day. The most recent episode lasted 1 hours. The problem has been unchanged. The pain is located in the epigastric region. The pain is at a severity of 7/10. The quality of the pain is sharp. The abdominal pain does not radiate. Pertinent negatives include no anorexia, arthralgias, belching, constipation, diarrhea, dysuria, fever, flatus, frequency, headaches, hematochezia, hematuria, melena, myalgias, nausea, vomiting or weight loss. Nothing aggravates the pain. The pain is relieved by Nothing.     Patient Care Team:  Karla Newsome PA-C as PCP - General  Marta Rome DO as PCP - [employer] PCP  Karla Newsome PA-C    PMH, PSH, family history and social history were reviewed and updated.    Review of Systems   Constitutional:  Negative for chills, fever and weight loss.   HENT:  Negative for congestion.    Respiratory:  Negative for cough and shortness of breath.    Cardiovascular:  Negative for chest pain, palpitations and leg swelling.   Gastrointestinal:  Positive for abdominal pain. Negative for anorexia, " "constipation, diarrhea, flatus, hematochezia, melena, nausea and vomiting.   Genitourinary:  Negative for decreased urine volume, difficulty urinating, dysuria, frequency, hematuria and urgency.   Musculoskeletal:  Negative for arthralgias and myalgias.   Neurological:  Negative for headaches.   All other systems reviewed and are negative.      Objective   /70   Pulse 83   Ht 1.803 m (5' 11\")   Wt 101 kg (223 lb)   SpO2 96%   BMI 31.10 kg/m²     Physical Exam  Vitals and nursing note reviewed.   Constitutional:       General: He is not in acute distress.     Appearance: Normal appearance. He is not ill-appearing or diaphoretic.   HENT:      Head: Normocephalic and atraumatic.      Mouth/Throat:      Pharynx: Posterior oropharyngeal erythema: lloyd.   Eyes:      Conjunctiva/sclera: Conjunctivae normal.   Cardiovascular:      Rate and Rhythm: Normal rate and regular rhythm.      Pulses: Normal pulses.      Heart sounds: Normal heart sounds. No murmur heard.     No friction rub. No gallop.   Pulmonary:      Effort: Pulmonary effort is normal. No respiratory distress.      Breath sounds: No stridor. Wheezing present. No rhonchi or rales.   Chest:      Chest wall: No tenderness.   Abdominal:      General: Abdomen is flat. Bowel sounds are normal. There is no distension.      Palpations: Abdomen is soft. There is no mass.      Tenderness: There is abdominal tenderness (Both upper quadrants and epigastric area.  Pain with palpation. Rovsing sign negative. Mcburneys and murphys sign negative.). There is guarding. There is no rebound.      Hernia: No hernia is present.   Musculoskeletal:      Cervical back: Normal range of motion.   Skin:     Coloration: Skin is not jaundiced or pale.   Neurological:      General: No focal deficit present.      Mental Status: He is alert and oriented to person, place, and time.   Psychiatric:         Mood and Affect: Mood normal.         Behavior: Behavior normal. "         Assessment/Plan   Assessment & Plan  Epigastric pain    Orders:    POCT UA Automated manually resulted    CBC and Auto Differential; Future    Comprehensive metabolic panel; Future    omeprazole (PriLOSEC) 40 mg DR capsule; Take 1 capsule (40 mg) by mouth once daily in the morning. Take before meals. Do not crush or chew.    Will check lab work to assess liver, kidneys and blood counts.     Due to lack of other signs or symptoms, will try omeprazole 40 mg in the morning and closely follow up. If no improvement, will consider imaging.   Right upper quadrant abdominal pain    Orders:    POCT UA Automated manually resulted    CBC and Auto Differential; Future    Comprehensive metabolic panel; Future  As above   Left upper quadrant abdominal pain    Orders:    POCT UA Automated manually resulted    CBC and Auto Differential; Future    Comprehensive metabolic panel; Future  As above     Instructed patient to call office if symptoms do not gradually improve or acutely worsen. Patient verbalized understanding and agrees with plan.     Follow up next week for serial examination.

## 2025-03-01 LAB
ALBUMIN SERPL-MCNC: 4.1 G/DL (ref 3.6–5.1)
ALP SERPL-CCNC: 53 U/L (ref 35–144)
ALT SERPL-CCNC: 21 U/L (ref 9–46)
ANION GAP SERPL CALCULATED.4IONS-SCNC: 8 MMOL/L (CALC) (ref 7–17)
AST SERPL-CCNC: 21 U/L (ref 10–35)
BASOPHILS # BLD AUTO: 38 CELLS/UL (ref 0–200)
BASOPHILS NFR BLD AUTO: 0.4 %
BILIRUB SERPL-MCNC: 0.4 MG/DL (ref 0.2–1.2)
BUN SERPL-MCNC: 16 MG/DL (ref 7–25)
CALCIUM SERPL-MCNC: 9.1 MG/DL (ref 8.6–10.3)
CHLORIDE SERPL-SCNC: 107 MMOL/L (ref 98–110)
CO2 SERPL-SCNC: 25 MMOL/L (ref 20–32)
CREAT SERPL-MCNC: 0.9 MG/DL (ref 0.7–1.22)
EGFRCR SERPLBLD CKD-EPI 2021: 85 ML/MIN/1.73M2
EOSINOPHIL # BLD AUTO: 564 CELLS/UL (ref 15–500)
EOSINOPHIL NFR BLD AUTO: 6 %
ERYTHROCYTE [DISTWIDTH] IN BLOOD BY AUTOMATED COUNT: 13.3 % (ref 11–15)
GLUCOSE SERPL-MCNC: 55 MG/DL (ref 65–139)
HCT VFR BLD AUTO: 48.1 % (ref 38.5–50)
HGB BLD-MCNC: 15.9 G/DL (ref 13.2–17.1)
LYMPHOCYTES # BLD AUTO: 2519 CELLS/UL (ref 850–3900)
LYMPHOCYTES NFR BLD AUTO: 26.8 %
MCH RBC QN AUTO: 31.3 PG (ref 27–33)
MCHC RBC AUTO-ENTMCNC: 33.1 G/DL (ref 32–36)
MCV RBC AUTO: 94.7 FL (ref 80–100)
MONOCYTES # BLD AUTO: 949 CELLS/UL (ref 200–950)
MONOCYTES NFR BLD AUTO: 10.1 %
NEUTROPHILS # BLD AUTO: 5330 CELLS/UL (ref 1500–7800)
NEUTROPHILS NFR BLD AUTO: 56.7 %
PLATELET # BLD AUTO: 168 THOUSAND/UL (ref 140–400)
PMV BLD REES-ECKER: 12.2 FL (ref 7.5–12.5)
POTASSIUM SERPL-SCNC: 4.9 MMOL/L (ref 3.5–5.3)
PROT SERPL-MCNC: 6.2 G/DL (ref 6.1–8.1)
RBC # BLD AUTO: 5.08 MILLION/UL (ref 4.2–5.8)
SODIUM SERPL-SCNC: 140 MMOL/L (ref 135–146)
WBC # BLD AUTO: 9.4 THOUSAND/UL (ref 3.8–10.8)

## 2025-03-10 ENCOUNTER — APPOINTMENT (OUTPATIENT)
Dept: PRIMARY CARE | Facility: CLINIC | Age: 85
End: 2025-03-10
Payer: MEDICARE

## 2025-03-10 VITALS
WEIGHT: 224 LBS | BODY MASS INDEX: 31.24 KG/M2 | SYSTOLIC BLOOD PRESSURE: 106 MMHG | DIASTOLIC BLOOD PRESSURE: 54 MMHG | OXYGEN SATURATION: 95 % | HEART RATE: 57 BPM | TEMPERATURE: 97.2 F

## 2025-03-10 DIAGNOSIS — N32.89 BLADDER WALL THICKENING: ICD-10-CM

## 2025-03-10 DIAGNOSIS — N40.0 ENLARGED PROSTATE: Primary | ICD-10-CM

## 2025-03-10 DIAGNOSIS — K21.00 GASTROESOPHAGEAL REFLUX DISEASE WITH ESOPHAGITIS WITHOUT HEMORRHAGE: ICD-10-CM

## 2025-03-10 PROCEDURE — 99213 OFFICE O/P EST LOW 20 MIN: CPT | Performed by: PHYSICIAN ASSISTANT

## 2025-03-10 PROCEDURE — 1159F MED LIST DOCD IN RCRD: CPT | Performed by: PHYSICIAN ASSISTANT

## 2025-03-10 PROCEDURE — 3074F SYST BP LT 130 MM HG: CPT | Performed by: PHYSICIAN ASSISTANT

## 2025-03-10 PROCEDURE — 3078F DIAST BP <80 MM HG: CPT | Performed by: PHYSICIAN ASSISTANT

## 2025-03-10 PROCEDURE — G2211 COMPLEX E/M VISIT ADD ON: HCPCS | Performed by: PHYSICIAN ASSISTANT

## 2025-03-10 PROCEDURE — 1123F ACP DISCUSS/DSCN MKR DOCD: CPT | Performed by: PHYSICIAN ASSISTANT

## 2025-03-10 PROCEDURE — 1126F AMNT PAIN NOTED NONE PRSNT: CPT | Performed by: PHYSICIAN ASSISTANT

## 2025-03-10 ASSESSMENT — ENCOUNTER SYMPTOMS: ABDOMINAL PAIN: 1

## 2025-03-10 ASSESSMENT — PAIN SCALES - GENERAL: PAINLEVEL_OUTOF10: 0-NO PAIN

## 2025-03-10 NOTE — PROGRESS NOTES
Subjective   Patient ID: Kang Morocho is a 84 y.o. male who presents for Follow-up (Recheck abdominal pain.).    Abdominal Pain  This is a recurrent problem. The current episode started 1 to 4 weeks ago. The onset quality is gradual. The problem has been waxing and waning. The pain is located in the epigastric region. The pain is mild. The quality of the pain is aching. The abdominal pain radiates to the epigastric region. Associated symptoms include diarrhea. Pertinent negatives include no anorexia, constipation, hematochezia, melena, nausea, vomiting or weight loss. Nothing aggravates the pain.    Saw Lynn holland for this. Started Omeprazole and has been taking it daily. Labs were reviewed.    Review of Systems   Constitutional:  Negative for weight loss.   Gastrointestinal:  Positive for abdominal pain and diarrhea. Negative for anorexia, constipation, hematochezia, melena, nausea and vomiting.       Objective   /54   Pulse 57   Temp 36.2 °C (97.2 °F)   Wt 102 kg (224 lb)   SpO2 95%   BMI 31.24 kg/m²     Physical Exam  Constitutional:       Appearance: He is obese.   Cardiovascular:      Rate and Rhythm: Normal rate and regular rhythm.      Heart sounds: Murmur heard.   Pulmonary:      Effort: Pulmonary effort is normal.   Abdominal:      General: Bowel sounds are normal.      Tenderness: There is abdominal tenderness in the epigastric area. There is no guarding or rebound. Negative signs include Lopez's sign, Rovsing's sign and McBurney's sign.   Neurological:      General: No focal deficit present.      Mental Status: He is alert and oriented to person, place, and time. Mental status is at baseline.         Assessment/Plan   Diagnoses and all orders for this visit:  Enlarged prostate  Bladder wall thickening  Gastroesophageal reflux disease with esophagitis without hemorrhage  -     Referral to Gastroenterology; Future  Other orders  -     Follow Up In Primary Care - Established  Reviewed his  last Ct scan. Needs to see urology and GI also. Had not followed up on that. Wife was present for the visit and will make sure he sees the specialist. Continue Prilosec and see GI.

## 2025-03-11 ENCOUNTER — APPOINTMENT (OUTPATIENT)
Dept: PRIMARY CARE | Facility: CLINIC | Age: 85
End: 2025-03-11
Payer: MEDICARE

## 2025-03-11 ASSESSMENT — ENCOUNTER SYMPTOMS
HEMATOCHEZIA: 0
ANOREXIA: 0
VOMITING: 0
DIARRHEA: 1
CONSTIPATION: 0
WEIGHT LOSS: 0
NAUSEA: 0

## 2025-03-17 DIAGNOSIS — E11.42 TYPE 2 DIABETES MELLITUS WITH DIABETIC POLYNEUROPATHY, WITH LONG-TERM CURRENT USE OF INSULIN: ICD-10-CM

## 2025-03-17 DIAGNOSIS — Z79.4 TYPE 2 DIABETES MELLITUS WITH DIABETIC POLYNEUROPATHY, WITH LONG-TERM CURRENT USE OF INSULIN: ICD-10-CM

## 2025-03-17 RX ORDER — INSULIN LISPRO-AABC 100 [IU]/ML
INJECTION, SOLUTION SUBCUTANEOUS
Qty: 75 ML | Refills: 3 | Status: SHIPPED | OUTPATIENT
Start: 2025-03-17

## 2025-03-26 ENCOUNTER — APPOINTMENT (OUTPATIENT)
Dept: PHARMACY | Facility: HOSPITAL | Age: 85
End: 2025-03-26
Payer: MEDICARE

## 2025-03-26 DIAGNOSIS — I50.9 CHRONIC HEART FAILURE, UNSPECIFIED HEART FAILURE TYPE: ICD-10-CM

## 2025-03-26 RX ORDER — LISINOPRIL 10 MG/1
10 TABLET ORAL DAILY
Qty: 30 TABLET | Refills: 11 | Status: SHIPPED | OUTPATIENT
Start: 2025-03-26 | End: 2026-03-21

## 2025-03-26 NOTE — PROGRESS NOTES
"  Pharmacist Clinic: Cardiology Management    Ramesh Morocho \"Kang\" is a 84 y.o. male was referred to Clinical Pharmacy Team for heart failure management.     Referring Provider: Terra Hood MD    THIS IS A FOLLOW UP PATIENT APPOINTMENT. AT LAST VISIT ON 2/19/2025 WITH PHARMACIST (Marium Carter).    Appointment was completed by Ramesh who was reached at [phone].    REVIEW OF PAST APPNT (IF APPLICABLE):   During last appointment, discussed with patient  patient assistance programs as patient is not eligible for  PAP. Patient reported adherence to current medications and reported no side effects.       Allergies   Allergen Reactions    Cat Dander Runny nose    House Dust Other     congestion    Metformin Rash    Mold Other     congestion       Past Medical History:   Diagnosis Date    Abnormal ECG     Arthritis 11/20/2014    Asthma 11/20/2014    Atrial fibrillation     CAD (coronary artery disease) 11/20/2014    History of coronary atherosclerosis    Carotid artery occlusion     CHF (congestive heart failure)     COPD (chronic obstructive pulmonary disease) (Multi)     Degenerative disc disease, lumbar 11/20/2014    Depression     Diabetic neuropathy 11/20/2014    History of diabetic neuropathy    Difficulty walking     Diverticulitis of colon     Diverticulosis of colon 07/09/2024    CT Scattered    DM (diabetes mellitus), type 2 11/20/2014    Essential tremor 09/06/2013    Benign familial tremor    Fissure, anal     History of depression 07/14/2017    History of gastroesophageal reflux (GERD) 11/20/2014    HL (hearing loss)     Hyperlipidemia     Hypertension     Inguinal hernia, bilateral 07/09/2024    CT fat containing    Lung disease     Old myocardial infarction 11/20/2014    History of myocardial infarction    BRITT on CPAP 11/20/2014    Peripheral neuropathy     Restless leg syndrome     Shingles     Small bowel obstruction 07/09/2024    CT    Systolic CHF        Current Outpatient Medications " on File Prior to Visit   Medication Sig Dispense Refill    ascorbic acid (Vitamin C) 1,000 mg tablet Take 1 tablet (1,000 mg) by mouth once daily.      b complex vitamins capsule Take 1 capsule by mouth once daily.      blood-glucose sensor (FreeStyle Tricia 3 Sensor) device 1 EACH EVERY 14 (FOURTEEN) DAYS. 2 each 11    cholecalciferol (Vitamin D-3) 50 MCG (2000 UT) tablet Take 1 tablet (50 mcg) by mouth once daily.      FLUoxetine (PROzac) 40 mg capsule TAKE 1 CAPSULE BY MOUTH ONCE DAILY 90 capsule 1    gabapentin (Neurontin) 600 mg tablet Take 1 tablet (600 mg) by mouth. QID      insulin degludec (Tresiba FlexTouch U-200) 200 unit/mL (3 mL) injection Inject 50 Units under the skin once daily at bedtime. Take as directed per insulin instructions.      insulin lispro-aabc (Lyumjev KwikPen U-100 Insulin) 100 unit/mL insulin pen Up to 80 units daily 75 mL 3    metoprolol succinate XL (Toprol-XL) 50 mg 24 hr tablet Take 1 tablet (50 mg) by mouth once daily.      MULTIVITAMIN ORAL Take 1 tablet by mouth once daily.      nystatin (Mycostatin) cream Apply topically 2 times a day. 15 g 0    omeprazole (PriLOSEC) 40 mg DR capsule Take 1 capsule (40 mg) by mouth once daily in the morning. Take before meals. Do not crush or chew. 30 capsule 0    rosuvastatin (Crestor) 40 mg tablet Take 1 tablet (40 mg) by mouth once daily. 90 tablet 1    spironolactone (Aldactone) 25 mg tablet Take 0.5 tablets (12.5 mg) by mouth once daily.      traZODone (Desyrel) 50 mg tablet TAKE 1 TO 2 TABLETS BY MOUTH AT BEDTIME 90 tablet 0     Current Facility-Administered Medications on File Prior to Visit   Medication Dose Route Frequency Provider Last Rate Last Admin    oxygen (O2) therapy   inhalation Continuous - Inhalation KEVON Waters-CNP             RELEVANT LAB RESULTS:  Lab Results   Component Value Date    BILITOT 0.4 02/28/2025    CALCIUM 9.1 02/28/2025    CO2 25 02/28/2025     02/28/2025    CREATININE 0.90 02/28/2025     "GLUCOSE 55 (L) 02/28/2025    ALKPHOS 53 02/28/2025    K 4.9 02/28/2025    PROT 6.2 02/28/2025     02/28/2025    AST 21 02/28/2025    ALT 21 02/28/2025    BUN 16 02/28/2025    ANIONGAP 8 02/28/2025    MG 1.92 07/14/2024    PHOS 2.5 07/14/2024    ALBUMIN 4.1 02/28/2025    LIPASE 20 11/16/2024    GFRMALE 90 07/21/2023     Lab Results   Component Value Date    TRIG 287 (H) 09/24/2024    CHOL 117 09/24/2024    LDLCALC 32 09/24/2024    HDL 27.5 09/24/2024     No results found for: \"BMCBC\", \"CBCDIF\"     PHARMACEUTICAL ASSESSMENT:    MEDICATION RECONCILIATION    Was a medication reconciliation completed at this visit? No - completed at last visit     Medication Documentation Review Audit       Reviewed by Debby Santiago MA (Medical Assistant) on 03/10/25 at 1140      Medication Order Taking? Sig Documenting Provider Last Dose Status   ascorbic acid (Vitamin C) 1,000 mg tablet [id number] Yes Take 1 tablet (1,000 mg) by mouth once daily. Historical Provider, MD  Active   b complex vitamins capsule [id number] Yes Take 1 capsule by mouth once daily. Historical Provider, MD  Active   blood-glucose sensor (FreeStyle Tricia 3 Sensor) device [id number] Yes 1 EACH EVERY 14 (FOURTEEN) DAYS. Louis Olivier MD  Active   cholecalciferol (Vitamin D-3) 50 MCG (2000 UT) tablet [id number] Yes Take 1 tablet (50 mcg) by mouth once daily. Historical Provider, MD  Active   FLUoxetine (PROzac) 40 mg capsule [id number] Yes TAKE 1 CAPSULE BY MOUTH ONCE DAILY Karla Newsome PA-C  Active   gabapentin (Neurontin) 600 mg tablet [id number] Yes Take 1 tablet (600 mg) by mouth. QID Historical Provider, MD  Active   insulin degludec (Tresiba FlexTouch U-200) 200 unit/mL (3 mL) injection [id number] Yes Inject 50 Units under the skin once daily at bedtime. Take as directed per insulin instructions. Chago Root, DO  Active   insulin lispro-aabc (Lyumjev KwikPen U-100 Insulin) 100 unit/mL insulin pen [id number] Yes Up to 80 units daily Alejandro UPTON" LINA FitzpatrickN-CNP  Active   metoprolol succinate XL (Toprol-XL) 50 mg 24 hr tablet [id number] Yes Take 1 tablet (50 mg) by mouth once daily. Historical Provider, MD  Active   MULTIVITAMIN ORAL [id number] Yes Take 1 tablet by mouth once daily. Historical Provider, MD  Active   nystatin (Mycostatin) cream [id number] Yes Apply topically 2 times a day. Chago Root DO  Active   omeprazole (PriLOSEC) 40 mg DR capsule [id number] Yes Take 1 capsule (40 mg) by mouth once daily in the morning. Take before meals. Do not crush or chew. Lynn Orozco PA-C  Active   rosuvastatin (Crestor) 40 mg tablet [id number] Yes Take 1 tablet (40 mg) by mouth once daily. Karla Newsome PA-C  Active   spironolactone (Aldactone) 25 mg tablet [id number] Yes Take 0.5 tablets (12.5 mg) by mouth once daily. Historical Provider, MD  Active   traZODone (Desyrel) 50 mg tablet [id number] Yes TAKE 1 TO 2 TABLETS BY MOUTH AT BEDTIME Karla Newsome PA-C  Active                    DISEASE MANAGEMENT ASSESSMENT:     CHF ASSESSMENT     Symptom/Staging: C  -Most recent ejection fraction: 35-40%  -NYHA Stage: II    Results for orders placed during the hospital encounter of 12/20/24    Transthoracic Echo (TTE) Complete    [facility]  [address] [place] [address], [place] [id number]  Phone [phone]    TRANSTHORACIC ECHOCARDIOGRAM REPORT    Patient Name:       ANNITA Martínez Physician:    [id number] Danny Villaseñor MD  Study Date:         12/20/2024          Ordering Provider:    [id number] JESUSITA SOLIS  MRN/PID:            [id number]            Fellow:  Accession#:         [id number]        Nurse:  Date of Birth/Age:  1940 / 84      Sonographer:          Emily umanzor RDCS  Gender Assigned at                     Additional Staff:     Ramona Bentley  Birth:                                                        Student  Height:             180.34 cm           Admit  Date:  Weight:             103.42 kg           Admission Status:     Outpatient  BSA / BMI:          2.23 m2 / 31.80     [facility]  kg[facility]  Blood Pressure: 116 /72 mmHg    Study Type:    TRANSTHORACIC ECHO (TTE) COMPLETE  Diagnosis/ICD: Old myocardial infarction-I25.2; Presence of aortocoronary bypass  graft-Z95.1; Atherosclerotic heart disease of native coronary  artery without angina pectoris-I25.10; Paroxysmal atrial  fibrillation-I48.0  Indication:    Old MI, CABG, Angina, Afib  CPT Codes:     Echo Complete w Full Doppler-09511    Patient History:  Smoker:            Current.  Pertinent History: A-Fib, HTN, Hyperlipidemia, Cardiomyopathy and COPD.  hypercholesterolemia.    Study Detail: The following Echo studies were performed: 2D, M-Mode, Doppler and  color flow.      PHYSICIAN INTERPRETATION:  Left Ventricle: The left ventricular systolic function is moderately decreased, with a visually estimated ejection fraction of 35-40%. Wall motion is abnormal. The left ventricular cavity size is moderately dilated. Abnormal (paradoxical) septal motion consistent with post-operative status. Spectral Doppler shows a normal pattern of left ventricular diastolic filling. There is severe hypokinesis of the basal and mid inferoposterior wall.  Left Atrium: The left atrium is upper limits of normal in size.  Right Ventricle: The right ventricle is normal in size. There is normal right ventriclar wall thickness. There is normal right ventricular global systolic function.  Right Atrium: The right atrium is normal in size.  Aortic Valve: The aortic valve is trileaflet. There is no aortic valve cusp calcification noted. There is no evidence of reduced aortic valve leaflet excursion excursion. The aortic valve dimensionless index is 0.93. There is no evidence of aortic valve regurgitation. The peak instantaneous gradient of the aortic valve is 6 mmHg. The mean gradient of the aortic valve is 3  mmHg.  Mitral Valve: The mitral valve is normal in structure. There is normal mitral valve leaflet mobility. There is mild mitral valve regurgitation.  Tricuspid Valve: The tricuspid valve is structurally normal. There is normal tricuspid valve leaflet mobility. There is trace tricuspid regurgitation.  Pulmonic Valve: The pulmonic valve is structurally normal. There is trace pulmonic valve regurgitation.  Pericardium: No pericardial effusion noted.  Aorta: The aortic root is normal. There is no dilatation of the aortic arch. There is no dilatation of the ascending aorta. There is no dilatation of the aortic root. There is plaque visualized in the ascending aorta, which is classified as a Grade 2 [mild (focal or diffuse) intimal thickening of 2-3 mm] atherosclerosis.  Pulmonary Artery: The pulmonary artery is normal in size. The estimated pulmonary artery pressure is normal.  Systemic Veins: The inferior vena cava appears mildly dilated, with IVC inspiratory collapse greater than 50%.      CONCLUSIONS:  1. The left ventricular systolic function is moderately decreased, with a visually estimated ejection fraction of 35-40%.  2. Abnormal wall motion.  3. Left ventricular cavity size is moderately dilated.  4. Abnormal septal motion consistent with post-operative status.  5. There is severe hypokinesis of the basal and mid inferoposterior wall.  6. There is normal right ventricular global systolic function.  7. Mild mitral valve regurgitation.  8. The inferior vena cava appears mildly dilated, with IVC inspiratory collapse greater than 50%.  9. There is plaque visualized in the ascending aorta.    QUANTITATIVE DATA SUMMARY:    LA VOLUME:                    Normal Ranges:  LA Vol A4C:        63.7 ml    (22+/-6mL/m2)  LA Vol A2C:        77.5 ml  LA Vol BP:         70.4 ml  LA Vol Index A4C:  28.6ml/m2  LA Vol Index A2C:  34.8 ml/m2  LA Vol Index BP:   31.6 ml/m2  LA Area A4C:       20.7 cm2  LA Area A2C:       22.9  cm2  LA Major Axis A4C: 5.7 cm  LA Major Axis A2C: 5.8 cm  LA Volume Index:   30.1 ml/m2  LA Vol A4C:        59.8 ml  LA Vol A2C:        75.6 ml  LA Vol Index BSA:  30.4 ml/m2      RA VOLUME BY A/L METHOD:            Normal Ranges:  RA Vol A4C:              39.4 ml    (8.3-19.5ml)  RA Vol Index A4C:        17.7 ml/m2  RA Area A4C:             16.3 cm2  RA Major Axis A4C:       5.7 cm      M-MODE MEASUREMENTS:         Normal Ranges:  Ao Root:             4.00 cm (2.0-3.7cm)      AORTA MEASUREMENTS:         Normal Ranges:  Asc Ao, d:          3.40 cm (2.1-3.4cm)      LV SYSTOLIC FUNCTION BY 2D PLANIMETRY (MOD):  Normal Ranges:  EF-A4C View:    50 % (>=55%)  EF-A2C View:    39 %  EF-Biplane:     45 %  EF-Visual:      38 %  LV EF Reported: 38 %      LV DIASTOLIC FUNCTION:           Normal Ranges:  MV Peak E:             1.30 m/s  (0.7-1.2 m/s)  MV Peak A:             0.39 m/s  (0.42-0.7 m/s)  E/A Ratio:             3.32      (1.0-2.2)  MV e'                  0.044 m/s (>8.0)  MV lateral e'          0.05 m/s  MV medial e'           0.04 m/s  E/e' Ratio:            29.25     (<8.0)      MITRAL VALVE:          Normal Ranges:  MV DT:        288 msec (150-240msec)      AORTIC VALVE:                     Normal Ranges:  AoV Vmax:                1.23 m/s (<=1.7m/s)  AoV Peak P.1 mmHg (<20mmHg)  AoV Mean PG:             3.0 mmHg (1.7-11.5mmHg)  LVOT Max Pravin:            0.96 m/s (<=1.1m/s)  AoV VTI:                 24.00 cm (18-25cm)  LVOT VTI:                22.40 cm  LVOT Diameter:           2.10 cm  (1.8-2.4cm)  AoV Area, VTI:           3.08 cm2 (2.5-5.5cm2)  AoV Area,Vmax:           2.57 cm2 (2.5-4.5cm2)  AoV Dimensionless Index: 0.93      RIGHT VENTRICLE:  RV Basal 4.61 cm  RV Mid   2.97 cm  RV Major 9.0 cm  TAPSE:   20.3 mm  RV s'    0.10 m/s      TRICUSPID VALVE/RVSP:         Normal Ranges:  IVC Diam:             2.18 cm      [id number] Danny Villaseñor MD  Electronically signed on 2024 at 10:19:33  AM        ** Final **      Guideline-Directed Medical Therapy:  -ARNI: No   -If no, then ACEi/ARB?: No  -Beta Blocker: Yes, describe: Metoprolol succinate 50 mg  -MRA: Yes, describe: Spironolactone 12.5 mg  -SGLT2i: No    Secondary Prevention:  -The ASCVD Risk score (Huong SARMIENTO, et al., 2019) failed to calculate for the following reasons:    The 2019 ASCVD risk score is only valid for ages 40 to 79    Risk score cannot be calculated because patient has a medical history suggesting prior/existing ASCVD   -Aspirin 81mg? no  -Statin?: Yes, describe: Rosuvastatin 40 mg  -HTN?: Yes, describe: controlled    CURRENT PHARMACOTHERAPY:   Metoprolol succinate 50 mg once daily   Spironolactone 12.5 mg once daily     Affordability: Patient not eligible for  PAP due to  retail pharmacy not being a preferred pharmacy   Adherence/Compliance: reports adherence   Adverse Effects: none reported    Monitoring Weights at Home: Yes  Home Weight Recordings: 224 lb - patient reports 1-2 lbs fluctuations but stable     Past In Office Weight Readings:   Wt Readings from Last 6 Encounters:   03/10/25 102 kg (224 lb)   02/28/25 101 kg (223 lb)   02/18/25 103 kg (226 lb 3.2 oz)   01/21/25 104 kg (230 lb 1.6 oz)   01/14/25 103 kg (227 lb)   02/06/25 102 kg (225 lb)       Monitoring Blood Pressure at Home: Yes  Home BP Recordings: 110/60    Past In Office BP Readings:   BP Readings from Last 6 Encounters:   03/10/25 106/54   02/28/25 116/70   02/18/25 112/50   01/21/25 143/69   01/14/25 110/68   12/31/24 129/71       HEALTH MANAGEMENT    Maintaining fluid restriction (<2 L/day): Yes  Edema/swelling: Yes  Shortness of breath: Yes - related to COPD  Trouble sleeping/lying down: No  Dry/hacking cough: No  Recent Hospitalizations: No    EDUCATION/COUNSELING:   - Counseled patient on MOA, expectations, duration of therapy, contraindications, administration, and monitoring parameters  - Counseled patient on lifestyle modifications that can decrease  your risk of having complications (smoking cessation, losing weight, daily weights, vaccines)  - Counseled patient on fluid intake and weight management. Recommended to not consume more than 2 liters of fliuids per day. If they have gained more than 2-3 pounds within a 24 hours period (or 5 pounds in a week), contact their cardiologist  - Answered all patient questions and concerns        DISCUSSION/NOTES:   Patient reports lower leg and ankle swelling and SOB related to COPD. Patient stated that his weight fluctuates 1-2 lbs but has overall been consistent.   Patient is not eligible for copay cards for Entresto and Farxiga, however provided the patient with information for Farxiga patient assistance program.   Discussed with patient initiating Lisinopril as an alternative for Entresto to optimize GDMT. Patient was on Lisinopril in the past, however patient does not recall. Discussed with patient starting with 10 mg and will titrate as tolerated to target dose. Counseled patient on monitoring BP weekly and maintain log. Patient was agreeable.     ASSESSMENT:    Assessment/Plan   Problem List Items Addressed This Visit       Heart failure     Patient is currently tolerating 2 GMDT medication well.   Discussed with patient starting Lisinopril 10 mg as an alternative to Entresto. Patient was agreeable.   Titrate to 20 mg as tolerated  Discussed with patient  cost assistance program for Farxiga and provided information to apply.          Relevant Medications    lisinopril 10 mg tablet    Other Relevant Orders    Referral to Clinical Pharmacy         RECOMMENDATIONS/PLAN:    START   Lisinopril 10 mg once daily   CONTINUE   Metoprolol succinate 50 mg once daily   Spironolactone 12.5 mg once daily     Last Appnt with Referring Provider: 1/21/2025  Next Appnt with Referring Provider: needs to be scheduled   Clinical Pharmacist follow up: 4/23/2025  VAF/Application Expiration: No  Type of Encounter:  Brandon Carter PharmD.   PGY-1 Pharmacy Resident   [phone]      Verbal consent to manage patient's drug therapy was obtained from the patient . They were informed they may decline to participate or withdraw from participation in pharmacy services at any time.    Continue all meds under the continuation of care with the referring provider and clinical pharmacy team.

## 2025-03-26 NOTE — ASSESSMENT & PLAN NOTE
Patient is currently tolerating 2 GMDT medication well.   Discussed with patient starting Lisinopril 10 mg as an alternative to Entresto. Patient was agreeable.   Titrate to 20 mg as tolerated  Discussed with patient  cost assistance program for Farxiga and provided information to apply.

## 2025-03-26 NOTE — PROGRESS NOTES
I reviewed the progress note and agree with the resident's findings and plans as written. Case discussed with resident.    Fabricio Burch  [phone]

## 2025-03-26 NOTE — Clinical Note
Bc Kang does not qualify for  PAP or other  assistance programs. We are not able to start Entresto or SGLT2 therapies. Restarting Kang on lisinopril 10mg daily

## 2025-04-03 ENCOUNTER — DOCUMENTATION (OUTPATIENT)
Dept: GASTROENTEROLOGY | Facility: HOSPITAL | Age: 85
End: 2025-04-03
Payer: MEDICARE

## 2025-04-03 NOTE — PROGRESS NOTES
Dr. Matthew Bettencourt's office received a referral for this patient from Dr. Cj Pandya requesting an EMR for a cecal polyp and the removal of any remaining polyps. Dr. Danny Spears reviewed the referral on 4/3/2025 and approved proceeding with scheduling a colonoscopy/EMR for 60 minutes.    Amy Nava was notified to call and schedule this patient. Contact Margarita Meier RN at [phone] for any questions.      Below is supporting clinical information from the referral sent by Dr. Cj Pandya's office and the patient's medical records. Referral documents from the referring office were scanned under Media, dated 4/2/2025.        Past Medical History:  Diagnosis         Date            Abnormal ECG            Arthritis           11/20/2014            Asthma            11/20/2014            Atrial fibrillation                      CAD (coronary artery disease) 11/20/2014             History of coronary atherosclerosis            Carotid artery occlusion                     CHF (congestive heart failure)            COPD (chronic obstructive pulmonary disease) (Multi)            Degenerative disc disease, lumbar     11/20/2014            Depression                 Diabetic neuropathy    11/20/2014             History of diabetic neuropathy            Difficulty walking                    Diverticulitis of colon             Diverticulosis of colon 07/09/2024             CT Scattered            DM (diabetes mellitus), type 2            11/20/2014            Essential tremor          09/06/2013             Benign familial tremor            Fissure, anal               History of depression  07/14/2017            History of gastroesophageal reflux (GERD)            11/20/2014            HL (hearing loss)                    Hyperlipidemia                       Hypertension              Inguinal hernia, bilateral         07/09/2024             CT fat containing            Lung disease               Old myocardial  infarction        11/20/2014             History of myocardial infarction            BRITT on CPAP   11/20/2014            Peripheral neuropathy                       Restless leg syndrome            Shingles                      Small bowel obstruction         07/09/2024             CT            Systolic CHF            Lab Results  Component     Value   Date             BILITOT            0.4       02/28/2025             CALCIUM         9.1       02/28/2025             CO2     25        02/28/2025             CL        107      02/28/2025             CREATININE    0.90     02/28/2025             GLUCOSE         55 (L)   02/28/2025             ALKPHOS         53        02/28/2025             K          4.9       02/28/2025             PROT   6.2       02/28/2025             NA       140      02/28/2025             AST      21        02/28/2025             ALT      21        02/28/2025             BUN     16        02/28/2025             ANIONGAP      8          02/28/2025             MG      1.92     07/14/2024             PHOS   2.5       07/14/2024             ALBUMIN        4.1       02/28/2025             LIPASE 20        11/16/2024             GFRMALE        90        07/21/2023     Colonoscopy 3/28/2025 performed by Dr. Cj Pandya:  ·       Indication: High risk colon cancer surveillance: Personal history of colonic polyps.  ·       Many 3 to 10 mm polyps in the cecum, in the ascending colon, in the transverse colon, at the hepatic flexure, in the descending colon and at the recto-sigmoid colon, removed with a hot snare. Resected and retrieved.  ·       A long sessile polyp across the IC valve was not resected. It was a difficult angle, and required abdominal pressure to keep steady. Ideally it can be done later by EMR.  ·       Note that there are other small polyps left behind as well due to the numerous polyps already removed. These will be removed at the time of retrieval of the IC valve polyp.

## 2025-04-07 DIAGNOSIS — E11.42 TYPE 2 DIABETES MELLITUS WITH DIABETIC POLYNEUROPATHY: ICD-10-CM

## 2025-04-07 RX ORDER — INSULIN DEGLUDEC 200 U/ML
50 INJECTION, SOLUTION SUBCUTANEOUS NIGHTLY
Qty: 15 ML | Refills: 5 | Status: SHIPPED | OUTPATIENT
Start: 2025-04-07

## 2025-04-09 DIAGNOSIS — R93.5 ABNORMAL FINDINGS ON DIAGNOSTIC IMAGING OF OTHER ABDOMINAL REGIONS, INCLUDING RETROPERITONEUM: Primary | ICD-10-CM

## 2025-04-13 DIAGNOSIS — E78.2 MIXED HYPERLIPIDEMIA: ICD-10-CM

## 2025-04-14 RX ORDER — ROSUVASTATIN CALCIUM 40 MG/1
40 TABLET, COATED ORAL DAILY
Qty: 90 TABLET | Refills: 1 | Status: SHIPPED | OUTPATIENT
Start: 2025-04-14

## 2025-04-16 ENCOUNTER — HOSPITAL ENCOUNTER (OUTPATIENT)
Dept: RADIOLOGY | Facility: HOSPITAL | Age: 85
Discharge: HOME | End: 2025-04-16
Payer: MEDICARE

## 2025-04-16 DIAGNOSIS — R10.9 UNSPECIFIED ABDOMINAL PAIN: ICD-10-CM

## 2025-04-16 DIAGNOSIS — R93.5 ABNORMAL FINDINGS ON DIAGNOSTIC IMAGING OF OTHER ABDOMINAL REGIONS, INCLUDING RETROPERITONEUM: ICD-10-CM

## 2025-04-16 PROCEDURE — 2550000001 HC RX 255 CONTRASTS: Performed by: PHYSICIAN ASSISTANT

## 2025-04-16 PROCEDURE — 74177 CT ABD & PELVIS W/CONTRAST: CPT

## 2025-04-16 RX ADMIN — IOHEXOL 75 ML: 350 INJECTION, SOLUTION INTRAVENOUS at 12:24

## 2025-04-22 NOTE — PROGRESS NOTES
"  Pharmacist Clinic: Cardiology Management    Ramesh Morocho \"Kang\" is a 84 y.o. male was referred to Clinical Pharmacy Team for Heart Failure management.     Referring Provider: Terra Hood MD    THIS IS A FOLLOW UP PATIENT APPOINTMENT. AT LAST VISIT ON 3/26/25 WITH PHARMACIST (Marium Carter).    Appointment was completed by primary number who was reached at primary number.    REVIEW OF PAST APPNT (IF APPLICABLE):   During last appointment Kang was started on lisinopril. He was previously given  assistance applications as he did not qualify for  PAP.    Allergies[1]    Medical History[2]    Medications Ordered Prior to Encounter[3]      RELEVANT LAB RESULTS:  Lab Results   Component Value Date    BILITOT 0.4 02/28/2025    CALCIUM 9.1 02/28/2025    CO2 25 02/28/2025     02/28/2025    CREATININE 0.90 02/28/2025    GLUCOSE 55 (L) 02/28/2025    ALKPHOS 53 02/28/2025    K 4.9 02/28/2025    PROT 6.2 02/28/2025     02/28/2025    AST 21 02/28/2025    ALT 21 02/28/2025    BUN 16 02/28/2025    ANIONGAP 8 02/28/2025    MG 1.92 07/14/2024    PHOS 2.5 07/14/2024    ALBUMIN 4.1 02/28/2025    LIPASE 20 11/16/2024    GFRMALE 90 07/21/2023     Lab Results   Component Value Date    TRIG 287 (H) 09/24/2024    CHOL 117 09/24/2024    LDLCALC 32 09/24/2024    HDL 27.5 09/24/2024     No results found for: \"BMCBC\", \"CBCDIF\"     PHARMACEUTICAL ASSESSMENT:    MEDICATION RECONCILIATION    Was a medication reconciliation completed at this visit? No    Drug Interactions? No    Medication Documentation Review Audit       Reviewed by Debby Santiago MA (Medical Assistant) on 03/10/25 at 1140      Medication Order Taking? Sig Documenting Provider Last Dose Status   ascorbic acid (Vitamin C) 1,000 mg tablet [id number] Yes Take 1 tablet (1,000 mg) by mouth once daily. Historical Provider, MD  Active   b complex vitamins capsule [id number] Yes Take 1 capsule by mouth once daily. Historical Provider, MD  Active "   blood-glucose sensor (FreeStyle Tricia 3 Sensor) device [id number] Yes 1 EACH EVERY 14 (FOURTEEN) DAYS. Louis Olivier MD  Active   cholecalciferol (Vitamin D-3) 50 MCG (2000 UT) tablet [id number] Yes Take 1 tablet (50 mcg) by mouth once daily. Historical Provider, MD  Active   FLUoxetine (PROzac) 40 mg capsule [id number] Yes TAKE 1 CAPSULE BY MOUTH ONCE DAILY Karla Newsome PA-C  Active   gabapentin (Neurontin) 600 mg tablet [id number] Yes Take 1 tablet (600 mg) by mouth. QID Historical Provider, MD  Active   insulin degludec (Tresiba FlexTouch U-200) 200 unit/mL (3 mL) injection [id number] Yes Inject 50 Units under the skin once daily at bedtime. Take as directed per insulin instructions. Chago Root DO  Active   insulin lispro-aabc (Lyumjev KwikPen U-100 Insulin) 100 unit/mL insulin pen [id number] Yes Up to 80 units daily Alejandro Fitzpatrick, APRN-CNP  Active   metoprolol succinate XL (Toprol-XL) 50 mg 24 hr tablet [id number] Yes Take 1 tablet (50 mg) by mouth once daily. Historical Provider, MD  Active   MULTIVITAMIN ORAL [id number] Yes Take 1 tablet by mouth once daily. Historical Provider, MD  Active   nystatin (Mycostatin) cream [id number] Yes Apply topically 2 times a day. Chago Root DO  Active   omeprazole (PriLOSEC) 40 mg DR capsule [id number] Yes Take 1 capsule (40 mg) by mouth once daily in the morning. Take before meals. Do not crush or chew. Lynn Orozco PA-C  Active   rosuvastatin (Crestor) 40 mg tablet [id number] Yes Take 1 tablet (40 mg) by mouth once daily. Karla Newsome PA-C  Active   spironolactone (Aldactone) 25 mg tablet [id number] Yes Take 0.5 tablets (12.5 mg) by mouth once daily. Historical Provider, MD  Active   traZODone (Desyrel) 50 mg tablet [id number] Yes TAKE 1 TO 2 TABLETS BY MOUTH AT BEDTIME Karla Newsome PA-C  Active                    DISEASE MANAGEMENT ASSESSMENT:     CHF ASSESSMENT     Symptom/Staging:  -Most recent ejection fraction: 35-40%  -NYHA Stage:  II    Results for orders placed during the hospital encounter of 12/20/24    Transthoracic Echo (TTE) Complete    Narrative  [facility]  [address], [place] [id number]  Phone [phone]    TRANSTHORACIC ECHOCARDIOGRAM REPORT    Patient Name:       ANNITA CHU       Reading Physician:    [id number] Danny Villaseñor MD  Study Date:         12/20/2024          Ordering Provider:    [id number] JESUSITA SOLIS  MRN/PID:            [id number]            Fellow:  Accession#:         [id number]        Nurse:  Date of Birth/Age:  1940 / 84      Sonographer:          Emily umanzor RDCS  Gender Assigned at                     Additional Staff:     Ramona Bentley  Birth:                                                        Student  Height:             180.34 cm           Admit Date:  Weight:             103.42 kg           Admission Status:     Outpatient  BSA / BMI:          2.23 m2 / 31.80     Department Location:  [facility]  kg/m2  Blood Pressure: 116 /72 mmHg    Study Type:    TRANSTHORACIC ECHO (TTE) COMPLETE  Diagnosis/ICD: Old myocardial infarction-I25.2; Presence of aortocoronary bypass  graft-Z95.1; Atherosclerotic heart disease of native coronary  artery without angina pectoris-I25.10; Paroxysmal atrial  fibrillation-I48.0  Indication:    Old MI, CABG, Angina, Afib  CPT Codes:     Echo Complete w Full Doppler-[id number]    Patient History:  Smoker:            Current.  Pertinent History: A-Fib, HTN, Hyperlipidemia, Cardiomyopathy and COPD.  hypercholesterolemia.    Study Detail: The following Echo studies were performed: 2D, M-Mode, Doppler and  color flow.      PHYSICIAN INTERPRETATION:  Left Ventricle: The left ventricular systolic function is moderately decreased, with a visually estimated ejection fraction of 35-40%. Wall motion is abnormal. The left ventricular cavity size is moderately dilated. Abnormal (paradoxical) septal motion consistent with  post-operative status. Spectral Doppler shows a normal pattern of left ventricular diastolic filling. There is severe hypokinesis of the basal and mid inferoposterior wall.  Left Atrium: The left atrium is upper limits of normal in size.  Right Ventricle: The right ventricle is normal in size. There is normal right ventriclar wall thickness. There is normal right ventricular global systolic function.  Right Atrium: The right atrium is normal in size.  Aortic Valve: The aortic valve is trileaflet. There is no aortic valve cusp calcification noted. There is no evidence of reduced aortic valve leaflet excursion excursion. The aortic valve dimensionless index is 0.93. There is no evidence of aortic valve regurgitation. The peak instantaneous gradient of the aortic valve is 6 mmHg. The mean gradient of the aortic valve is 3 mmHg.  Mitral Valve: The mitral valve is normal in structure. There is normal mitral valve leaflet mobility. There is mild mitral valve regurgitation.  Tricuspid Valve: The tricuspid valve is structurally normal. There is normal tricuspid valve leaflet mobility. There is trace tricuspid regurgitation.  Pulmonic Valve: The pulmonic valve is structurally normal. There is trace pulmonic valve regurgitation.  Pericardium: No pericardial effusion noted.  Aorta: The aortic root is normal. There is no dilatation of the aortic arch. There is no dilatation of the ascending aorta. There is no dilatation of the aortic root. There is plaque visualized in the ascending aorta, which is classified as a Grade 2 [mild (focal or diffuse) intimal thickening of 2-3 mm] atherosclerosis.  Pulmonary Artery: The pulmonary artery is normal in size. The estimated pulmonary artery pressure is normal.  Systemic Veins: The inferior vena cava appears mildly dilated, with IVC inspiratory collapse greater than 50%.      CONCLUSIONS:  1. The left ventricular systolic function is moderately decreased, with a visually estimated  ejection fraction of 35-40%.  2. Abnormal wall motion.  3. Left ventricular cavity size is moderately dilated.  4. Abnormal septal motion consistent with post-operative status.  5. There is severe hypokinesis of the basal and mid inferoposterior wall.  6. There is normal right ventricular global systolic function.  7. Mild mitral valve regurgitation.  8. The inferior vena cava appears mildly dilated, with IVC inspiratory collapse greater than 50%.  9. There is plaque visualized in the ascending aorta.    QUANTITATIVE DATA SUMMARY:    LA VOLUME:                    Normal Ranges:  LA Vol A4C:        63.7 ml    (22+/-6mL/m2)  LA Vol A2C:        77.5 ml  LA Vol BP:         70.4 ml  LA Vol Index A4C:  28.6ml/m2  LA Vol Index A2C:  34.8 ml/m2  LA Vol Index BP:   31.6 ml/m2  LA Area A4C:       20.7 cm2  LA Area A2C:       22.9 cm2  LA Major Axis A4C: 5.7 cm  LA Major Axis A2C: 5.8 cm  LA Volume Index:   30.1 ml/m2  LA Vol A4C:        59.8 ml  LA Vol A2C:        75.6 ml  LA Vol Index BSA:  30.4 ml/m2      RA VOLUME BY A/L METHOD:            Normal Ranges:  RA Vol A4C:              39.4 ml    (8.3-19.5ml)  RA Vol Index A4C:        17.7 ml/m2  RA Area A4C:             16.3 cm2  RA Major Axis A4C:       5.7 cm      M-MODE MEASUREMENTS:         Normal Ranges:  Ao Root:             4.00 cm (2.0-3.7cm)      AORTA MEASUREMENTS:         Normal Ranges:  Asc Ao, d:          3.40 cm (2.1-3.4cm)      LV SYSTOLIC FUNCTION BY 2D PLANIMETRY (MOD):  Normal Ranges:  EF-A4C View:    50 % (>=55%)  EF-A2C View:    39 %  EF-Biplane:     45 %  EF-Visual:      38 %  LV EF Reported: 38 %      LV DIASTOLIC FUNCTION:           Normal Ranges:  MV Peak E:             1.30 m/s  (0.7-1.2 m/s)  MV Peak A:             0.39 m/s  (0.42-0.7 m/s)  E/A Ratio:             3.32      (1.0-2.2)  MV e'                  0.044 m/s (>8.0)  MV lateral e'          0.05 m/s  MV medial e'           0.04 m/s  E/e' Ratio:            29.25     (<8.0)      MITRAL VALVE:           Normal Ranges:  MV DT:        288 msec (150-240msec)      AORTIC VALVE:                     Normal Ranges:  AoV Vmax:                1.23 m/s (<=1.7m/s)  AoV Peak P.1 mmHg (<20mmHg)  AoV Mean PG:             3.0 mmHg (1.7-11.5mmHg)  LVOT Max Pravin:            0.96 m/s (<=1.1m/s)  AoV VTI:                 24.00 cm (18-25cm)  LVOT VTI:                22.40 cm  LVOT Diameter:           2.10 cm  (1.8-2.4cm)  AoV Area, VTI:           3.08 cm2 (2.5-5.5cm2)  AoV Area,Vmax:           2.57 cm2 (2.5-4.5cm2)  AoV Dimensionless Index: 0.93      RIGHT VENTRICLE:  RV Basal 4.61 cm  RV Mid   2.97 cm  RV Major 9.0 cm  TAPSE:   20.3 mm  RV s'    0.10 m/s      TRICUSPID VALVE/RVSP:         Normal Ranges:  IVC Diam:             2.18 cm      [id number] Danny Villaseñor MD  Electronically signed on 2024 at 10:19:33 AM        ** Final **      Guideline-Directed Medical Therapy:  -ARNI: No              -If no, then ACEi/ARB?: Yes, describe: lisinopril 10mg daily  -Beta Blocker: Yes, describe: Metoprolol succinate 50 mg  -MRA: Yes, describe: Spironolactone 12.5 mg  -SGLT2i: No     Secondary Prevention:  -The ASCVD Risk score (Huong DK, et al., 2019) failed to calculate for the following reasons:    The 2019 ASCVD risk score is only valid for ages 40 to 79    Risk score cannot be calculated because patient has a medical history suggesting prior/existing ASCVD   -Aspirin 81mg? no  -Statin?: Yes, describe: Rosuvastatin 40 mg  -HTN?: Yes, describe: controlled     CURRENT PHARMACOTHERAPY:   Metoprolol succinate 50 mg once daily   Spironolactone 12.5 mg once daily   Lisinopril 10mg daily    Affordability: unable to afford name brand medications  Adherence/Compliance: reports adherence  Adverse Effects: none reported    Monitoring Weights at Home: No  Home Weight Recordings: NA    Past In Office Weight Readings:   Wt Readings from Last 6 Encounters:   03/10/25 102 kg (224 lb)   25 101 kg (223 lb)   25 103 kg (226 lb  3.2 oz)   01/21/25 104 kg (230 lb 1.6 oz)   01/14/25 103 kg (227 lb)   02/06/25 102 kg (225 lb)       Monitoring Blood Pressure at Home: No  Home BP Recordings: NA    Past In Office BP Readings:   BP Readings from Last 6 Encounters:   03/10/25 106/54   02/28/25 116/70   02/18/25 112/50   01/21/25 143/69   01/14/25 110/68   12/31/24 129/71       HEALTH MANAGEMENT    Maintaining fluid restriction (<2 L/day): Yes  Edema/swelling: Yes  Shortness of breath: Yes - COPD  Trouble sleeping/lying down: No  Dry/hacking cough: No  Recent Hospitalizations: No    EDUCATION/COUNSELING:   - Counseled patient on MOA, expectations, duration of therapy, contraindications, administration, and monitoring parameters  - Counseled patient on lifestyle modifications that can decrease your risk of having complications (smoking cessation, losing weight, daily weights, vaccines)  - Counseled patient on fluid intake and weight management. Recommended to not consume more than 2 liters of fliuids per day. If they have gained more than 2-3 pounds within a 24 hours period (or 5 pounds in a week), contact their cardiologist  - Answered all patient questions and concerns       DISCUSSION/NOTES:   Patient was not able to sign up for  assistance for Entresto and Farxiga.  Tolerating lisinopril at today's visit. Notes no signs of hypotension reported.    ASSESSMENT:    Assessment/Plan   Problem List Items Addressed This Visit       Heart failure    Patient is currently tolerating 3 GMDT medication well.   Tolerating lisinopril at this time.  No dose adjustments needed based on kidney and liver functions.              RECOMMENDATIONS/PLAN:    CONTINUE  Metoprolol succinate 50 mg once daily   Spironolactone 12.5 mg once daily   Lisinopril 10mg daily    Last Appnt with Referring Provider: 1/21/25  Next Appnt with Referring Provider: none scheduled  Clinical Pharmacist follow up: none scheduled  VAF/Application Expiration: No  Type of Encounter:  Brandon Burch PharmD    Verbal consent to manage patient's drug therapy was obtained from the patient . They were informed they may decline to participate or withdraw from participation in pharmacy services at any time.    Continue all meds under the continuation of care with the referring provider and clinical pharmacy team.            [1]   Allergies  Allergen Reactions    Cat Dander Runny nose    House Dust Other     congestion    Metformin Rash    Mold Other     congestion   [2]   Past Medical History:  Diagnosis Date    Abnormal ECG     Arthritis 11/20/2014    Asthma 11/20/2014    Atrial fibrillation     CAD (coronary artery disease) 11/20/2014    History of coronary atherosclerosis    Carotid artery occlusion     CHF (congestive heart failure)     COPD (chronic obstructive pulmonary disease) (Multi)     Degenerative disc disease, lumbar 11/20/2014    Depression     Diabetic neuropathy 11/20/2014    History of diabetic neuropathy    Difficulty walking     Diverticulitis of colon     Diverticulosis of colon 07/09/2024    CT Scattered    DM (diabetes mellitus), type 2 11/20/2014    Essential tremor 09/06/2013    Benign familial tremor    Fissure, anal     History of depression 07/14/2017    History of gastroesophageal reflux (GERD) 11/20/2014    HL (hearing loss)     Hyperlipidemia     Hypertension     Inguinal hernia, bilateral 07/09/2024    CT fat containing    Lung disease     Old myocardial infarction 11/20/2014    History of myocardial infarction    BRITT on CPAP 11/20/2014    Peripheral neuropathy     Restless leg syndrome     Shingles     Small bowel obstruction 07/09/2024    CT    Systolic CHF    [3]   Current Outpatient Medications on File Prior to Visit   Medication Sig Dispense Refill    ascorbic acid (Vitamin C) 1,000 mg tablet Take 1 tablet (1,000 mg) by mouth once daily.      b complex vitamins capsule Take 1 capsule by mouth once daily.      blood-glucose sensor (FreeStyle Tricia 3  Sensor) device 1 EACH EVERY 14 (FOURTEEN) DAYS. 2 each 11    cholecalciferol (Vitamin D-3) 50 MCG (2000 UT) tablet Take 1 tablet (50 mcg) by mouth once daily.      FLUoxetine (PROzac) 40 mg capsule TAKE 1 CAPSULE BY MOUTH ONCE DAILY 90 capsule 1    gabapentin (Neurontin) 600 mg tablet Take 1 tablet (600 mg) by mouth. QID      insulin degludec (Tresiba FlexTouch U-200) 200 unit/mL (3 mL) pen Inject 50 Units under the skin once daily at bedtime. Take as directed per insulin instructions. 15 mL 5    insulin lispro-aabc (Lyumjev KwikPen U-100 Insulin) 100 unit/mL insulin pen Up to 80 units daily 75 mL 3    lisinopril 10 mg tablet Take 1 tablet (10 mg) by mouth once daily. 30 tablet 11    metoprolol succinate XL (Toprol-XL) 50 mg 24 hr tablet Take 1 tablet (50 mg) by mouth once daily.      MULTIVITAMIN ORAL Take 1 tablet by mouth once daily.      nystatin (Mycostatin) cream Apply topically 2 times a day. 15 g 0    omeprazole (PriLOSEC) 40 mg DR capsule Take 1 capsule (40 mg) by mouth once daily in the morning. Take before meals. Do not crush or chew. 30 capsule 0    rosuvastatin (Crestor) 40 mg tablet TAKE 1 TABLET BY MOUTH EVERY DAY 90 tablet 1    spironolactone (Aldactone) 25 mg tablet Take 0.5 tablets (12.5 mg) by mouth once daily.      traZODone (Desyrel) 50 mg tablet TAKE 1 TO 2 TABLETS BY MOUTH AT BEDTIME 90 tablet 0     Current Facility-Administered Medications on File Prior to Visit   Medication Dose Route Frequency Provider Last Rate Last Admin    oxygen (O2) therapy   inhalation Continuous - Inhalation Brunilda Mcdonough, APRN-CNP

## 2025-04-23 ENCOUNTER — APPOINTMENT (OUTPATIENT)
Dept: PHARMACY | Facility: HOSPITAL | Age: 85
End: 2025-04-23
Payer: MEDICARE

## 2025-04-23 DIAGNOSIS — I50.9 CHRONIC HEART FAILURE, UNSPECIFIED HEART FAILURE TYPE: ICD-10-CM

## 2025-04-23 NOTE — ASSESSMENT & PLAN NOTE
Patient is currently tolerating 3 GMDT medication well.   Tolerating lisinopril at this time.  No dose adjustments needed based on kidney and liver functions.

## 2025-04-23 NOTE — Clinical Note
Does not qualify for cost assistance for Entresto or Lockitron. Tolerating lisinlopril at this time. On 3 GDMT medications outside of Farixga.

## 2025-04-25 ENCOUNTER — APPOINTMENT (OUTPATIENT)
Dept: ENDOCRINOLOGY | Facility: CLINIC | Age: 85
End: 2025-04-25
Payer: MEDICARE

## 2025-04-25 VITALS
BODY MASS INDEX: 31.64 KG/M2 | HEIGHT: 71 IN | SYSTOLIC BLOOD PRESSURE: 121 MMHG | HEART RATE: 60 BPM | WEIGHT: 226 LBS | DIASTOLIC BLOOD PRESSURE: 56 MMHG

## 2025-04-25 DIAGNOSIS — E11.42 TYPE 2 DIABETES MELLITUS WITH DIABETIC POLYNEUROPATHY, WITH LONG-TERM CURRENT USE OF INSULIN: Primary | ICD-10-CM

## 2025-04-25 DIAGNOSIS — E78.2 MIXED HYPERLIPIDEMIA: ICD-10-CM

## 2025-04-25 DIAGNOSIS — Z79.4 TYPE 2 DIABETES MELLITUS WITH DIABETIC POLYNEUROPATHY, WITH LONG-TERM CURRENT USE OF INSULIN: Primary | ICD-10-CM

## 2025-04-25 DIAGNOSIS — I10 ESSENTIAL (PRIMARY) HYPERTENSION: ICD-10-CM

## 2025-04-25 LAB — POC HEMOGLOBIN A1C: 7.6 % (ref 4.2–6.5)

## 2025-04-25 PROCEDURE — 83036 HEMOGLOBIN GLYCOSYLATED A1C: CPT | Performed by: INTERNAL MEDICINE

## 2025-04-25 PROCEDURE — 4004F PT TOBACCO SCREEN RCVD TLK: CPT | Performed by: INTERNAL MEDICINE

## 2025-04-25 PROCEDURE — 1126F AMNT PAIN NOTED NONE PRSNT: CPT | Performed by: INTERNAL MEDICINE

## 2025-04-25 PROCEDURE — 3078F DIAST BP <80 MM HG: CPT | Performed by: INTERNAL MEDICINE

## 2025-04-25 PROCEDURE — 1123F ACP DISCUSS/DSCN MKR DOCD: CPT | Performed by: INTERNAL MEDICINE

## 2025-04-25 PROCEDURE — 3074F SYST BP LT 130 MM HG: CPT | Performed by: INTERNAL MEDICINE

## 2025-04-25 PROCEDURE — 99214 OFFICE O/P EST MOD 30 MIN: CPT | Performed by: INTERNAL MEDICINE

## 2025-04-25 RX ORDER — PEN NEEDLE, DIABETIC 30 GX3/16"
NEEDLE, DISPOSABLE MISCELLANEOUS
Qty: 400 EACH | Refills: 3 | Status: SHIPPED | OUTPATIENT
Start: 2025-04-25

## 2025-04-25 ASSESSMENT — ENCOUNTER SYMPTOMS
DEPRESSION: 0
OCCASIONAL FEELINGS OF UNSTEADINESS: 1
LOSS OF SENSATION IN FEET: 1

## 2025-04-25 ASSESSMENT — PATIENT HEALTH QUESTIONNAIRE - PHQ9
2. FEELING DOWN, DEPRESSED OR HOPELESS: SEVERAL DAYS
1. LITTLE INTEREST OR PLEASURE IN DOING THINGS: NOT AT ALL
2. FEELING DOWN, DEPRESSED OR HOPELESS: NOT AT ALL
SUM OF ALL RESPONSES TO PHQ9 QUESTIONS 1 & 2: 0

## 2025-04-25 ASSESSMENT — PAIN SCALES - GENERAL: PAINLEVEL_OUTOF10: 0-NO PAIN

## 2025-05-06 ENCOUNTER — OFFICE VISIT (OUTPATIENT)
Dept: CARDIOLOGY | Facility: CLINIC | Age: 85
End: 2025-05-06
Payer: MEDICARE

## 2025-05-06 VITALS
HEART RATE: 67 BPM | WEIGHT: 230 LBS | BODY MASS INDEX: 32.2 KG/M2 | HEIGHT: 71 IN | SYSTOLIC BLOOD PRESSURE: 127 MMHG | DIASTOLIC BLOOD PRESSURE: 72 MMHG | RESPIRATION RATE: 22 BRPM | OXYGEN SATURATION: 96 %

## 2025-05-06 DIAGNOSIS — I42.8 OTHER CARDIOMYOPATHIES: ICD-10-CM

## 2025-05-06 DIAGNOSIS — I48.91 ATRIAL FIBRILLATION, UNSPECIFIED TYPE (MULTI): Primary | ICD-10-CM

## 2025-05-06 DIAGNOSIS — R06.09 DOE (DYSPNEA ON EXERTION): ICD-10-CM

## 2025-05-06 DIAGNOSIS — I50.9 CHRONIC HEART FAILURE, UNSPECIFIED HEART FAILURE TYPE: ICD-10-CM

## 2025-05-06 PROCEDURE — 3078F DIAST BP <80 MM HG: CPT | Performed by: HOSPITALIST

## 2025-05-06 PROCEDURE — 1159F MED LIST DOCD IN RCRD: CPT | Performed by: HOSPITALIST

## 2025-05-06 PROCEDURE — 1123F ACP DISCUSS/DSCN MKR DOCD: CPT | Performed by: HOSPITALIST

## 2025-05-06 PROCEDURE — 3074F SYST BP LT 130 MM HG: CPT | Performed by: HOSPITALIST

## 2025-05-06 PROCEDURE — 99215 OFFICE O/P EST HI 40 MIN: CPT | Performed by: HOSPITALIST

## 2025-05-06 RX ORDER — PRAMIPEXOLE DIHYDROCHLORIDE 0.25 MG/1
TABLET ORAL
COMMUNITY
Start: 2025-05-02

## 2025-05-06 RX ORDER — FLUTICASONE PROPIONATE AND SALMETEROL 250; 50 UG/1; UG/1
POWDER RESPIRATORY (INHALATION)
COMMUNITY
Start: 2025-04-13

## 2025-05-06 RX ORDER — ASPIRIN 81 MG/1
81 TABLET ORAL DAILY
Qty: 90 TABLET | Refills: 3 | Status: SHIPPED | OUTPATIENT
Start: 2025-05-06 | End: 2026-05-06

## 2025-05-06 NOTE — PATIENT INSTRUCTIONS
Thank you so much for visiting us today.    We are going to obtain an echocardiogram, and some blood work including BNP.    Please restrict your fluid [all fluids including water, coffee, tea, juice, etc.] and salt intake to less than 60 ounces and 2 g/day, respectively.    Please weigh yourself at home every day early morning after using restroom and with the same amount cloth, write numbers down, and bring with you next visit.    We are going to refer to Dr. Jara to discuss other none blood thinners options to prevent against stroke from your A-fib    Please take aspirin 81 mg once daily without interruption for the rest of your life.    Will see you back in 4 to 6 weeks.  Please call us at [phone] meanwhile if any question

## 2025-05-06 NOTE — PROGRESS NOTES
"Briseida Morocho \"Kang\" is a 84 y.o. male  with PMH of A-fib, CAD status post three-vessel CABG [see below heart catheterization report for details], ischemic cardiomyopathy, HTN, HLD, diabetes, COPD, BRITT on CPAP, essential tremor, spinal stenosis, memory impairment, and other comorbidities, who is to see Dr. Forbes before he retired.  Patient established cardiac care with us on 1/21/2025.  Patient is here for evaluation of progressive short of breath.  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  He is not very physically active.  He reports progressively and gradually worsening ACKERMAN with his daily activities.  Patient denies any orthopnea [uses only 1 pillows at nighttime] or PND.  He denies any palpitation.  He reports falling 1 time and feeling imbalance all the time.  He is compliant with his CPAP.  Patient is not on any aspirin or blood thinners. Patient was evaluated by pharmacy, did not qualify for DOAC, Entresto, Jardiance.    Patient denies any history of bleeding, black stool, or stroke.  Patient has been smoking 1 pack/day for 65 years.     Patient is on metoprolol succinate 50 mg once daily, Aldactone 12.5 mg once daily, rosuvastatin 40 mg once daily, and other noncardiac meds.  Today's blood pressure is 127/72, HR 67.    EKG from 7/10/2024:  Atrial fibrillation  Low voltage QRS  ST & T wave abnormality, consider anterolateral ischemia  QRS is 76 milliseconds.     Blood work from 11/16/2024 with creatinine 0.71, normal hemoglobin and platelet 132 [lowest 104K]. A1c was 8 from March 2024.     TTE on 12/20/2024  1. The left ventricular systolic function is moderately decreased, with a visually estimated ejection fraction of 35-40%.   2. Abnormal wall motion.   3. Left ventricular cavity size is moderately dilated.   4. Abnormal septal motion consistent with post-operative status.   5. There is severe hypokinesis of the basal and mid inferoposterior wall.   6. There is " normal right ventricular global systolic function.   7. Mild mitral valve regurgitation.   8. The inferior vena cava appears mildly dilated, with IVC inspiratory collapse greater than 50%.   9. There is plaque visualized in the ascending aorta.  Note: I reviewed all 3 echocardiograms of the patient's system, his EF appears similar among all echoes; close to 40%.     14-day heart monitor in 2023: Predominant rhythm of more than 99% is a flutter/A-fib.  Longest A-fib episode was 6 days and 16 hours, fastest episode was 111 bpm.  Heart rate between 41 and 142, average 75 bpm.  Rare PACs and PVCs.  5 occurrences of V. tach, longest for 19 beats and fastest for 142 bpm.  No triggers.     Coronary angiogram on 3/20/2020 with severe native three-vessel disease [10% left main, 100% occluded LAD after a small diagonal 1, 100% occluded LCx after small OM1, and 100% proximal RCA].  Occluded SVG to RCA.  Reported small subbranch of left circumflex after the anastomosis of the LIMA that has 90% lesion and appears to be culprit of patient's elevated troponin but the branch deemed to be very small for any intervention per report.  SVG to LAD is patent         Review of Systems  ROS is negative other than in HPI.      Objective   Physical Exam  General: NAD  HEENT: IEOM, PERRL   Neck: No JVD or carotid bruit  Lungs: Coarse breath sounds bilaterally, no crackles.    Heart: Irregularly irregular, normal S1 and S2, no loud murmurs  Abdomen: Distended but soft, nontender, positive bowel sounds  Extremities: 3+ edema bilaterally.    Neurologic: No FND  Psychiatric: Normal mood and affect    Assessment/Plan   1-CAD:  -Coronary angiogram on 3/20/2020 with severe native three-vessel disease [10% left main, 100% occluded LAD after a small diagonal 1, 100% occluded LCx after small OM1, and 100% proximal RCA occlusion].  Occluded SVG to RCA.  Reported small subbranch of left circumflex after the anastomosis of the LIMA that has 90% lesion and  appears to be culprit of patient's elevated troponin but the branch deemed to be very small for any intervention per report.  SVG to LAD is patent].  -His most recent echocardiogram from 12/20/2024 with LVEF around 40%, I reviewed all 3 echocardiograms available in the system, his LVEF has always been around 40% with global hypokinesis but worse in the inferior, inferoseptal, and inferolateral walls  -Patient has a chronic stable ACKERMAN likely related to his COPD, CAD, cardiomyopathy, and A-fib.  He denies any chest pain or chest pressure.  -Continue atorvastatin, emphasized to the patient the need to take aspirin 81 mg once daily.     2-cardiomyopathy:  -ACC/AHA stage C, NYHA class III.  -His most recent echocardiogram from 12/20/2024 with LVEF around 40%, I reviewed all 3 echocardiograms available in the system, his LVEF has always been around 40% with global hypokinesis but worse in the inferior, inferoseptal, and inferolateral walls.  -Etiology is likely multifactorial; CAD and A-fib.  -He has a chronic stable ACKERMAN which could be also related to his COPD, A-fib, and CAD.  No evidence of volume overload by JVD on exam today.  -His most recent echocardiogram with CVP of 8, he appears euvolemic on today's exam.  -Will continue metoprolol succinate 50 mg once daily and Aldactone 12.5 mg once daily.  He does not qualify for Jardiance and Entresto.  Patient is following with heart failure pharmacy for further titration of his medicine and optimization.  -Daily weight.  Salt and fluid restriction.  -Will check BNP and echocardiogram.     3- A-fib:  -14-day heart monitor in 2023: Predominant rhythm of more than 99% is a flutter/A-fib.  Longest A-fib episode was 6 days and 16 hours, fastest episode was 111 bpm.  Heart rate between 41 and 142, average 75 bpm.  Rare PACs and PVCs.  5 occurrences of V. tach, longest for 19 beats and fastest for 142 bpm.  No triggers.  -[id number] score of 6 with 9% annual risk of stroke.  HASBLED score of 4 with 9% risk for bleed.  -We will refer to Dr. Jara for consideration of FRANCHESKA closure device.  Continue metoprolol.     4-smoking:  -Patient smokes 1 pack/day for last 65 years.  Patient is not willing to quit, not interested in smoking cessation aids.    RTC in 4 to 6 weeks.      Terra Hood MD

## 2025-05-07 DIAGNOSIS — R10.13 EPIGASTRIC PAIN: ICD-10-CM

## 2025-05-07 DIAGNOSIS — K21.00 GASTROESOPHAGEAL REFLUX DISEASE WITH ESOPHAGITIS WITHOUT HEMORRHAGE: ICD-10-CM

## 2025-05-08 RX ORDER — PANTOPRAZOLE SODIUM 40 MG/1
40 TABLET, DELAYED RELEASE ORAL 2 TIMES DAILY
Qty: 180 TABLET | Refills: 1 | OUTPATIENT
Start: 2025-05-08 | End: 2025-11-04

## 2025-05-12 LAB — BNP SERPL-MCNC: 378 PG/ML

## 2025-05-21 ENCOUNTER — HOSPITAL ENCOUNTER (OUTPATIENT)
Dept: CARDIOLOGY | Facility: CLINIC | Age: 85
Discharge: HOME | End: 2025-05-21
Payer: MEDICARE

## 2025-05-21 DIAGNOSIS — I42.8 OTHER CARDIOMYOPATHIES: ICD-10-CM

## 2025-05-21 DIAGNOSIS — I50.9 CHRONIC HEART FAILURE, UNSPECIFIED HEART FAILURE TYPE: ICD-10-CM

## 2025-05-21 PROCEDURE — 2500000004 HC RX 250 GENERAL PHARMACY W/ HCPCS (ALT 636 FOR OP/ED): Mod: JZ | Performed by: HOSPITALIST

## 2025-05-21 PROCEDURE — 93306 TTE W/DOPPLER COMPLETE: CPT | Performed by: INTERNAL MEDICINE

## 2025-05-21 PROCEDURE — C8929 TTE W OR WO FOL WCON,DOPPLER: HCPCS

## 2025-05-21 RX ADMIN — PERFLUTREN 4 ML OF DILUTION: 6.52 INJECTION, SUSPENSION INTRAVENOUS at 10:19

## 2025-05-24 LAB
AORTIC VALVE PEAK VELOCITY: 1.27 M/S
AV PEAK GRADIENT: 6 MMHG
AVA (PEAK VEL): 2.58 CM2
EJECTION FRACTION APICAL 4 CHAMBER: 42.1
EJECTION FRACTION: 40 %
LEFT ATRIUM VOLUME AREA LENGTH INDEX BSA: 28.4 ML/M2
LEFT VENTRICLE INTERNAL DIMENSION DIASTOLE: 6.32 CM (ref 3.5–6)
LEFT VENTRICULAR OUTFLOW TRACT DIAMETER: 2.24 CM
MITRAL VALVE E/A RATIO: 1.89
RIGHT VENTRICLE FREE WALL PEAK S': 8.63 CM/S
TRICUSPID ANNULAR PLANE SYSTOLIC EXCURSION: 1.7 CM

## 2025-05-28 ENCOUNTER — TELEPHONE (OUTPATIENT)
Dept: CARDIOLOGY | Facility: CLINIC | Age: 85
End: 2025-05-28
Payer: MEDICARE

## 2025-05-28 DIAGNOSIS — I50.9 CHRONIC HEART FAILURE, UNSPECIFIED HEART FAILURE TYPE: Primary | ICD-10-CM

## 2025-05-28 NOTE — TELEPHONE ENCOUNTER
Echo results reviewed by Dr. Hood. His BNP came elevated at 378 but he is in A-fib all the time.     Patient has ACKERMAN and his filling pressures are mildly elevated, will prescribe him Lasix 20 mg daily trial for 5 days with repeat BMP next week.  Patient to weigh himself every single day and write numbers down.  Will call him next week to check on his breathing and weight.  TCT patient and left M requesting return call to review results

## 2025-05-28 NOTE — H&P
"History Of Present Illness  Ramesh Morocho \"Kang\" is a 84 y.o. male presenting with colon polyps.     Past Medical History  Medical History[1]  Surgical History  Surgical History[2]  Social History  He reports that he has been smoking cigarettes. He has a 60 pack-year smoking history. He has been exposed to tobacco smoke. He has never used smokeless tobacco. He reports current alcohol use. He reports that he does not use drugs.    Family History  Family History[3]     Allergies  Allergies[4]  Review of Systems     Physical Exam  Vitals and nursing note reviewed.   Constitutional:       Appearance: Normal appearance.   Cardiovascular:      Rate and Rhythm: Normal rate and regular rhythm.      Pulses: Normal pulses.      Heart sounds: Normal heart sounds.   Pulmonary:      Effort: Pulmonary effort is normal.      Breath sounds: Normal breath sounds.   Abdominal:      General: Abdomen is flat.      Palpations: Abdomen is soft.   Neurological:      Mental Status: He is alert.          Last Recorded Vitals  There were no vitals taken for this visit.    Assessment/Plan   Colon polyps    Proceed with colonoscopy and EMR     Mando Perez MD         [1]   Past Medical History:  Diagnosis Date    Abnormal ECG     Arthritis 11/20/2014    Asthma 11/20/2014    Atrial fibrillation     CAD (coronary artery disease) 11/20/2014    History of coronary atherosclerosis    Carotid artery occlusion     CHF (congestive heart failure)     COPD (chronic obstructive pulmonary disease) (Multi)     Degenerative disc disease, lumbar 11/20/2014    Depression     Diabetic neuropathy 11/20/2014    History of diabetic neuropathy    Difficulty walking     Diverticulitis of colon     Diverticulosis of colon 07/09/2024    CT Scattered    DM (diabetes mellitus), type 2 11/20/2014    Essential tremor 09/06/2013    Benign familial tremor    Fissure, anal     History of depression 07/14/2017    History of gastroesophageal reflux (GERD) 11/20/2014    " HL (hearing loss)     Hyperlipidemia     Hypertension     Inguinal hernia, bilateral 07/09/2024    CT fat containing    Lung disease     Old myocardial infarction 11/20/2014    History of myocardial infarction    BRITT on CPAP 11/20/2014    Peripheral neuropathy     Restless leg syndrome     Shingles     Small bowel obstruction 07/09/2024    CT    Systolic CHF    [2]   Past Surgical History:  Procedure Laterality Date    CAROTID STENT      CHOLECYSTECTOMY      COLONOSCOPY  10/01/2015    CORONARY ARTERY BYPASS GRAFT  11/20/2014    CABG    CORONARY STENT PLACEMENT      HERNIA REPAIR      SMALL INTESTINE SURGERY      Partial resection   [3]   Family History  Problem Relation Name Age of Onset    Arthritis Mother opal     Other (blood disease) Brother      Blood Disorder Brother carlitos     Early natural death Brother lola     Alzheimer's disease Sister nancy    [4]   Allergies  Allergen Reactions    Cat Dander Runny nose    House Dust Other     congestion    Metformin Rash    Mold Other     congestion

## 2025-05-29 ENCOUNTER — ANESTHESIA EVENT (OUTPATIENT)
Dept: GASTROENTEROLOGY | Facility: HOSPITAL | Age: 85
End: 2025-05-29
Payer: MEDICARE

## 2025-05-29 NOTE — ANESTHESIA PREPROCEDURE EVALUATION
"Patient: Ramesh Morocho \"Kang\"    Procedure Information       Date/Time: 05/30/25 0800    Scheduled providers: Mando Perez MD; Guido Lei MD    Procedure: COLONOSCOPY    Location: [facility]            Relevant Problems   Cardiac   (+) Acute non-ST segment elevation myocardial infarction (Multi)   (+) Atherosclerosis of coronary artery   (+) Atrial fibrillation (Multi)   (+) Benign essential hypertension   (+) Mixed hyperlipidemia   (+) Pure hypercholesterolemia      Pulmonary   (+) Bacterial pneumonia   (+) Chronic obstructive lung disease (Multi)      Neuro   (+) Peripheral nerve disease      Endocrine   (+) Type 2 diabetes mellitus      Musculoskeletal   (+) Degeneration of intervertebral disc of lumbar region   (+) Spinal stenosis of lumbar region      ID   (+) Bacterial pneumonia      Skin   (+) Groin rash       Clinical information reviewed:   Tobacco  Allergies  Meds   Med Hx  Surg Hx   Fam Hx  Soc Hx         Medical History[1]   Surgical History[2]  Social History[3]   Current Outpatient Medications   Medication Instructions    ascorbic acid (VITAMIN C) 1,000 mg, Daily    aspirin 81 mg, oral, Daily    b complex vitamins capsule 1 capsule, Daily    blood-glucose sensor (FreeStyle Tricia 3 Sensor) device 1 each, miscellaneous, Every 14 days    cholecalciferol (VITAMIN D-3) 50 mcg, Daily    FLUoxetine (PROZAC) 40 mg, oral, Daily    fluticasone propion-salmeteroL (Advair Diskus) 250-50 mcg/dose diskus inhaler PLEASE SEE ATTACHED FOR DETAILED DIRECTIONS    gabapentin (NEURONTIN) 600 mg    insulin degludec (TRESIBA FLEXTOUCH U-200) 50 Units, subcutaneous, Nightly, Take as directed per insulin instructions.    insulin lispro-aabc (Lyumjev KwikPen U-100 Insulin) 100 unit/mL insulin pen Up to 80 units daily    lisinopril 10 mg, oral, Daily    metoprolol succinate XL (TOPROL-XL) 50 mg, Daily    MULTIVITAMIN ORAL 1 tablet, Daily    nystatin (Mycostatin) cream Topical, 2 times daily    " "omeprazole (PRILOSEC) 40 mg, oral, Daily before breakfast, Do not crush or chew.    pen needle, diabetic (BD [place] 2nd Gen Pen Needle) 32 gauge x 5/32\" needle Use qid    pramipexole (Mirapex) 0.25 mg tablet     rosuvastatin (CRESTOR) 40 mg, oral, Daily    spironolactone (Aldactone) 25 mg tablet 0.5 tablets, Daily    traZODone (Desyrel) 50 mg tablet TAKE 1 TO 2 TABLETS BY MOUTH AT BEDTIME      RX Allergies[4]     Chemistry    Lab Results   Component Value Date/Time     02/28/2025 0952    K 4.9 02/28/2025 0952     02/28/2025 0952    CO2 25 02/28/2025 0952    BUN 16 02/28/2025 0952    CREATININE 0.90 02/28/2025 0952    Lab Results   Component Value Date/Time    CALCIUM 9.1 02/28/2025 0952    ALKPHOS 53 02/28/2025 0952    AST 21 02/28/2025 0952    ALT 21 02/28/2025 0952    BILITOT 0.4 02/28/2025 0952          Lab Results   Component Value Date    HGBA1C 7.6 (A) 04/25/2025     Lab Results   Component Value Date/Time    WBC 9.4 02/28/2025 0952    HGB 15.9 02/28/2025 0952    HCT 48.1 02/28/2025 0952     02/28/2025 0952     Lab Results   Component Value Date/Time    PROTIME 11.8 03/19/2024 1156    INR 1.1 03/19/2024 1156     No results found for: \"ABORH\"  No results found for this or any previous visit (from the past 4464 hours).  No results found for this or any previous visit from the past 1095 days.    Echo 5/21/2025:   Left Ventricle: The left ventricular systolic function is moderately decreased, with a visually estimated ejection fraction of 40%. There is global hypokinesis of the left ventricle with minor regional variations. The left ventricular cavity size is mild to moderately dilated. There is normal septal and normal posterior left ventricular wall thickness. Spectral Doppler shows a normal pattern of left ventricular diastolic filling.  Left Atrium: The left atrium is mildly dilated.  Right Ventricle: The right ventricle is normal in size. There is normal right ventriclar wall thickness. There " is normal right ventricular global systolic function.  Right Atrium: The right atrium is normal in size.  Aortic Valve: The aortic valve is trileaflet. There is no evidence of aortic valve regurgitation. The peak instantaneous gradient of the aortic valve is 6 mmHg.  Mitral Valve: The mitral valve is normal in structure. There is normal mitral valve leaflet mobility. The peak instantaneous gradient of the mitral valve is 5 mmHg. There is mild mitral valve regurgitation.  Tricuspid Valve: The tricuspid valve is structurally normal. There is normal tricuspid valve leaflet mobility. There is trace to mild tricuspid regurgitation.  Pulmonic Valve: The pulmonic valve is structurally normal. There is trace pulmonic valve regurgitation.  Pericardium: There is no pericardial effusion noted.  Aorta: The aortic root is normal. The aortic root appears upper limits of normal in size and measures 3.70 cm. The Ao Sinus is 3.70 cm. The Asc Ao is 3.80 cm. There is no dilatation of the aortic arch. There is upper limits of normal dilatation of the ascending aorta. The aortic root is at the upper limits of normal size.  Systemic Veins: The inferior vena cava appears mildly dilated, with IVC inspiratory collapse greater than 50%.     CONCLUSIONS:   1. The left ventricular systolic function is moderately decreased, with a visually estimated ejection fraction of 40%.   2. There is global hypokinesis of the left ventricle with minor regional variations.   3. Left ventricular cavity size is mild to moderately dilated.   4. The left atrium is mildly dilated.   5. Mild mitral valve regurgitation.   6. Upper limits of normal size aortic root.   7. The inferior vena cava appears mildly dilated, with IVC inspiratory collapse greater than 50%.    Cath 3/24/2020:  Findings/ Coronary Angiography Coronary anatomy finding. Left main. Left main has mild distal disease of 10% . Left anterior descending artery. Left anterior descending artery is 100%  "occluded after small diagonal 1.  Left circumflex. Left circumflex provides a high obtuse marginal 1 which is small vessel patent by after obtuse marginal 1 is 100% occluded. Right coronary artery. Right coronary artery is 100% occluded in the proximal segment.  - Left internal mammary artery to left circumflex. The left internal mammary artery to left circumflex has no significant disease there is sub - branch of the left circumflex after the anastomosis that is 95% disease but is small vessel for intervention the vessel size is 1 millimeter. - Saphenous vein graft to left anterior descending artery is patent in the arteries filling distally after the anastomosis no major issues. - Saphenous vein graft to right coronary arteries 100% occluded at the ostial level    Impression/ Plan Severe 3 vessels native disease. 100% occluded saphenous vein graft to the right coronary artery. There is small sub branch of the left circumflex after the anastomosis of left internal mammary artery that has 90% lesion could be the culprit of patient elevated troponin but this branch is very small for any intervention size is 1 millimeter. Medical management at this point. Mynx will be used for hemostasis. Will optimize his treatment for heart failure.    Visit Vitals  /65   Pulse 88   Temp 36.2 °C (97.2 °F) (Temporal)   Resp 22   Ht 1.803 m (5' 11\")   Wt 100 kg (221 lb 5.5 oz)   SpO2 94%   BMI 30.87 kg/m²   Smoking Status Every Day   BSA 2.24 m²     NPO/Void Status  Carbohydrate Drink Given Prior to Surgery? : N  Date of Last Liquid: 05/30/25  Time of Last Liquid: 0200  Date of Last Solid: 05/28/25  Time of Last Solid: 1800  Last Intake Type: Clear fluids  Time of Last Void: 0730        Physical Exam    Airway  Mallampati: II  TM distance: >3 FB  Neck ROM: full  Mouth opening: 3 or more finger widths     Cardiovascular - normal exam  Rhythm: regular  Rate: normal     Dental    Pulmonary - normal exam   Abdominal - normal exam "           Anesthesia Plan    History of general anesthesia?: yes  History of complications of general anesthesia?: no    ASA 4     MAC   (Standard ASA monitoring. 90%RA on arrival, incremented to 94% on 2L.)  intravenous induction   Anesthetic plan and risks discussed with patient.    Plan discussed with CRNA and CAA.             [1]   Past Medical History:  Diagnosis Date    Abnormal ECG     Asthma 11/20/2014    Atrial fibrillation     CAD (coronary artery disease)     Carotid artery occlusion     CHF (congestive heart failure)     COPD (chronic obstructive pulmonary disease) (Multi)     Depression     Diabetic neuropathy     Difficulty walking     Diverticulosis of colon 07/09/2024    CT Scattered    DM (diabetes mellitus), type 2 11/20/2014    Essential tremor     GERD (gastroesophageal reflux disease)     HL (hearing loss)     Hyperlipidemia     Hypertension     Old myocardial infarction     BRITT on CPAP     Restless leg syndrome     Shingles    [2]   Past Surgical History:  Procedure Laterality Date    CARDIAC CATHETERIZATION  03/24/2020    CAROTID STENT      CHOLECYSTECTOMY      COLECTOMY      with appendectomy for diverticulitis    COLONOSCOPY  10/01/2015    CORONARY ARTERY BYPASS GRAFT  2000    CORONARY STENT PLACEMENT      subsequent to CABG    DEEP BRAIN STIMULATOR PLACEMENT  08/02/2017    HERNIA REPAIR      TRANSURETHRAL RESECTION OF PROSTATE     [3]   Social History  Tobacco Use    Smoking status: Every Day     Current packs/day: 1.00     Average packs/day: 1 pack/day for 135.2 years (135.2 ttl pk-yrs)     Types: Cigarettes     Start date: 3/31/1950     Passive exposure: Current    Smokeless tobacco: Never    Tobacco comments:     long and enjoyable   Vaping Use    Vaping status: Never Used   Substance Use Topics    Alcohol use: Yes     Comment: very rare    Drug use: Never   [4]   Allergies  Allergen Reactions    Cat Dander Runny nose    House Dust Other     congestion    Metformin Rash    Mold Other      congestion

## 2025-05-30 ENCOUNTER — ANESTHESIA (OUTPATIENT)
Dept: GASTROENTEROLOGY | Facility: HOSPITAL | Age: 85
End: 2025-05-30
Payer: MEDICARE

## 2025-05-30 ENCOUNTER — HOSPITAL ENCOUNTER (OUTPATIENT)
Dept: GASTROENTEROLOGY | Facility: HOSPITAL | Age: 85
Discharge: HOME | End: 2025-05-30
Payer: MEDICARE

## 2025-05-30 VITALS
HEART RATE: 82 BPM | RESPIRATION RATE: 16 BRPM | OXYGEN SATURATION: 92 % | TEMPERATURE: 97.7 F | WEIGHT: 221.34 LBS | SYSTOLIC BLOOD PRESSURE: 133 MMHG | DIASTOLIC BLOOD PRESSURE: 71 MMHG | BODY MASS INDEX: 30.99 KG/M2 | HEIGHT: 71 IN

## 2025-05-30 DIAGNOSIS — K63.5 POLYP OF COLON, UNSPECIFIED PART OF COLON, UNSPECIFIED TYPE: ICD-10-CM

## 2025-05-30 LAB — GLUCOSE BLD MANUAL STRIP-MCNC: 163 MG/DL (ref 74–99)

## 2025-05-30 PROCEDURE — 2720000007 HC OR 272 NO HCPCS

## 2025-05-30 PROCEDURE — 7100000010 HC PHASE TWO TIME - EACH INCREMENTAL 1 MINUTE

## 2025-05-30 PROCEDURE — 3700000002 HC GENERAL ANESTHESIA TIME - EACH INCREMENTAL 1 MINUTE

## 2025-05-30 PROCEDURE — 45390 COLONOSCOPY W/RESECTION: CPT | Performed by: INTERNAL MEDICINE

## 2025-05-30 PROCEDURE — 7100000009 HC PHASE TWO TIME - INITIAL BASE CHARGE

## 2025-05-30 PROCEDURE — 2500000004 HC RX 250 GENERAL PHARMACY W/ HCPCS (ALT 636 FOR OP/ED): Performed by: ANESTHESIOLOGIST ASSISTANT

## 2025-05-30 PROCEDURE — 45385 COLONOSCOPY W/LESION REMOVAL: CPT | Performed by: INTERNAL MEDICINE

## 2025-05-30 PROCEDURE — 3700000001 HC GENERAL ANESTHESIA TIME - INITIAL BASE CHARGE

## 2025-05-30 PROCEDURE — 82947 ASSAY GLUCOSE BLOOD QUANT: CPT

## 2025-05-30 RX ORDER — IPRATROPIUM BROMIDE AND ALBUTEROL SULFATE 2.5; .5 MG/3ML; MG/3ML
3 SOLUTION RESPIRATORY (INHALATION) ONCE AS NEEDED
Status: DISCONTINUED | OUTPATIENT
Start: 2025-05-30 | End: 2025-05-31 | Stop reason: HOSPADM

## 2025-05-30 RX ORDER — METOPROLOL TARTRATE 1 MG/ML
INJECTION, SOLUTION INTRAVENOUS AS NEEDED
Status: DISCONTINUED | OUTPATIENT
Start: 2025-05-30 | End: 2025-05-30

## 2025-05-30 RX ORDER — PROPOFOL 10 MG/ML
INJECTION, EMULSION INTRAVENOUS CONTINUOUS PRN
Status: DISCONTINUED | OUTPATIENT
Start: 2025-05-30 | End: 2025-05-30

## 2025-05-30 RX ORDER — FUROSEMIDE 20 MG/1
20 TABLET ORAL DAILY
Qty: 5 TABLET | Refills: 0 | Status: SHIPPED | OUTPATIENT
Start: 2025-05-30 | End: 2025-06-04

## 2025-05-30 RX ADMIN — SODIUM CHLORIDE: 9 INJECTION, SOLUTION INTRAVENOUS at 08:21

## 2025-05-30 RX ADMIN — PROPOFOL 300 MCG/KG/MIN: 10 INJECTION, EMULSION INTRAVENOUS at 08:24

## 2025-05-30 RX ADMIN — METOPROLOL TARTRATE 5 MG: 5 INJECTION INTRAVENOUS at 09:10

## 2025-05-30 ASSESSMENT — PAIN - FUNCTIONAL ASSESSMENT
PAIN_FUNCTIONAL_ASSESSMENT: 0-10

## 2025-05-30 ASSESSMENT — PAIN SCALES - GENERAL
PAINLEVEL_OUTOF10: 0 - NO PAIN

## 2025-05-30 ASSESSMENT — COLUMBIA-SUICIDE SEVERITY RATING SCALE - C-SSRS
1. IN THE PAST MONTH, HAVE YOU WISHED YOU WERE DEAD OR WISHED YOU COULD GO TO SLEEP AND NOT WAKE UP?: NO
2. HAVE YOU ACTUALLY HAD ANY THOUGHTS OF KILLING YOURSELF?: NO
6. HAVE YOU EVER DONE ANYTHING, STARTED TO DO ANYTHING, OR PREPARED TO DO ANYTHING TO END YOUR LIFE?: NO

## 2025-05-30 NOTE — ANESTHESIA POSTPROCEDURE EVALUATION
"Patient: Ramesh Morocho \"Kang\"    Procedure Summary       Date: 05/30/25 Room / Location: [facility]    Anesthesia Start: 0821 Anesthesia Stop: 0924    Procedure: COLONOSCOPY Diagnosis: Polyp of colon, unspecified part of colon, unspecified type    Scheduled Providers: Mando Perez MD; Guido Lei MD; MATTHEW Bowles Responsible Provider: Guido Lei MD    Anesthesia Type: MAC ASA Status: 4            Anesthesia Type: MAC    Vitals Value Taken Time   /71 05/30/25 09:52   Temp 36.5 °C (97.7 °F) 05/30/25 09:22   Pulse 82 05/30/25 09:52   Resp 16 05/30/25 09:52   SpO2 92 % 05/30/25 09:52       Anesthesia Post Evaluation    Patient location during evaluation: PACU  Patient participation: complete - patient participated  Level of consciousness: awake and alert  Pain management: adequate  Airway patency: patent  Cardiovascular status: acceptable and hemodynamically stable  Respiratory status: acceptable, spontaneous ventilation and nonlabored ventilation  Hydration status: acceptable  Postoperative Nausea and Vomiting: none        No notable events documented.    "

## 2025-05-30 NOTE — TELEPHONE ENCOUNTER
TCT patient with wife on speaker phone and treatment plan explained in detail and repeated. States understanding and agreement. All questions answered. Lasix and labs pended for signature

## 2025-05-30 NOTE — DISCHARGE INSTRUCTIONS

## 2025-06-06 DIAGNOSIS — I50.9 CHRONIC HEART FAILURE, UNSPECIFIED HEART FAILURE TYPE: ICD-10-CM

## 2025-06-12 LAB
LABORATORY COMMENT REPORT: NORMAL
PATH REPORT.FINAL DX SPEC: NORMAL
PATH REPORT.GROSS SPEC: NORMAL
PATH REPORT.TOTAL CANCER: NORMAL
RESIDENT REVIEW: NORMAL

## 2025-06-17 ENCOUNTER — OFFICE VISIT (OUTPATIENT)
Dept: CARDIOLOGY | Facility: CLINIC | Age: 85
End: 2025-06-17
Payer: MEDICARE

## 2025-06-17 VITALS
SYSTOLIC BLOOD PRESSURE: 118 MMHG | WEIGHT: 221.2 LBS | HEART RATE: 64 BPM | OXYGEN SATURATION: 93 % | DIASTOLIC BLOOD PRESSURE: 63 MMHG | HEIGHT: 71 IN | BODY MASS INDEX: 30.97 KG/M2

## 2025-06-17 DIAGNOSIS — R06.09 DOE (DYSPNEA ON EXERTION): ICD-10-CM

## 2025-06-17 DIAGNOSIS — I25.2 OLD MYOCARDIAL INFARCTION: ICD-10-CM

## 2025-06-17 DIAGNOSIS — I48.91 ATRIAL FIBRILLATION, UNSPECIFIED TYPE (MULTI): Primary | ICD-10-CM

## 2025-06-17 PROCEDURE — 1159F MED LIST DOCD IN RCRD: CPT | Performed by: HOSPITALIST

## 2025-06-17 PROCEDURE — 3078F DIAST BP <80 MM HG: CPT | Performed by: HOSPITALIST

## 2025-06-17 PROCEDURE — 99213 OFFICE O/P EST LOW 20 MIN: CPT | Performed by: HOSPITALIST

## 2025-06-17 PROCEDURE — 1126F AMNT PAIN NOTED NONE PRSNT: CPT | Performed by: HOSPITALIST

## 2025-06-17 PROCEDURE — 3074F SYST BP LT 130 MM HG: CPT | Performed by: HOSPITALIST

## 2025-06-17 PROCEDURE — 4004F PT TOBACCO SCREEN RCVD TLK: CPT | Performed by: HOSPITALIST

## 2025-06-17 PROCEDURE — 99214 OFFICE O/P EST MOD 30 MIN: CPT | Performed by: HOSPITALIST

## 2025-06-17 PROCEDURE — 1160F RVW MEDS BY RX/DR IN RCRD: CPT | Performed by: HOSPITALIST

## 2025-06-17 ASSESSMENT — COLUMBIA-SUICIDE SEVERITY RATING SCALE - C-SSRS
2. HAVE YOU ACTUALLY HAD ANY THOUGHTS OF KILLING YOURSELF?: NO
1. IN THE PAST MONTH, HAVE YOU WISHED YOU WERE DEAD OR WISHED YOU COULD GO TO SLEEP AND NOT WAKE UP?: NO
6. HAVE YOU EVER DONE ANYTHING, STARTED TO DO ANYTHING, OR PREPARED TO DO ANYTHING TO END YOUR LIFE?: NO

## 2025-06-17 ASSESSMENT — ENCOUNTER SYMPTOMS
LOSS OF SENSATION IN FEET: 0
DEPRESSION: 1
OCCASIONAL FEELINGS OF UNSTEADINESS: 0

## 2025-06-17 ASSESSMENT — PATIENT HEALTH QUESTIONNAIRE - PHQ9
1. LITTLE INTEREST OR PLEASURE IN DOING THINGS: NOT AT ALL
SUM OF ALL RESPONSES TO PHQ9 QUESTIONS 1 AND 2: 1
2. FEELING DOWN, DEPRESSED OR HOPELESS: SEVERAL DAYS

## 2025-06-17 ASSESSMENT — PAIN SCALES - GENERAL: PAINLEVEL_OUTOF10: 0-NO PAIN

## 2025-06-17 NOTE — PATIENT INSTRUCTIONS
Thank you so much for visiting us today.    Your shortness of breath is likely multifactorial related to your atrial fibrillation, coronary artery disease, smoking/COPD, age and deconditioning.    We are going to obtain a 7-day heart monitor.    We are going to refer to Dr. Beard, A-fib specialist.    We will obtain a stress MRI of the heart.    Please call us at [phone] if you do not hear from us within few days of your testing.    Please avoid food rich with salt [popcorn, pretzels, chips, frozen food, pickles etc.] your maximum sodium intake is 2 g/day.  Please limit your fluid intake [of fluids including water, juice, coffee, milk etc.] to less than 2 L/day.    Please weigh yourself every day, early morning after using the restroom, with the same amount of cloths.  Please write numbers down and share with us next visit.    Will see you back in 3 months, call us meanwhile have any questions.

## 2025-06-17 NOTE — PROGRESS NOTES
"Briseida Chandler R Rashawn \"Kang\" is a 84 y.o. male   with PMH of A-fib, CAD status post three-vessel CABG [see below heart catheterization report for details], ischemic cardiomyopathy, HTN, HLD, diabetes, COPD, BRITT on CPAP, essential tremor, spinal stenosis, memory impairment, status post brain stimulator, and other comorbidities, who is to see Dr. Forbes before he retired.  Patient established cardiac care with us on 1/21/2025.  Patient continues to complain of progressive short of breath.  He took Lasix for 5 days and felt it helped with his cough but not ACKERMAN.  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  He is not very physically active.  He reports progressively and gradually worsening ACKERMAN with his daily activities.  Patient denies any orthopnea [uses only 1 pillows at nighttime] or PND.  He denies any palpitation.  He reports falling 1 time and feeling imbalance all the time.  He is compliant with his CPAP.  Patient is not on any aspirin or blood thinners. Patient was evaluated by pharmacy, did not qualify for DOAC, Entresto, Jardiance.    Patient denies any history of bleeding, black stool, or stroke.  Patient has been smoking 1 pack/day for 65 years.  Patient saw EP, Dr. Beard, in 2023 and the plan was to maintain rate control strategy as long as he is asymptomatic.    Patient is on aspirin 81 mg, lisinopril 10 mg, metoprolol succinate 50 mg, rosuvastatin 40 mg, Aldactone 25 mg, and  other noncardiac meds.  Today's blood pressure is 118/63, heart rate 64.    Patient is on metoprolol succinate 50 mg once daily, Aldactone 12.5 mg once daily, rosuvastatin 40 mg once daily, and other noncardiac meds.  Today's blood pressure is 127/72, HR 67.    Blood work from 11/16/2024 with creatinine 0.71, normal hemoglobin and platelet 132 [lowest 104K]. A1c was 8 from March 2024. His BNP on 5/9/2025 came elevated at 378 but he is in A-fib all the time.     TTE on 5/21/2025:   1. The left " ventricular systolic function is moderately decreased, with a visually estimated ejection fraction of 40%.   2. There is global hypokinesis of the left ventricle with minor regional variations.   3. Left ventricular cavity size is mild to moderately dilated.   4. The left atrium is mildly dilated.   5. Mild mitral valve regurgitation.   6. Upper limits of normal size aortic root.   7. The inferior vena cava appears mildly dilated, with IVC inspiratory collapse greater than 50%.    EKG from 7/10/2024:  Atrial fibrillation  Low voltage QRS  ST & T wave abnormality, consider anterolateral ischemia  QRS is 76 milliseconds.     TTE on 12/20/2024  1. The left ventricular systolic function is moderately decreased, with a visually estimated ejection fraction of 35-40%.   2. Abnormal wall motion.   3. Left ventricular cavity size is moderately dilated.   4. Abnormal septal motion consistent with post-operative status.   5. There is severe hypokinesis of the basal and mid inferoposterior wall.   6. There is normal right ventricular global systolic function.   7. Mild mitral valve regurgitation.   8. The inferior vena cava appears mildly dilated, with IVC inspiratory collapse greater than 50%.   9. There is plaque visualized in the ascending aorta.  Note: I reviewed all 3 echocardiograms of the patient's system, his EF appears similar among all echoes; close to 40%.     14-day heart monitor in 2023: Predominant rhythm of more than 99% is a flutter/A-fib.  Longest A-fib episode was 6 days and 16 hours, fastest episode was 111 bpm.  Heart rate between 41 and 142, average 75 bpm.  Rare PACs and PVCs.  5 occurrences of V. tach, longest for 19 beats and fastest for 142 bpm.  No triggers.     Coronary angiogram on 3/20/2020 with severe native three-vessel disease [10% left main, 100% occluded LAD after a small diagonal 1, 100% occluded LCx after small OM1, and 100% proximal RCA].  Occluded SVG to RCA.  Reported small subbranch of  left circumflex after the anastomosis of the LIMA that has 90% lesion and appears to be culprit of patient's elevated troponin but the branch deemed to be very small for any intervention per report.  SVG to LAD is patent      Review of Systems  ROS is negative other than in HPI.      Objective   Physical Exam  General: NAD  HEENT: IEOM, PERRL   Neck: No JVP at 45 degrees, no carotid bruit  Lungs: Coarse breath sounds bilaterally, no crackles.    Heart: Irregularly irregular, normal S1 and S2, no loud murmurs  Abdomen: Distended but soft, nontender, positive bowel sounds  Extremities: 3+ edema bilaterally.    Neurologic: No FND  Psychiatric: Normal mood and affect    Assessment/Plan   1-CAD:  -Coronary angiogram on 3/20/2020 with severe native three-vessel disease [10% left main, 100% occluded LAD after a small diagonal 1, 100% occluded LCx after small OM1, and 100% proximal RCA occlusion].  Occluded SVG to RCA.  Reported small subbranch of left circumflex after the anastomosis of the LIMA that has 90% lesion and appears to be culprit of patient's elevated troponin but the branch deemed to be very small for any intervention per report.  SVG to LAD is patent].  -His most recent echocardiogram from 12/20/2024 with LVEF around 40%, I reviewed all 3 echocardiograms available in the system, his LVEF has always been around 40% with global hypokinesis but worse in the inferior, inferoseptal, and inferolateral walls  -Patient has a chronic stable ACKERMAN likely related to his COPD, CAD, cardiomyopathy, and A-fib.  He denies any chest pain or chest pressure.  -Will obtain cardiac stress MRI to evaluate for ischemia and viability.  -Continue atorvastatin and aspirin 81 mg once daily.     2-cardiomyopathy:  -ACC/AHA stage C, NYHA class III.  -His most recent echocardiogram from 12/20/2024 with LVEF around 40%, I reviewed all 3 echocardiograms available in the system, his LVEF has always been around 40% with global hypokinesis but  worse in the inferior, inferoseptal, and inferolateral walls.  -Etiology is likely multifactorial; CAD and A-fib.  -He has a chronic stable ACKERMAN which could be also related to his COPD, A-fib, and CAD.  No evidence of volume overload by JVD on exam today.  -His most recent echocardiogram with CVP of 8, he continues to appear euvolemic on exam.  -Continue metoprolol succinate 50 mg once daily and Aldactone 12.5 mg once daily.  He does not qualify for Jardiance and Entresto.  Patient is following with heart failure pharmacy for further titration of his medicine and optimization.  -Daily weight.  Salt and fluid restriction.     3- A-fib:  -14-day heart monitor in 2023: Predominant rhythm of more than 99% is a flutter/A-fib.  Longest A-fib episode was 6 days and 16 hours, fastest episode was 111 bpm.  Heart rate between 41 and 142, average 75 bpm.  Rare PACs and PVCs.  5 occurrences of V. tach, longest for 19 beats and fastest for 142 bpm.  No triggers.  -BGL1BN2-HQAg score of 6 with 9% annual risk of stroke. HASBLED score of 4 with 9% risk for bleed.  -Will obtain updated 7-day heart monitor.  -Will refer to [facility] for evaluation of possible ablation given his low LVEF and ACKERMAN.  -We will refer to Dr. Jara for consideration of FRANCHESKA closure device.  Continue metoprolol.    4-smoking:  -Patient smokes 1 pack/day for last 65 years.  Patient is not willing to quit, not interested in smoking cessation aids.     RTC in 3 months.      Terra Hood MD

## 2025-06-20 DIAGNOSIS — Z95.1 HX OF CABG: Primary | ICD-10-CM

## 2025-06-20 RX ORDER — METOPROLOL SUCCINATE 50 MG/1
50 TABLET, EXTENDED RELEASE ORAL DAILY
Qty: 90 TABLET | Refills: 3 | Status: SHIPPED | OUTPATIENT
Start: 2025-06-20 | End: 2026-06-20

## 2025-06-24 DIAGNOSIS — F32.A DEPRESSION, UNSPECIFIED DEPRESSION TYPE: ICD-10-CM

## 2025-06-24 RX ORDER — FLUOXETINE HYDROCHLORIDE 40 MG/1
40 CAPSULE ORAL DAILY
Qty: 90 CAPSULE | Refills: 0 | Status: SHIPPED | OUTPATIENT
Start: 2025-06-24

## 2025-06-25 ENCOUNTER — HOSPITAL ENCOUNTER (OUTPATIENT)
Dept: CARDIOLOGY | Facility: CLINIC | Age: 85
Discharge: HOME | End: 2025-06-25
Payer: MEDICARE

## 2025-06-25 DIAGNOSIS — I48.91 ATRIAL FIBRILLATION, UNSPECIFIED TYPE (MULTI): ICD-10-CM

## 2025-06-25 PROCEDURE — 93246 EXT ECG>7D<15D RECORDING: CPT

## 2025-07-03 DIAGNOSIS — Z79.4 TYPE 2 DIABETES MELLITUS WITH DIABETIC POLYNEUROPATHY, WITH LONG-TERM CURRENT USE OF INSULIN: ICD-10-CM

## 2025-07-03 DIAGNOSIS — E11.42 TYPE 2 DIABETES MELLITUS WITH DIABETIC POLYNEUROPATHY, WITH LONG-TERM CURRENT USE OF INSULIN: ICD-10-CM

## 2025-07-03 RX ORDER — BLOOD-GLUCOSE SENSOR
1 EACH MISCELLANEOUS
Qty: 6 EACH | Refills: 0 | Status: SHIPPED | OUTPATIENT
Start: 2025-07-03

## 2025-07-03 RX ORDER — BLOOD-GLUCOSE SENSOR
EACH MISCELLANEOUS
Qty: 6 EACH | Refills: 0 | Status: SHIPPED | OUTPATIENT
Start: 2025-07-03

## 2025-07-30 ENCOUNTER — OFFICE VISIT (OUTPATIENT)
Dept: CARDIOLOGY | Facility: CLINIC | Age: 85
End: 2025-07-30
Payer: MEDICARE

## 2025-07-30 VITALS
HEART RATE: 94 BPM | OXYGEN SATURATION: 90 % | SYSTOLIC BLOOD PRESSURE: 103 MMHG | WEIGHT: 206.2 LBS | DIASTOLIC BLOOD PRESSURE: 62 MMHG | BODY MASS INDEX: 28.87 KG/M2 | HEIGHT: 71 IN

## 2025-07-30 DIAGNOSIS — Z00.6 ENCOUNTER FOR EXAMINATION FOR NORMAL COMPARISON AND CONTROL IN CLINICAL RESEARCH PROGRAM: ICD-10-CM

## 2025-07-30 DIAGNOSIS — I48.19 PERSISTENT ATRIAL FIBRILLATION (MULTI): Primary | ICD-10-CM

## 2025-07-30 DIAGNOSIS — I48.91 ATRIAL FIBRILLATION, UNSPECIFIED TYPE (MULTI): ICD-10-CM

## 2025-07-30 PROCEDURE — 3078F DIAST BP <80 MM HG: CPT | Performed by: INTERNAL MEDICINE

## 2025-07-30 PROCEDURE — 99212 OFFICE O/P EST SF 10 MIN: CPT

## 2025-07-30 PROCEDURE — 1159F MED LIST DOCD IN RCRD: CPT | Performed by: INTERNAL MEDICINE

## 2025-07-30 PROCEDURE — 99204 OFFICE O/P NEW MOD 45 MIN: CPT | Performed by: INTERNAL MEDICINE

## 2025-07-30 PROCEDURE — 1126F AMNT PAIN NOTED NONE PRSNT: CPT | Performed by: INTERNAL MEDICINE

## 2025-07-30 PROCEDURE — 3074F SYST BP LT 130 MM HG: CPT | Performed by: INTERNAL MEDICINE

## 2025-07-30 ASSESSMENT — ENCOUNTER SYMPTOMS
DEPRESSION: 0
LOSS OF SENSATION IN FEET: 0
OCCASIONAL FEELINGS OF UNSTEADINESS: 0

## 2025-07-30 ASSESSMENT — PAIN SCALES - GENERAL: PAINLEVEL_OUTOF10: 0-NO PAIN

## 2025-07-30 NOTE — PROGRESS NOTES
"  PCP: Dr. Newsome  Cardio: Dr. Hood    I was asked by Dr. Hood to evaluate this patient in consultation for evaluation of left atrial appendage closure.    The patient is a 84 y.o.  male with PMH of CAD s/p CABG, HTN, DM, HLD, COPD, BRITT, ischemic cardiomyopathy (EF 40%), 1 PPD smoker, spinal stenosis, persistent AF, and essential tremor. The patient has reduced EF of 40%. Patient has had 4-5 falls in the last year and they are due to gait instability. This is the result of severe diabetic peripheral neuropathy. Patient is not on anticoagulation likely due to his high fall risk.    Given the patient's significant fall risk and gait instability and inability to be on anticoagulation, the patient is referred for consideration of left atrial appendage closure for stroke risk reduction.       ROS:  Constitutional: no fatigue, no fevers, no body aches  Eyes: no acute eye problems, no blurred vision, no diplopia, no eye pain  ENT:  no acute hearing loss, no earache, no sore throat  Cardiovascular: dyspnea on exertion, no chest pain, LE edema  Respiratory: chronic cough, not coughing up sputum, no wheezing that is consistent with asthma  Gastrointestinal: no acute bowel complaints  Musculoskeletal: no acute arthralgias, no acute myalgias, no acute joint swelling  Skin: no skin rashes, no change in skin color and pigmentation, no skin lesions and no skin lumps.   Neurological: no headaches, no dizziness, no tingling, no fainting and no limb weakness.   Psychiatric:  no suicidal ideation, no confusion, no personality change and no emotional problems.   Hematologic/Lymphatic: no bleeding issues, other then mentioned in HPI  All other systems have been reviewed and are negative for complaint.     Physical Exam:     Visit Vitals  /62 (BP Location: Left arm, Patient Position: Sitting, BP Cuff Size: Adult)   Pulse 94   Ht 1.803 m (5' 11\")   Wt 93.5 kg (206 lb 3.2 oz)   SpO2 90%   BMI 28.76 kg/m²   Smoking Status " Every Day   BSA 2.16 m²        Constitutional: alert and in no acute distress.   Eyes: no erythema, swelling or discharge from the eye .   Ears, Nose, Mouth, and Throat: external inspection of ears and nose is normal , lips, teeth, and gums are normal with good dentition  and oropharynx normal with no erythema, edema, exudate or lesions .   Neck: neck is supple, symmetric, trachea midline, no masses  and no thyromegaly .   Pulmonary: mild end expiratory wheezing  Cardiovascular: Irregular no murmur, 2+ b/l LE edema, non-displaced PMI, no S3 or S4  Abdomen: abdomen non-tender, no masses  and no hepatomegaly .           Skin:  no skin lesions          Neurologic: non-focal neurologic examination.      Psychiatric judgment and insight is normal , oriented to person, place and time , normal mood and affect .       Labs:    Results for orders placed or performed during the hospital encounter of 05/30/25   POCT GLUCOSE    Collection Time: 05/30/25  8:06 AM   Result Value Ref Range    POCT Glucose 163 (H) 74 - 99 mg/dL   Surgical Pathology Exam    Collection Time: 05/30/25  8:31 AM   Result Value Ref Range    Case Report       Surgical Pathology                                Case: [id number]                                  Authorizing Provider:  Mando Perez MD     Collected:           05/30/2025 0831              Ordering Location:     [facility]    Received:            05/30/2025 1122              Pathologist:           Harman Queen MD                                                            Specimens:   A) - COLON - TRANSVERSE POLYP                                                                       B) - COLON - ASCENDING POLYP, proximal ascending                                                    C) - ASCENDING COLON POLYP, mid ascending                                                           D) - COLON - DESCENDING POLYP                                                                       " E) - COLON - SIGMOID POLYP                                                                 FINAL DIAGNOSIS       A. Transverse colon, polyps (x3), polypectomies:  - Fragments of tubular adenoma.    B. Proximal ascending colon, polyps (x4), polypectomies:  - Fragments of tubular adenoma.    C. Mid ascending colon, polyps (x3), polypectomies:  - Fragments of tubular adenoma.    D. Descending colon, polyps (x5), polypectomies:  - Fragments of tubular adenoma.    E. Sigmoid colon, polyps (x2), polypectomies:  - Fragments of tubular adenoma.                By the signature on this report, the individual or group listed as making the Final Interpretation/Diagnosis certifies that they have reviewed this case.         Resident Review       Justino Huntley MD        Gross Description       A: Received in formalin, labeled with the patient's name, hospital number and \"1, transverse polyp\",  are multiple fragments of tan soft tissue aggregating to 1.3 x 0.7 x 0.2 cm. The specimen is submitted in toto in 1 cassette.  KE  B: Received in formalin, labeled with the patient's name, hospital number and \"2, ascending polyp\", are multiple fragments of tan soft tissue aggregating to 2.2 x 1.0 x 0.2 cm. The specimen is submitted in toto in 4 cassettes.  KE  C: Received in formalin, labeled with the patient's name, hospital number and \"3, mid ascending colon polyp\", are multiple fragments of tan soft tissue aggregating to 2.5 x 0.9 x 0.3 cm. The specimen is submitted in toto in 2 cassettes.  KE  D: Received in formalin, labeled with the patient's name, hospital number and \"4, descending biopsy\", are multiple fragments of tan soft tissue aggregating to 1.4 x 0.7 x 0.2 cm. The specimen is submitted in toto in 2 cassettes.  KE  E: Received in formalin, labeled with the patient's name, hospital number and \"5, sigmoid polyp\", are multiple fragments of tan soft tissue aggregating to 1.5 x 1.0 x 0.2 cm. The specimen is submitted in toto " "in 1 cassettes.  KE       *Note: Due to a large number of results and/or encounters for the requested time period, some results have not been displayed. A complete set of results can be found in Results Review.          Medications:    Current Outpatient Medications   Medication Instructions    ascorbic acid (VITAMIN C) 1,000 mg, Daily    aspirin 81 mg, oral, Daily    b complex vitamins capsule 1 capsule, Daily    blood-glucose sensor (FreeStyle Tricia 3 Plus Sensor) device USE 1 EACH EVERY 14 (FOURTEEN) DAYS.    blood-glucose sensor (FreeStyle Tricia 3 Sensor) device 1 each, miscellaneous, Every 14 days    cholecalciferol (VITAMIN D-3) 50 mcg, Daily    FLUoxetine (PROZAC) 40 mg, oral, Daily    fluticasone propion-salmeteroL (Advair Diskus) 250-50 mcg/dose diskus inhaler PLEASE SEE ATTACHED FOR DETAILED DIRECTIONS    furosemide (LASIX) 20 mg, oral, Daily    gabapentin (NEURONTIN) 600 mg    insulin degludec (TRESIBA FLEXTOUCH U-200) 50 Units, subcutaneous, Nightly, Take as directed per insulin instructions.    insulin lispro-aabc (Lyumjev KwikPen U-100 Insulin) 100 unit/mL insulin pen Up to 80 units daily    lisinopril 10 mg, oral, Daily    metoprolol succinate XL (TOPROL-XL) 50 mg, oral, Daily    MULTIVITAMIN ORAL 1 tablet, Daily    nystatin (Mycostatin) cream Topical, 2 times daily    omeprazole (PRILOSEC) 40 mg, oral, Daily before breakfast, Do not crush or chew.    pen needle, diabetic (BD [place] 2nd Gen Pen Needle) 32 gauge x 5/32\" needle Use qid    pramipexole (Mirapex) 0.25 mg tablet     rosuvastatin (CRESTOR) 40 mg, oral, Daily    spironolactone (Aldactone) 25 mg tablet 0.5 tablets, Daily    traZODone (Desyrel) 50 mg tablet TAKE 1 TO 2 TABLETS BY MOUTH AT BEDTIME          Assessment/Plan:      This is a 84 y.o. male with PMH of persistent AF, CAD s/p CABG, HTN, DM, HLD, COPD, BRITT, ischemic cardiomyopathy (EF 40%), 1 PPD smoker, spinal stenosis, and essential tremor. He has very frequent falls and significant gait " instability due to severe peripheral diabetic neuropathy precluding him from being on anticoagulation.    The [id number] Stroke Risk Points: 6   Values used to calculate this score:    Points  Metrics       1        Has Congestive Heart Failure: Yes       1        Has Hypertension: Yes       2        Age: 84       1        Has Diabetes: Yes       0        Had Stroke: No                 Had TIA: No                 Had Thromboembolism: No       1        Has Vascular Disease: Yes       0        Clinically Relevant Sex: Male   and HAS-BLED score is 4. The patient is at increased risk of both bleeding and stroke.  As such the patient is a reasonable candidate for consideration of left atrial appendage occluder placement.    Today we discussed the left atrial appendage closure procedure. The patient was given written educational handout materials and watched an educational video. All risks, benefits and alternative were discussed.     The risks discussed included but were not limited to vascular complications, sedation related complications, risk of MI, CVA, device embolization, pericardial tamponade and death. The patient verbalized understanding and decided to proceed.         The patient requires CT for planning and to exclude FRANCHESKA thrombus. In addition, the patient will also need a CT scan 3 months after the procedure, to evaluate the device for position, thrombus and darnell-device leak. Following device implant, strategy will be low dose Eliquis 2.5 mg twice daily for 3 month and aspirin monotherapy for life thereafter.    Thank you, Dr. Hood, for this consultation and for allowing me to participate in the care of this patient.      Kody Jara MD  , Interventional Cardiology Fellowship Program   [facility]   [facility]  Office Phone Number: [phone]

## 2025-07-31 PROBLEM — Z00.6 ENCOUNTER FOR EXAMINATION FOR NORMAL COMPARISON AND CONTROL IN CLINICAL RESEARCH PROGRAM: Status: ACTIVE | Noted: 2025-07-30

## 2025-08-01 ENCOUNTER — OFFICE VISIT (OUTPATIENT)
Dept: PRIMARY CARE | Facility: CLINIC | Age: 85
End: 2025-08-01
Payer: MEDICARE

## 2025-08-01 ENCOUNTER — HOSPITAL ENCOUNTER (OUTPATIENT)
Dept: RADIOLOGY | Facility: CLINIC | Age: 85
Discharge: HOME | End: 2025-08-01
Payer: MEDICARE

## 2025-08-01 VITALS
BODY MASS INDEX: 28.87 KG/M2 | HEART RATE: 103 BPM | WEIGHT: 207 LBS | OXYGEN SATURATION: 96 % | TEMPERATURE: 97.7 F | SYSTOLIC BLOOD PRESSURE: 137 MMHG | DIASTOLIC BLOOD PRESSURE: 80 MMHG

## 2025-08-01 DIAGNOSIS — R11.0 NAUSEA: ICD-10-CM

## 2025-08-01 DIAGNOSIS — E11.9 TYPE 2 DIABETES MELLITUS WITHOUT COMPLICATION, WITH LONG-TERM CURRENT USE OF INSULIN: ICD-10-CM

## 2025-08-01 DIAGNOSIS — R05.1 ACUTE COUGH: Primary | ICD-10-CM

## 2025-08-01 DIAGNOSIS — R05.1 ACUTE COUGH: ICD-10-CM

## 2025-08-01 DIAGNOSIS — Z79.4 TYPE 2 DIABETES MELLITUS WITHOUT COMPLICATION, WITH LONG-TERM CURRENT USE OF INSULIN: ICD-10-CM

## 2025-08-01 PROBLEM — D69.1 PLATELET DISORDER (MULTI): Status: RESOLVED | Noted: 2024-04-03 | Resolved: 2025-08-01

## 2025-08-01 PROBLEM — J96.90 RESPIRATORY FAILURE: Status: RESOLVED | Noted: 2024-03-20 | Resolved: 2025-08-01

## 2025-08-01 LAB — POC FINGERSTICK BLOOD GLUCOSE: 198 MG/DL (ref 70–100)

## 2025-08-01 PROCEDURE — G2211 COMPLEX E/M VISIT ADD ON: HCPCS

## 2025-08-01 PROCEDURE — 71046 X-RAY EXAM CHEST 2 VIEWS: CPT | Performed by: RADIOLOGY

## 2025-08-01 PROCEDURE — 71046 X-RAY EXAM CHEST 2 VIEWS: CPT

## 2025-08-01 PROCEDURE — 1126F AMNT PAIN NOTED NONE PRSNT: CPT

## 2025-08-01 PROCEDURE — 82962 GLUCOSE BLOOD TEST: CPT

## 2025-08-01 PROCEDURE — 1159F MED LIST DOCD IN RCRD: CPT

## 2025-08-01 PROCEDURE — 3075F SYST BP GE 130 - 139MM HG: CPT

## 2025-08-01 PROCEDURE — 1160F RVW MEDS BY RX/DR IN RCRD: CPT

## 2025-08-01 PROCEDURE — 99213 OFFICE O/P EST LOW 20 MIN: CPT

## 2025-08-01 PROCEDURE — 3079F DIAST BP 80-89 MM HG: CPT

## 2025-08-01 RX ORDER — ONDANSETRON 4 MG/1
4 TABLET, ORALLY DISINTEGRATING ORAL EVERY 8 HOURS PRN
Qty: 20 TABLET | Refills: 0 | Status: SHIPPED | OUTPATIENT
Start: 2025-08-01 | End: 2025-08-08

## 2025-08-01 RX ORDER — METHYLPREDNISOLONE 4 MG/1
TABLET ORAL
Qty: 21 TABLET | Refills: 0 | Status: SHIPPED | OUTPATIENT
Start: 2025-08-01 | End: 2025-08-07

## 2025-08-01 RX ORDER — AMOXICILLIN AND CLAVULANATE POTASSIUM 875; 125 MG/1; MG/1
875 TABLET, FILM COATED ORAL 2 TIMES DAILY
Qty: 14 TABLET | Refills: 0 | Status: SHIPPED | OUTPATIENT
Start: 2025-08-01 | End: 2025-08-08

## 2025-08-01 ASSESSMENT — PAIN SCALES - GENERAL: PAINLEVEL_OUTOF10: 0-NO PAIN

## 2025-08-01 NOTE — PROGRESS NOTES
"Subjective   Patient ID: Ramesh Morocho \"Kang\" is a 84 y.o. male who presents for Sick Visit (Pt c/o cough and nausea x 1 week with no improvement.)    Kang is an 85 yo male presenting with sick symptoms for 1 week. He has a worsening cough and some SOB in the setting of COPD - reports he hasn't been using his inhalers or taking any of his medications because he feels sick. Has had nausea and loss of appetite. Denies abdominal pain, vomiting or diarrhea. He denies any fevers but reports some sweating. He feels very fatigued \"ran over by a train.\"  Denies any chest pain or worsening leg swelling. Denies sore throat or ear pain.       Patient Care Team:  Karla Newsome PA-C as PCP - General  Marta Rome DO as PCP - [employer] PCP  BHAVANI Pulido MD as Surgeon (Gastroenterology)    PMH, PSH, family history and social history were reviewed and updated.    Review of Systems  All other systems have been reviewed and are negative except as noted in the HPI.     Objective   /80   Pulse 103   Temp 36.5 °C (97.7 °F) (Temporal)   Wt 93.9 kg (207 lb)   SpO2 96%   BMI 28.87 kg/m²     Physical Exam  Vitals and nursing note reviewed.   Constitutional:       General: He is not in acute distress.     Appearance: Normal appearance. He is not ill-appearing.   HENT:      Nose: Congestion present.      Mouth/Throat:      Mouth: Mucous membranes are moist.      Pharynx: Oropharynx is clear.     Cardiovascular:      Rate and Rhythm: Normal rate and regular rhythm.      Heart sounds: Normal heart sounds. No murmur heard.     No friction rub. No gallop.   Pulmonary:      Effort: No respiratory distress.      Breath sounds: Normal breath sounds. No stridor. No wheezing, rhonchi or rales.      Comments: Increased effort  Chest:      Chest wall: No tenderness.   Abdominal:      General: Abdomen is flat.      Palpations: Abdomen is soft.      Tenderness: There is no abdominal " tenderness. There is no guarding.     Musculoskeletal:      Cervical back: Normal range of motion and neck supple. No rigidity or tenderness.     Skin:     General: Skin is warm and dry.     Neurological:      Mental Status: He is alert and oriented to person, place, and time.     Psychiatric:         Mood and Affect: Mood normal.         Behavior: Behavior normal.         Assessment/Plan   Assessment & Plan  Acute cough    Orders:    ondansetron ODT (Zofran-ODT) 4 mg disintegrating tablet; Dissolve 1 tablet (4 mg) in the mouth every 8 hours if needed for nausea or vomiting for up to 7 days.    XR chest 2 views; Future    methylPREDNISolone (Medrol Dospak) 4 mg tablets; Take as directed on package.    amoxicillin-clavulanate (Augmentin) 875-125 mg tablet; Take 1 tablet (875 mg of amoxicillin) by mouth 2 times a day for 7 days.    Will get chest XR to rule out pneumonia or other concerning causes.   Start steroid pack for 6 days to assist with SOB -- patient will restart using his inhalers and other medicines -- has not been taking insulin. Glucose checked in office - 198. Patient will use zofran to begin eating again and will adjust for any higher sugars with his insulin.   Will start 7 days of antibiotics. Please take with food.  Patient and wife verbally agreed if any worsening symptoms - weakness, chest pain, worsening shortness of breath etc - they will present to the ER.     Nausea    Orders:    ondansetron ODT (Zofran-ODT) 4 mg disintegrating tablet; Dissolve 1 tablet (4 mg) in the mouth every 8 hours if needed for nausea or vomiting for up to 7 days.    XR chest 2 views; Future    Type 2 diabetes mellitus without complication, with long-term current use of insulin    Orders:    POCT Fingerstick Glucose manually resulted      Follow up next week if no improvement, sooner with any problems or concerns.

## 2025-08-13 ENCOUNTER — OFFICE VISIT (OUTPATIENT)
Dept: CARDIOLOGY | Facility: CLINIC | Age: 85
End: 2025-08-13
Payer: MEDICARE

## 2025-08-13 VITALS
DIASTOLIC BLOOD PRESSURE: 72 MMHG | BODY MASS INDEX: 30.1 KG/M2 | OXYGEN SATURATION: 98 % | SYSTOLIC BLOOD PRESSURE: 128 MMHG | WEIGHT: 215 LBS | HEART RATE: 80 BPM | HEIGHT: 71 IN

## 2025-08-13 DIAGNOSIS — I50.9 CHRONIC HEART FAILURE, UNSPECIFIED HEART FAILURE TYPE: ICD-10-CM

## 2025-08-13 DIAGNOSIS — I48.91 ATRIAL FIBRILLATION, UNSPECIFIED TYPE (MULTI): Primary | ICD-10-CM

## 2025-08-13 DIAGNOSIS — Z86.39 HISTORY OF DIABETES MELLITUS: ICD-10-CM

## 2025-08-13 DIAGNOSIS — I12.9 HYPERTENSIVE RENAL DISEASE: ICD-10-CM

## 2025-08-13 DIAGNOSIS — Z95.1 HX OF CABG: ICD-10-CM

## 2025-08-13 LAB
ATRIAL RATE: 20 BPM
Q ONSET: 215 MS
QRS COUNT: 11 BEATS
QRS DURATION: 132 MS
QT INTERVAL: 494 MS
QTC CALCULATION(BAZETT): 505 MS
QTC FREDERICIA: 502 MS
R AXIS: 95 DEGREES
T AXIS: -87 DEGREES
T OFFSET: 462 MS
VENTRICULAR RATE: 63 BPM

## 2025-08-13 PROCEDURE — 3078F DIAST BP <80 MM HG: CPT | Performed by: INTERNAL MEDICINE

## 2025-08-13 PROCEDURE — 3074F SYST BP LT 130 MM HG: CPT | Performed by: INTERNAL MEDICINE

## 2025-08-13 PROCEDURE — 1160F RVW MEDS BY RX/DR IN RCRD: CPT | Performed by: INTERNAL MEDICINE

## 2025-08-13 PROCEDURE — 1159F MED LIST DOCD IN RCRD: CPT | Performed by: INTERNAL MEDICINE

## 2025-08-13 PROCEDURE — 99214 OFFICE O/P EST MOD 30 MIN: CPT | Performed by: INTERNAL MEDICINE

## 2025-08-13 PROCEDURE — G2211 COMPLEX E/M VISIT ADD ON: HCPCS | Performed by: INTERNAL MEDICINE

## 2025-08-13 PROCEDURE — 99212 OFFICE O/P EST SF 10 MIN: CPT

## 2025-08-13 PROCEDURE — 93005 ELECTROCARDIOGRAM TRACING: CPT | Performed by: INTERNAL MEDICINE

## 2025-08-13 RX ORDER — BUDESONIDE, GLYCOPYRROLATE, AND FORMOTEROL FUMARATE 160; 9; 4.8 UG/1; UG/1; UG/1
2 AEROSOL, METERED RESPIRATORY (INHALATION) 2 TIMES DAILY
COMMUNITY

## 2025-08-13 RX ORDER — ALBUTEROL SULFATE 90 UG/1
2 INHALANT RESPIRATORY (INHALATION) EVERY 4 HOURS PRN
COMMUNITY
Start: 2025-07-10

## 2025-08-13 RX ORDER — FLUTICASONE FUROATE, UMECLIDINIUM BROMIDE AND VILANTEROL TRIFENATATE 200; 62.5; 25 UG/1; UG/1; UG/1
1 POWDER RESPIRATORY (INHALATION)
COMMUNITY
Start: 2025-06-19

## 2025-08-13 ASSESSMENT — ENCOUNTER SYMPTOMS: DEPRESSION: 0

## 2025-10-27 ENCOUNTER — APPOINTMENT (OUTPATIENT)
Facility: CLINIC | Age: 85
End: 2025-10-27
Payer: MEDICARE

## 2025-10-29 ENCOUNTER — APPOINTMENT (OUTPATIENT)
Dept: CARDIOLOGY | Facility: CLINIC | Age: 85
End: 2025-10-29
Payer: MEDICARE